# Patient Record
Sex: FEMALE | Race: WHITE | Employment: OTHER | URBAN - METROPOLITAN AREA
[De-identification: names, ages, dates, MRNs, and addresses within clinical notes are randomized per-mention and may not be internally consistent; named-entity substitution may affect disease eponyms.]

---

## 2019-02-04 VITALS
RESPIRATION RATE: 18 BRPM | OXYGEN SATURATION: 96 % | WEIGHT: 212 LBS | HEART RATE: 71 BPM | TEMPERATURE: 97 F | DIASTOLIC BLOOD PRESSURE: 60 MMHG | SYSTOLIC BLOOD PRESSURE: 142 MMHG

## 2019-02-04 RX ORDER — TAMSULOSIN HYDROCHLORIDE 0.4 MG/1
0.8 CAPSULE ORAL AT BEDTIME
COMMUNITY
End: 2019-06-12

## 2019-02-04 RX ORDER — ACETAMINOPHEN 500 MG
1000 TABLET ORAL EVERY MORNING
COMMUNITY
End: 2020-12-01

## 2019-02-04 RX ORDER — ASPIRIN 81 MG/1
81 TABLET, CHEWABLE ORAL DAILY
Status: ON HOLD | COMMUNITY
End: 2019-04-17

## 2019-02-04 RX ORDER — LIDOCAINE 50 MG/G
1 PATCH TOPICAL EVERY 24 HOURS
COMMUNITY
End: 2020-02-14

## 2019-02-05 ENCOUNTER — NURSING HOME VISIT (OUTPATIENT)
Dept: GERIATRICS | Facility: CLINIC | Age: 79
End: 2019-02-05
Payer: COMMERCIAL

## 2019-02-05 DIAGNOSIS — I48.19 PERSISTENT ATRIAL FIBRILLATION (H): ICD-10-CM

## 2019-02-05 DIAGNOSIS — G93.41 ACUTE METABOLIC ENCEPHALOPATHY DUE TO HYPOGLYCEMIA: Primary | ICD-10-CM

## 2019-02-05 DIAGNOSIS — E11.649 HYPOGLYCEMIA ASSOCIATED WITH TYPE 2 DIABETES MELLITUS (H): ICD-10-CM

## 2019-02-05 DIAGNOSIS — I10 ESSENTIAL HYPERTENSION: ICD-10-CM

## 2019-02-05 DIAGNOSIS — R05.9 COUGH: ICD-10-CM

## 2019-02-05 DIAGNOSIS — R13.12 OROPHARYNGEAL DYSPHAGIA: ICD-10-CM

## 2019-02-05 DIAGNOSIS — E16.2 ACUTE METABOLIC ENCEPHALOPATHY DUE TO HYPOGLYCEMIA: Primary | ICD-10-CM

## 2019-02-05 DIAGNOSIS — I50.22 CHRONIC SYSTOLIC HEART FAILURE (H): ICD-10-CM

## 2019-02-05 DIAGNOSIS — E03.9 ACQUIRED HYPOTHYROIDISM: ICD-10-CM

## 2019-02-05 PROCEDURE — 99310 SBSQ NF CARE HIGH MDM 45: CPT | Performed by: NURSE PRACTITIONER

## 2019-02-05 NOTE — LETTER
"    2/5/2019        RE: Chantal Cosby  [address]  [place] [address]              [facility] GERIATRIC SERVICES  PRIMARY CARE PROVIDER AND CLINIC:  No primary care provider on file. No primary physician on file.  Chief Complaint   Patient presents with     Hospital F/U     [place] Medical Record Number:  [id number]  Place of Service where encounter took place:  [facility] [facility] [[id number]]    HPI:    Chantal Cosby is a 78 year old  (1940),admitted to the above facility from  [facility] .  Hospital stay 1/8/19 through 2/1/19.  Admitted to this facility for  rehab, medical management and nursing care.  HPI information obtained from: Care Everywhere Epic chart review.  Current issues are:        1. Acute metabolic encephalopathy due to hypoglycemia    2. Hypoglycemia associated with type 2 diabetes mellitus (H)    3. Persistent atrial fibrillation (H)    4. Acquired hypothyroidism    5. Essential hypertension    6. Oropharyngeal dysphagia    7. Chronic systolic heart failure (H)      Came to see Kristine today as she was in the recliner in her room.  Chantal came to [place] from [facility] after intense therapies after her hospitalization for metabolic encephalopathy.    She was able to answer simple questions.  Her concern was this cough she has had for 2 days.  Is a risk for aspiration due to dysphagia.  Chantal asked if she could have Vicks Vapor Rub for her chest and \"whatever you can do\".  Noted to be up 4 lbs since admission.    Blood sugars taken twice a day.  No diabetic medication noted.    See summary below for details of hospitalization:      Copied from [facility] discharge note:  BRIEF HISTORY OF PRESENT ILLNESS: Kristine Cosby is a 78 y.o. female with past medical history of hypertension, diabetes mellitus type 2, chronic systolic heart failure, hyperlipidemia, hypothyroidism, CAD with STEMI s/p drug eluting stent 2016, prior right frontal infarct " "with no residual effects ~2008, and atrial fibrillation (not anticoagulated due to GI bleed on Pradaxa 2017) who presented to [facility] emergency department via EMS on 12/26/18 with altered mental status.     Of note, she was recently treated with urinary tract infection with Bactrim from 12/18/18-12/23/18. She had reported feeling lightheaded for the one week prior to hospitalization. She was with family on 12/25/18 in usual state of health. She cancelled further plans with family as she was not feeling well. That evening when daughter went by patient's home, she was noted to be in bed and was snoring so daughter did not disturb. She was next seen on 12/26/18 evening after family could not reach her on phone. When family arrived, she was still in her bed and not arousable and had \"dried foaming\" around mouth and what family described as possible seizure. EMS was called and she was found to have blood glucose of 31. She was given dextrose and transported to ED. She has a history of having one seizure after a stroke 10 years ago and is not on any antiepileptic medications at home.      At [facility] emergency department, CT brain showed no acute intracranial pathology. There was known right frontal encephalomalacia and diffuse volume loss also. Labs showed normal lactate and ammonia but she had slightly elevated troponin at 0.994, CK at 5231, and . She was started on diltiazem drip for atrial fibrillation with rapid ventricular response. She developed agonal breathing and was intubated for airway protection. She was admitted to ICU for further care needs. EEG did not show any syndromic epilepsy but did showed findings consistent with moderate to severe encephalopathy. No need for AED per neuro as seizure event most likely triggered by hypoglycemia. Echocardiogram revealed hypokinesis with EF 35-40% which was similar to prior 8/2017 echocardiogram findings. A nasogastric feeding tube was placed for " dysphagia.      MRI brain showed old right frontal infarct and moderately extensive changes of small vessel vascular disease and old lacunar type infarcts. She was extubated and transferred to floor.      CT Abdomen Pelvis was completed for anticipated PEG tube placement which revealed incidental 0.2 cm right middle lobe nodule and was recommended to follow up outpatient with [facility]. There was also noted age indeterminate T9 and T10 compression deformities but imaging had limited evaluation due to bilateral hip arthroplasties causing streak artifact. A large hiatal hernia was observed (per chart review, known since EGD 8/2017). After discussion with patient/family, it was decided not to pursue PEG due to very large hiatal hernia. Szymanski catheter removed 1/1/19 and she continued with urinary incontinence.      XR Video Swallow Study with Speech on 1/4/19 showed aspiration of thins, penetration without aspiration of nectar, and pooling in proximal esophagus. She was started on NDD1/nectar thickened liquids dysphagia diet with 1:1 supervision at meals.      On 1/7/19, she was hypotensive due to volume depletion in setting of multiple hypertensives. She received IV fluids and ACE/lasix was discontinued. Hypotension continued and metoprolol was held. Palliative care followed and confirmed DNR status after meeting with patient/family. Patient was coughing with nectar thickened liquids on 1/8/19. SLP evaluated and downgraded diet to NDD1/HONEY thickened liquids by teaspoon only until patient can consistently use compensatory strategies of swallow X 2 per bolus and volitional cough.      She was requiring moderate to total assist for ADLs and total assist for transfers/mobility. She was independent of ADLs/IADls and mobility prior to hospitalization. She was below functional baseline and admitted to [facility] acute inpatient rehabilitation on 1/8/19.     CODE STATUS/ADVANCE DIRECTIVES DISCUSSION:   DNR /  DNI  Patient's living condition: lives alone    ALLERGIES:Patient has no allergy information on record.  PAST MEDICAL HISTORY:  has a past medical history of Cellulitis of left lower extremity, GERD (gastroesophageal reflux disease), HTN (hypertension), Type 2 diabetes mellitus without complication (H), and Ulcer of left lower extremity with fat layer exposed (H).  PAST SURGICAL HISTORY:  has a past surgical history that includes Hysterectomy and ESOPHAGOGASTRODUODENOSCOPY.  FAMILY HISTORY: family history includes Lung Cancer in her father.  SOCIAL HISTORY:  reports that she has quit smoking. She has quit using smokeless tobacco. She reports that she does not drink alcohol.    Post Discharge Medication Reconciliation Status: discharge medications reconciled, continue medications without change.  Current Outpatient Medications   Medication Sig Dispense Refill     acetaminophen (TYLENOL) 500 MG tablet Take 500 mg by mouth 3 times daily       aspirin (ASA) 81 MG chewable tablet Take 81 mg by mouth daily       cholecalciferol 1000 units TABS Take 2,000 Units by mouth daily       guaiFENesin (ROBITUSSIN) 100 MG/5ML SYRP Take 10 mLs by mouth every 4 hours as needed for cough       lidocaine (LIDODERM) 5 % patch Place 1 patch onto the skin every 24 hours       Skin Protectants, Misc. (EUCERIN) cream Apply topically 2 times daily ; apply to arms, legs, hands, feet for dry skin       tamsulosin (FLOMAX) 0.4 MG capsule Take 0.8 mg by mouth At Bedtime         ROS:  10 point ROS of systems including Constitutional, Eyes, Respiratory, Cardiovascular, Gastroenterology, Genitourinary, Integumentary, Musculoskeletal, Psychiatric were all negative except for pertinent positives noted in my HPI.    Exam:  /60   Pulse 71   Temp 97  F (36.1  C)   Resp 18   Wt 96.2 kg (212 lb)   SpO2 96%   GENERAL APPEARANCE:  Alert, cooperative, little coughing through out the visit noted.  EYES:  PERRL, Conjunctiva and lids normal  RESP:   wheezing heard in lung fields.  congested cough but no production noted to see phlegm.  no oxygen used  CV:  Palpation and auscultation of heart done , regular rate and rhythm, no murmur, rub, or gallop, no edema  ABDOMEN:  normal bowel sounds, soft, nontender, no hepatosplenomegaly or other masses, no guarding or rebound  M/S:   Gait and station abnormal uses a walker for transfers, w/c for mobility, able to feed self  SKIN:  Inspection of skin and subcutaneous tissue baseline, Palpation of skin and subcutaneous tissue baseline  PSYCH:  oriented to person and time which is vague.  did not remember what town she lives in, memory impaired , affect and mood normal    Lab/Diagnostic data:  Hemoglobin AM (01/29/2019 5:30 AM)  Hemoglobin AM (01/29/2019 5:30 AM)   Component Value Ref Range Performed At   HEMOGLOBIN  11.7 (L) 12.0 - 16.0 g/dL [facility]     Basic metabolic panel AM (01/29/2019 5:30 AM)  Only the most recent of 10 results within the time period is included.    Basic metabolic panel AM (01/29/2019 5:30 AM)   Component Value Ref Range Performed At   SODIUM 138 135 - 145 mmol/L [facility]   POTASSIUM 4.6 3.5 - 5.0 mmol/L [facility]   CHLORIDE 108 98 - 110 mmol/L [facility]   CO2,TOTAL 24 21 - 31 mmol/L [facility]   ANION GAP 6 5 - 18  [facility]   GLUCOSE 123 (H) 65 - 100 mg/dL [facility]   CALCIUM 9.1 8.5 - 10.5 mg/dL [facility]   BUN 12 8 - 25 mg/dL [facility]   CREATININE 0.85 0.57 - 1.11 mg/dL [facility]   BUN/CREAT RATIO  14 10 - 20  [facility]   GFR if African American >60 >60 ml/min/1.73m2 [facility]   GFR if not  African American >60 >60 ml/min/1.73m2 [facility]     RENAL FUNCTION PANEL (01/24/2019 10:55 AM)  RENAL FUNCTION PANEL (01/24/2019 10:55 AM)   Component Value Ref Range Performed At   SODIUM 138 135 - 145 mmol/L [facility]   POTASSIUM 4.3 3.5 - 5.0 mmol/L [facility]   CHLORIDE 108 98 - 110 mmol/L [facility]   CO2,TOTAL 22 21 - 31 mmol/L [facility]   ANION GAP 8 5 - 18  [facility]   GLUCOSE 195 (H) 65 - 100 mg/dL [facility]   CALCIUM 9.0 8.5 - 10.5 mg/dL [facility]   BUN 13 8 - 25 mg/dL [facility]   CREATININE 1.03 0.57 - 1.11 mg/dL [facility]   BUN/CREAT RATIO  13 10 - 20  [facility]   GFR if African American >60 >60 ml/min/1.73m2 [facility]   GFR if not African American 52 (L) >60 ml/min/1.73m2 [facility]   PHOSPHORUS 3.3 2.3 - 4.7 mg/dL [facility]   ALBUMIN 3.3 3.2 - 4.6 g/dL [facility]     CBC W PLT NO DIFF (01/15/2019 4:48 AM)  Only the most recent of 3 results within the time period is included.    CBC W PLT NO DIFF (01/15/2019 4:48 AM)   Component Value Ref Range Performed At   WHITE BLOOD COUNT  7.8 4.5 - 11.0 thou/cu mm [facility]   RED BLOOD COUNT  3.66 (L) 4.00 - 5.20 mil/cu mm [facility]   HEMOGLOBIN  12.0 12.0 - 16.0 g/dL [facility]   HEMATOCRIT  35.4 33.0 - 51.0 % [facility]   MCV  97 80 - 100 fL [facility]   MCH  32.8 26.0 - 34.0 pg [facility]  LABORATORY-CENTRAL LABORATORY   MCHC  33.9 32.0 - 36.0 g/dL [facility] LABORATORY-CENTRAL LABORATORY   RDW  14.4 11.5 - 15.5 % [facility] LABORATORY-CENTRAL LABORATORY   PLATELET COUNT  217 140 - 440 thou/cu mm [facility] LABORATORY-CENTRAL LABORATORY   MPV  11.6 (H) 6.5 - 11.0 fL [facility] LABORATORY-CENTRAL LABORATORY                 ASSESSMENT/PLAN:  Acute metabolic encephalopathy due to hypoglycemia  Hypoglycemia associated with type 2 diabetes mellitus (H)  - at this time will continue her therapy for PT/OT and ST.  Came to Lovenox 40mg daily at HS.  This to be ongoing pending ambulation status.  No medication for the Diabetes.  Blood sugars will be done twice a day.  So far her sugars have ranged from 130-190's so far.  Last A1c was 6.1% on 12/27/18.  Glipizide discontinued.    Persistent atrial fibrillation (H)  Hx of GI bleed with Pradaxa 2017  Remains on ASA 81mg daily and Toprol XL 50mg daily.      Cough  Out of concern for this cough and knowing she aspirates, will obtain a CXR AP and lateral view.  Will order Albuterol nebs PRN and vicks vapor rub prn to chest.    Acquired hypothyroidism  Is on Synthroid 125mcg daily.    No changes at this time.    Essential hypertension  Vitals to be done daily.  Blood pressures so far 100-150's/60-70's.    Is on Toprol XL 50mg daily.      Oropharyngeal dysphagia  Unable to insert PEG tube due to large hiatal hernia.  Does aspirate on thins and penetration/aspiration of nectar from video swallow on 1/4/19.    Need soft foods with honey thickened liquids.  Instead of a magic cup will use a nutritional supplement that is to honey thick.  This facility uses [employer] that would be equivalent to magic cup  Will be working with speech therapy.    Chronic systolic heart failure (H)  Was on Lasix but discontinued due to low B/P's.  Vitals daily.  Will monitor for symptoms.       Orders:  1.  CXR AP & lateral view.  Dx: wheezing, cough  2.  Albuterol nebs 1 vial Q4H  PRN.  Dx: wheezing, SOB  3.  Vicks Vapor rub to chest BID PRN.  Dx: chest congestion  4.  Monday, CBC & BMP.  Dx: CHF, HTN    Total time spent with patient visit at the skilled nursing facility was 38 minutes including patient visit and review of past records. Greater than 50% of total time spent with counseling and coordinating care due to medication review and meet her concerns    Electronically signed by:  NATIVIDAD Sanon CNP, Cynthia Dianne Loehlein, APRN CNP

## 2019-02-06 PROBLEM — E16.2 ACUTE METABOLIC ENCEPHALOPATHY DUE TO HYPOGLYCEMIA: Status: ACTIVE | Noted: 2019-01-07

## 2019-02-06 PROBLEM — R53.1 GENERALIZED WEAKNESS: Status: ACTIVE | Noted: 2019-01-08

## 2019-02-06 PROBLEM — R13.10 DYSPHAGIA: Status: ACTIVE | Noted: 2019-01-08

## 2019-02-06 PROBLEM — Z86.73 HISTORY OF STROKE: Status: ACTIVE | Noted: 2019-01-08

## 2019-02-06 PROBLEM — F06.2 PSYCHOTIC DISORDER DUE TO MEDICAL CONDITION WITH DELUSIONS: Status: ACTIVE | Noted: 2019-02-06

## 2019-02-06 PROBLEM — Z51.5 PALLIATIVE CARE BY SPECIALIST: Status: ACTIVE | Noted: 2019-01-04

## 2019-02-06 PROBLEM — G93.41 ACUTE METABOLIC ENCEPHALOPATHY DUE TO HYPOGLYCEMIA: Status: ACTIVE | Noted: 2019-01-07

## 2019-02-06 PROBLEM — I50.22 CHRONIC SYSTOLIC HEART FAILURE (H): Status: ACTIVE | Noted: 2019-01-08

## 2019-02-06 PROBLEM — Z71.89 ACP (ADVANCE CARE PLANNING): Status: ACTIVE | Noted: 2019-01-04

## 2019-02-06 PROBLEM — E11.649 HYPOGLYCEMIA ASSOCIATED WITH TYPE 2 DIABETES MELLITUS (H): Status: ACTIVE | Noted: 2018-12-27

## 2019-02-06 PROBLEM — I25.10 CORONARY ATHEROSCLEROSIS: Status: ACTIVE | Noted: 2019-01-08

## 2019-02-06 RX ORDER — ALBUTEROL SULFATE 0.83 MG/ML
2.5 SOLUTION RESPIRATORY (INHALATION) EVERY 4 HOURS PRN
COMMUNITY
End: 2019-10-11

## 2019-02-06 SDOH — HEALTH STABILITY: MENTAL HEALTH: HOW OFTEN DO YOU HAVE A DRINK CONTAINING ALCOHOL?: NEVER

## 2019-02-06 NOTE — PROGRESS NOTES
"[place] [facility]  PRIMARY CARE PROVIDER AND CLINIC:  No primary care provider on file. No primary physician on file.  Chief Complaint   Patient presents with     Hospital F/U     [place] Medical Record Number:  [id number]  Place of Service where encounter took place:  [facility] [[id number]]    HPI:    Chantal Cosby is a 78 year old  (1940),admitted to the above facility from  [facility] .  Hospital stay 1/8/19 through 2/1/19.  Admitted to this facility for  rehab, medical management and nursing care.  HPI information obtained from: [facility] Everywhere [facility] chart review.  Current issues are:        1. Acute metabolic encephalopathy due to hypoglycemia    2. Hypoglycemia associated with type 2 diabetes mellitus (H)    3. Persistent atrial fibrillation (H)    4. Acquired hypothyroidism    5. Essential hypertension    6. Oropharyngeal dysphagia    7. Chronic systolic heart failure (H)      Came to see Kristine today as she was in the recliner in her room.  Chantal came to [place] from [facility] after intense therapies after her hospitalization for metabolic encephalopathy.    She was able to answer simple questions.  Her concern was this cough she has had for 2 days.  Is a risk for aspiration due to dysphagia.  Chantal asked if she could have Vicks Vapor Rub for her chest and \"whatever you can do\".  Noted to be up 4 lbs since admission.    Blood sugars taken twice a day.  No diabetic medication noted.    See summary below for details of hospitalization:      Copied from [facility] discharge note:  BRIEF HISTORY OF PRESENT ILLNESS: Kristine Cosby is a 78 y.o. 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     Of note, she was recently treated with urinary tract infection with Bactrim from 12/18/18-12/23/18. She had reported feeling lightheaded for the one week prior to hospitalization. She was with family on 12/25/18 in usual state of health. She cancelled further plans with family as she was not feeling well. That evening when daughter went by patient's home, she was noted to be in bed and was snoring so daughter did not disturb. She was next seen on 12/26/18 evening after family could not reach her on phone. When family arrived, she was still in her bed and not arousable and had \"dried foaming\" around mouth and what family described as possible seizure. EMS was called and she was found to have blood glucose of 31. She was given dextrose and transported to ED. She has a history of having one seizure after a stroke 10 years ago and is not on any antiepileptic medications at home.      At [facility] emergency department, CT brain showed no acute intracranial pathology. There was known right frontal encephalomalacia and diffuse volume loss also. Labs showed normal lactate and ammonia but she had slightly elevated troponin at 0.994, CK at 5231, and . She was started on diltiazem drip for atrial fibrillation with rapid ventricular response. She developed agonal breathing and was intubated for airway protection. She was admitted to ICU for further care needs. EEG did not show any syndromic epilepsy but did showed findings consistent with moderate to severe encephalopathy. No need for AED per neuro as seizure event most likely triggered by hypoglycemia. Echocardiogram revealed hypokinesis with EF 35-40% which was similar to prior 8/2017 echocardiogram findings. A nasogastric feeding tube was placed for dysphagia.      MRI brain showed old right frontal infarct and moderately extensive changes of " small vessel vascular disease and old lacunar type infarcts. She was extubated and transferred to floor.      CT Abdomen Pelvis was completed for anticipated PEG tube placement which revealed incidental 0.2 cm right middle lobe nodule and was recommended to follow up outpatient with [facility]. There was also noted age indeterminate T9 and T10 compression deformities but imaging had limited evaluation due to bilateral hip arthroplasties causing streak artifact. A large hiatal hernia was observed (per chart review, known since EGD 8/2017). After discussion with patient/family, it was decided not to pursue PEG due to very large hiatal hernia. Szymanski catheter removed 1/1/19 and she continued with urinary incontinence.      XR Video Swallow Study with Speech on 1/4/19 showed aspiration of thins, penetration without aspiration of nectar, and pooling in proximal esophagus. She was started on NDD1/nectar thickened liquids dysphagia diet with 1:1 supervision at meals.      On 1/7/19, she was hypotensive due to volume depletion in setting of multiple hypertensives. She received IV fluids and ACE/lasix was discontinued. Hypotension continued and metoprolol was held. Palliative care followed and confirmed DNR status after meeting with patient/family. Patient was coughing with nectar thickened liquids on 1/8/19. SLP evaluated and downgraded diet to NDD1/HONEY thickened liquids by teaspoon only until patient can consistently use compensatory strategies of swallow X 2 per bolus and volitional cough.      She was requiring moderate to total assist for ADLs and total assist for transfers/mobility. She was independent of ADLs/IADls and mobility prior to hospitalization. She was below functional baseline and admitted to [facility] acute inpatient rehabilitation on 1/8/19.     CODE STATUS/ADVANCE DIRECTIVES DISCUSSION:   DNR / DNI  Patient's living condition: lives alone    ALLERGIES:Patient has no allergy information on  record.  PAST MEDICAL HISTORY:  has a past medical history of Cellulitis of left lower extremity, GERD (gastroesophageal reflux disease), HTN (hypertension), Type 2 diabetes mellitus without complication (H), and Ulcer of left lower extremity with fat layer exposed (H).  PAST SURGICAL HISTORY:  has a past surgical history that includes Hysterectomy and ESOPHAGOGASTRODUODENOSCOPY.  FAMILY HISTORY: family history includes Lung Cancer in her father.  SOCIAL HISTORY:  reports that she has quit smoking. She has quit using smokeless tobacco. She reports that she does not drink alcohol.    Post Discharge Medication Reconciliation Status: discharge medications reconciled, continue medications without change.  Current Outpatient Medications   Medication Sig Dispense Refill     acetaminophen (TYLENOL) 500 MG tablet Take 500 mg by mouth 3 times daily       aspirin (ASA) 81 MG chewable tablet Take 81 mg by mouth daily       cholecalciferol 1000 units TABS Take 2,000 Units by mouth daily       guaiFENesin (ROBITUSSIN) 100 MG/5ML SYRP Take 10 mLs by mouth every 4 hours as needed for cough       lidocaine (LIDODERM) 5 % patch Place 1 patch onto the skin every 24 hours       Skin Protectants, Misc. (EUCERIN) cream Apply topically 2 times daily ; apply to arms, legs, hands, feet for dry skin       tamsulosin (FLOMAX) 0.4 MG capsule Take 0.8 mg by mouth At Bedtime         ROS:  10 point ROS of systems including Constitutional, Eyes, Respiratory, Cardiovascular, Gastroenterology, Genitourinary, Integumentary, Musculoskeletal, Psychiatric were all negative except for pertinent positives noted in my HPI.    Exam:  /60   Pulse 71   Temp 97  F (36.1  C)   Resp 18   Wt 96.2 kg (212 lb)   SpO2 96%   GENERAL APPEARANCE:  Alert, cooperative, little coughing through out the visit noted.  EYES:  PERRL, Conjunctiva and lids normal  RESP:  wheezing heard in lung fields.  congested cough but no production noted to see phlegm.  no oxygen  used  CV:  Palpation and auscultation of heart done , regular rate and rhythm, no murmur, rub, or gallop, no edema  ABDOMEN:  normal bowel sounds, soft, nontender, no hepatosplenomegaly or other masses, no guarding or rebound  M/S:   Gait and station abnormal uses a walker for transfers, w/c for mobility, able to feed self  SKIN:  Inspection of skin and subcutaneous tissue baseline, Palpation of skin and subcutaneous tissue baseline  PSYCH:  oriented to person and time which is vague.  did not remember what town she lives in, memory impaired , affect and mood normal    Lab/Diagnostic data:  Hemoglobin AM (01/29/2019 5:30 AM)  Hemoglobin AM (01/29/2019 5:30 AM)   Component Value Ref Range Performed At   HEMOGLOBIN  11.7 (L) 12.0 - 16.0 g/dL [facility]     Basic metabolic panel AM (01/29/2019 5:30 AM)  Only the most recent of 10 results within the time period is included.    Basic metabolic panel AM (01/29/2019 5:30 AM)   Component Value Ref Range Performed At   SODIUM 138 135 - 145 mmol/L [facility]   POTASSIUM 4.6 3.5 - 5.0 mmol/L [facility]   CHLORIDE 108 98 - 110 mmol/L [facility]   CO2,TOTAL 24 21 - 31 mmol/L [facility]   ANION GAP 6 5 - 18  [facility]   GLUCOSE 123 (H) 65 - 100 mg/dL [facility]   CALCIUM 9.1 8.5 - 10.5 mg/dL [facility]   BUN 12 8 - 25 mg/dL [facility]   CREATININE 0.85 0.57 - 1.11 mg/dL [facility]   BUN/CREAT RATIO  14 10 - 20  [facility]   GFR if African American >60 >60 ml/min/1.73m2 [facility]   GFR if not African American >60 >60 ml/min/1.73m2 [facility]     RENAL  FUNCTION PANEL (01/24/2019 10:55 AM)  RENAL FUNCTION PANEL (01/24/2019 10:55 AM)   Component Value Ref Range Performed At   SODIUM 138 135 - 145 mmol/L [facility]   POTASSIUM 4.3 3.5 - 5.0 mmol/L [facility]   CHLORIDE 108 98 - 110 mmol/L [facility]   CO2,TOTAL 22 21 - 31 mmol/L [facility]   ANION GAP 8 5 - 18  [facility]   GLUCOSE 195 (H) 65 - 100 mg/dL [facility]   CALCIUM 9.0 8.5 - 10.5 mg/dL [facility]   BUN 13 8 - 25 mg/dL [facility]   CREATININE 1.03 0.57 - 1.11 mg/dL [facility]   BUN/CREAT RATIO  13 10 - 20  [facility]   GFR if African American >60 >60 ml/min/1.73m2 [facility]   GFR if not African American 52 (L) >60 ml/min/1.73m2 [facility]   PHOSPHORUS 3.3 2.3 - 4.7 mg/dL [facility]   ALBUMIN 3.3 3.2 - 4.6 g/dL [facility]     CBC W PLT NO DIFF (01/15/2019 4:48 AM)  Only the most recent of 3 results within the time period is included.    CBC W PLT NO DIFF (01/15/2019 4:48 AM)   Component Value Ref Range Performed At   WHITE BLOOD COUNT  7.8 4.5 - 11.0 thou/cu mm [facility]   RED BLOOD COUNT  3.66 (L) 4.00 - 5.20 mil/cu mm [facility]   HEMOGLOBIN  12.0 12.0 - 16.0 g/dL [facility]   HEMATOCRIT  35.4 33.0 - 51.0 % [facility]   MCV  97 80 - 100 fL [facility]   MCH  32.8 26.0 - 34.0 pg [facility]   MCHC  33.9 32.0 - 36.0 g/dL [facility]  LABORATORY   RDW  14.4 11.5 - 15.5 % [facility]AT[facility]-C[facility]TR[facility] LABORATORY   PLATELET COUNT  217 140 - 440 thou/cu mm [facility] LAB[facility]ORY   MPV  11.6 (H) 6.5 - 11.0 fL [facility]AL LAB[facility]ATORY                 ASSESSMENT/PLAN:  Acute metabolic encephalopathy due to hypoglycemia  Hypoglycemia associated with type 2 diabetes mellitus (H)  - at this time will continue her therapy for PT/OT and ST.  Came to Lovenox 40mg daily at HS.  This to be ongoing pending ambulation status.  No medication for the Diabetes.  Blood sugars will be done twice a day.  So far her sugars have ranged from 130-190's so far.  Last A1c was 6.1% on 12/27/18.  Glipizide discontinued.    Persistent atrial fibrillation (H)  Hx of GI bleed with Pradaxa 2017  Remains on ASA 81mg daily and Toprol XL 50mg daily.      Cough  Out of concern for this cough and knowing she aspirates, will obtain a CXR AP and lateral view.  Will order Albuterol nebs PRN and vicks vapor rub prn to chest.    Acquired hypothyroidism  Is on Synthroid 125mcg daily.    No changes at this time.    Essential hypertension  Vitals to be done daily.  Blood pressures so far 100-150's/60-70's.    Is on Toprol XL 50mg daily.      Oropharyngeal dysphagia  Unable to insert PEG tube due to large hiatal hernia.  Does aspirate on thins and penetration/aspiration of nectar from video swallow on 1/4/19.    Need soft foods with honey thickened liquids.  Instead of a magic cup will use a nutritional supplement that is to honey thick.  This facility uses [employer] that would be equivalent to magic cup  Will be working with speech therapy.    Chronic systolic heart failure (H)  Was on Lasix but discontinued due to low B/P's.  Vitals daily.  Will monitor for symptoms.       Orders:  1.  CXR AP & lateral view.  Dx: wheezing, cough  2.  Albuterol nebs 1 vial Q4H PRN.  Dx: wheezing, SOB  3.  Vicks Vapor rub to chest BID PRN.  Dx: chest congestion  4.   Monday, CBC & BMP.  Dx: CHF, HTN    Total time spent with patient visit at the skilled nursing facility was 38 minutes including patient visit and review of past records. Greater than 50% of total time spent with counseling and coordinating care due to medication review and meet her concerns    Electronically signed by:  NATIVIDAD Sanon CNP

## 2019-02-11 ENCOUNTER — HOSPITAL LABORATORY (OUTPATIENT)
Dept: NURSING HOME | Facility: OTHER | Age: 79
End: 2019-02-11

## 2019-02-11 LAB
ANION GAP SERPL CALCULATED.3IONS-SCNC: 6 MMOL/L (ref 3–14)
BUN SERPL-MCNC: 14 MG/DL (ref 7–30)
CALCIUM SERPL-MCNC: 8.5 MG/DL (ref 8.5–10.1)
CHLORIDE SERPL-SCNC: 110 MMOL/L (ref 94–109)
CO2 SERPL-SCNC: 27 MMOL/L (ref 20–32)
CREAT SERPL-MCNC: 0.73 MG/DL (ref 0.52–1.04)
ERYTHROCYTE [DISTWIDTH] IN BLOOD BY AUTOMATED COUNT: 14.4 % (ref 10–15)
GFR SERPL CREATININE-BSD FRML MDRD: 79 ML/MIN/{1.73_M2}
GLUCOSE SERPL-MCNC: 120 MG/DL (ref 70–99)
HCT VFR BLD AUTO: 32.9 % (ref 35–47)
HGB BLD-MCNC: 10.9 G/DL (ref 11.7–15.7)
MCH RBC QN AUTO: 33.5 PG (ref 26.5–33)
MCHC RBC AUTO-ENTMCNC: 33.1 G/DL (ref 31.5–36.5)
MCV RBC AUTO: 101 FL (ref 78–100)
PLATELET # BLD AUTO: 186 10E9/L (ref 150–450)
POTASSIUM SERPL-SCNC: 3.7 MMOL/L (ref 3.4–5.3)
RBC # BLD AUTO: 3.25 10E12/L (ref 3.8–5.2)
SODIUM SERPL-SCNC: 143 MMOL/L (ref 133–144)
WBC # BLD AUTO: 4.8 10E9/L (ref 4–11)

## 2019-02-21 ENCOUNTER — NURSING HOME VISIT (OUTPATIENT)
Dept: GERIATRICS | Facility: CLINIC | Age: 79
End: 2019-02-21
Payer: COMMERCIAL

## 2019-02-21 VITALS
SYSTOLIC BLOOD PRESSURE: 130 MMHG | RESPIRATION RATE: 22 BRPM | OXYGEN SATURATION: 98 % | HEIGHT: 67 IN | HEART RATE: 101 BPM | WEIGHT: 218 LBS | DIASTOLIC BLOOD PRESSURE: 76 MMHG | BODY MASS INDEX: 34.21 KG/M2 | TEMPERATURE: 96.7 F

## 2019-02-21 DIAGNOSIS — I50.22 CHRONIC SYSTOLIC HEART FAILURE (H): ICD-10-CM

## 2019-02-21 DIAGNOSIS — I48.20 CHRONIC ATRIAL FIBRILLATION (H): Primary | ICD-10-CM

## 2019-02-21 DIAGNOSIS — M62.81 GENERALIZED MUSCLE WEAKNESS: ICD-10-CM

## 2019-02-21 DIAGNOSIS — R13.12 OROPHARYNGEAL DYSPHAGIA: ICD-10-CM

## 2019-02-21 DIAGNOSIS — I10 ESSENTIAL HYPERTENSION: ICD-10-CM

## 2019-02-21 DIAGNOSIS — E11.9 TYPE 2 DIABETES MELLITUS WITHOUT COMPLICATION, WITHOUT LONG-TERM CURRENT USE OF INSULIN (H): ICD-10-CM

## 2019-02-21 PROCEDURE — 99305 1ST NF CARE MODERATE MDM 35: CPT | Performed by: FAMILY MEDICINE

## 2019-02-21 RX ORDER — PANTOPRAZOLE SODIUM 40 MG/1
40 TABLET, DELAYED RELEASE ORAL DAILY
COMMUNITY
End: 2019-06-12

## 2019-02-21 RX ORDER — MENTHOL
LOZENGE MUCOUS MEMBRANE 2 TIMES DAILY PRN
COMMUNITY
End: 2019-04-02

## 2019-02-21 RX ORDER — METOPROLOL SUCCINATE 50 MG/1
50 TABLET, EXTENDED RELEASE ORAL DAILY
COMMUNITY

## 2019-02-21 RX ORDER — NYSTATIN 100000 [USP'U]/G
POWDER TOPICAL 2 TIMES DAILY PRN
COMMUNITY
End: 2020-12-01

## 2019-02-21 RX ORDER — LEVOTHYROXINE SODIUM 125 UG/1
125 TABLET ORAL DAILY
COMMUNITY
End: 2019-06-12

## 2019-02-21 RX ORDER — AMOXICILLIN 250 MG
2 CAPSULE ORAL DAILY
COMMUNITY

## 2019-02-21 RX ORDER — NITROGLYCERIN 0.4 MG/1
0.4 TABLET SUBLINGUAL EVERY 5 MIN PRN
COMMUNITY

## 2019-02-21 RX ORDER — ATORVASTATIN CALCIUM 20 MG/1
20 TABLET, FILM COATED ORAL AT BEDTIME
COMMUNITY

## 2019-02-21 ASSESSMENT — MIFFLIN-ST. JEOR: SCORE: 1506.47

## 2019-02-21 NOTE — PROGRESS NOTES
[place] [facility]  PRIMARY CARE PROVIDER AND CLINIC:  No primary care provider on file. No primary physician on file.  Chief Complaint   Patient presents with     Hospital F/U     Clinic Care Coordination - Initial     [place] Medical Record Number:  [id number]  Place of Service where encounter took place:  [facility] [[id number]]    HPI:    Chantal Cosby is a 78 year old  (1940),admitted to the above facility from  [facility] .  Hospital stay 1/8/19 through 2/1/19.  Admitted to this facility for  rehab, medical management and nursing care.  HPI information obtained from: facility chart records and Care Everywhere Epic chart review.     Pt with T2D admitted to the above hospital for AMS 2/2 hypoglycemia, intubated for agonal breathing, a-fib RVR, dysphagia had failed PEG placement attempt due to large hiatal hernia.      Current issues are:      - Pt seen and examined today. Reports started rehab and making a  Progress. GNP reports now can transfer with assistance  And able to propel wheel chair with a gaol to return home.   - GNP reports pt developed LLL pna, treated with Augmentin. Pt reports slight cough with clear phlegm, no fever or chills.   - GNP reports Pt on dysphagia diet and working with speech therapist. Pt reports tolerating well the diet.   - GNP reports Pt now is off insulin and oral meds given [facility] readings have been wnl for this patient.   =====================================  CODE STATUS/ADVANCE DIRECTIVES DISCUSSION:   DNR / DNI  Patient's living condition: lives alone    ALLERGIES:Patient has no allergy information on record.  PAST MEDICAL HISTORY:  has a past medical history of Cellulitis of left lower extremity, GERD (gastroesophageal reflux disease), HTN (hypertension), Type 2 diabetes mellitus without complication (H), and Ulcer of left lower extremity with fat layer exposed (H).  PAST SURGICAL HISTORY:  has a past surgical  history that includes Hysterectomy and ESOPHAGOGASTRODUODENOSCOPY.  FAMILY HISTORY: family history includes Lung Cancer in her father.  SOCIAL HISTORY:  reports that she has quit smoking. She has quit using smokeless tobacco. She reports that she does not drink alcohol.    Post Discharge Medication Reconciliation Status: discharge medications reconciled and changed, per note/orders (see AVS).  Current Outpatient Medications   Medication Sig Dispense Refill     acetaminophen (TYLENOL) 500 MG tablet Take 500 mg by mouth 3 times daily       albuterol (PROVENTIL) (2.5 MG/3ML) 0.083% neb solution Take 2.5 mg by nebulization every 4 hours as needed for shortness of breath / dyspnea or wheezing       aspirin (ASA) 81 MG chewable tablet Take 81 mg by mouth daily       atorvastatin (LIPITOR) 20 MG tablet Take 20 mg by mouth At Bedtime       camphor-eucalyptus-menthol (VICKS VAPORUB) 4.73-1.2-2.6 % OINT ointment Apply topically 2 times daily as needed for cough       cholecalciferol 1000 units TABS Take 2,000 Units by mouth daily       enoxaparin (LOVENOX) 40 MG/0.4ML syringe Inject 40 mg Subcutaneous At Bedtime       levothyroxine (SYNTHROID/LEVOTHROID) 125 MCG tablet Take 125 mcg by mouth daily       lidocaine (LIDODERM) 5 % patch Place 1 patch onto the skin every 24 hours       metoprolol succinate ER (TOPROL-XL) 50 MG 24 hr tablet Take 50 mg by mouth daily       nitroGLYcerin (NITROSTAT) 0.4 MG sublingual tablet Place 0.4 mg under the tongue every 5 minutes as needed for chest pain For chest pain place 1 tablet under the tongue every 5 minutes for 3 doses. If symptoms persist 5 minutes after 1st dose call 911.       nystatin (MYCOSTATIN) 449563 UNIT/GM external powder Apply topically 2 times daily as needed       pantoprazole (PROTONIX) 40 MG EC tablet Take 40 mg by mouth daily       senna-docusate (SENOKOT-S/PERICOLACE) 8.6-50 MG tablet Take 1-2 tablets by mouth 2 times daily       Skin Protectants, Misc. (EUCERIN) cream  "Apply topically 2 times daily ; apply to arms, legs, hands, feet for dry skin       tamsulosin (FLOMAX) 0.4 MG capsule Take 0.8 mg by mouth At Bedtime         ROS:  10 point ROS of systems including Constitutional, Eyes, Respiratory, Cardiovascular, Gastroenterology, Genitourinary, Integumentary, Musculoskeletal, Psychiatric were all negative except for pertinent positives noted in my HPI.    Exam:  /76   Pulse 101   Temp 96.7  F (35.9  C)   Resp 22   Ht 1.702 m (5' 7\")   Wt 98.9 kg (218 lb)   SpO2 98%   BMI 34.14 kg/m    GENERAL APPEARANCE:  in no distress, cooperative, obese  ENT:  Mouth and posterior oropharynx normal, moist mucous membranes, oral mucosa moist, no lesion noted.   EYES:  EOMI, Pupil rounded and equal.  RESP:  lungs clear to auscultation   CV:  S1S2 audible, irregular HR, no murmur appreciated. Traces pedal edema.   ABDOMEN:  soft, NT/ND, BS audible. no mass appreciated on palpation.   M/S:   no joint deformity noted on observation.   SKIN:  No rash.   NEURO:   No NFD appreciated on observation. Hand  5/5 b/l  PSYCH:  AAOx person, and year but not place, day or month. Fair insight, and affected memory. affect and mood normal        Lab/Diagnostic data:  CBC RESULTS:   Recent Labs   Lab Test 02/11/19  0718   WBC 4.8   RBC 3.25*   HGB 10.9*   HCT 32.9*   *   MCH 33.5*   MCHC 33.1   RDW 14.4          Last Basic Metabolic Panel:  Recent Labs   Lab Test 02/11/19  0718      POTASSIUM 3.7   CHLORIDE 110*   MIKE 8.5   CO2 27   BUN 14   CR 0.73   *       ASSESSMENT/PLAN:  Chronic atrial fibrillation (H)  Chronic systolic heart failure (H)  Essential hypertension  - compensated.   - on metoprolol and ASA    Type 2 diabetes mellitus without complication, without long-term current use of insulin (H)  -Controlled. Off meds now.     Oropharyngeal dysphagia  - on dysphagia diet, failed PEG placement 2/2 large hiatal hernia. At risk for aspiration.   - SLP     Generalized " muscle weakness  - started rehab program, making a progress, continue until desired goal is achieved.      Orders:  - See above, otherwise, continue the rest of the current POC.     Electronically signed by:  Parris Navarro MD

## 2019-02-21 NOTE — LETTER
2/21/2019        RE: Kristine Cosby  [address] [facility] [address]        [facility]  PRIMARY CARE PROVIDER AND CLINIC:  No primary care provider on file. No primary physician on file.  Chief Complaint   Patient presents with     Hospital F/U     Clinic Care Coordination - Initial     [facility] Record Number:  [id number]  Place of Service where encounter took place:  [facility] [[id number]]    HPI:    Chantal Cosby is a 78 year old  (1940),admitted to the above facility from  [facility] .  Hospital stay 1/8/19 through 2/1/19.  Admitted to this facility for  rehab, medical management and nursing care.  HPI information obtained from: facility chart records and Care Everywhere Epic chart review.     Pt with T2D admitted to the above hospital for AMS 2/2 hypoglycemia, intubated for agonal breathing, a-fib RVR, dysphagia had failed PEG placement attempt due to large hiatal hernia.      Current issues are:      - Pt seen and examined today. Reports started rehab and making a  Progress. GNP reports now can transfer with assistance  And able to propel wheel chair with a gaol to return home.   - GNP reports pt developed LLL pna, treated with Augmentin. Pt reports slight cough with clear phlegm, no fever or chills.   - GNP reports Pt on dysphagia diet and working with speech therapist. Pt reports tolerating well the diet.   - GNP reports Pt now is off insulin and oral meds given BGS readings have been wnl for this patient.   =====================================  CODE STATUS/ADVANCE DIRECTIVES DISCUSSION:   DNR / DNI  Patient's living condition: lives alone    ALLERGIES:Patient has no allergy information on record.  PAST MEDICAL HISTORY:  has a past medical history of Cellulitis of left lower extremity, GERD (gastroesophageal reflux disease), HTN (hypertension), Type 2 diabetes mellitus without complication (H), and Ulcer of left  lower extremity with fat layer exposed (H).  PAST SURGICAL HISTORY:  has a past surgical history that includes Hysterectomy and ESOPHAGOGASTRODUODENOSCOPY.  FAMILY HISTORY: family history includes Lung Cancer in her father.  SOCIAL HISTORY:  reports that she has quit smoking. She has quit using smokeless tobacco. She reports that she does not drink alcohol.    Post Discharge Medication Reconciliation Status: discharge medications reconciled and changed, per note/orders (see AVS).  Current Outpatient Medications   Medication Sig Dispense Refill     acetaminophen (TYLENOL) 500 MG tablet Take 500 mg by mouth 3 times daily       albuterol (PROVENTIL) (2.5 MG/3ML) 0.083% neb solution Take 2.5 mg by nebulization every 4 hours as needed for shortness of breath / dyspnea or wheezing       aspirin (ASA) 81 MG chewable tablet Take 81 mg by mouth daily       atorvastatin (LIPITOR) 20 MG tablet Take 20 mg by mouth At Bedtime       camphor-eucalyptus-menthol (VICKS VAPORUB) 4.73-1.2-2.6 % OINT ointment Apply topically 2 times daily as needed for cough       cholecalciferol 1000 units TABS Take 2,000 Units by mouth daily       enoxaparin (LOVENOX) 40 MG/0.4ML syringe Inject 40 mg Subcutaneous At Bedtime       levothyroxine (SYNTHROID/LEVOTHROID) 125 MCG tablet Take 125 mcg by mouth daily       lidocaine (LIDODERM) 5 % patch Place 1 patch onto the skin every 24 hours       metoprolol succinate ER (TOPROL-XL) 50 MG 24 hr tablet Take 50 mg by mouth daily       nitroGLYcerin (NITROSTAT) 0.4 MG sublingual tablet Place 0.4 mg under the tongue every 5 minutes as needed for chest pain For chest pain place 1 tablet under the tongue every 5 minutes for 3 doses. If symptoms persist 5 minutes after 1st dose call 911.       nystatin (MYCOSTATIN) 672466 UNIT/GM external powder Apply topically 2 times daily as needed       pantoprazole (PROTONIX) 40 MG EC tablet Take 40 mg by mouth daily       senna-docusate (SENOKOT-S/PERICOLACE) 8.6-50 MG  "tablet Take 1-2 tablets by mouth 2 times daily       Skin Protectants, Misc. (EUCERIN) cream Apply topically 2 times daily ; apply to arms, legs, hands, feet for dry skin       tamsulosin (FLOMAX) 0.4 MG capsule Take 0.8 mg by mouth At Bedtime         ROS:  10 point ROS of systems including Constitutional, Eyes, Respiratory, Cardiovascular, Gastroenterology, Genitourinary, Integumentary, Musculoskeletal, Psychiatric were all negative except for pertinent positives noted in my HPI.    Exam:  /76   Pulse 101   Temp 96.7  F (35.9  C)   Resp 22   Ht 1.702 m (5' 7\")   Wt 98.9 kg (218 lb)   SpO2 98%   BMI 34.14 kg/m     GENERAL APPEARANCE:  in no distress, cooperative, obese  ENT:  Mouth and posterior oropharynx normal, moist mucous membranes, oral mucosa moist, no lesion noted.   EYES:  EOMI, Pupil rounded and equal.  RESP:  lungs clear to auscultation   CV:  S1S2 audible, irregular HR, no murmur appreciated. Traces pedal edema.   ABDOMEN:  soft, NT/ND, BS audible. no mass appreciated on palpation.   M/S:   no joint deformity noted on observation.   SKIN:  No rash.   NEURO:   No NFD appreciated on observation. Hand  5/5 b/l  PSYCH:  AAOx person, and year but not place, day or month. Fair insight, and affected memory. affect and mood normal        Lab/Diagnostic data:  CBC RESULTS:   Recent Labs   Lab Test 02/11/19  0718   WBC 4.8   RBC 3.25*   HGB 10.9*   HCT 32.9*   *   MCH 33.5*   MCHC 33.1   RDW 14.4          Last Basic Metabolic Panel:  Recent Labs   Lab Test 02/11/19  0718      POTASSIUM 3.7   CHLORIDE 110*   MIKE 8.5   CO2 27   BUN 14   CR 0.73   *       ASSESSMENT/PLAN:  Chronic atrial fibrillation (H)  Chronic systolic heart failure (H)  Essential hypertension  - compensated.   - on metoprolol and ASA    Type 2 diabetes mellitus without complication, without long-term current use of insulin (H)  -Controlled. Off meds now.     Oropharyngeal dysphagia  - on dysphagia diet, " failed PEG placement 2/2 large hiatal hernia. At risk for aspiration.   - SLP     Generalized muscle weakness  - started rehab program, making a progress, continue until desired goal is achieved.      Orders:  - See above, otherwise, continue the rest of the current POC.     Electronically signed by:  Parris Navarro MD                    Sincerely,        Parris Navarro MD

## 2019-03-06 PROBLEM — M62.81 GENERALIZED MUSCLE WEAKNESS: Status: ACTIVE | Noted: 2019-03-06

## 2019-03-25 ENCOUNTER — HOSPITAL ENCOUNTER (OUTPATIENT)
Dept: GENERAL RADIOLOGY | Facility: CLINIC | Age: 79
Discharge: HOME | End: 2019-03-25
Attending: NURSE PRACTITIONER | Admitting: NURSE PRACTITIONER
Payer: COMMERCIAL

## 2019-03-25 ENCOUNTER — HOSPITAL ENCOUNTER (OUTPATIENT)
Dept: SPEECH THERAPY | Facility: CLINIC | Age: 79
End: 2019-03-25
Attending: NURSE PRACTITIONER
Payer: COMMERCIAL

## 2019-03-25 DIAGNOSIS — R13.10 DYSPHAGIA: ICD-10-CM

## 2019-03-25 PROCEDURE — 92610 EVALUATE SWALLOWING FUNCTION: CPT | Mod: GN | Performed by: SPEECH-LANGUAGE PATHOLOGIST

## 2019-03-25 PROCEDURE — 92611 MOTION FLUOROSCOPY/SWALLOW: CPT | Mod: GN | Performed by: SPEECH-LANGUAGE PATHOLOGIST

## 2019-03-25 PROCEDURE — 74230 X-RAY XM SWLNG FUNCJ C+: CPT | Mod: TC

## 2019-03-25 RX ORDER — BARIUM SULFATE 400 MG/ML
SUSPENSION ORAL ONCE
Status: COMPLETED | OUTPATIENT
Start: 2019-03-25 | End: 2019-03-25

## 2019-03-25 RX ADMIN — BARIUM SULFATE: 400 SUSPENSION ORAL at 10:10

## 2019-03-25 NOTE — PROGRESS NOTES
" 03/25/19 1030   General Information   Type Of Visit Initial   Start Of Care Date 03/25/19   Referring Physician Karyn Hogue   Orders Evaluate And Treat   Orders Comment VFSS   Medical Diagnosis Dysphagia   Onset Of Illness/injury Or Date Of Surgery 02/25/19   Precautions/limitations Swallowing Precautions   Hearing WFLs   Pertinent History of Current Problem/OT: Additional Occupational Profile Info Patient is 78 year old woman who is currently in rehab at [facility] and receiving ST for swallowing difficulties.  Current diet is nectar thick liquids and regular food items.  Patient reports that she takes her medications with water.  PMH includes dysphagia, HTN, GERD, DB 2, acute metabolic encephalopathy due to hypoglycemia, acute CHF, generalized muscle weakness, seizures, UTI, TIA and cellulitis.  Patient reports she is not in pain and has no pain associated to her swallowing difficulties.  She reports that she has not lost weight with her swallowing difficulties and strongly dislikes her nectar thick liquids and pauses in starting her swallow due to the strong dislike of the thicker liquid sensation. A video swallow study has been ordered today to assess the function of her swallowing mechanism and rule out aspiration.     Respiratory Status Room air   Prior Level Of Function Swallowing   Prior Level Of Function Comment nectar thick liquids, regular food items, pills whole with water   Patient Role/employment History Retired   Living Environment [facility]   General Observations Patient is alert and participated well with the evaluation today.  She followed directions without difficulties and participated well in the discussion folloqing the procedure, regarding results and recommendations.     Patient/family Goals \"I don't want these liquids thick.  They make me sick to my stomach.\"   Pain Assessment   Pain Reported No   Fall Risk Screen   Is patient a fall risk? Yes;Department fall risk interventions " implemented   Clinical Swallow Evaluation   Oral Musculature generally intact   Structural Abnormalities none present   Dentition upper and lower dentures  (dentures fit well per patient report and observation)   Mucosal Quality good   Mandibular Strength and Mobility intact   Oral Labial Strength and Mobility WFL   Lingual Strength and Mobility impaired coordination   Velar Elevation intact   Buccal Strength and Mobility intact   Laryngeal Function Cough;Throat clear;Swallow;Voicing initiated;Dry swallow palpated   Oral Musculature Comments Mild deficits noted in coordination of masticators.     Additional Documentation Yes   Additional evaluation(s) completed today Yes;Recommended   Rationale for completing additional evaluation to assess function of the swallowing mechanism, rule out aspiration and determine safest diet.   Clinical Swallow Eval: Nectar Thick Liquid Texture Trial   Mode of Presentation, Nectar self-fed;cup   Volume of Nectar Presented .5 ounce   Oral Phase, [place] Poor AP movement   Pharyngeal Phase, [place] impaired   Diagnostic Statement possible penetration/aspiration   VFSS Evaluation   VFSS Additional Documentation Yes   VFSS Eval: Radiology   Radiologist Dr. Eric Carrasco   Views Taken left lateral   Physical Location of Procedure [facility] [place] [address]   VFSS Eval: Thin Liquid Texture Trial   Mode of Presentation, Thin Liquid spoon   Order of Presentation 11, 12   Preparatory Phase WFL   Oral Phase, Thin Liquid WFL   Pharyngeal Phase, Thin Liquid Residue in valleculae;Residue in pyriform sinus   Rosenbek's Penetration Aspiration Scale: Thin Liquid Trial Results 1 - no aspiration, contrast does not enter airway   Diagnostic Statement no aspiration/penetration with very limited bolus size trials taken via spoon   VFSS Eval: Nectar Thick Liquid Texture Trial   Mode of Presentation, Nectar spoon;cup;self-fed;fed by clinician   Order of Presentation 1, 2, 3, 4, 9   Preparatory Phase Holding   Oral Phase,  Nectar Poor AP movement   Pharyngeal Phase, Nectar Residue in valleculae;Residue in pyriform sinus   Rosenbek's Penetration Aspiration Scale: Nectar-Thick Liquid Trial Results 6 - contrast passes glottis, no subglottic residue remains (aspiration)   Response to Aspiration, Nectar unproductive reflexive involuntary cough/throat clear   Successful Strategies Trialed During Procedure, Nectar chin tuck   Diagnostic Statement Aspiration and penetration occurred on more than half trials when trials taken via cup.  Chin tuck ineffective.  When trials taken via spoon, no aspiration or penetration.  Aspiration resputed in spontaneous coughing.     VFSS Eval: Honey Thick Texture Trial   Mode of Presentation, Honey spoon;fed by clinician   Order of Presentation 5   Preparatory Phase WFL   Oral Phase, Honey Poor AP movement   Pharyngeal Phase, Honey WFL   Rosenbek's Penetration Aspiration Scale: Honey Trial Results 1 - no aspiration, contrast does not enter airway   Diagnostic Statement no difficulties noted   VFSS Eval: Puree Solid Texture Trial   Mode of Presentation, Puree spoon;fed by clinician   Order of Presentation 6   Preparatory Phase WFL   Oral Phase, Puree WFL   Pharyngeal Phase, Puree WFL   Rosenbek's Penetration Aspiration Scale: Puree Food Trial Results 1 - no aspiration, contrast does not enter airway   Diagnostic Statement no concerns identified   VFSS Eval: Semisolid Texture Trial   Mode of Presentation, Semisolid spoon;fed by clinician   Order of Presentation 7   Preparatory Phase WFL   Oral Phase, Semisolid Poor AP movement   Pharyngeal Phase, Semisolid WFL   Rosenbek's Penetration Aspiration Scale: Semisolid Food Trial Results 1 - no aspiration, contrast does not enter airway   Diagnostic Statement no concerns identified; very mild difficulties with effective mastication and AP propulsion of bolus   VFSS Eval: Solid Food Texture Trial   Mode of Presentation, Solid spoon;fed by clinician   Order of  Presentation 8   Preparatory Phase WFL   Oral Phase, Solid Poor AP movement   Pharyngeal Phase, Solid WFL   Rosenbek's Penetration Aspiration Scale: Solid Food Trial Results 1 - no aspiration, contrast does not enter airway   Diagnostic Statement no concerns identified; very mild difficulties with effective mastication and AP propulsion of bolus   Swallow Compensations   Swallow Compensations Chin tuck;Reduce amounts   Results No difficulties noted   Educational Assessment   Barriers to Learning Cognitive   Preferred Learning Style Demonstration   Esophageal Phase of Swallow   Patient reports or presents with symptoms of esophageal dysphagia Yes   Esophageal sweep performed during today s vidofluoroscopic exam  Please refer to radiologist's report for details   General Therapy Interventions   Planned Therapy Interventions (Patient receiving dysphagia therapy at [facility])   Intervention Comments Patient will benefit from continued skilled intervention to maximize safety of oral intake and reduce risk of aspiration and pneumonia   Swallow Eval: Clinical Impressions   Skilled Criteria for Therapy Intervention Skilled criteria met.  Treatment indicated.  (Patient receiving dysphagia therapy at [facility] where residing)   Functional Assessment Scale (FAS) 3   Dysphagia Outcome Severity Scale (KESHA) Level 3 - KESHA   Treatment Diagnosis Moderate oropharyngeal dysphagia   Diet texture recommendations Dysphagia diet level 3;Nectar thick liquids  (Pills whole with applesauce)   Recommended Feeding/Eating Techniques no straws;small sips/bites  (liquids via SPOON ONLY)   Rehab Potential good, to achieve stated therapy goals   Risks and Benefits of Treatment have been explained. Yes   Patient, family and/or staff in agreement with Plan of Care Yes   Clinical Impression Comments Video swallow study completed per MD order.     Trials included thin, nectar thick, honey thick and pureed, semisolid and solid textures given via cup and spoon  with patient feeding herself and SLP feeding for selected trials.  Thin liquid trials given via spoon only because of aspiration risk.  Results are as follows:    Patient aspirated 2 trials of nectar thick liquids via cup and flash penetrated 1 trial via cup.  Patient did spontaneously cough with aspiration.   Did not aspirate or penetrate trials of nectar thick liquids when given via spoon.  Did not aspirated/penetrate any trials of thin liquids when given via spoon.   No difficulties swallowing honey thick trials.  Tolerated all food trials with minimal difficulties and good pharyngeal clearance.  Mild decrease in effective mastication and AP propulsion likely due to dentures, as difficulties slightly increased as textures became more firm.  Reviewed images with patient, identifying aspiration and penetration with nectar thick liquid trials.  Educated patient regarding safe swallow strategies including the need to take liquids VIA SPOON only otherwise significant increase in risk of aspiration and penetration.  Recommend continue with dysphagia therapy at her SNF for diet tolerance, diet advancement and education regarding s/s of aspiration and pneumonia.  Recommend diet of nectar thick liquids and mechanical soft foods with pills whole and in food carrier.   No straws with any liquids.  Recommend patient to be upright for all oral intake and remain upright for 30 minutes following intake as a reflux precaution.  SLP at [facility] may consider advancing liquids to thin consistency when patient is able to consistently take liquids via spoon and presents with no overt s/s of aspiration or penetration or pneumonia.  Patient agreed to follow up dysphagia sessions with SNF SLP.   Total Session Time   SLP Eval: oral/pharyngeal swallow function, clinical minutes (28825) 20   SLP Eval: VideoFluoroscopic Swallow function Minutes (89932) 25   Total Evaluation Time 45       Thank you for this referral.    Etta Schwarz (Mattie)  [email or web address]

## 2019-03-27 ENCOUNTER — HOSPITAL LABORATORY (OUTPATIENT)
Dept: NURSING HOME | Facility: OTHER | Age: 79
End: 2019-03-27

## 2019-04-02 ENCOUNTER — NURSING HOME VISIT (OUTPATIENT)
Dept: GERIATRICS | Facility: CLINIC | Age: 79
End: 2019-04-02
Payer: COMMERCIAL

## 2019-04-02 VITALS
OXYGEN SATURATION: 96 % | HEIGHT: 67 IN | HEART RATE: 96 BPM | DIASTOLIC BLOOD PRESSURE: 84 MMHG | RESPIRATION RATE: 19 BRPM | WEIGHT: 218.8 LBS | SYSTOLIC BLOOD PRESSURE: 153 MMHG | BODY MASS INDEX: 34.34 KG/M2 | TEMPERATURE: 96.3 F

## 2019-04-02 DIAGNOSIS — I50.22 CHRONIC SYSTOLIC HEART FAILURE (H): ICD-10-CM

## 2019-04-02 DIAGNOSIS — I48.20 CHRONIC ATRIAL FIBRILLATION (H): ICD-10-CM

## 2019-04-02 DIAGNOSIS — I10 ESSENTIAL HYPERTENSION: ICD-10-CM

## 2019-04-02 DIAGNOSIS — E11.9 TYPE 2 DIABETES MELLITUS WITHOUT COMPLICATION, WITHOUT LONG-TERM CURRENT USE OF INSULIN (H): ICD-10-CM

## 2019-04-02 DIAGNOSIS — D64.9 ANEMIA, UNSPECIFIED TYPE: ICD-10-CM

## 2019-04-02 DIAGNOSIS — R33.9 URINARY RETENTION: ICD-10-CM

## 2019-04-02 DIAGNOSIS — I25.2 HISTORY OF ST ELEVATION MYOCARDIAL INFARCTION (STEMI): ICD-10-CM

## 2019-04-02 DIAGNOSIS — Z86.73 HISTORY OF STROKE: ICD-10-CM

## 2019-04-02 DIAGNOSIS — R13.12 OROPHARYNGEAL DYSPHAGIA: ICD-10-CM

## 2019-04-02 DIAGNOSIS — I25.10 ATHEROSCLEROSIS OF NATIVE CORONARY ARTERY OF NATIVE HEART WITHOUT ANGINA PECTORIS: ICD-10-CM

## 2019-04-02 DIAGNOSIS — M62.81 GENERALIZED MUSCLE WEAKNESS: ICD-10-CM

## 2019-04-02 DIAGNOSIS — M15.0 PRIMARY OSTEOARTHRITIS INVOLVING MULTIPLE JOINTS: ICD-10-CM

## 2019-04-02 DIAGNOSIS — E78.5 HYPERLIPIDEMIA LDL GOAL <100: ICD-10-CM

## 2019-04-02 DIAGNOSIS — K21.00 GASTROESOPHAGEAL REFLUX DISEASE WITH ESOPHAGITIS: ICD-10-CM

## 2019-04-02 DIAGNOSIS — E16.2 HYPOGLYCEMIC ENCEPHALOPATHY: Primary | ICD-10-CM

## 2019-04-02 DIAGNOSIS — K59.01 SLOW TRANSIT CONSTIPATION: ICD-10-CM

## 2019-04-02 DIAGNOSIS — E03.9 ACQUIRED HYPOTHYROIDISM: ICD-10-CM

## 2019-04-02 PROCEDURE — 99310 SBSQ NF CARE HIGH MDM 45: CPT | Performed by: NURSE PRACTITIONER

## 2019-04-02 ASSESSMENT — MIFFLIN-ST. JEOR: SCORE: 1510.1

## 2019-04-02 NOTE — PROGRESS NOTES
[facility]  [place] Medical Record Number:  [id number]  Place of Service where encounter took place:  [facility] ([facility]) [[id number]]  Chief Complaint   Patient presents with     [facility]       HPI:    Kristine Cosby  is a 78 year old (1940), who is being seen today for an episodic care visit.  HPI information obtained from: facility chart records, facility staff, patient report, [facility] chart review and Care Everywhere [facility] chart review. Today's concern is:     Hypoglycemic encephalopathy  Type 2 diabetes mellitus without complication, without long-term current use of insulin (H)  Oropharyngeal dysphagia  Chronic atrial fibrillation (H)  Chronic systolic heart failure (H)  Essential hypertension  Hyperlipidemia LDL goal <100  Atherosclerosis of native coronary artery of native heart without angina pectoris  History of ST elevation myocardial infarction (STEMI)  Gastroesophageal reflux disease with esophagitis  History of stroke  Primary osteoarthritis involving multiple joints  Urinary retention  Acquired hypothyroidism  Slow transit constipation  Generalized muscle weakness  Anemia, unspecified type     Patient is a 78 year old woman with PMH of DM2, A fib with RVR, CVA with seizure, HTN, HLD, CAD, 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  She was found to have hypoglycemia (blood glucose 31) and was intubated and transferred to [facility] (12/27/18 - 1/8/19).  She was treated for metabolic encephalopathy 2/2 hypoglycemia, JOSTIN, dysphagia and was then transferred to acute care rehab at [facility] (1/8-2/1/19) before transferring to [facility] for LTC.      Today patient is met in her LTC room where she is pleasantly confused and denies all ROS questioning (seemingly without thinking about the  "questions).  She is in good spirits.      Nursing reports patient is confused and notes indicate that she has been recently depressed about wanting to go home.  Today patient reports, \"I'm going home in a little while.\"      Past Medical and Surgical History reviewed in Epic today.    MEDICATIONS:  Current Outpatient Medications   Medication Sig Dispense Refill     acetaminophen (TYLENOL) 500 MG tablet Take 1,000 mg by mouth 3 times daily        albuterol (PROVENTIL) (2.5 MG/3ML) 0.083% neb solution Take 2.5 mg by nebulization every 4 hours as needed for shortness of breath / dyspnea or wheezing       aspirin (ASA) 81 MG chewable tablet Take 81 mg by mouth daily       atorvastatin (LIPITOR) 20 MG tablet Take 20 mg by mouth At Bedtime       cholecalciferol 1000 units TABS Take 2,000 Units by mouth daily       levothyroxine (SYNTHROID/LEVOTHROID) 125 MCG tablet Take 125 mcg by mouth daily       lidocaine (LIDODERM) 5 % patch Place 1 patch onto the skin every 24 hours       metoprolol succinate ER (TOPROL-XL) 50 MG 24 hr tablet Take 50 mg by mouth daily       nitroGLYcerin (NITROSTAT) 0.4 MG sublingual tablet Place 0.4 mg under the tongue every 5 minutes as needed for chest pain For chest pain place 1 tablet under the tongue every 5 minutes for 3 doses. If symptoms persist 5 minutes after 1st dose call 911.       nystatin (MYCOSTATIN) 215127 UNIT/GM external powder Apply topically 2 times daily as needed       pantoprazole (PROTONIX) 40 MG EC tablet Take 40 mg by mouth daily       senna-docusate (SENOKOT-S/PERICOLACE) 8.6-50 MG tablet Take 2 tablets by mouth 2 times daily        Skin Protectants, Misc. (EUCERIN) cream Apply topically 2 times daily ; apply to arms, legs, hands, feet for dry skin       tamsulosin (FLOMAX) 0.4 MG capsule Take 0.8 mg by mouth At Bedtime         REVIEW OF SYSTEMS:  Limited secondary to cognitive impairment but today pt reports 10 point ROS of systems including Constitutional, Eyes, " "Respiratory, Cardiovascular, Gastroenterology, Genitourinary, Integumentary, Musculoskeletal, Psychiatric were all negative except for pertinent positives noted in my HPI.    Objective:  /84   Pulse 96   Temp 96.3  F (35.7  C)   Resp 19   Ht 1.702 m (5' 7\")   Wt 99.2 kg (218 lb 12.8 oz)   SpO2 96%   BMI 34.27 kg/m    Exam:  GENERAL APPEARANCE:  Alert, in no distress, pleasantly confused  RESP:  respiratory effort and palpation of chest normal, auscultation of lungs clear , no respiratory distress  CV:  Palpation and auscultation of heart done , rate and rhythm irregular, no murmur, moderate LE peripheral edema  ABDOMEN:  normal bowel sounds, soft, nontender, no hepatosplenomegaly or other masses  M/S:   Gait and station with W/C for mobility, also has walker in room, Digits and nails with arthritic changes, reduced muscle mass  SKIN:  Inspection and Palpation of skin and subcutaneous tissue seemingly intact  PSYCH:  insight and judgement, memory with impairment, affect and mood normal, follows commands readily       Labs:   Labs done in SNF are in [place] EPIC. Please refer to them using EPIC/Care Everywhere.    ASSESSMENT/PLAN:  Hypoglycemic encephalopathy  Presented to the [facility] after being found down at home for undetermined amount of time, blood glucose found to be 31 and patient transferred to [facility] (12/27/28-1/8/19).  Then transferred to [facility] (1/8-2/1/19) and now to SNF for LTC.    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    BIMS 8/15  Able to make her needs known.     PLAN:  - nursing for supportive cares     Type 2 diabetes mellitus without complication, without long-term current use of insulin (H)  Found down with hypoglycemia-induced encephalopathy  Glipizide and Novolog DC'd   12/27/18 A1C 6.1  On BID blood glucose monitoring:  AM:112-137   PM: 135-219    PLAN:  - Goal: HgbA1C between 8-9%. " "Per AGS there is potential harm in lowering the A1C to <6.5% in older adults with diabetes.   - will monitor accuchecks but if stable, likely can discontinue accuchecks as patient is not on med anymore and is taking in PO, resides in LTC SNF with nursing for monitoring.  Can add \"PRN accuchecks\"    Oropharyngeal dysphagia  Failed PEG tube placement while in-patient d/t large hiatal hernia.  SLP consulted and recommended regular diet with nectar thick liquids.   Nursing reporting patient does not comply with thickened liquid diet recommendation and waiver at SNF signed by family.      Patient currently on [place] thick nutritional supplements 4 oz TID, regular diet, nectar-thick liquids    PLAN:  - nectar thick liquids  - regular diet  - SLP following for exercising and diet recommendations     Chronic atrial fibrillation (H)  On Toprol XL 50 mg daily  HRs 80-90s, irregular today  On ASA 81 mg daily, no other OA (d/t to GIB and falls)     PLAN:  - continue Toprol XL for rate control  - anticoagulation with ASA 81 mg daily only     Chronic systolic heart failure (H)  Essential hypertension  Hyperlipidemia LDL goal <100  Atherosclerosis of native coronary artery of native heart without angina pectoris  History of ST elevation myocardial infarction (STEMI)  12/27/18 ECHO with EF 35-40%, hypokinesis of distal septum, anterior wall and apex    On atorvastatin 20 mg q HS, ASA 81 mg daily, Toprol XL 50 mg daily, nitroglycerin PRN    BPs 120-140s mostly/70-80s  HRs 80-90s  Patient denies CP, HA, lightheadedness (may be a poor historian)    PLAN:  - BP goals are <150/90 mm Hg.This is higher than ACC and AHA recommendations due to goals of care, risk for hypotension, risk of dizziness and falls and risk of tissue/cerebral hypoperfusion. Patient is stable with current plan of care and routine assessment.  - monitor for titration needs  - TEDS ordered, use recommended    Gastroesophageal reflux disease with esophagitis  History " of large hiatal hernia and erosive gastritis  On pantoprazole 40 mg daily   Patient denies symptoms of heartburn or upset stomach (likely is apoor historian d/t cognitive impairment and answering without considering questioning).     PLAN:  - continue pantoprazole despite known risks as patient high risk for recurrent GIB     History of stroke  With seizure post-CVA  On ASA 81 mg daily and atorvastatin 20 mg q HS for ppx    PLAN:  - nursing for supportive cares   - continue ASA and atorvastatin        Primary osteoarthritis involving multiple joints  History of bilat knee pain - patient denies pain today  Analgesia with Tylenol 1000 mg 3 times daily lidocaine patch,    PLAN:  - monitor for pain  - unknown if steroid injections have been given, can look into this if patient/family interested.     Urinary retention  On tamsulosin 0.8 mg q HS  Will monitor for concerns/issues.     Acquired hypothyroidism  On levothyroxine 125 mcg daily  11/29/18 TSH 0.09 but Reflex Free T4: 1.49  Denies fatigue, constipation, depression (may be poor historian).  Nursing notes indicate recent adjustment disorder with depression at not being able to move home.     PLAN:  - continue levothyroxine  - can recheck TSH with Reflex Free T4 in near future      Slow transit constipation  On Senna S 2 tabs BID  Patient denies constipation   EHR showing 1-2 BMs/day    PLAN:  - continue Senna S  - monitor for titration needs    Generalized muscle weakness  2/2 chronic illnesses with increased needs 2/2 recent acute illnesses requiring hospitalizations  Patient has been seeing Physical Therapy/OT; Physical Therapy's last day denotes today.     PLAN:  - nursing for supportive cares, likely will have Restorative Program after therapy completed.     Anemia  1/29/19 Hgb 11.7  2/11/19 Hgb 10.9  No overt bleeding noted, does have history of GIB    6/7/18: Iron 91, TIBC 305, Iron Sat 30, Ferritin 133.3    PLAN:  - TIFFANY vs ACD   - recheck Hgb for stability        Orders written by provider at facility  1. BMP, Hgb, TSH reflex to Free T4 on 4/10/19, DX: hypothyroidism, anemia, encephalopathy, CHF      Total time spent with patient visit at the skilled nursing facility was >35 min including patient visit, review of past records and coordinationof care with nursing. Greater than 50% of total time spent with counseling and coordinating care due to review of records, patient visit, coordination of care with nursing     Electronically signed by:  NATIVIDAD Chapa CNP

## 2019-04-02 NOTE — LETTER
4/2/2019        RE: Kristine Cosby  [address] [address]        [facility]  [place] Medical Record Number:  [id number]  Place of Service where encounter took place:  [facility] ([facility]) [[id number]]  Chief Complaint   Patient presents with     [facility]       HPI:    Kristine Cosby  is a 78 year old (1940), who is being seen today for an episodic care visit.  HPI information obtained from: facility chart records, facility staff, patient report, [facility] chart review and Care [facility] chart review. Today's concern is:     Hypoglycemic encephalopathy  Type 2 diabetes mellitus without complication, without long-term current use of insulin (H)  Oropharyngeal dysphagia  Chronic atrial fibrillation (H)  Chronic systolic heart failure (H)  Essential hypertension  Hyperlipidemia LDL goal <100  Atherosclerosis of native coronary artery of native heart without angina pectoris  History of ST elevation myocardial infarction (STEMI)  Gastroesophageal reflux disease with esophagitis  History of stroke  Primary osteoarthritis involving multiple joints  Urinary retention  Acquired hypothyroidism  Slow transit constipation  Generalized muscle weakness  Anemia, unspecified type     Patient is a 78 year old woman with PMH of DM2, A fib with RVR, CVA with seizure, HTN, HLD, CAD, 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  She was found to have hypoglycemia (blood glucose 31) and was intubated and transferred to [facility] (12/27/18 - 1/8/19).  She was treated for metabolic encephalopathy 2/2 hypoglycemia, JOSTIN, dysphagia and was then transferred to acute care rehab at [facility] (1/8-2/1/19) before transferring to [facility] for LTC.      Today patient is met in her LTC room where she is  "pleasantly confused and denies all ROS questioning (seemingly without thinking about the questions).  She is in good spirits.      Nursing reports patient is confused and notes indicate that she has been recently depressed about wanting to go home.  Today patient reports, \"I'm going home in a little while.\"      Past Medical and Surgical History reviewed in Epic today.    MEDICATIONS:  Current Outpatient Medications   Medication Sig Dispense Refill     acetaminophen (TYLENOL) 500 MG tablet Take 1,000 mg by mouth 3 times daily        albuterol (PROVENTIL) (2.5 MG/3ML) 0.083% neb solution Take 2.5 mg by nebulization every 4 hours as needed for shortness of breath / dyspnea or wheezing       aspirin (ASA) 81 MG chewable tablet Take 81 mg by mouth daily       atorvastatin (LIPITOR) 20 MG tablet Take 20 mg by mouth At Bedtime       cholecalciferol 1000 units TABS Take 2,000 Units by mouth daily       levothyroxine (SYNTHROID/LEVOTHROID) 125 MCG tablet Take 125 mcg by mouth daily       lidocaine (LIDODERM) 5 % patch Place 1 patch onto the skin every 24 hours       metoprolol succinate ER (TOPROL-XL) 50 MG 24 hr tablet Take 50 mg by mouth daily       nitroGLYcerin (NITROSTAT) 0.4 MG sublingual tablet Place 0.4 mg under the tongue every 5 minutes as needed for chest pain For chest pain place 1 tablet under the tongue every 5 minutes for 3 doses. If symptoms persist 5 minutes after 1st dose call 911.       nystatin (MYCOSTATIN) 432322 UNIT/GM external powder Apply topically 2 times daily as needed       pantoprazole (PROTONIX) 40 MG EC tablet Take 40 mg by mouth daily       senna-docusate (SENOKOT-S/PERICOLACE) 8.6-50 MG tablet Take 2 tablets by mouth 2 times daily        Skin Protectants, Misc. (EUCERIN) cream Apply topically 2 times daily ; apply to arms, legs, hands, feet for dry skin       tamsulosin (FLOMAX) 0.4 MG capsule Take 0.8 mg by mouth At Bedtime         REVIEW OF SYSTEMS:  Limited secondary to cognitive " "impairment but today pt reports 10 point ROS of systems including Constitutional, Eyes, Respiratory, Cardiovascular, Gastroenterology, Genitourinary, Integumentary, Musculoskeletal, Psychiatric were all negative except for pertinent positives noted in my HPI.    Objective:  /84   Pulse 96   Temp 96.3  F (35.7  C)   Resp 19   Ht 1.702 m (5' 7\")   Wt 99.2 kg (218 lb 12.8 oz)   SpO2 96%   BMI 34.27 kg/m     Exam:  GENERAL APPEARANCE:  Alert, in no distress, pleasantly confused  RESP:  respiratory effort and palpation of chest normal, auscultation of lungs clear , no respiratory distress  CV:  Palpation and auscultation of heart done , rate and rhythm irregular, no murmur, moderate LE peripheral edema  ABDOMEN:  normal bowel sounds, soft, nontender, no hepatosplenomegaly or other masses  M/S:   Gait and station with W/C for mobility, also has walker in room, Digits and nails with arthritic changes, reduced muscle mass  SKIN:  Inspection and Palpation of skin and subcutaneous tissue seemingly intact  PSYCH:  insight and judgement, memory with impairment, affect and mood normal, follows commands readily       Labs:   Labs done in SNF are in [place] EPIC. Please refer to them using EPIC/Care Everywhere.    ASSESSMENT/PLAN:  Hypoglycemic encephalopathy  Presented to the [facility] after being found down at home for undetermined amount of time, blood glucose found to be 31 and patient transferred to [facility] (12/27/28-1/8/19).  Then transferred to [facility] (1/8-2/1/19) and now to SNF for LTC.    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    BIMS 8/15  Able to make her needs known.     PLAN:  - nursing for supportive cares     Type 2 diabetes mellitus without complication, without long-term current use of insulin (H)  Found down with hypoglycemia-induced encephalopathy  Glipizide and Novolog DC'd   12/27/18 A1C 6.1  On BID " "blood glucose monitoring:  AM:112-137   PM: 135-219    PLAN:  - Goal: HgbA1C between 8-9%. Per AGS there is potential harm in lowering the A1C to <6.5% in older adults with diabetes.   - will monitor accuchecks but if stable, likely can discontinue accuchecks as patient is not on med anymore and is taking in PO, resides in LTC SNF with nursing for monitoring.  Can add \"PRN accuchecks\"    Oropharyngeal dysphagia  Failed PEG tube placement while in-patient d/t large hiatal hernia.  SLP consulted and recommended regular diet with nectar thick liquids.   Nursing reporting patient does not comply with thickened liquid diet recommendation and waiver at SNF signed by family.      Patient currently on [place] thick nutritional supplements 4 oz TID, regular diet, nectar-thick liquids    PLAN:  - nectar thick liquids  - regular diet  - SLP following for exercising and diet recommendations     Chronic atrial fibrillation (H)  On Toprol XL 50 mg daily  HRs 80-90s, irregular today  On ASA 81 mg daily, no other OA (d/t to GIB and falls)     PLAN:  - continue Toprol XL for rate control  - anticoagulation with ASA 81 mg daily only     Chronic systolic heart failure (H)  Essential hypertension  Hyperlipidemia LDL goal <100  Atherosclerosis of native coronary artery of native heart without angina pectoris  History of ST elevation myocardial infarction (STEMI)  12/27/18 ECHO with EF 35-40%, hypokinesis of distal septum, anterior wall and apex    On atorvastatin 20 mg q HS, ASA 81 mg daily, Toprol XL 50 mg daily, nitroglycerin PRN    BPs 120-140s mostly/70-80s  HRs 80-90s  Patient denies CP, HA, lightheadedness (may be a poor historian)    PLAN:  - BP goals are <150/90 mm Hg.This is higher than ACC and AHA recommendations due to goals of care, risk for hypotension, risk of dizziness and falls and risk of tissue/cerebral hypoperfusion. Patient is stable with current plan of care and routine assessment.  - monitor for titration needs  - " TEDS ordered, use recommended    Gastroesophageal reflux disease with esophagitis  History of large hiatal hernia and erosive gastritis  On pantoprazole 40 mg daily   Patient denies symptoms of heartburn or upset stomach (likely is apoor historian d/t cognitive impairment and answering without considering questioning).     PLAN:  - continue pantoprazole despite known risks as patient high risk for recurrent GIB     History of stroke  With seizure post-CVA  On ASA 81 mg daily and atorvastatin 20 mg q HS for ppx    PLAN:  - nursing for supportive cares   - continue ASA and atorvastatin        Primary osteoarthritis involving multiple joints  History of bilat knee pain - patient denies pain today  Analgesia with Tylenol 1000 mg 3 times daily lidocaine patch,    PLAN:  - monitor for pain  - unknown if steroid injections have been given, can look into this if patient/family interested.     Urinary retention  On tamsulosin 0.8 mg q HS  Will monitor for concerns/issues.     Acquired hypothyroidism  On levothyroxine 125 mcg daily  11/29/18 TSH 0.09 but Reflex Free T4: 1.49  Denies fatigue, constipation, depression (may be poor historian).  Nursing notes indicate recent adjustment disorder with depression at not being able to move home.     PLAN:  - continue levothyroxine  - can recheck TSH with Reflex Free T4 in near future      Slow transit constipation  On Senna S 2 tabs BID  Patient denies constipation   EHR showing 1-2 BMs/day    PLAN:  - continue Senna S  - monitor for titration needs    Generalized muscle weakness  2/2 chronic illnesses with increased needs 2/2 recent acute illnesses requiring hospitalizations  Patient has been seeing Physical Therapy/OT; Physical Therapy's last day denotes today.     PLAN:  - nursing for supportive cares, likely will have Restorative Program after therapy completed.     Anemia  1/29/19 Hgb 11.7  2/11/19 Hgb 10.9  No overt bleeding noted, does have history of GIB    6/7/18: Iron 91,  TIBC 305, Iron Sat 30, Ferritin 133.3    PLAN:  - TIFFANY vs ACD   - recheck Hgb for stability       Orders written by provider at facility  1. BMP, Hgb, TSH reflex to Free T4 on 4/10/19, DX: hypothyroidism, anemia, encephalopathy, CHF      Total time spent with patient visit at the [facility] nursing facility was >35 min including patient visit, review of past records and coordinationof care with nursing. Greater than 50% of total time spent with counseling and coordinating care due to review of records, patient visit, coordination of care with nursing     Electronically signed by:  NATIVIDAD Chapa CNP               Sincerely,        NATIVIDAD Chapa CNP

## 2019-04-10 ENCOUNTER — HOSPITAL LABORATORY (OUTPATIENT)
Dept: NURSING HOME | Facility: OTHER | Age: 79
End: 2019-04-10

## 2019-04-10 LAB
ANION GAP SERPL CALCULATED.3IONS-SCNC: 4 MMOL/L (ref 3–14)
BUN SERPL-MCNC: 14 MG/DL (ref 7–30)
CALCIUM SERPL-MCNC: 8.3 MG/DL (ref 8.5–10.1)
CHLORIDE SERPL-SCNC: 112 MMOL/L (ref 94–109)
CO2 SERPL-SCNC: 28 MMOL/L (ref 20–32)
CREAT SERPL-MCNC: 0.82 MG/DL (ref 0.52–1.04)
GFR SERPL CREATININE-BSD FRML MDRD: 69 ML/MIN/{1.73_M2}
GLUCOSE SERPL-MCNC: 116 MG/DL (ref 70–99)
HGB BLD-MCNC: 10.4 G/DL (ref 11.7–15.7)
POTASSIUM SERPL-SCNC: 4.1 MMOL/L (ref 3.4–5.3)
SODIUM SERPL-SCNC: 144 MMOL/L (ref 133–144)
T4 FREE SERPL-MCNC: 1.32 NG/DL (ref 0.76–1.46)
TSH SERPL DL<=0.005 MIU/L-ACNC: 0.86 MU/L (ref 0.4–4)

## 2019-04-15 ENCOUNTER — APPOINTMENT (OUTPATIENT)
Dept: MRI IMAGING | Facility: CLINIC | Age: 79
DRG: 092 | End: 2019-04-15
Attending: STUDENT IN AN ORGANIZED HEALTH CARE EDUCATION/TRAINING PROGRAM
Payer: COMMERCIAL

## 2019-04-15 ENCOUNTER — APPOINTMENT (OUTPATIENT)
Dept: CT IMAGING | Facility: CLINIC | Age: 79
End: 2019-04-15
Attending: EMERGENCY MEDICINE
Payer: COMMERCIAL

## 2019-04-15 ENCOUNTER — MEDICAL CORRESPONDENCE (OUTPATIENT)
Dept: HEALTH INFORMATION MANAGEMENT | Facility: CLINIC | Age: 79
End: 2019-04-15

## 2019-04-15 ENCOUNTER — HOSPITAL ENCOUNTER (EMERGENCY)
Facility: CLINIC | Age: 79
Discharge: ANOTHER HOSPITAL | End: 2019-04-15
Attending: EMERGENCY MEDICINE | Admitting: EMERGENCY MEDICINE
Payer: COMMERCIAL

## 2019-04-15 ENCOUNTER — HOSPITAL ENCOUNTER (INPATIENT)
Facility: CLINIC | Age: 79
LOS: 2 days | Discharge: NURSING, REHAB OR LONG-TERM CARE | DRG: 092 | End: 2019-04-17
Attending: PSYCHIATRY & NEUROLOGY | Admitting: PSYCHIATRY & NEUROLOGY
Payer: COMMERCIAL

## 2019-04-15 VITALS
RESPIRATION RATE: 22 BRPM | BODY MASS INDEX: 34.18 KG/M2 | SYSTOLIC BLOOD PRESSURE: 137 MMHG | WEIGHT: 218.26 LBS | DIASTOLIC BLOOD PRESSURE: 87 MMHG | OXYGEN SATURATION: 95 % | HEART RATE: 99 BPM | TEMPERATURE: 98.6 F

## 2019-04-15 DIAGNOSIS — Z86.73 HISTORY OF STROKE: Primary | ICD-10-CM

## 2019-04-15 DIAGNOSIS — I63.9 CEREBROVASCULAR ACCIDENT (CVA), UNSPECIFIED MECHANISM (H): ICD-10-CM

## 2019-04-15 DIAGNOSIS — N30.00 ACUTE CYSTITIS WITHOUT HEMATURIA: ICD-10-CM

## 2019-04-15 PROBLEM — R41.0 CONFUSION: Status: ACTIVE | Noted: 2019-04-15

## 2019-04-15 LAB
ANION GAP SERPL CALCULATED.3IONS-SCNC: 8 MMOL/L (ref 3–14)
ANION GAP SERPL CALCULATED.3IONS-SCNC: 8 MMOL/L (ref 3–14)
APTT PPP: 32 SEC (ref 22–37)
APTT PPP: 34 SEC (ref 22–37)
BASOPHILS # BLD AUTO: 0 10E9/L (ref 0–0.2)
BASOPHILS NFR BLD AUTO: 0.3 %
BUN SERPL-MCNC: 14 MG/DL (ref 7–30)
BUN SERPL-MCNC: 16 MG/DL (ref 7–30)
CALCIUM SERPL-MCNC: 8.3 MG/DL (ref 8.5–10.1)
CALCIUM SERPL-MCNC: 8.4 MG/DL (ref 8.5–10.1)
CHLORIDE SERPL-SCNC: 108 MMOL/L (ref 94–109)
CHLORIDE SERPL-SCNC: 110 MMOL/L (ref 94–109)
CHOLEST SERPL-MCNC: 100 MG/DL
CO2 SERPL-SCNC: 23 MMOL/L (ref 20–32)
CO2 SERPL-SCNC: 26 MMOL/L (ref 20–32)
CREAT SERPL-MCNC: 0.8 MG/DL (ref 0.52–1.04)
CREAT SERPL-MCNC: 0.89 MG/DL (ref 0.52–1.04)
DIFFERENTIAL METHOD BLD: ABNORMAL
EOSINOPHIL NFR BLD AUTO: 3.3 %
ERYTHROCYTE [DISTWIDTH] IN BLOOD BY AUTOMATED COUNT: 12.2 % (ref 10–15)
ERYTHROCYTE [DISTWIDTH] IN BLOOD BY AUTOMATED COUNT: 12.2 % (ref 10–15)
GFR SERPL CREATININE-BSD FRML MDRD: 62 ML/MIN/{1.73_M2}
GFR SERPL CREATININE-BSD FRML MDRD: 71 ML/MIN/{1.73_M2}
GLUCOSE BLDC GLUCOMTR-MCNC: 99 MG/DL (ref 70–99)
GLUCOSE SERPL-MCNC: 102 MG/DL (ref 70–99)
GLUCOSE SERPL-MCNC: 150 MG/DL (ref 70–99)
HBA1C MFR BLD: 6.7 % (ref 0–5.6)
HCT VFR BLD AUTO: 34.7 % (ref 35–47)
HCT VFR BLD AUTO: 34.9 % (ref 35–47)
HDLC SERPL-MCNC: 43 MG/DL
HGB BLD-MCNC: 11 G/DL (ref 11.7–15.7)
HGB BLD-MCNC: 11.2 G/DL (ref 11.7–15.7)
IMM GRANULOCYTES # BLD: 0 10E9/L (ref 0–0.4)
IMM GRANULOCYTES NFR BLD: 0.2 %
INR PPP: 1.08 (ref 0.86–1.14)
INR PPP: 1.21 (ref 0.86–1.14)
LDLC SERPL CALC-MCNC: 39 MG/DL
LYMPHOCYTES # BLD AUTO: 1.6 10E9/L (ref 0.8–5.3)
LYMPHOCYTES NFR BLD AUTO: 26.9 %
MCH RBC QN AUTO: 32.4 PG (ref 26.5–33)
MCH RBC QN AUTO: 32.5 PG (ref 26.5–33)
MCHC RBC AUTO-ENTMCNC: 31.5 G/DL (ref 31.5–36.5)
MCHC RBC AUTO-ENTMCNC: 32.3 G/DL (ref 31.5–36.5)
MCV RBC AUTO: 100 FL (ref 78–100)
MCV RBC AUTO: 103 FL (ref 78–100)
MONOCYTES # BLD AUTO: 0.6 10E9/L (ref 0–1.3)
MONOCYTES NFR BLD AUTO: 10.5 %
MRSA DNA SPEC QL NAA+PROBE: NEGATIVE
NEUTROPHILS # BLD AUTO: 3.6 10E9/L (ref 1.6–8.3)
NEUTROPHILS NFR BLD AUTO: 58.8 %
NONHDLC SERPL-MCNC: 57 MG/DL
NRBC # BLD AUTO: 0 10*3/UL
NRBC BLD AUTO-RTO: 0 /100
PLATELET # BLD AUTO: 168 10E9/L (ref 150–450)
PLATELET # BLD AUTO: 190 10E9/L (ref 150–450)
POTASSIUM SERPL-SCNC: 4 MMOL/L (ref 3.4–5.3)
POTASSIUM SERPL-SCNC: 4.3 MMOL/L (ref 3.4–5.3)
RBC # BLD AUTO: 3.38 10E12/L (ref 3.8–5.2)
RBC # BLD AUTO: 3.46 10E12/L (ref 3.8–5.2)
SODIUM SERPL-SCNC: 141 MMOL/L (ref 133–144)
SODIUM SERPL-SCNC: 142 MMOL/L (ref 133–144)
SPECIMEN SOURCE: NORMAL
TRIGL SERPL-MCNC: 90 MG/DL
TROPONIN I SERPL-MCNC: 0.02 UG/L (ref 0–0.04)
TROPONIN I SERPL-MCNC: 0.02 UG/L (ref 0–0.04)
WBC # BLD AUTO: 5.4 10E9/L (ref 4–11)
WBC # BLD AUTO: 6.1 10E9/L (ref 4–11)

## 2019-04-15 PROCEDURE — C9113 INJ PANTOPRAZOLE SODIUM, VIA: HCPCS | Performed by: STUDENT IN AN ORGANIZED HEALTH CARE EDUCATION/TRAINING PROGRAM

## 2019-04-15 PROCEDURE — 86870 RBC ANTIBODY IDENTIFICATION: CPT | Performed by: PSYCHIATRY & NEUROLOGY

## 2019-04-15 PROCEDURE — 96375 TX/PRO/DX INJ NEW DRUG ADDON: CPT | Performed by: EMERGENCY MEDICINE

## 2019-04-15 PROCEDURE — 36415 COLL VENOUS BLD VENIPUNCTURE: CPT | Performed by: PSYCHIATRY & NEUROLOGY

## 2019-04-15 PROCEDURE — 25000128 H RX IP 250 OP 636: Performed by: EMERGENCY MEDICINE

## 2019-04-15 PROCEDURE — 70450 CT HEAD/BRAIN W/O DYE: CPT

## 2019-04-15 PROCEDURE — 93005 ELECTROCARDIOGRAM TRACING: CPT | Performed by: EMERGENCY MEDICINE

## 2019-04-15 PROCEDURE — 86901 BLOOD TYPING SEROLOGIC RH(D): CPT | Performed by: PSYCHIATRY & NEUROLOGY

## 2019-04-15 PROCEDURE — 85610 PROTHROMBIN TIME: CPT | Performed by: PSYCHIATRY & NEUROLOGY

## 2019-04-15 PROCEDURE — 80048 BASIC METABOLIC PNL TOTAL CA: CPT | Performed by: EMERGENCY MEDICINE

## 2019-04-15 PROCEDURE — 85730 THROMBOPLASTIN TIME PARTIAL: CPT | Performed by: PSYCHIATRY & NEUROLOGY

## 2019-04-15 PROCEDURE — 25000125 ZZHC RX 250: Performed by: STUDENT IN AN ORGANIZED HEALTH CARE EDUCATION/TRAINING PROGRAM

## 2019-04-15 PROCEDURE — 70498 CT ANGIOGRAPHY NECK: CPT

## 2019-04-15 PROCEDURE — 93005 ELECTROCARDIOGRAM TRACING: CPT

## 2019-04-15 PROCEDURE — 85027 COMPLETE CBC AUTOMATED: CPT | Performed by: PSYCHIATRY & NEUROLOGY

## 2019-04-15 PROCEDURE — 80048 BASIC METABOLIC PNL TOTAL CA: CPT | Performed by: PSYCHIATRY & NEUROLOGY

## 2019-04-15 PROCEDURE — 20000004 ZZH R&B ICU UMMC

## 2019-04-15 PROCEDURE — 86850 RBC ANTIBODY SCREEN: CPT | Performed by: PSYCHIATRY & NEUROLOGY

## 2019-04-15 PROCEDURE — 85730 THROMBOPLASTIN TIME PARTIAL: CPT | Performed by: EMERGENCY MEDICINE

## 2019-04-15 PROCEDURE — 70553 MRI BRAIN STEM W/O & W/DYE: CPT

## 2019-04-15 PROCEDURE — 80061 LIPID PANEL: CPT | Performed by: PSYCHIATRY & NEUROLOGY

## 2019-04-15 PROCEDURE — 99291 CRITICAL CARE FIRST HOUR: CPT | Mod: 25 | Performed by: EMERGENCY MEDICINE

## 2019-04-15 PROCEDURE — 00000146 ZZHCL STATISTIC GLUCOSE BY METER IP

## 2019-04-15 PROCEDURE — 85610 PROTHROMBIN TIME: CPT | Performed by: EMERGENCY MEDICINE

## 2019-04-15 PROCEDURE — 93010 ELECTROCARDIOGRAM REPORT: CPT | Mod: Z6 | Performed by: EMERGENCY MEDICINE

## 2019-04-15 PROCEDURE — 86900 BLOOD TYPING SEROLOGIC ABO: CPT | Performed by: PSYCHIATRY & NEUROLOGY

## 2019-04-15 PROCEDURE — 25800030 ZZH RX IP 258 OP 636: Performed by: STUDENT IN AN ORGANIZED HEALTH CARE EDUCATION/TRAINING PROGRAM

## 2019-04-15 PROCEDURE — 25500064 ZZH RX 255 OP 636: Performed by: PSYCHIATRY & NEUROLOGY

## 2019-04-15 PROCEDURE — 84484 ASSAY OF TROPONIN QUANT: CPT | Performed by: EMERGENCY MEDICINE

## 2019-04-15 PROCEDURE — 87640 STAPH A DNA AMP PROBE: CPT | Performed by: PSYCHIATRY & NEUROLOGY

## 2019-04-15 PROCEDURE — 84484 ASSAY OF TROPONIN QUANT: CPT | Performed by: PSYCHIATRY & NEUROLOGY

## 2019-04-15 PROCEDURE — A9585 GADOBUTROL INJECTION: HCPCS | Performed by: PSYCHIATRY & NEUROLOGY

## 2019-04-15 PROCEDURE — 96361 HYDRATE IV INFUSION ADD-ON: CPT | Performed by: EMERGENCY MEDICINE

## 2019-04-15 PROCEDURE — 25000125 ZZHC RX 250: Performed by: EMERGENCY MEDICINE

## 2019-04-15 PROCEDURE — 25000128 H RX IP 250 OP 636: Performed by: STUDENT IN AN ORGANIZED HEALTH CARE EDUCATION/TRAINING PROGRAM

## 2019-04-15 PROCEDURE — 96365 THER/PROPH/DIAG IV INF INIT: CPT | Mod: 59 | Performed by: EMERGENCY MEDICINE

## 2019-04-15 PROCEDURE — 83036 HEMOGLOBIN GLYCOSYLATED A1C: CPT | Performed by: PSYCHIATRY & NEUROLOGY

## 2019-04-15 PROCEDURE — 85025 COMPLETE CBC W/AUTO DIFF WBC: CPT | Performed by: EMERGENCY MEDICINE

## 2019-04-15 PROCEDURE — 99292 CRITICAL CARE ADDL 30 MIN: CPT | Performed by: EMERGENCY MEDICINE

## 2019-04-15 PROCEDURE — 80061 LIPID PANEL: CPT | Performed by: STUDENT IN AN ORGANIZED HEALTH CARE EDUCATION/TRAINING PROGRAM

## 2019-04-15 PROCEDURE — 93010 ELECTROCARDIOGRAM REPORT: CPT | Performed by: INTERNAL MEDICINE

## 2019-04-15 PROCEDURE — 87641 MR-STAPH DNA AMP PROBE: CPT | Performed by: PSYCHIATRY & NEUROLOGY

## 2019-04-15 RX ORDER — IOPAMIDOL 755 MG/ML
500 INJECTION, SOLUTION INTRAVASCULAR ONCE
Status: COMPLETED | OUTPATIENT
Start: 2019-04-15 | End: 2019-04-15

## 2019-04-15 RX ORDER — MAGNESIUM SULFATE HEPTAHYDRATE 40 MG/ML
4 INJECTION, SOLUTION INTRAVENOUS EVERY 4 HOURS PRN
Status: DISCONTINUED | OUTPATIENT
Start: 2019-04-15 | End: 2019-04-17 | Stop reason: HOSPADM

## 2019-04-15 RX ORDER — PROCHLORPERAZINE 25 MG
12.5 SUPPOSITORY, RECTAL RECTAL EVERY 12 HOURS PRN
Status: DISCONTINUED | OUTPATIENT
Start: 2019-04-15 | End: 2019-04-17 | Stop reason: HOSPADM

## 2019-04-15 RX ORDER — ALBUTEROL SULFATE 0.83 MG/ML
2.5 SOLUTION RESPIRATORY (INHALATION) EVERY 4 HOURS PRN
Status: DISCONTINUED | OUTPATIENT
Start: 2019-04-15 | End: 2019-04-17 | Stop reason: HOSPADM

## 2019-04-15 RX ORDER — LIDOCAINE 4 G/G
1 PATCH TOPICAL
Status: DISCONTINUED | OUTPATIENT
Start: 2019-04-15 | End: 2019-04-17 | Stop reason: HOSPADM

## 2019-04-15 RX ORDER — NICOTINE POLACRILEX 4 MG
15-30 LOZENGE BUCCAL
Status: DISCONTINUED | OUTPATIENT
Start: 2019-04-15 | End: 2019-04-17 | Stop reason: HOSPADM

## 2019-04-15 RX ORDER — METOPROLOL SUCCINATE 50 MG/1
50 TABLET, EXTENDED RELEASE ORAL DAILY
Status: DISCONTINUED | OUTPATIENT
Start: 2019-04-16 | End: 2019-04-15

## 2019-04-15 RX ORDER — ATORVASTATIN CALCIUM 10 MG/1
20 TABLET, FILM COATED ORAL AT BEDTIME
Status: DISCONTINUED | OUTPATIENT
Start: 2019-04-15 | End: 2019-04-15

## 2019-04-15 RX ORDER — DEXTROSE MONOHYDRATE 25 G/50ML
25-50 INJECTION, SOLUTION INTRAVENOUS
Status: DISCONTINUED | OUTPATIENT
Start: 2019-04-15 | End: 2019-04-17 | Stop reason: HOSPADM

## 2019-04-15 RX ORDER — ACETAMINOPHEN 325 MG/1
650 TABLET ORAL EVERY 4 HOURS PRN
Status: DISCONTINUED | OUTPATIENT
Start: 2019-04-15 | End: 2019-04-15

## 2019-04-15 RX ORDER — POTASSIUM CHLORIDE 750 MG/1
20-40 TABLET, EXTENDED RELEASE ORAL
Status: DISCONTINUED | OUTPATIENT
Start: 2019-04-15 | End: 2019-04-17 | Stop reason: HOSPADM

## 2019-04-15 RX ORDER — POTASSIUM CL/LIDO/0.9 % NACL 10MEQ/0.1L
10 INTRAVENOUS SOLUTION, PIGGYBACK (ML) INTRAVENOUS
Status: DISCONTINUED | OUTPATIENT
Start: 2019-04-15 | End: 2019-04-17 | Stop reason: HOSPADM

## 2019-04-15 RX ORDER — ONDANSETRON 4 MG/1
4 TABLET, ORALLY DISINTEGRATING ORAL EVERY 6 HOURS PRN
Status: DISCONTINUED | OUTPATIENT
Start: 2019-04-15 | End: 2019-04-17 | Stop reason: HOSPADM

## 2019-04-15 RX ORDER — ACETAMINOPHEN 650 MG/1
650 SUPPOSITORY RECTAL EVERY 4 HOURS PRN
Status: DISCONTINUED | OUTPATIENT
Start: 2019-04-15 | End: 2019-04-16

## 2019-04-15 RX ORDER — LIDOCAINE 40 MG/G
CREAM TOPICAL
Status: DISCONTINUED | OUTPATIENT
Start: 2019-04-15 | End: 2019-04-17 | Stop reason: HOSPADM

## 2019-04-15 RX ORDER — POTASSIUM CHLORIDE 7.45 MG/ML
10 INJECTION INTRAVENOUS
Status: DISCONTINUED | OUTPATIENT
Start: 2019-04-15 | End: 2019-04-17 | Stop reason: HOSPADM

## 2019-04-15 RX ORDER — POTASSIUM CHLORIDE 29.8 MG/ML
20 INJECTION INTRAVENOUS
Status: DISCONTINUED | OUTPATIENT
Start: 2019-04-15 | End: 2019-04-17 | Stop reason: HOSPADM

## 2019-04-15 RX ORDER — SODIUM CHLORIDE 9 MG/ML
INJECTION, SOLUTION INTRAVENOUS CONTINUOUS
Status: DISCONTINUED | OUTPATIENT
Start: 2019-04-15 | End: 2019-04-17 | Stop reason: HOSPADM

## 2019-04-15 RX ORDER — PROCHLORPERAZINE MALEATE 5 MG
5 TABLET ORAL EVERY 6 HOURS PRN
Status: DISCONTINUED | OUTPATIENT
Start: 2019-04-15 | End: 2019-04-17 | Stop reason: HOSPADM

## 2019-04-15 RX ORDER — ONDANSETRON 2 MG/ML
4 INJECTION INTRAMUSCULAR; INTRAVENOUS EVERY 6 HOURS PRN
Status: DISCONTINUED | OUTPATIENT
Start: 2019-04-15 | End: 2019-04-17 | Stop reason: HOSPADM

## 2019-04-15 RX ORDER — NYSTATIN 100000 [USP'U]/G
POWDER TOPICAL 2 TIMES DAILY PRN
Status: DISCONTINUED | OUTPATIENT
Start: 2019-04-15 | End: 2019-04-17 | Stop reason: HOSPADM

## 2019-04-15 RX ORDER — METOPROLOL TARTRATE 1 MG/ML
5 INJECTION, SOLUTION INTRAVENOUS EVERY 6 HOURS
Status: DISCONTINUED | OUTPATIENT
Start: 2019-04-15 | End: 2019-04-16

## 2019-04-15 RX ORDER — LABETALOL 20 MG/4 ML (5 MG/ML) INTRAVENOUS SYRINGE
10 EVERY 10 MIN PRN
Status: DISCONTINUED | OUTPATIENT
Start: 2019-04-15 | End: 2019-04-17 | Stop reason: HOSPADM

## 2019-04-15 RX ORDER — POTASSIUM CHLORIDE 1.5 G/1.58G
20-40 POWDER, FOR SOLUTION ORAL
Status: DISCONTINUED | OUTPATIENT
Start: 2019-04-15 | End: 2019-04-17 | Stop reason: HOSPADM

## 2019-04-15 RX ORDER — GADOBUTROL 604.72 MG/ML
10 INJECTION INTRAVENOUS ONCE
Status: COMPLETED | OUTPATIENT
Start: 2019-04-15 | End: 2019-04-15

## 2019-04-15 RX ORDER — NALOXONE HYDROCHLORIDE 0.4 MG/ML
.1-.4 INJECTION, SOLUTION INTRAMUSCULAR; INTRAVENOUS; SUBCUTANEOUS
Status: DISCONTINUED | OUTPATIENT
Start: 2019-04-15 | End: 2019-04-17 | Stop reason: HOSPADM

## 2019-04-15 RX ADMIN — SODIUM CHLORIDE: 9 INJECTION, SOLUTION INTRAVENOUS at 20:21

## 2019-04-15 RX ADMIN — PANTOPRAZOLE SODIUM 40 MG: 40 INJECTION, POWDER, LYOPHILIZED, FOR SOLUTION INTRAVENOUS at 20:25

## 2019-04-15 RX ADMIN — ALTEPLASE 8.91 MG: KIT at 12:03

## 2019-04-15 RX ADMIN — SODIUM CHLORIDE 100 ML: 9 INJECTION, SOLUTION INTRAVENOUS at 11:23

## 2019-04-15 RX ADMIN — METOPROLOL TARTRATE 5 MG: 5 INJECTION, SOLUTION INTRAVENOUS at 20:26

## 2019-04-15 RX ADMIN — ALTEPLASE 80.19 MG: KIT at 12:09

## 2019-04-15 RX ADMIN — SODIUM CHLORIDE 100 ML: 9 INJECTION, SOLUTION INTRAVENOUS at 13:08

## 2019-04-15 RX ADMIN — SODIUM CHLORIDE 500 ML: 9 INJECTION, SOLUTION INTRAVENOUS at 11:50

## 2019-04-15 RX ADMIN — IOPAMIDOL 100 ML: 755 INJECTION, SOLUTION INTRAVENOUS at 11:23

## 2019-04-15 RX ADMIN — GADOBUTROL 10 ML: 604.72 INJECTION INTRAVENOUS at 21:32

## 2019-04-15 ASSESSMENT — ACTIVITIES OF DAILY LIVING (ADL)
ADLS_ACUITY_SCORE: 19
COGNITION: 0 - NO COGNITION ISSUES REPORTED
RETIRED_COMMUNICATION: 0-->UNDERSTANDS/COMMUNICATES WITHOUT DIFFICULTY
AMBULATION: 2-->ASSISTIVE PERSON
BATHING: 0-->INDEPENDENT
RETIRED_EATING: 0-->INDEPENDENT
DRESS: 0-->INDEPENDENT
TRANSFERRING: 2-->ASSISTIVE PERSON
TOILETING: 2-->ASSISTIVE PERSON
FALL_HISTORY_WITHIN_LAST_SIX_MONTHS: NO
SWALLOWING: 0-->SWALLOWS FOODS/LIQUIDS WITHOUT DIFFICULTY

## 2019-04-15 NOTE — ED NOTES
Bedside report from Maryanne MENDEZ RN and care assumed. Blood sugar en route to ED was 161 so was deferred on patient's arrival to ED.

## 2019-04-15 NOTE — H&P
[facility], [place]      Neurology Stroke Admission    Patient Name: Kristine Cosby  : 1940 MRN#: [id number]    STROKE DATA    Stroke Code:  Stroke code activated at outside facility.  Time patient seen:  04/15/2019 1730  Last known normal (pt's baseline):  04/15/2019 0900     TPA treatment:  Administered at 1203.  The treatment plan was discussed with and approved by Attending MD prior to administration.  Risks and benefits of tPA were discussed with patient's emergency contact / daughter and it was administered.  prior to administration.      Stroke Scales      National Institutes of Health Stroke Scale (at presentation)   NIHSS done at:  time patient seen      Score    Level of consciousness:  (0)   Alert, keenly responsive    LOC questions:  (1)   Answers one question correctly    LOC commands:  (0)   Performs both tasks correctly    Best gaze:  (0)   Normal    Visual:  (0)   No visual loss    Facial palsy:  (0)   Normal symmetrical movements    Motor arm (left):  (0)   No drift    Motor arm (right):  (0)   No drift    Motor leg (left):  (0)   No drift    Motor leg (right):  (0)   No drift    Limb ataxia:  (0)   Absent    Sensory:  (0)   Normal- no sensory loss    Best language:  (0)   Normal- no aphasia    Dysarthria:  (1)   Mild to moderate dysarthria (w/o dentures, per family patient is normal    Extinction and inattention:  (0)   No abnormality        NIHSS Total Score:  2          Dysphagia Screen  Time of screenin/15/2019 1730  Screening results: Dysarthria present - maintain NPO, consult SLP     ASSESSMENT & PLAN BY PROBLEM     Work-up for Ischemic Stroke (post TPA)        #Confusion, decreased responsiveness, difficulty following commands, episode of inability to lift bilateral upper extremities question of aphasia versus confusion/encephalopathy.  Has multiple stroke risk factors , including hypertension, hyperlipidemia, type 2 diabetes, previous stroke, CAD,  A. fib not on anticoagulation. also, cannot rule out toxic metabolic, medication effect, underlying infection for patient's previous confusion/altered mental status; patient's family report history of confusion and cognition issues over the past few months since hospitalization in December for hypoglycemia complicated by possible seizure.   - Risks and benefits of tPA discussed with family prior to administration  - Admit to Neuro ICU, under Neurocritical Care Team  - Close monitoring and neurochecks for any evidence of hemorrhagic transformation or allergic reaction  - Labetalol PRN to maintain BP < 180/105  - Euthermia, Euglycemia  - Head of bed elevated  - Hold aspirin and pharmacologic DVT prophylaxis for at least 24 hrs post-tPA  - Statin  - Repeat HCT 24 hrs post-tPA  - MRI w/ + w/o to evaluate for stroke versus other etiologies of confusion/decreased responsiveness  - TTE with Bubble Study  - Telemetry, EKG  - Bedside Glucose Monitoring  - A1c, Lipid Panel, Troponin x 3  - PT/OT/SLP  - PM&R  - Stroke Education  - Smoking Cessation  - Depression Screen  - Apnea Screen    During initial physical assessment, the plan of care was discussed and developed with patient and family.  Plan of care includes: further MRI + evaluation of event..    Patient was admitted via transfer from [facility] ED.    The patient will be admitted to the Neuro Critical Care/Stroke team..     Other Medical Problems:  #Atrial fibrillation not currently on anticoagulation (due to GI bleed 2017)  #HFpEF  #CAD  #Hypertension  #Hyperlipidemia  #GERD  #Type 2 diabetes  #hypothyroidism  -Holding PTA medications in setting of previous dysarthria, will restart after SLP evaluation  - metoprolol 5mg q6h    Fluids/Electrolytes/Nutrition  0.9% sodium chloride @ 75 mL/hr  Avoid hypotonic fluids.    Nutrition: NPO    Prophylaxis            For VTE Prevention:  - pneumatic compression device    For Acid Suppression:  - pantoprazole    Code  Status  Full Code    HPI  Kristine Cosby is a 78 year old female with hypoglycemia c/b seizure, dysphagia, remote ischemic stroke, Atrial fib not anticoagulation due to GI bleed in 2017 while on Pradaxa, type 2 diabetes, hypertension, hyperlipidemia, tobacco abuse who presents with concern for confusion versus aphasia and transient decreased responsiveness/difficulty following commands.  NIH of 6 at outside hospital, in the time window without bleed on CT, given TPA and transferred to [facility] for further evaluation.  Upon admission, Kristine could not recall the events that lead to her hospitalization today. She did remember that she was transported in an ambulance today.  Per discussion with family, patient has had significant cognitive and memory concerns in recent months after hospitalizations starting in December.    Her TCU was contacted this morning for collateral information. They describe her as pleasantly confused at baseline. She has conversations with staff members knows there names and has friends at the TCU. They reiterated that she has a past history of TIA and seizures. At 0825 she was awakened from sleep for a blood glucose check, was 130, she was tired and slept  in longer which was unusual for her. At 0930 she was noted to be talking like a baby which was unusual for her she then hugged her lenora bear and went back to sleep which was not considered unusual. At 1030 She asked for extra help to get out of bed and stated that she felt unusual both her arms were noted to be flaccid. She did not follow commands, which limited her neurologic evaluation, however her appearance was noted to be symmetrical. An ambulance was called at that time and arrived at 1050. She did ask at that time if she could drive the ambulance which the TCU staff thought was an unusual response for her.     Patient presented to [facility] ED, at that time patient was not able to follow directions, was disoriented,  talking like a baby but attempted to answer questions.  NIH of 6 (level of consciousness questions 2, severe aphasia, mild to moderate dysarthria, extinction and inattention of 1).  Per discussion with Dr. Anguiano at [facility], patient was given TPA and transferred to [facility] for further evaluation.  Here, patient is evaluated and multiple family members are present.  She again does not recall the events of the day but remembers getting an argument with 1 of the care attendants at the TCU related to her medications but otherwise cannot recall the events leading to arrival.  Family believes that she is in her normal state of health and she has had moderate amount of confusion, which is baseline in recent months.    Pertinent Past Medical/Surgical History  Past Medical History:   Diagnosis Date     Cellulitis of left lower extremity      GERD (gastroesophageal reflux disease)      HTN (hypertension)      Type 2 diabetes mellitus without complication (H)      Ulcer of left lower extremity with fat layer exposed (H)        Past Surgical History:   Procedure Laterality Date     ESOPHAGOGASTRODUODENOSCOPY       HYSTERECTOMY         Medications:   Medications Prior to Admission   Medication Sig Dispense Refill Last Dose     acetaminophen (TYLENOL) 500 MG tablet Take 1,000 mg by mouth 3 times daily    4/15/2019 at 0913     albuterol (PROVENTIL) (2.5 MG/3ML) 0.083% neb solution Take 2.5 mg by nebulization every 4 hours as needed for shortness of breath / dyspnea or wheezing   Unknown at Unknown time     aspirin (ASA) 81 MG chewable tablet Take 81 mg by mouth daily   4/15/2019 at 0913     atorvastatin (LIPITOR) 20 MG tablet Take 20 mg by mouth At Bedtime   4/14/2019 at 2004     cholecalciferol 1000 units TABS Take 2,000 Units by mouth daily   4/15/2019 at 0913     levothyroxine (SYNTHROID/LEVOTHROID) 125 MCG tablet Take 125 mcg by mouth daily   4/15/2019 at 0635     lidocaine (LIDODERM) 5 % patch Place 1 patch onto the skin every 24  hours   4/15/2019 at 0913     metoprolol succinate ER (TOPROL-XL) 50 MG 24 hr tablet Take 50 mg by mouth daily   4/15/2019 at 0913     nitroGLYcerin (NITROSTAT) 0.4 MG sublingual tablet Place 0.4 mg under the tongue every 5 minutes as needed for chest pain For chest pain place 1 tablet under the tongue every 5 minutes for 3 doses. If symptoms persist 5 minutes after 1st dose call 911.   Unknown at Unknown time     nystatin (MYCOSTATIN) 992209 UNIT/GM external powder Apply topically 2 times daily as needed   3/4/2019 at 0953     pantoprazole (PROTONIX) 40 MG EC tablet Take 40 mg by mouth daily   4/15/2019 at 0635     senna-docusate (SENOKOT-S/PERICOLACE) 8.6-50 MG tablet Take 2 tablets by mouth 2 times daily    4/15/2019 at 0913     Skin Protectants, Misc. (EUCERIN) cream Apply topically 2 times daily ; apply to arms, legs, hands, feet for dry skin   4/6/2019 at 0949     tamsulosin (FLOMAX) 0.4 MG capsule Take 0.8 mg by mouth At Bedtime   4/14/2019 at 2004   .    Allergies: No Known Allergies.    Family History:   Family History   Problem Relation Age of Onset     Lung Cancer Father    No known family history of stroke, granddaughter had a dvt.    Social History:   Social History     Tobacco Use     Smoking status: Former Smoker     Smokeless tobacco: Former User   Substance Use Topics     Alcohol use: No     Frequency: Never   Socially smokes.    Tobacco use: Current smoker, infrequent / few cigarettes per day but not consistent.     ROS:  The 10 point Review of Systems was performed and is negative other than noted in the HPI.    PHYSICAL EXAMINATION  Vital Signs:  B/P: 157/98,  T: 98.5,  P: Data Unavailable,  R: 22    General:  patient lying in bed without any acute distress    HEENT:  normocephalic/atraumatic, edentulous, no oral lesions, no epistaxis   Cardio: fairly  RRR  Pulmonary:  no respiratory distress, CTAB  Abdomen:  soft, non-tender, non-distended, bowel sounds present, obese  Extremities: moving all,  well perfused  Skin:  rash present, bruising present   Psych: Variable affect, laughing, pauses throughout conversation and closes her eyes and then will abruptly open her eyes and ask apprised that you have were talking to her.  At other times, she will laugh hysterically for no apparent reason or to situations that were not necessarily comical.    Neurologic  Mental Status: Alert, oriented to self, year, not month.  Knows her birthday, knows how old she is.  Knows the current president but not the previous one.. Unable to spell world forwards.  Remembers 3 of 3 words on immediate recall and 03 words on 5-minute delay.  Unable to perform Luria testing.  Follows one-step commands consistently, inconsistently following 2 step commands, and unable to follow complex commands.  Cranial Nerves:  visual fields intact, EOMI with normal smooth pursuit, facial sensation intact and symmetric, facial movements symmetric, hearing not formally tested but intact to conversation, shoulder shrug strong bilaterally, tongue protrusion midline, Mild dysarthria but is edentulous (and patient's family thinks she sounds about normal for not having her dentures in).  Motor:  no abnormal movements, normal muscle bulk, no pronator drift, able to move all limbs spontaneously, strength 5/5 throughout upper and lower extremities  Sensory:  intact/symmetric to light touch throughout upper and lower extremities without extinction.  Coordination:  FNF and HS intact without dysmetria, although impaired by patient's ability to follow commands.  Station/Gait:  Defer testing due to patient receiving TPA and is on strict bedrest    Labs  Labs and Imaging reviewed and used in developing the plan; pertinent results included.     Lab Results   Component Value Date     (H) 04/15/2019       The patient was discussed with the fellow, Dr. Real.  The staff is Dr. Begum.    Gigi Diehl MD  Pager: [phone]

## 2019-04-15 NOTE — ED NOTES
"Return from CT and placed on cardiac/BP/oximetry. She is alert and more talkative at this time. She now knows her birth date and age. She keeps shaking her head and when asked if she has pain or is dizzy she states, \"I'm just trying to figure out whats going on\".  "

## 2019-04-15 NOTE — PHARMACY
Pharmacy Stroke Code Response    Patient weight (in kg): 99.    The Stroke Team determined that the patient was a candidate for IV alteplase therapy and requested that alteplase be made at 11:52.    Dose of alteplase:  A total dose of 89.1 mg was calculated.  This is to be given as a 8.91 mg bolus over 1 minute followed by a 80.19 mg infusion over 1 hour.  Calculation double check performed by: Ralph Mckeon    The alteplase bolus was handed off to nursing at 12:00.      Ralph Mckeon [facility] on 4/15/2019 at 12:26 PM

## 2019-04-15 NOTE — ED TRIAGE NOTES
"CODE STROKE CALLED: Patient brought to ED via EMS with concerns for possible stroke. Staff noted \"slurred speech\" about 0915 today. Upon arrival she is using \"baby talk\". H/O UTI's. Maryanne Hanna RN  "

## 2019-04-15 NOTE — ED PROVIDER NOTES
"  History     Chief Complaint   Patient presents with     Slurred Speech     The history is provided by the EMS personnel.     Kristine Cosby is a 78 year old female who has a history of dysphagia, TIA and atrial fibrillation currently not on blood thinners presents to the emergency department via ems for slurred speech. Patient brought to the ED via EMS from her nursing home. EMS reports the staff at her nursing home noticed sudden onset of slurred speech and confusion around 0915.  Last seen normal at presumably 9 am but that cannot but confirmed by paramedics and patient cannot answer any questions.  She has also been talking \"like a baby\" which unusual for her. Her last known well was around 0900. Patient is also complaining of right sided neck pain, \"feels funny\" and photophobia unable to focus her vision. Her blood sugar around 0915 was in the 130's.  They noticed no limb ataxia or focal neurologic deficits with the exception of difficulty speaking.    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  Patient has identified Health Care Agent(s): No    FAMILY CONTACT  Primary Spokesperson: Amarilys Cosby Relationship: daughter Phone: [phone]   Secondary Spokesperson: Iraida Relationship: " daughter Phone: [phone]   Conservator:  Relationship:  Phone:    Guardian: Relationship:  Phone:      Patient has Advance Care Plan Documents (Health Care Directive, POLST): No, recommend POLST.    Patient has identified Specific Treatment Preferences: Yes     How have preferences been verified: patient currently without decisional capacity. Daughters make decisions together for patient.     Specific Treatment Preferences:   a.) Code Status:  DNR / DNI / Do Not Attempt Resuscitation - Allow a Natural Death  b.) Goals of Treatment:  Selective Treatment: Use medical treatment, antibiotics, IV fluids and cardiac monitor as indicated. No intubation, advanced airway interventions, or mechanical ventilation. May consider less invasive airway support (e.g. CPAP, BiPAP). Transfer to hospital if indicated. Generally avoid the intensive care unit. All patients will receive comfort-focused treatments.  TREATMENT PLAN: Provide basic medical treatments aimed at treating new or reversible illness.    Dudley Herrera MD 1/4/2019 4:43 PM       Palliative care by specialist 01/04/2019     Priority: Medium     Hypoglycemia associated with type 2 diabetes mellitus (H) 12/27/2018     Priority: Medium     Acute CHF (congestive heart failure) (H) 12/22/2016     Priority: Medium     Anemia 12/22/2016     Priority: Medium     Persistent atrial fibrillation (H) 02/24/2016     Priority: Medium     Overview:   2.  Chronic atrial fibrillation  A. On Toprol 200mg, daily.  B. Pradaxa discontinued due to GI bleed. On aspirin. Risk of bleeding currently outweighs risk of stroke. Will have close follow-up with GI.       Acquired hypothyroidism 02/01/2016     Priority: Medium     GERD (gastroesophageal reflux disease) 02/01/2016     Priority: Medium     HTN (hypertension) 02/01/2016     Priority: Medium     Hyperlipidemia LDL goal <100 02/01/2016     Priority: Medium     Type 2 diabetes mellitus without complication (H) 02/01/2016     Priority:  Medium        Past Medical History:    Past Medical History:   Diagnosis Date     Cellulitis of left lower extremity      GERD (gastroesophageal reflux disease)      HTN (hypertension)      Type 2 diabetes mellitus without complication (H)      Ulcer of left lower extremity with fat layer exposed (H)        Past Surgical History:    Past Surgical History:   Procedure Laterality Date     ESOPHAGOGASTRODUODENOSCOPY       HYSTERECTOMY         Family History:    Family History   Problem Relation Age of Onset     Lung Cancer Father        Social History:  Marital Status:   [5]  Social History     Tobacco Use     Smoking status: Former Smoker     Smokeless tobacco: Former User   Substance Use Topics     Alcohol use: No     Frequency: Never     Drug use: Not on file        Medications:      acetaminophen (TYLENOL) 500 MG tablet   albuterol (PROVENTIL) (2.5 MG/3ML) 0.083% neb solution   aspirin (ASA) 81 MG chewable tablet   atorvastatin (LIPITOR) 20 MG tablet   cholecalciferol 1000 units TABS   levothyroxine (SYNTHROID/LEVOTHROID) 125 MCG tablet   lidocaine (LIDODERM) 5 % patch   metoprolol succinate ER (TOPROL-XL) 50 MG 24 hr tablet   nitroGLYcerin (NITROSTAT) 0.4 MG sublingual tablet   nystatin (MYCOSTATIN) 393016 UNIT/GM external powder   pantoprazole (PROTONIX) 40 MG EC tablet   senna-docusate (SENOKOT-S/PERICOLACE) 8.6-50 MG tablet   Skin Protectants, Misc. (EUCERIN) cream   tamsulosin (FLOMAX) 0.4 MG capsule         Review of Systems   Unable to perform ROS: Mental status change       Physical Exam   BP: (!) 141/101  Pulse: 99  Heart Rate: 88  Temp: 99  F (37.2  C)  Resp: 16  Weight: 99 kg (218 lb 4.1 oz)  SpO2: 93 %      Physical Exam   Constitutional: She appears well-developed and well-nourished. No distress.   HENT:   Head: Normocephalic and atraumatic.   Eyes: Pupils are equal, round, and reactive to light. No scleral icterus.   Neck: Normal range of motion. Neck supple.   Cardiovascular: Exam reveals no  friction rub.   Irregularly irregular, tachycardic   Pulmonary/Chest: Effort normal and breath sounds normal. No stridor. No respiratory distress.   Musculoskeletal: Normal range of motion. She exhibits no edema or deformity.   Neurological: She is alert.   Disoriented.  No facial droop.  Unable to follow directions for full neurologic exam.  No pronator drift.  No limb ataxia appreciated.  Unable to follow directions for finger to nose.   Skin: Skin is warm and dry. Capillary refill takes less than 2 seconds. No rash noted. She is not diaphoretic. No erythema. No pallor.   Psychiatric:   Talking like a baby.  Attempts to answer questions.   Nursing note and vitals reviewed.      ED Course        Procedures               EKG Interpretation:      Interpreted by Wayne Mccracken  Time reviewed: 1148  Symptoms at time of EKG: None   Rhythm: bigeminy  Rate: Normal  Axis: Other (leftward)  Ectopy: bigeminy  Conduction: underlying atrial fibrillation  ST Segments/ T Waves: No ST-T wave changes  Q Waves: none  Comparison to prior: No old EKG available    Clinical Impression: bigeminy with underlying atrial fibrillation                Critical Care time:  was 40 minutes for this patient excluding procedures.    The patient has stroke symptoms:           ED Stroke specific documentation           NIHSS PDF          Protocol PDF     Patient last known well time: 0900  ED Provider first to bedside at: 1110  CT Results received at: 1131  Patient was treated with TPA.  The risks and benefits of TPA were reviewed using the risk information document.  These risks included bleeding complications such as intracranial bleeding and death. The patient or patient's surrogate's decisional capacity was assessed to be intact. TPA decision was made after this informed consent. I discussed this with the patient's emergency contact Norma Cosby who will call her other siblings and meet the patient at the [facility].     National Institutes of  Health Stroke Scale (Baseline)  Time Performed: 1110      Score    Level of consciousness: (0)   Alert, keenly responsive    LOC questions: (2)   Answers neither question correctly    LOC commands: (0)   Performs both tasks correctly    Best gaze: (0)   Normal    Visual: (0)   No visual loss    Facial palsy: (0)   Normal symmetrical movements    Motor arm (left): (0)   No drift    Motor arm (right): (0)   No drift    Motor leg (left): (0)   No drift    Motor leg (right): (0)   No drift    Limb ataxia: (0)   Absent    Sensory: (0)   Normal- no sensory loss    Best language: (2)   Severe aphasia    Dysarthria: (1)   Mild to moderate dysarthria    Extinction and inattention: (1)   Visual, tacile, auditory, spatial, person inattention        Total Score:  6        Stroke Mimics were considered (including migraine headache, seizure disorder, hypoglycemia (or hyperglycemia), head or spinal trauma, CNS infection, Toxin ingestion and shock state (e.g. sepsis) .      There were no delays in care                  Results for orders placed or performed during the hospital encounter of 04/15/19 (from the past 24 hour(s))   Head CT w/o contrast    Narrative    CT SCAN OF THE HEAD WITHOUT CONTRAST   4/15/2019 11:19 AM     HISTORY: Motor neuron disease. History of stroke and dysphagia.  Presents with sudden onset of slurred speech and confusion at 9:15 AM.  Right neck pain. Photophobia.    TECHNIQUE:  Axial images of the head and coronal reformations without  IV contrast material. Radiation dose for this scan was reduced using  automated exposure control, adjustment of the mA and/or kV according  to patient size, or iterative reconstruction technique.    COMPARISON: None.    FINDINGS:  There is generalized atrophy of the brain.  There is low  attenuation in the white matter of the cerebral hemispheres consistent  with sequelae of small vessel ischemic disease. There is  encephalomalacia in the lateral right frontal lobe consistent  with an  old infarct. There is no evidence of intracranial hemorrhage, mass,  acute infarct or anomaly.     The visualized portions of the sinuses and mastoids appear normal.  There is no evidence of trauma.       Impression    IMPRESSION:   1. No acute abnormality.  2. Atrophy of the brain.  White matter changes consistent with  sequelae of small vessel ischemic disease.  3. Encephalomalacia in the lateral right frontal lobe probably from an  old infarct.     DEA FELDER MD   CBC with platelets differential   Result Value Ref Range    WBC 6.1 4.0 - 11.0 10e9/L    RBC Count 3.46 (L) 3.8 - 5.2 10e12/L    Hemoglobin 11.2 (L) 11.7 - 15.7 g/dL    Hematocrit 34.7 (L) 35.0 - 47.0 %     78 - 100 fl    MCH 32.4 26.5 - 33.0 pg    MCHC 32.3 31.5 - 36.5 g/dL    RDW 12.2 10.0 - 15.0 %    Platelet Count 190 150 - 450 10e9/L    Diff Method Automated Method     % Neutrophils 58.8 %    % Lymphocytes 26.9 %    % Monocytes 10.5 %    % Eosinophils 3.3 %    % Basophils 0.3 %    % Immature Granulocytes 0.2 %    Nucleated RBCs 0 0 /100    Absolute Neutrophil 3.6 1.6 - 8.3 10e9/L    Absolute Lymphocytes 1.6 0.8 - 5.3 10e9/L    Absolute Monocytes 0.6 0.0 - 1.3 10e9/L    Absolute Basophils 0.0 0.0 - 0.2 10e9/L    Abs Immature Granulocytes 0.0 0 - 0.4 10e9/L    Absolute Nucleated RBC 0.0    Basic metabolic panel   Result Value Ref Range    Sodium 142 133 - 144 mmol/L    Potassium 4.3 3.4 - 5.3 mmol/L    Chloride 108 94 - 109 mmol/L    Carbon Dioxide 26 20 - 32 mmol/L    Anion Gap 8 3 - 14 mmol/L    Glucose 150 (H) 70 - 99 mg/dL    Urea Nitrogen 16 7 - 30 mg/dL    Creatinine 0.89 0.52 - 1.04 mg/dL    GFR Estimate 62 >60 mL/min/[1.73_m2]    GFR Estimate If Black 72 >60 mL/min/[1.73_m2]    Calcium 8.4 (L) 8.5 - 10.1 mg/dL   INR   Result Value Ref Range    INR 1.08 0.86 - 1.14   Partial thromboplastin time   Result Value Ref Range    PTT 32 22 - 37 sec   Troponin I   Result Value Ref Range    Troponin I ES 0.016 0.000 - 0.045 ug/L   CT  Head Neck Angio w/o & w Contrast    Narrative    CT ANGIOGRAM OF THE HEAD AND NECK WITHOUT AND WITH CONTRAST  April 15,  2019 11:38 AM     HISTORY: Sudden onset of slurred speech and confusion at 9:15 AM.    TECHNIQUE: Precontrast localizing scans were followed by CT  angiography with an injection of 100 mL Isovue 370  IV with scans  through the head and neck. Images were transferred to a separate 3-D  workstation where multiplanar reformations and 3-D images were  created. Estimates of carotid stenoses are made relative to the distal  internal carotid artery diameters except as noted. Radiation dose for  this scan was reduced using automated exposure control, adjustment of  the mA and/or kV according to patient size, or iterative  reconstruction technique.      COMPARISON: None.    CT HEAD FINDINGS: No contrast enhancing lesions. Cerebral blood flow  is grossly normal.    CT ANGIOGRAM HEAD FINDINGS: Multiple stenoses are seen in the  intracranial arterial circulation consistent with atherosclerosis, but  no arterial occlusion is seen. No aneurysm. Calcified atherosclerotic  plaque is present in the carotid siphons but with no significant  stenosis. Venous circulation is unremarkable.     CT ANGIOGRAM NECK FINDINGS:   Right carotid artery: Calcified atherosclerotic plaque in the carotid  bifurcation.  No significant stenosis.      Left carotid artery: Calcified atherosclerotic plaque in the carotid  bifurcation.  No significant stenosis.      Vertebral arteries: No significant stenosis.      Other findings: Thyroid gland is either absent or severely atrophic.  Bilateral intraocular lens implants. Old infarct in lateral right  frontal lobe.      Impression    IMPRESSION:   1. No evidence for arterial occlusion or severe stenosis.  2. Multilevel areas of mild atherosclerotic stenosis in the  intracranial circulation.  3. Mild calcified atherosclerotic plaque in the carotid bifurcations  and siphons with no significant  "stenosis.    I called the report to Dr. Zenon Mccracken in the emergency room at 11:48 AM  on 4/15/2019.         Medications   alteplase (ACTIVASE) infusion 8.91 mg (8.91 mg Intravenous New Bag 4/15/19 1203)     Followed by   alteplase (ACTIVASE) infusion 80.19 mg (80.19 mg Intravenous New Bag 4/15/19 1209)     Followed by   0.9% sodium chloride BOLUS (has no administration in time range)   No NSAIDs, Heparin, Aspirin, or Warfarin to be given immediately before or 24 hours after alteplase (ACTIVASE) infusion. (has no administration in time range)   0.9% sodium chloride BOLUS (500 mLs Intravenous New Bag 4/15/19 1150)   sodium chloride (PF) 0.9% PF flush 3 mL (3 mLs Intravenous Given by Other 4/15/19 1123)   Saline Bag 100mL  CT  flush use only (100 mLs Intravenous Given by Other 4/15/19 1123)   iopamidol (ISOVUE-370) solution 500 mL (100 mLs Intravenous Given by Other 4/15/19 1123)     0900 - per report, last seen well.  Normally A and O, speaking normally  0845 per call to NH  0915 - \"baby talk\" confusion and slurred speech  1110 patient seen at bedside. NIHSS shows a stroke scale of 6  1115 requested CT  1131 reviewed the results of the CT - no bleed  1132 - reviewed ecg which shows sinus tach, not afib  1133 - requested page to stroke neurology  1140 Dr. Begum reviewed the case, ct, cta, meds and timeline and recommended TPA  During this time, nursing staff confirmed that the patient was not on anticoagulants and last known well time with 0845 am.  TPA ordered. Patient accepted for transfer. No change in status on re-evaluation.      Assessments & Plan (with Medical Decision Making)  CVA  78-year-old female with atrial fibrillation not on anticoagulants presents with new onset altered mental status, confusion, slurred speech.  Last known well time at 8:45 AM per nursing home staff.  We called and confirmed this.  Apparently the patient is normally not this confused does not have baby talk and does not have the " significant of slurred speech.  NIH stroke scale 6.  I discussed this case with the stroke neurologist Dr. Begum who recommended TPA.  He felt that the patient's GI bleed was remote enough that it was not a contraindication to TPA.  I confirmed with her emergency contact Norma Cosby ([phone]) and specifically discussed the risks and benefits of TPA and she wanted us to proceed with it.   The diagnosis, treatment options, risks and transfer to the [facility] was discussed with Norma Harjeet who agrees with the plan.  The patient was not mentally competent to make decisions in the emergency department.  TPA bolus and drip started.  Transfer arrangements were made via ground ambulance ALS for transfer and admission to the neuro ICU at the [facility] under the care of Dr. Begum.     I have reviewed the nursing notes.    I have reviewed the findings, diagnosis, plan and need for follow up with the patient.         Medication List      There are no discharge medications for this visit.         Final diagnoses:   Cerebrovascular accident (CVA), unspecified mechanism (H)     This document serves as a record of services personally performed by Zenon Mccracken MD. It was created on their behalf by Agueda Cabrera, a trained medical scribe. The creation of this record is based on the provider's personal observations and the statements of the patient. This document has been checked and approved by the attending provider.    Note: Chart documentation done in part with Dragon Voice Recognition software. Although reviewed after completion, some word and grammatical errors may remain.    4/15/2019   [facility] EMERGENCY DEPARTMENT     Wayne Mccracken MD  04/15/19 0086

## 2019-04-15 NOTE — PROGRESS NOTES
D: Pt admitted to [facility] Neuro ICU via [facility] ambulance stretcher from [facility] ED at 1519. Pt alert, oriented with occasional lapses of short term memory. PERRL, DANIELS with equal strength. Normotensive, normothermic. Lung sounds clear.   I: Standard monitors, q 30 minute neuros and vitals until 2009H tonight. Admission documentation started. Oriented pt and family to expected testing and unit routine.   A: Pt states no pain. Asked appropriately for assistance to bedside commode but was incontinent before she could get there.   P: Anticipate MRI tonight, q 1 hr vitals and neuros after 2009H.

## 2019-04-15 NOTE — LETTER
Transition Communication Hand-off for Care Transitions to Next Level of Care Provider    Name: Kristine Cosby  : 1940  MRN #: [id number]  Primary Care Provider: Physician No Ref-Primary     Primary Clinic: No address on file     Reason for Hospitalization:  Toxic metabolic encephalopathy [G92]  Admit Date/Time: 4/15/2019  3:19 PM  Discharge Date: 19  Payor Source: Payor: UCARE / Plan: UCARE MEDICARE NON FPA / Product Type: HMO /              Reason for Communication Hand-off Referral:     Discharge Plan:    Social Work Services Discharge Note     Patient Name:  Kristine Cosby     Anticipated Discharge Date:  19     Discharge Disposition:   [facility] ([phone]).     Following MD:  Facility assignment     Pre-Admission Screening (PAS) online form has been completed.  The Level of Care (LOC) is:  Determined  Confirmation Code is:  Not required as pt is returning to long term care facility of origin.        Additional Services/Equipment Arranged: CITLALLI has confirmed readiness for discharge with Dr. Diehl.  CITLALLI phoned Admissions at [facility] (Yessi) and left messages for Admissions to call in regards to the discharge plan.  CITLALLI has arranged for [facility] (Max [phone]) to provide w/c transport at 11:45am.  CITLALLI attempted to call Yessi a 3rd time and was successful in reaching her.  Yessi states that return Admissions are coordinated through the Nurse Manager, Sigrid. SW left a message for Sigrid informing her of the above.     Patient / Family response to discharge plan:  Pt and daughter (Kaya) voice understanding of the discharge plan and agreement with the discharge plan.     Persons notified of above discharge plan:  Pt, daughter, 6A nursing and Dr. Diehl.     Staff Discharge Instructions:  Please fax discharge orders and signed hard scripts for any controlled substances (SW will complete this task).  Please print a packet and send with patient.      CTS  Handoff completed:  YES     Medicare Notice of Rights provided to the patient/family:  YES     DMITRY Lin  Social Work, 6A  Phone:  [phone]  Pager:  [phone]  4/16/2019

## 2019-04-16 ENCOUNTER — APPOINTMENT (OUTPATIENT)
Dept: CT IMAGING | Facility: CLINIC | Age: 79
DRG: 092 | End: 2019-04-16
Attending: STUDENT IN AN ORGANIZED HEALTH CARE EDUCATION/TRAINING PROGRAM
Payer: COMMERCIAL

## 2019-04-16 ENCOUNTER — APPOINTMENT (OUTPATIENT)
Dept: SPEECH THERAPY | Facility: CLINIC | Age: 79
DRG: 092 | End: 2019-04-16
Attending: PSYCHIATRY & NEUROLOGY
Payer: COMMERCIAL

## 2019-04-16 PROBLEM — Z71.89 ACP (ADVANCE CARE PLANNING): Status: RESOLVED | Noted: 2019-01-04 | Resolved: 2019-04-16

## 2019-04-16 LAB
ABO + RH BLD: ABNORMAL
ABO + RH BLD: ABNORMAL
ALBUMIN UR-MCNC: NEGATIVE MG/DL
APPEARANCE UR: ABNORMAL
BACTERIA #/AREA URNS HPF: ABNORMAL /HPF
BILIRUB UR QL STRIP: NEGATIVE
BLD GP AB INVEST PLASRBC-IMP: ABNORMAL
BLD GP AB SCN SERPL QL: ABNORMAL
BLOOD BANK CMNT PATIENT-IMP: ABNORMAL
BLOOD BANK CMNT PATIENT-IMP: ABNORMAL
COLOR UR AUTO: YELLOW
GLUCOSE BLDC GLUCOMTR-MCNC: 100 MG/DL (ref 70–99)
GLUCOSE BLDC GLUCOMTR-MCNC: 102 MG/DL (ref 70–99)
GLUCOSE BLDC GLUCOMTR-MCNC: 102 MG/DL (ref 70–99)
GLUCOSE BLDC GLUCOMTR-MCNC: 160 MG/DL (ref 70–99)
GLUCOSE BLDC GLUCOMTR-MCNC: 92 MG/DL (ref 70–99)
GLUCOSE BLDC GLUCOMTR-MCNC: 93 MG/DL (ref 70–99)
GLUCOSE UR STRIP-MCNC: NEGATIVE MG/DL
HGB UR QL STRIP: NEGATIVE
INTERPRETATION ECG - MUSE: NORMAL
KETONES UR STRIP-MCNC: 10 MG/DL
LEUKOCYTE ESTERASE UR QL STRIP: ABNORMAL
MUCOUS THREADS #/AREA URNS LPF: PRESENT /LPF
NITRATE UR QL: POSITIVE
PH UR STRIP: 6.5 PH (ref 5–7)
RBC #/AREA URNS AUTO: 2 /HPF (ref 0–2)
SOURCE: ABNORMAL
SP GR UR STRIP: 1.02 (ref 1–1.03)
SPECIMEN EXP DATE BLD: ABNORMAL
SQUAMOUS #/AREA URNS AUTO: 2 /HPF (ref 0–1)
UROBILINOGEN UR STRIP-MCNC: NORMAL MG/DL (ref 0–2)
WBC #/AREA URNS AUTO: 78 /HPF (ref 0–5)
WBC CLUMPS #/AREA URNS HPF: PRESENT /HPF

## 2019-04-16 PROCEDURE — 25000128 H RX IP 250 OP 636: Performed by: STUDENT IN AN ORGANIZED HEALTH CARE EDUCATION/TRAINING PROGRAM

## 2019-04-16 PROCEDURE — C9113 INJ PANTOPRAZOLE SODIUM, VIA: HCPCS | Performed by: STUDENT IN AN ORGANIZED HEALTH CARE EDUCATION/TRAINING PROGRAM

## 2019-04-16 PROCEDURE — 25000132 ZZH RX MED GY IP 250 OP 250 PS 637: Performed by: STUDENT IN AN ORGANIZED HEALTH CARE EDUCATION/TRAINING PROGRAM

## 2019-04-16 PROCEDURE — 92610 EVALUATE SWALLOWING FUNCTION: CPT | Mod: GN

## 2019-04-16 PROCEDURE — 25000128 H RX IP 250 OP 636: Performed by: PSYCHIATRY & NEUROLOGY

## 2019-04-16 PROCEDURE — 81001 URINALYSIS AUTO W/SCOPE: CPT | Performed by: PSYCHIATRY & NEUROLOGY

## 2019-04-16 PROCEDURE — 25000125 ZZHC RX 250: Performed by: STUDENT IN AN ORGANIZED HEALTH CARE EDUCATION/TRAINING PROGRAM

## 2019-04-16 PROCEDURE — 87186 SC STD MICRODIL/AGAR DIL: CPT | Performed by: PSYCHIATRY & NEUROLOGY

## 2019-04-16 PROCEDURE — 12000001 ZZH R&B MED SURG/OB UMMC

## 2019-04-16 PROCEDURE — 40000141 ZZH STATISTIC PERIPHERAL IV START W/O US GUIDANCE

## 2019-04-16 PROCEDURE — 70450 CT HEAD/BRAIN W/O DYE: CPT

## 2019-04-16 PROCEDURE — 87086 URINE CULTURE/COLONY COUNT: CPT | Performed by: PSYCHIATRY & NEUROLOGY

## 2019-04-16 PROCEDURE — 25000131 ZZH RX MED GY IP 250 OP 636 PS 637: Performed by: STUDENT IN AN ORGANIZED HEALTH CARE EDUCATION/TRAINING PROGRAM

## 2019-04-16 PROCEDURE — 40000894 ZZH STATISTIC OT IP EVAL DEFER: Performed by: OCCUPATIONAL THERAPIST

## 2019-04-16 PROCEDURE — 87088 URINE BACTERIA CULTURE: CPT | Performed by: PSYCHIATRY & NEUROLOGY

## 2019-04-16 PROCEDURE — 00000146 ZZHCL STATISTIC GLUCOSE BY METER IP

## 2019-04-16 RX ORDER — ASPIRIN 325 MG
325 TABLET ORAL DAILY
Status: DISCONTINUED | OUTPATIENT
Start: 2019-04-16 | End: 2019-04-17 | Stop reason: HOSPADM

## 2019-04-16 RX ORDER — CEFTRIAXONE 1 G/1
1 INJECTION, POWDER, FOR SOLUTION INTRAMUSCULAR; INTRAVENOUS EVERY 24 HOURS
Status: DISCONTINUED | OUTPATIENT
Start: 2019-04-16 | End: 2019-04-17

## 2019-04-16 RX ORDER — METOPROLOL SUCCINATE 50 MG/1
50 TABLET, EXTENDED RELEASE ORAL DAILY
Status: DISCONTINUED | OUTPATIENT
Start: 2019-04-16 | End: 2019-04-17 | Stop reason: HOSPADM

## 2019-04-16 RX ORDER — TAMSULOSIN HYDROCHLORIDE 0.4 MG/1
0.8 CAPSULE ORAL AT BEDTIME
Status: DISCONTINUED | OUTPATIENT
Start: 2019-04-16 | End: 2019-04-17 | Stop reason: HOSPADM

## 2019-04-16 RX ORDER — ACETAMINOPHEN 325 MG/1
975 TABLET ORAL EVERY 6 HOURS PRN
Status: DISCONTINUED | OUTPATIENT
Start: 2019-04-16 | End: 2019-04-17 | Stop reason: HOSPADM

## 2019-04-16 RX ORDER — HEPARIN SODIUM 5000 [USP'U]/.5ML
5000 INJECTION, SOLUTION INTRAVENOUS; SUBCUTANEOUS EVERY 12 HOURS
Status: DISCONTINUED | OUTPATIENT
Start: 2019-04-16 | End: 2019-04-17 | Stop reason: HOSPADM

## 2019-04-16 RX ORDER — PANTOPRAZOLE SODIUM 40 MG/1
40 TABLET, DELAYED RELEASE ORAL
Status: DISCONTINUED | OUTPATIENT
Start: 2019-04-17 | End: 2019-04-16

## 2019-04-16 RX ORDER — PANTOPRAZOLE SODIUM 40 MG/1
40 TABLET, DELAYED RELEASE ORAL DAILY
Status: DISCONTINUED | OUTPATIENT
Start: 2019-04-17 | End: 2019-04-17 | Stop reason: HOSPADM

## 2019-04-16 RX ADMIN — ASPIRIN 325 MG ORAL TABLET 325 MG: 325 PILL ORAL at 14:49

## 2019-04-16 RX ADMIN — PANTOPRAZOLE SODIUM 40 MG: 40 INJECTION, POWDER, LYOPHILIZED, FOR SOLUTION INTRAVENOUS at 07:46

## 2019-04-16 RX ADMIN — ACETAMINOPHEN 975 MG: 325 TABLET, FILM COATED ORAL at 11:14

## 2019-04-16 RX ADMIN — METOPROLOL TARTRATE 5 MG: 5 INJECTION, SOLUTION INTRAVENOUS at 01:59

## 2019-04-16 RX ADMIN — METOPROLOL SUCCINATE 50 MG: 50 TABLET, EXTENDED RELEASE ORAL at 16:25

## 2019-04-16 RX ADMIN — METOPROLOL TARTRATE 5 MG: 5 INJECTION, SOLUTION INTRAVENOUS at 07:46

## 2019-04-16 RX ADMIN — CEFTRIAXONE SODIUM 1 G: 1 INJECTION, POWDER, FOR SOLUTION INTRAMUSCULAR; INTRAVENOUS at 12:32

## 2019-04-16 RX ADMIN — TAMSULOSIN HYDROCHLORIDE 0.8 MG: 0.4 CAPSULE ORAL at 21:12

## 2019-04-16 RX ADMIN — INSULIN ASPART 1 UNITS: 100 INJECTION, SOLUTION INTRAVENOUS; SUBCUTANEOUS at 21:13

## 2019-04-16 RX ADMIN — LEVOTHYROXINE SODIUM 125 MCG: 25 TABLET ORAL at 16:24

## 2019-04-16 RX ADMIN — HEPARIN SODIUM 5000 UNITS: 5000 INJECTION, SOLUTION INTRAVENOUS; SUBCUTANEOUS at 14:47

## 2019-04-16 ASSESSMENT — ACTIVITIES OF DAILY LIVING (ADL)
ADLS_ACUITY_SCORE: 23
ADLS_ACUITY_SCORE: 19
ADLS_ACUITY_SCORE: 23
ADLS_ACUITY_SCORE: 19

## 2019-04-16 ASSESSMENT — PAIN DESCRIPTION - DESCRIPTORS: DESCRIPTORS: ACHING

## 2019-04-16 NOTE — PROGRESS NOTES
Nurse Care Coordination was consulted to assess needs of patient following stroke. Chart was reviewed and discussed with interdisciplinary team. Nurse care coordination needs are not identified at this time.  Per chart review and report, pt is from TCU and anticipated pt will discharge back to TCU once medically stable.  RNCC will cont to follow plan of care.       Amos Novak RN, PHN, BSN  4A and 4E/ ICU  Care Coordinator  Phone: [phone]  Pager: [phone]

## 2019-04-16 NOTE — PROGRESS NOTES
Social Work Services Progress Note    Hospital Day: 2  Date of Initial Social Work Evaluation:  Not completed  Collaborated with:  Dr Diehl    Data:  Pt was admitted to [facility] from [facility] in [place].  Pt is active on Medical Assistance number [phone].    Intervention:  CITLALLI spoke with Dr. Diehl.  Dr. Diehl indicates that a stroke code was activated on pt at an outside facility and pt received TPA.  Dr. Diehl indicates that they have since determined that pt did not have a stroke.  Dr. Diehl indicates that pt has a UTI.  Dr. Diehl indicates that pt will remain hospitalized through the night as she received TPA.  Dr. Diehl anticipates that pt will be medically ready for discharge tomorrow. CITLALLI phoned Admissions (Yessi) at [facility] in [place] ([phone]) to inform that discharge is anticipated tomorrow.  As pt is on Medical Assistance, pt has a 18 day bed hold at [facility].    Assessment:  Discharge is anticipated tomorrow.    Plan:    Anticipated Disposition: Await return call from Admissions at [facility] to coordinate return admit for tomorrow.    Barriers to d/c plan:  Pt received TPA    Follow Up:  CITLALLI will follow for discharge planning.    DMITRY Lin  Social Work, 6A  Phone:  [phone]  Pager:  [phone]  4/16/2019

## 2019-04-16 NOTE — PLAN OF CARE
Discharge Planner SLP   Patient plan for discharge: Unknown  Current status: Clinical swallow evaluation completed per MD order.  Pt presents with moderate oropharyngeal dysphagia, recommend cautious initiation of dysphagia diet 1 and honey thick liquids via spoon only with 1:1 supervision. Please hold PO if pt demonstrates overt s/sx of aspiration. Pt to be fully alert and upright for all PO, taking small bites/sips at slow rate.  Pt with history of aspiration on nectar consistency via cup on recent VFSS. Pt awake, with consistent confusion.  Pt with upper and lower dentures which she uses today, not present during exam.  Weak and inconsistent cued cough, mild dysarthria.  Suspect receptive and language deficits, unclear if this is pt's baseline presentation.  Consistent coughing and wet vocal quality with nectar via spoon, coughing x1 and consistent wet vocal quality with honey via cup.  No overt s/sx of aspiration with honey thick liquid via spoon or puree consistency.  Mild difficulty with bolus prep and transit of puree consistency, but WFL with small bites.  SLP to follow for PO tolerance, diet advancement, education.  Speech-language evaluation to be completed as appropriate  Barriers to return to prior living situation: Dysphagia, cognition, language  Recommendations for discharge: Defer to PT/OT  Rationale for recommendations: Below baseline function; pt will benefit from ongoing ST targeting swallowing.  Pt will likely require SLP for swallowing and language at next level of care.       Entered by: Kati Agarwal 04/16/2019 11:36 AM

## 2019-04-16 NOTE — PROGRESS NOTES
"Pt now transferred to .     Pt has been alert and oriented to self and place but has been disoriented to time. She is somewhat oriented to situation as she knows she's here for \"a leak in her brain\". She is able to follow commands and is an easy one person assist with transfers. Slight pronator drift in the right upper extremity. Pt has been on RA with clear lungs. Hr has been in a controlled Afib 80-90s. BP has been stable. Afebrile. 2/2 pulses. No BM this shift. UO adequate, occasionally incontinent but has no issues with that this shift thus. Uses commode to void. BS have been great and needed no insulin. Pt complained of a headache this morning. Stat head CT was done and was unremarkable. 975mg of Tylenol given.   "

## 2019-04-16 NOTE — PROGRESS NOTES
Stroke Progress Note  04/16/2019    ASSESSMENT:  Kristine Cosby is a 78 year old female with hypoglycemia c/b seizure, dysphagia, remote ischemic stroke, Atrial fib not anticoagulation due to GI bleed in 2017 while on Pradaxa, type 2 diabetes, hypertension, hyperlipidemia, tobacco abuse who presents with concern for confusion versus aphasia and transient decreased responsiveness/difficulty following commands. NIH of 6 at outside hospital, in the time window without bleed on CT, given TPA and transferred to [facility] for further evaluation.    INTERVAL CHANGES:  - Found to have UTI, likely precipitant to confusion/encephalopathy, Started Ceftriaxone  - Added acetaminophen for headache, stat CTH negative for bleed in setting of recent TPA     PLAN by system    Neuro:   # Metabolic Encephalopathy secondary to UTI   Patient presented with confusion vs. aphasia. Found to have no bleed or significant occlusions on CT/CTA. IV tPA was given due to NIH 6 and concern for possible stroke given risk factors @ OSH. No endovascular procedure since no LVO. MRI w/o any e/o acute infarction.  - U/A on 4/16 indicated urinary tract infection, which would be most consistent with patient's waxing waning mental status + constellation of presenting symptoms      - treating w/ cetriaxone    #Hx of R frontal stroke: no e/o acute infarction on current MRI  Work-up  - TTE with bubble: Pending read   - Telemetry discontinued today   - A1c, LDL: A1c 6.7, LDL 39  - vessel imaging: no LVO    Management and Modification of Risk Factors: Does have history of stroke given history of stroke, will optimize secondary stroke prevention even though presenting symptoms likely related to toxic metabolic encephalopathy  -Antithrombotic/anticoagulant agent: Patient has known history of A. fib, complicated in the past by GI bleed of unclear significance given that patient has a [id number] of  9, will discuss with patient and family regarding reinitiating  anticoagulation   -H/o Hypertension: yes   BP goal long term: <140/80  -H/o Hyperlipidemia: yes   -LDL goal: <70 given DM, continue Atorva 20mg  -DM: yes, Hgb A1C 6.7   -Hgb A1C goal: < 7.0  -Atrial fibrillation: yes, [id number]: 9  -Smoking: yes   -Smoking cessation: ordered  -DARIUS: pending  -PHQ9: pending    # Headache: no e/o ICH  - Acetaminophen 975 mg Q6H PRN     Card:  BP goal long term: 120/80  #Atrial Fib- had not been anticoagulated since 2017 (after a GI bleed)   #CAD  #Hypertension  #Hyperlipidemia   - pta atorva 20mg at bedtime  -Aspirin 325 for the moment, long-term would recommend anticoagulation  -Metoprolol 50 mg XL, PTA        Pulm:  No acute issues    Renal:  No acute issues    Endo:  Hemoglobin A1C   Date Value Ref Range Status   04/15/2019 6.7 (H) 0 - 5.6 % Final     Comment:     Normal <5.7% Prediabetes 5.7-6.4%  Diabetes 6.5% or higher - adopted from ADA   consensus guidelines.       #Type 2 diabetes  #Hypothyroidism   -Sliding scale insulin    Hem:  # Macrocytic anemia  -We will check B12 and reflex to MMA    ID:   #UTI  - Ceftriaxone 1 g per day for 7 days, started 4/16    GI:  # GERD  - Pantoprazole 40 mg every morning, starting 4/17    #Nutrition- History of dysphagia  -Clinical swallow evaluation     Last BM: Today    Diet: DD3 w/ thick  Fluids: discontinue, able to PO  Ppx:    -DVT: hep   -GI: pantoprazole pta    Rehab Recs:   -  Pending OT evaluation     Code Status: Geriatric note in encounters has DNR/DNI    Dispo: Likely tomorrow discharge back to TCU pending further work up and stabilization.        Gigi Diehl MD on 4/16/2019 at 7:17 AM  ASCOM [id number]    ===================================================================  INTERVAL:   Confused ON, refusing cares, intermittently arousable with repeated stimulation, confused, inconsistently following commands.  Later describes headache.    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     Type 2 diabetes mellitus without complication (H)     Generalized muscle weakness     Confusion       Current Medications:    insulin aspart  1-4 Units Subcutaneous Q4H     lidocaine   Transdermal Q8H     lidocaine   Transdermal Q24H     lidocaine   Transdermal Q24h     metoprolol  5 mg Intravenous Q6H     pantoprazole (PROTONIX) IV  40 mg Intravenous Daily     sodium chloride (PF)  3 mL Intracatheter Q8H       PRN Medications:  acetaminophen, albuterol, glucose **OR** dextrose **OR** glucagon, labetalol, Lidocaine, lidocaine 4%, lidocaine 4%, lidocaine (buffered or not buffered), lidocaine (buffered or not buffered), magnesium sulfate, - MEDICATION INSTRUCTIONS -, - MEDICATION INSTRUCTIONS -, naloxone, nystatin, ondansetron **OR** ondansetron, potassium chloride, potassium chloride with lidocaine, potassium chloride, potassium chloride, potassium chloride, potassium phosphate (KPHOS) in D5W IV, potassium phosphate (KPHOS) in D5W IV, potassium phosphate (KPHOS) in D5W IV, potassium phosphate (KPHOS) in D5W IV, prochlorperazine **OR** prochlorperazine **OR** prochlorperazine, sodium chloride (PF), sodium phosphate    Infusions:    - MEDICATION INSTRUCTIONS -       - MEDICATION INSTRUCTIONS -       sodium chloride 75 mL/hr at 04/16/19 0400       Objective findings:  /73   Pulse 97   Temp 98.2  F (36.8  C) (Oral)   Resp 16   SpO2 94%     Intake/Output:    Intake/Output Summary (Last 24  hours) at 4/16/2019 0717  Last data filed at 4/16/2019 0700  Gross per 24 hour   Intake 885 ml   Output 150 ml   Net 735 ml       Physical Examination:   Vitals:  B/P: 122/73, T: 98.2, P: 97, R: 16  General:  NAD, confused  HEENT:  NC/AT, no icterus, op pink and moist, no ear or nose drainage. Edentulous.  Chest:  Breathing unlabored  Abdomen:  ND  Extremities:  No LE swelling.    Skin:  No rash or lesion.      Neuro Exam:  Mental Status: requires repeated stimulation to remain awake/alert enough for exam. oriented to month, age. speech is slowed, intermittently coherent but with delay, intact naming and repeating simple phrases / items. Able to follow 1 step commands, unable to follow 2 step / complex. R-L differentiation issues.     Cranial Nerves:  CN II: visual fields intact, blinks to threat  CN III, IV, VI: EOMI intact  CN V: facial sensation nl  CN VII: face symmetric and strong b/l  CN VIII: hearing normal  CN XI SCM normal, shoulder shrug nl  CN XII: tongue midline, mild dysarthria but without dentures    Strength: limited due to cooperation. No abnormal movements. Moving all extremities antigravity.     Sensory: intact to light touch throughout w/o extinction.     Coordination: unable to fully assess due to difficulty to following commands but no overt dysmetria with finger to nose. Deferred heel to shin given confusion.    Gait:  Not assessed, on bedrest with recent TPA administration     [employer] Stroke Scale  Performed in the Early AM (patient's exam improved later in the day)   Score    Level of consciousness: (1)   Not alert; arousable w/ minor stim to obey/answer/respond    LOC questions: (0)   Answers both questions correctly    LOC commands: (1)   Performs one task correctly    Best gaze: (0)   Normal    Visual: (0)   No visual loss    Facial palsy: (0)   Normal symmetrical movements    Motor arm (left): (0)   No drift    Motor arm (right): (1)   Drift    Motor leg (left): (1)    Drift    Motor leg (right): (1)   Drift    Limb ataxia: (0)   Absent    Sensory: (0)   Normal- no sensory loss    Best language: (1)   Mild to moderate aphasia    Dysarthria: (1)   Mild to moderate dysarthria    Extinction and inattention: (0)   No abnormality        Total Score:  7         Labs/Studies:  Recent Labs   Lab Test 04/15/19  1858 04/15/19  1131 04/10/19  0825 02/11/19  0718    142 144 143   POTASSIUM 4.0 4.3 4.1 3.7   CHLORIDE 110* 108 112* 110*   CO2 23 26 28 27   ANIONGAP 8 8 4 6   * 150* 116* 120*   BUN 14 16 14 14   CR 0.80 0.89 0.82 0.73   MIKE 8.3* 8.4* 8.3* 8.5   WBC 5.4 6.1  --  4.8   RBC 3.38* 3.46*  --  3.25*   HGB 11.0* 11.2* 10.4* 10.9*    190  --  186       Recent Labs   Lab Test 04/15/19  1858 04/15/19  1131   INR 1.21* 1.08   PTT 34 32       No lab results found in last 7 days.    ==========================================================    I Raman Chase, MS3, scribed the above note for resident Anthony Diehl.    This patient was seen & discussed with my attending, Dr. Begum, who agrees with my assessment and plan.     Anthony Diehl   Neurology  [phone]

## 2019-04-16 NOTE — PLAN OF CARE
OT: Per chart pt on bedrest for 24hrs after TPA administration until noon today and then plans to transfer to floor, OT will check back as appropriate and available.

## 2019-04-16 NOTE — PROGRESS NOTES
Social Work Services Discharge Note      Patient Name:  Kristine Cosby     Anticipated Discharge Date:  4/17/19    Discharge Disposition:   [facility] ([phone]).    Following MD:  Facility assignment     Pre-Admission Screening (PAS) online form has been completed.  The Level of Care (LOC) is:  Determined  Confirmation Code is:  Not required as pt is returning to long term care facility of origin.       Additional Services/Equipment Arranged: CITLALLI has confirmed readiness for discharge with Dr. Diehl.  SW phoned Admissions at [facility] (Yessi) and left messages for Admissions to call in regards to the discharge plan.  [facility]I has arranged for [facility] ([facility] [phone]) to provide w/c transport at 11:45am.  SW attempted to call Yessi a 3rd time and was successful in reaching her.  [facility]essi states that return Admissions are coordinated through the Nurse Manager, Sigrid. SW left a message for Sigrid informing her of the above.     Patient / Family response to discharge plan:  Pt and daughter (Kaya) voice understanding of the discharge plan and agreement with the discharge plan.     Persons notified of above discharge plan:  Pt, daughter, 6A nursing and Dr. Diehl.    Staff Discharge Instructions:  Please fax discharge orders and signed hard scripts for any controlled substances (SW will complete this task).  Please print a packet and send with patient.     CTS Handoff completed:  YES    Medicare Notice of Rights provided to the patient/family:  YES    DMITRY Lin  Social Work, 6A  Phone:  [phone]  Pager:  [phone]  4/16/2019

## 2019-04-16 NOTE — PLAN OF CARE
Pt admitted with stroke s/p tpa administration.    Neuros: Alert with intermittent episodes of disorientation to time and situation. PERRLA. Able to follow commands. Moving all extremities spontaneously.    CVS: Rate controlled Afib @ baseline. Afebrile. BP stable.    RS: Sating mid to upper 90s on RA. LS clear.    G.I: NPO. Speech eval ordered. BS normoactive. No stool. Blood sugar WDL.    G.U: Incontinent of bladder.     Pain: Denies headache.    Activity: Up to commode with assist of 1.    MIVF: NS @ 75 ml/hr.    Plan: see flow sheet for detailed assessments and interventions, continue to support POC.

## 2019-04-16 NOTE — PROGRESS NOTES
Attempted to start piv as patient's nurse requested and patient refused to have piv inserted. Patient's nurse notified.

## 2019-04-16 NOTE — PROGRESS NOTES
04/16/19 1047   General Information   Onset Date 04/15/19   Start of Care Date 04/16/19   Referring Physician Gigi Diehl MD   Patient/Family Goals Statement Pt unable to state   Swallowing Evaluation Bedside swallow evaluation   Behaviorial Observations Confused;Lethargic;Distractible;Other (Comment)  (Oriented to name, place.  Inconsistent following )   Mode of current nutrition NPO   Respiratory Status Room air   Comments Pt is a 78 year old female with hypoglycemia c/b seizure, dysphagia, remote ischemic stroke, Atrial fib not anticoagulation due to GI bleed in 2017 while on Pradaxa, type 2 diabetes, hypertension, hyperlipidemia, tobacco abuse who presents with concern for confusion versus aphasia and transient decreased responsiveness/difficulty following commands.  Admitted to Neuro ICU, under Neurocritical Care Team for close monitoring and neurochecks for any evidence of hemorrhagic transformation or allergic reaction.  History of GERD.  Recent VFSS on 3/25/2019 indicated pt  aspirated 2 trials of nectar thick liquids via cup and flash penetrated 1 trial via cup, and that pt did spontaneously cough with aspiration.   Pt did not aspirate or penetrate trials of nectar thick liquids when given via spoon.  Did not aspirate/penetrate any trials of thin liquids when given via spoon, thin given only via spoon due aspiration risk.   No aspiration or penetration swallowing honey thick trials.  Pt was recommended a dysphagia diet 3 and nectar thick liquids via spoon only.  Clinical swallow evaluation completed per MD order.   Clinical Swallow Evaluation   Oral Musculature anomalies present;unable to assess due to poor participation/comprehension   Structural Abnormalities none present   Dentition upper and lower dentures;uses dentures to eat  (dentures not present during exam)   Mucosal Quality adequate   Laryngeal Function Cough;Voicing initiated  (weak cough)   Oral Musculature Comments Full oral [facility] exam  not completed due to pt's ability to participate.  Pt with upper and lower dentures which she uses today, not present during exam.  Weak and inconsistent cough.   Additional Documentation Yes   Swallow Eval   Feeding Assistance dependent   Clinical Swallow Eval: Nectar Thick Liquid Texture Trial   Mode of Presentation, Nectar spoon   Volume of Nectar Presented 2 teaspoon (1/2 tsp amounts)   Oral Phase, Nectar Premature pharyngeal entry   Pharyngeal Phase, Nectar impaired;coughing/choking;reduction in laryngeal movement;wet vocal quality after swallow   Diagnostic Statement High aspiration risk   Clinical Swallow Eval: Honey Thick Liquid Texture Trial   Mode of Presentation, Honey cup;spoon;fed by clinician   Volume of Honey Presented 3 oz    Oral Phase, Honey Premature pharyngeal entry  (via cup)   Pharyngeal Phase, Honey wet vocal quality after swallow;coughing/choking  (x1 cough and wet vocal quality x2 via cup)   Diagnostic Statement Overt s/sx of aspiration via cup.  No overt s/sx of aspiration with honey via spoon    Clinical Swallow Eval: Puree Solid Texture Trial   Mode of Presentation, Puree spoon;fed by clinician   Volume of Puree Presented 4 teaspoons   Oral Phase, Puree WFL   Pharyngeal Phase, Puree intact   Diagnostic Statement No overt s/sx of aspiration with puree consistency.  Mild difficulty with oral prep, but WFL for small controlled bites   Swallow Compensations   Swallow Compensations Pacing;Reduce amounts   Results Suspect aspiration   Esophageal Phase of Swallow   Patient reports or presents with symptoms of esophageal dysphagia Yes   Esophageal comments Hx of GERD   General Therapy Interventions   Planned Therapy Interventions Dysphagia Treatment   Dysphagia treatment Modified diet education;Instruction of safe swallow strategies;Compensatory strategies for swallowing   Swallow Eval: Clinical Impressions   Skilled Criteria for Therapy Intervention Skilled criteria met.  Treatment indicated.    Functional Assessment Scale (FAS) 3   Treatment Diagnosis Moderate oropharyngeal dysphagia   Diet texture recommendations Honey thick liquids;Dysphagia diet level 1  (via spoon only)   Recommended Feeding/Eating Techniques alternate between small bites and sips of food/liquid;check mouth frequently for oral residue/pocketing;maintain upright posture during/after eating for 30 mins;small sips/bites;no straws   Demonstrates Need for Referral to Another Service occupational therapy;physical therapy;dietitian   Therapy Frequency daily   Predicted Duration of Therapy Intervention (days/wks) 2 weeks   Anticipated Discharge Disposition other (see comments)  (defer to MD/PT/OT)   Risks and Benefits of Treatment have been explained. Yes   Patient, family and/or staff in agreement with Plan of Care Yes   Clinical Impression Comments Clinical swallow evaluation completed per MD order.  Pt presents with moderate oropharyngeal dysphagia, recommend cautious initiation of dysphagia diet 1 and honey thick liquids via spoon only with 1:1 supervision.  Pt to be fully alert and upright for all PO, taking small bites/sips at slow rate.  Pt with history of aspiration on nectar consistency via cup on recent VFSS. Pt awake, with consistent confusion.  Oriented to name, place.  Full oral [facility] exam not completed due to pt's ability to participate.  Pt with upper and lower dentures which she uses today, not present during exam.  Weak and inconsistent cued cough, mild dysarthria.  Suspect receptive and language deficits, unclear if this is pt's baseline presentation.  Consistent coughing and wet vocal quality with nectar via spoon, coughing x1 and consistent wet vocal quality with honey via cup.  No overt s/sx of aspiration with honey thick liquid via spoon or puree consistency.  Mild difficulty with bolus prep and transit of puree consistency, but WFL with small bites.  Education provided regarding safe swallow precautions, diet  recommendations, question pt's understanding.  SLP to follow for PO tolerance, diet advancement, education.  Speech-language evaluation should be completed as appropriate.   Total Evaluation Time   Total Evaluation Time (Minutes) 16

## 2019-04-16 NOTE — PROGRESS NOTES
04/16/19 1047   General Information   Onset Date 04/15/19   Start of Care Date 04/16/19   Referring Physician Gigi Diehl MD   Patient/Family Goals Statement Pt unable to state   Swallowing Evaluation Bedside swallow evaluation   Behaviorial Observations Confused;Lethargic;Distractible;Other (Comment)  (Oriented to name, place.  Inconsistent following )   Mode of current nutrition NPO   Respiratory Status Room air   Comments Pt is a 78 year old female with hypoglycemia c/b seizure, dysphagia, remote ischemic stroke, Atrial fib not anticoagulation due to GI bleed in 2017 while on Pradaxa, type 2 diabetes, hypertension, hyperlipidemia, tobacco abuse who presents with concern for confusion versus aphasia and transient decreased responsiveness/difficulty following commands.  Admitted to Neuro ICU, under Neurocritical Care Team for close monitoring and neurochecks for any evidence of hemorrhagic transformation or allergic reaction.  History of GERD.  Recent VFSS on 3/25/2019 indicated pt  aspirated 2 trials of nectar thick liquids via cup and flash penetrated 1 trial via cup, and that pt did spontaneously cough with aspiration.   Pt did not aspirate or penetrate trials of nectar thick liquids when given via spoon.  Did not aspirate/penetrate any trials of thin liquids when given via spoon, thin given only via spoon due aspiration risk.   No aspiration or penetration swallowing honey thick trials.  Pt was recommended a dysphagia diet 3 and nectar thick liquids via spoon only.  Clinical swallow evaluation completed per MD order.   Clinical Swallow Evaluation   Oral Musculature anomalies present;unable to assess due to poor participation/comprehension   Structural Abnormalities none present   Dentition upper and lower dentures;uses dentures to eat  (dentures not present during exam)   Mucosal Quality adequate   Laryngeal Function Cough;Voicing initiated  (weak cough)   Oral Musculature Comments Full oral [facility] exam  not completed due to pt's ability to participate.  Pt with upper and lower dentures which she uses today, not present during exam.  Weak and inconsistent cough.   Additional Documentation Yes   Swallow Eval   Feeding Assistance dependent   Clinical Swallow Eval: Nectar Thick Liquid Texture Trial   Mode of Presentation, Nectar spoon   Volume of Nectar Presented 2 teaspoon (1/2 tsp amounts)   Oral Phase, Nectar Premature pharyngeal entry   Pharyngeal Phase, Nectar impaired;coughing/choking;reduction in laryngeal movement;wet vocal quality after swallow   Diagnostic Statement High aspiration risk   Clinical Swallow Eval: Honey Thick Liquid Texture Trial   Mode of Presentation, Honey cup;spoon;fed by clinician   Volume of Honey Presented 3 oz    Oral Phase, Honey Premature pharyngeal entry  (via cup)   Pharyngeal Phase, Honey wet vocal quality after swallow;coughing/choking  (x1 cough and wet vocal quality x2 via cup)   Diagnostic Statement Overt s/sx of aspiration via cup.  No overt s/sx of aspiration with honey via spoon    Clinical Swallow Eval: Puree Solid Texture Trial   Mode of Presentation, Puree spoon;fed by clinician   Volume of Puree Presented 4 teaspoons   Oral Phase, Puree WFL   Pharyngeal Phase, Puree intact   Diagnostic Statement No overt s/sx of aspiration with puree consistency.  Mild difficulty with oral prep, but WFL for small controlled bites   Swallow Compensations   Swallow Compensations Pacing;Reduce amounts   Results Suspect aspiration   Esophageal Phase of Swallow   Patient reports or presents with symptoms of esophageal dysphagia Yes   Esophageal comments Hx of GERD   General Therapy Interventions   Planned Therapy Interventions Dysphagia Treatment   Dysphagia treatment Modified diet education;Instruction of safe swallow strategies;Compensatory strategies for swallowing   Swallow Eval: Clinical Impressions   Skilled Criteria for Therapy Intervention Skilled criteria met.  Treatment indicated.    Functional Assessment Scale (FAS) 3   Treatment Diagnosis Moderate oropharyngeal dysphagia   Diet texture recommendations Honey thick liquids;Dysphagia diet level 1  (via spoon only)   Recommended Feeding/Eating Techniques alternate between small bites and sips of food/liquid;check mouth frequently for oral residue/pocketing;maintain upright posture during/after eating for 30 mins;small sips/bites;no straws   Demonstrates Need for Referral to Another Service occupational therapy;physical therapy;dietitian   Therapy Frequency daily   Predicted Duration of Therapy Intervention (days/wks) 2 weeks   Anticipated Discharge Disposition other (see comments)  (defer to MD/PT/OT)   Risks and Benefits of Treatment have been explained. Yes   Patient, family and/or staff in agreement with Plan of Care Yes   Clinical Impression Comments Clinical swallow evaluation completed per MD order.  Pt presents with moderate oropharyngeal dysphagia, recommend cautious initiation of dysphagia diet 1 and honey thick liquids via spoon only with 1:1 supervision.  Pt to be fully alert and upright for all PO, taking small bites/sips at slow rate.  Pt with history of aspiration on nectar consistency via cup on recent VFSS. Pt awake, with consistent confusion.  Oriented to name, place.  Full oral [facility] exam not completed due to pt's ability to participate.  Pt with upper and lower dentures which she uses today, not present during exam.  Weak and inconsistent cough.  Consistent coughing and wet vocal quality with nectar via spoon, coughing x1 and consistent wet vocal quality with honey via cup.  No overt s/sx of aspiration with honey thick liquid via spoon or puree consistency.  Mild difficulty with bolus prep and transit of puree consistency, but WFL with small bites.  Education provided regarding safe swallow precautions, diet recommendations, question pt's understanding.  SLP to follow for PO tolerance, diet advancement, education.   Total  Evaluation Time   Total Evaluation Time (Minutes) 16

## 2019-04-16 NOTE — PLAN OF CARE
"OT: Through thorough chart review OT found that pt lives in [facility] facility. OT called [facility] [phone] and found that pt lives in LTC at baseline. OT talked with pt's RN, RN stating that at baseline pt is Ax1 for all ADLS, pt is in w/c at baseline, walks w/ staff only w/ walker and Ax1. RN stating that pt's \"baby voice\" and \"shaking\" are not baseline. Pt up w/ A x1 w/ nursing staff to BSC current during hospital stay per chart review. OT will complete orders. Thank for the referral.   "

## 2019-04-16 NOTE — CONSULTS
Social Work Services Progress Note    Hospital Day: 2  Date of Initial Social Work Evaluation:  Not completed.  Collaborated with:  Bedside RN    Data:  SW consulted to complete a stroke consult     Intervention:  Writer attempted to meet with Pt and or Pt's family to follow up on SW attempts for SWA. Pt was in the process of being moved to . Bedside RN was present in the room and says that Pt is near her baseline and will be returning to the nursing home where she was previously. Writer does not know the name of the nursing home.     Assessment:  Pt appears confused at the time of meeting    Plan:    Anticipated Disposition:  Returning to previous nursing home    Barriers to d/c plan:  Medical clearance.     Follow Up:  SW may want to follow up and attempt to meet with family. No family was present at the time of meeting.    Sruthi Zavala  Social Work Intern   Phone: [phone]   Pager:[phone]

## 2019-04-16 NOTE — PROGRESS NOTES
CLINICAL NUTRITION SERVICES - ASSESSMENT NOTE     Nutrition Prescription    RECOMMENDATIONS FOR MDs/PROVIDERS TO ORDER:  - ADAT per SLP recommendations for safe PO    Malnutrition Status:    - unable to complete all parameters of NFPA    Recommendations already ordered by Registered Dietitian (RD):  - Ordered Magic Cup with meals TID     Future/Additional Recommendations:  - PO/supp tolerance     - IF EN initiated, recommend: Nutren 1.5 @ 50 mL/hr to provide 1800 kcals (25 kcal/kg/day), 82 g PRO (1.2 g/kg/day), 912 mL H2O, 211 g CHO and no fiber daily.  - Once FT (NGT vs ppFT) placed and verfied, Initiate @ 10 ml/hr and advance by 10 ml q8hr as tolerated  - Do not start or advance until lytes (Mg++,K+) WNL and phos>1.9   - Recommend 30-60 ml q4hr fluid flushes for tube patency. Additional fluids and/or adjustments per MD.    - Order multivitamin/mineral (15 ml/day via FT) to help ensure micronutrient needs being met with suspected hypermetabolic demands and potential interruptions to TF infusions.       REASON FOR ASSESSMENT  Kristine Cosby is a/an 78 year old female assessed by the dietitian for Nutrition Risk Monitoring; NPO in ICU     Medical history: Pt with hypoglycemia c/b seizure, dysphagia, remote ischemic stroke, Atrial fib not anticoagulation due to GI bleed in 2017, type 2 diabetes, hypertension, hyperlipidemia, tobacco abuse,Ulcer of left lower extremity with fat layer exposed (H) who presents with concern for confusion versus aphasia and transient decreased responsiveness/difficulty following commands.    NUTRITION HISTORY  Assess as able. Pt busy with cares/therapies and then preparing for transport to other unit.     CURRENT NUTRITION ORDERS  Diet: NPO--> just advanced   Intake/Tolerance: SLP consult ordered for swallow assessment.   4/16 SLP assessment recommendations: Honey thick liquids;Dysphagia diet level 1    LABS  Labs reviewed  A1C: 6.7 (H)  Glucose trends: 102, 92, 93  HDL: 43  "(L)    MEDICATIONS  Medications reviewed  Insulin   NaCl @ 75 ml/hr     ANTHROPOMETRICS  Height: 0 cm (Data Unavailable)   Ht Readings from Last 2 Encounters:   04/02/19 1.702 m (5' 7\")   02/21/19 1.702 m (5' 7\")   67\"   Most Recent Weight: 99 kg     IBW: 61 kg  BMI: Obesity Grade I BMI 30-34.9  Weight History:   Wt Readings from Last 9 Encounters:   04/15/19 99 kg (218 lb 4.1 oz)   04/02/19 99.2 kg (218 lb 12.8 oz)   02/21/19 98.9 kg (218 lb)   02/04/19 96.2 kg (212 lb)     Dosing Weight: 71 kg (adjusted weight based current weight of 99 kg and IBW of 61 kg)    ASSESSED NUTRITION NEEDS  Estimated Energy Needs: 8060-4258 kcals/day (25 - 30 kcals/kg)  Justification: Maintenance  Estimated Protein Needs:  grams protein/day (1.2 - 1.5 grams of pro/kg)  Justification: Increased needs  Estimated Fluid Needs: 4011-7893 mL/day (1 mL/kcal)   Justification: Maintenance and Per provider pending fluid status    PHYSICAL FINDINGS  See malnutrition section below.     PMH of wound per chart. Did not note WOC consult placed thus far this admit.      MALNUTRITION  % Intake: Unable to assess  % Weight Loss: None noted over past few months; requires further assessment   Subcutaneous Fat Loss: None observed  Muscle Loss: None observed  Fluid Accumulation/Edema: None noted  Malnutrition Diagnosis: Unable to determine due to unable to assess all parameters of NFPA; pt with cares/therapies and then transported to other unit.     NUTRITION DIAGNOSIS  Inadequate protein-energy intake related to diet status as evidenced by NPO diet status and meeting 0% kcal/PRO needs. *diet just advanced to modified diet with thickened liquids, no meals yet*      INTERVENTIONS  Implementation  Nutrition Education: Unable to complete due to unable to visit pt    Enteral Nutrition - recommendations made should EN become nutrition POC.      Goals  Diet adv v nutrition support within 2-3 days.  *Diet just advanced  Pt will tolerate % nutritionally " adequate meals.      Monitoring/Evaluation  Progress toward goals will be monitored and evaluated per protocol.     Mejia Harden RD, NIRMAL, [facility]  Neuro ICU  Pager: [phone]

## 2019-04-16 NOTE — PLAN OF CARE
"PT: Per OT, pt lives in [facility] facility. OT called [facility] [phone] and found that pt lives in LTC at baseline. OT talked with pt's RN at LTC, with RN stating that at baseline pt is Ax1 for all ADLS, pt is in w/c at baseline, walks w/ staff only w/ walker and Ax1. RN stating that pt's \"baby voice\" and \"shaking\" are not baseline. Pt up w/ A x1 w/ nursing staff to C current during hospital stay per chart review. Per nursing notes here, pt walking with Ax1 and wc follow.  PT will complete orders. Per staff, pt likely to return to LTC tonight.  Thank for the referral.     "

## 2019-04-16 NOTE — CONSULTS
"Smoking Cessation Consult   2019    Patient: Kristine Cosby      :  1940                    MRN:[id number]      Admission: stroke, Confusion    Reviewed patient chart. Documented in H&P that patient is former smoker AND that she does smoke a few cigarettes per day few cigarettes per day but not consistent.     Asked patient about tobacco use and she stated that she \"quit over 30 years ago\".    Will sign off consult.    Jeanine Banuelos, RRT, CTTS  Chronic Pulmonary Disease Specialist  Office: [phone]  Pager: [phone]    "

## 2019-04-16 NOTE — PLAN OF CARE
Pt arrived from 4a at appx 1410. Pt on 6a for stroke workup. Neuros and VS unchanged. Neuros include: d/o to place, situation, and time. Knows hospital with choices, unable to state which one. DD1 diet w/ honey thick liquids. Up w/ 1, GB. Follow w/WC for longer distances. Commode ordered for use in room. Anticipate discharge tomorrow after completing IV abx for UTI. Will update oncoming nurse.

## 2019-04-17 ENCOUNTER — APPOINTMENT (OUTPATIENT)
Dept: SPEECH THERAPY | Facility: CLINIC | Age: 79
DRG: 092 | End: 2019-04-17
Attending: PSYCHIATRY & NEUROLOGY
Payer: COMMERCIAL

## 2019-04-17 VITALS
TEMPERATURE: 98.2 F | SYSTOLIC BLOOD PRESSURE: 162 MMHG | RESPIRATION RATE: 16 BRPM | OXYGEN SATURATION: 97 % | DIASTOLIC BLOOD PRESSURE: 84 MMHG | HEART RATE: 102 BPM

## 2019-04-17 LAB
GLUCOSE BLDC GLUCOMTR-MCNC: 104 MG/DL (ref 70–99)
GLUCOSE BLDC GLUCOMTR-MCNC: 109 MG/DL (ref 70–99)
GLUCOSE BLDC GLUCOMTR-MCNC: 163 MG/DL (ref 70–99)
GLUCOSE BLDC GLUCOMTR-MCNC: 98 MG/DL (ref 70–99)
T4 SERPL-MCNC: 12.7 UG/DL (ref 4.5–13.9)
TSH SERPL DL<=0.005 MIU/L-ACNC: 1.62 MU/L (ref 0.4–4)

## 2019-04-17 PROCEDURE — 25000128 H RX IP 250 OP 636: Performed by: STUDENT IN AN ORGANIZED HEALTH CARE EDUCATION/TRAINING PROGRAM

## 2019-04-17 PROCEDURE — 25000132 ZZH RX MED GY IP 250 OP 250 PS 637: Performed by: STUDENT IN AN ORGANIZED HEALTH CARE EDUCATION/TRAINING PROGRAM

## 2019-04-17 PROCEDURE — 00000146 ZZHCL STATISTIC GLUCOSE BY METER IP

## 2019-04-17 PROCEDURE — 92526 ORAL FUNCTION THERAPY: CPT | Mod: GN

## 2019-04-17 RX ORDER — ASPIRIN 325 MG
325 TABLET ORAL DAILY
DISCHARGE
Start: 2019-04-18 | End: 2019-04-17

## 2019-04-17 RX ORDER — CEPHALEXIN 500 MG/1
500 CAPSULE ORAL EVERY 12 HOURS
Qty: 12 CAPSULE | Refills: 0 | Status: ON HOLD | DISCHARGE
Start: 2019-04-17 | End: 2019-04-24

## 2019-04-17 RX ORDER — CEFTRIAXONE 1 G/1
1 INJECTION, POWDER, FOR SOLUTION INTRAMUSCULAR; INTRAVENOUS EVERY 24 HOURS
Status: DISCONTINUED | OUTPATIENT
Start: 2019-04-17 | End: 2019-04-17

## 2019-04-17 RX ADMIN — PANTOPRAZOLE SODIUM 40 MG: 40 TABLET, DELAYED RELEASE ORAL at 09:39

## 2019-04-17 RX ADMIN — METOPROLOL SUCCINATE 50 MG: 50 TABLET, EXTENDED RELEASE ORAL at 09:39

## 2019-04-17 RX ADMIN — LEVOTHYROXINE SODIUM 125 MCG: 25 TABLET ORAL at 09:37

## 2019-04-17 RX ADMIN — ASPIRIN 325 MG ORAL TABLET 325 MG: 325 PILL ORAL at 09:39

## 2019-04-17 RX ADMIN — HEPARIN SODIUM 5000 UNITS: 5000 INJECTION, SOLUTION INTRAVENOUS; SUBCUTANEOUS at 02:24

## 2019-04-17 RX ADMIN — CEPHALEXIN 500 MG: 250 CAPSULE ORAL at 10:51

## 2019-04-17 ASSESSMENT — ACTIVITIES OF DAILY LIVING (ADL)
ADLS_ACUITY_SCORE: 23

## 2019-04-17 NOTE — PROGRESS NOTES
Phone call received from Norma Cosby (patient's daughter and guardian).  Per Norma Cosby, she stated that it is ok for the patient to be on a blood thinner.  Message has been passed on to the medical team.

## 2019-04-17 NOTE — PLAN OF CARE
/68   Pulse 84   Temp 99.1  F (37.3  C) (Oral)   Resp 16   SpO2 94%     Patient's VSS, on RA. Denies pain. Disoriented to time and place. Reported she was here for a brain injury and bladder infection. Other neuros intact. Bed alarm on. Incontinent of urine x 1. DD1 diet w/ honey thick liquids. PIV SL.  and 98. Plan to discharge back to [facility] today, transport via [facility] at 1145.    Problem: Adult Inpatient Plan of Care  Goal: Plan of Care Review  4/17/2019 0519 by Any Whaley, RN  Outcome: No Change

## 2019-04-17 NOTE — DISCHARGE SUMMARY
[facility]  NEUROLOGY DISCHARGE SUMMARY    Patient Name:  Kristine Cosby  MRN:  [id number]      :  1940      Date of Admission:  4/15/2019  Date of Discharge:  2019  Admitting Physician:  Arnulfo Begum MD  Discharge Physician:  Arnulfo Begum MD  Primary Care Provider:   No Ref-Primary, Physician  Discharge Disposition:   Discharged to nursing home    Admission Diagnoses:  Confusion vs. Aphasia, concern for stroke    Discharge Diagnoses:    Toxic metabolic encephalopathy  Urinary tract infection    Brief History of Illness:   Kristine Cosby is a 78 year old female with hypoglycemia c/b seizure, dysphagia, remote ischemic stroke, Atrial fib not anticoagulation due to GI bleed in 2017 while on Pradaxa, type 2 diabetes, hypertension, hyperlipidemia, tobacco abuse who presents with concern for confusion versus aphasia and transient decreased responsiveness/difficulty following commands.  NIH of 6 at outside hospital, in the time window without bleed on CT, given TPA and transferred to [facility] for further evaluation.  Upon admission, Kristine could not recall the events that lead to her hospitalization today. She did remember that she was transported in an ambulance today.  Per discussion with family, patient has had significant cognitive and memory concerns in recent months after hospitalizations starting in December.     Her TCU was contacted this morning for collateral information. They describe her as pleasantly confused at baseline. She has conversations with staff members knows there names and has friends at the TCU. They reiterated that she has a past history of TIA and seizures. At 0825 she was awakened from sleep for a blood glucose check, was 130, she was tired and slept  in longer which was unusual for her. At 0930 she was noted to be talking like a baby which was unusual for her she then hugged her lenora bear and went back to sleep which was  not considered unusual. At 1030 She asked for extra help to get out of bed and stated that she felt unusual both her arms were noted to be flaccid. She did not follow commands, which limited her neurologic evaluation, however her appearance was noted to be symmetrical. An ambulance was called at that time and arrived at 1050. She did ask at that time if she could drive the ambulance which the TCU staff thought was an unusual response for her.      Patient presented to [facility] ED, at that time patient was not able to follow directions, was disoriented, talking like a baby but attempted to answer questions.  NIH of 6 (level of consciousness questions 2, severe aphasia, mild to moderate dysarthria, extinction and inattention of 1).  Per discussion with Dr. Anguiano at [facility], patient was given TPA and transferred to [facility] for further evaluation.  Here, patient is evaluated and multiple family members are present.  She again does not recall the events of the day but remembers getting an argument with 1 of the care attendants at the TCU related to her medications but otherwise cannot recall the events leading to arrival.  Family believes that she is in her normal state of health and she has had moderate amount of confusion, which is baseline in recent months.    Hospital Course:  Patient presented from [facility] due to concern for altered mental status, aphasia versus confusion; due to concern for stroke patient had a head CT CTA that showed no acute intracranial pathology and was given TPA and transferred to [facility] for further evaluation.  Upon transfer patient was evaluated, was pleasantly confused with altered mental status exam but otherwise nonfocal neurologically ; family present say that she is at her baseline and seems to be acting appropriately for which she has been recently.  MRI performed showed no acute infarctions but UA was indicative of urinary tract infection and urinary culture grew lactose  fermenting gram-negative rods greater than 100,000. Patient was started on ceftriaxone, received 2 IV doses and then was to continue Keflex for a total course of 7 days.  Patient did have continued difficulty with mentation early in the morning which improved during the day hospitalization.     Although patient did not have a stroke on MRI, patient does have an old R frontal stroke and a known history of atrial fibrillation and is not currently on anticoagulation due to history of GI bleeds as well as GI abnormality (hiatal hernia, erosive changes gastritis).  Given the patient has a chads 2 vasc of 9, with a 12% risk of stroke per year, we did have a discussion with patient and family regarding this being a high indication and strong recommendation to starting anticoagulation but we understand that this is a difficult decision given that she has had GI bleeds requiring transfusion in the past.  Patient was not able to relate the benefits and the risks of appropriate management/possibilities, and family wanted time to discuss amongst themselves regarding what they were going to do -e.g. what her previously preferred wishes were.  We had a long discussion regarding the risks and benefits of anticoagulation ( secondary prevention of stroke in the setting of A. Fib, high risk of stroke within one year versus catastrophic/significant GI bleed; this was also complicated by the fact that she has stents from a STEMI in the past, although these are fairly remote).  Per family decision and according to Norma Cosby, patient's guardian, patient would want to be on a blood thinner to prevent future strokes and potential significant complications.  Therefore, patient was to start anticoagulation with Eliquis 5 mg twice daily and hold aspirin; this being said, we did recommend that the family further discuss this amongst themselves and with the patient as well as with their primary care provider.  We do recommend continued  surveillance of patient for concerns of GI bleed or acute blood loss and rechecking hemoglobin in 1 week.    Patient's encephalopathy was waxing waning, worse in the morning but improved every afternoon a.m. when she was more awake.  Stable/improved over the course of the hospitalization.  Family thinks patient is at her baseline.  Was evaluated by occupational therapy, seems to be at her baseline function.  Safe to discharge to previous nursing home facility.  On the differential beyond toxic metabolic and stroke, if patient would have periods of fluctuant mental status and beyond patient's normal cognitive status despite consider further workup for altered mental status.  Of note, seizure was considered early on given her known intracranial lesion, but give clinical picture and improvement to baseline, seems less likely at this time but this could be considered in the future.    Pertinent Investigations:  UA - positive nitrate, large LE, WBC 78, few bacteria  UC - >100,000 lactose fermenting gram negative rods    Consultations:    OT  SLP    LDL 43  Hgb A1C 6.7    Recommendations and Follow-up:  - continue Keflex for 7 total days of treatment (last dose on 4/23)  - Start eliquis 5mg BID and stop ASA 325mg Qday / stop pta ASA 81mg ; if she has GIB or complications, would rediscuss goals of care and risk versus benefit of anticoagulation versus antiplatelet; if deemed inappropriate to continue anticoagulation at that time would consider plavix 75mg qday lifelong. Would also include in discussion the history of cardiac stents (although this was in 2016 and patient was only on low dose ASA 81).  - continue PTA medications   - Follow up with PCP or medical provider in 1wk to follow up CBC and re-discuss/reconfirm anticoagulation  -Continue discussing with family + primary care regarding benefits and risks of having restarted anticoagulation - in the setting of [id number] of 9 (12% risk per year) and previous GIB  (+  HASBLED 9% risk bleed per year).  - Discuss with primary care regarding further cognitive testing to further assess mentation.      Discharge physical examination:   /84   Pulse 102   Temp 98.2  F (36.8  C)   Resp 16   SpO2 97%     General:  patient lying in bed without any acute distress    HEENT:  normocephalic/atraumatic, edentulous, no oral lesions, no epistaxis   Pulmonary:  no respiratory distress  Abdomen:  soft, non-tender, non-distended, bowel sounds present, obese  Extremities: moving all, well perfused  Skin:  rash present, bruising present, chronic.  Psych: Variable affect, laughing intermittently but following conversation.     Neurologic  Mental Status: Early AM rounds - Drowsy but awakens to voice, oriented to self, year, month but often questions herself / her answers. Knows shes in the hospital.  Knows her birthday + age, knows how old she is.  Knows the current president but thinks the previous president is brian. Unable to spell world forwards.  Remember 3 of 3 words on immediate recall and 0/3 words on 5-minute delay. Follows one-step commands consistently, inconsistently following 2 step commands, and unable to follow complex commands.  Cranial Nerves:  visual fields intact, EOMI with normal smooth pursuit, facial sensation intact and symmetric, facial movements symmetric, hearing not formally tested but intact to conversation, shoulder shrug strong bilaterally, tongue protrusion midline, Mild dysarthria but is edentulous (and patient's family thinks she sounds about normal for not having her dentures in).  Motor:  no abnormal movements, normal muscle bulk, no pronator drift, able to move all limbs spontaneously, strength full throughout upper and mild weakness b/l in lower extremities but all antigravity.  Sensory:  intact/symmetric to light touch throughout upper and lower extremities without extinction.  Coordination:  FNF intact without dysmetria, although impaired by patient's  ability to follow commands.  Station/Gait:  Defer testing due to patient being assist + wheelchair dependent at baseline.      Discharge Medications:  Current Discharge Medication List      START taking these medications    Details   apixaban ANTICOAGULANT (ELIQUIS) 5 MG tablet Take 1 tablet (5 mg) by mouth 2 times daily    Associated Diagnoses: History of stroke      cephALEXin (KEFLEX) 500 MG capsule Take 1 capsule (500 mg) by mouth every 12 hours for 6 days  Qty: 12 capsule, Refills: 0    Associated Diagnoses: Acute cystitis without hematuria         CONTINUE these medications which have NOT CHANGED    Details   acetaminophen (TYLENOL) 500 MG tablet Take 1,000 mg by mouth 3 times daily       albuterol (PROVENTIL) (2.5 MG/3ML) 0.083% neb solution Take 2.5 mg by nebulization every 4 hours as needed for shortness of breath / dyspnea or wheezing      atorvastatin (LIPITOR) 20 MG tablet Take 20 mg by mouth At Bedtime      cholecalciferol 1000 units TABS Take 2,000 Units by mouth daily      levothyroxine (SYNTHROID/LEVOTHROID) 125 MCG tablet Take 125 mcg by mouth daily      lidocaine (LIDODERM) 5 % patch Place 1 patch onto the skin every 24 hours      metoprolol succinate ER (TOPROL-XL) 50 MG 24 hr tablet Take 50 mg by mouth daily      nitroGLYcerin (NITROSTAT) 0.4 MG sublingual tablet Place 0.4 mg under the tongue every 5 minutes as needed for chest pain For chest pain place 1 tablet under the tongue every 5 minutes for 3 doses. If symptoms persist 5 minutes after 1st dose call 911.      nystatin (MYCOSTATIN) 653125 UNIT/GM external powder Apply topically 2 times daily as needed      pantoprazole (PROTONIX) 40 MG EC tablet Take 40 mg by mouth daily      senna-docusate (SENOKOT-S/PERICOLACE) 8.6-50 MG tablet Take 2 tablets by mouth 2 times daily       Skin Protectants, Misc. (EUCERIN) cream Apply topically 2 times daily ; apply to arms, legs, hands, feet for dry skin      tamsulosin (FLOMAX) 0.4 MG capsule Take 0.8 mg  by mouth At Bedtime         STOP taking these medications       aspirin (ASA) 81 MG chewable tablet Comments:   Reason for Stopping:               Discharge follow up and instructions:     CBC with platelets    Last Lab Result: Hemoglobin (g/dL)       Date                     Value                 04/15/2019               11.0 (L)         ----------     General info for SNF    Length of Stay Estimate: Long Term Care  Condition at Discharge: Stable  Level of care:skilled   Rehabilitation Potential: Fair  Admission H&P remains valid and up-to-date: Yes  Recent Chemotherapy: N/A  Use Nursing Home Standing Orders: Yes     Mantoux instructions    Give two-step Mantoux (PPD) Per Facility Policy Yes     Reason for your hospital stay    Altered mental status, confusion. No stroke on MRI but found to have a urinary tract infection.     Bladder scan    X 2 for post void residual     Activity - Up ad unique     Follow Up and recommended labs and tests    Follow up with primary care provider in 1 week to discuss hospitalization, change in medication, improvement/resolution of UTI, discussion with family and patient regarding starting anticoagulation. Follow up with CBC in one week.     Additional Discharge Instructions    - continue Keflex for 7 total days of treatment (last dose on 4/23)  - After discussion of risks vs. Benefits, we started eliquis 5mg BID and stop ASA 325mg Qday and stop pta ASA 81mg ; if she has GIB or complications, would rediscuss goals of care and risk versus benefit of anticoagulation versus antiplatelet; if deemed inappropriate to continue anticoagulation at that time would consider plavix 75mg qday lifelong. Would also include in discussion the history of cardiac stents (although this was in 2016 and patient was only on low dose ASA 81).  --> please check CBC in one week and be hypervigilant about checking patient's stools for changes / black-blood or evidence of GIB.   --> fall precautions  - continue  PTA medications   -Continue discussing with family + primary care regarding benefits and risks of having restarted anticoagulation - in the setting of [id number] of 9 (12% risk per year) and previous GIB  (+ HASBLED 9% risk bleed per year).  - Discuss with primary care regarding further cognitive testing to further assess mentation.  Follow up CBC for stability.     DNR/DNI     Physical Therapy Adult Consult    Evaluate and treat as clinically indicated.    Reason:  Deconditioning, gait instability     Occupational Therapy Adult Consult    Evaluate and treat as clinically indicated.    Reason:  Deconditioning, gait instability, cognitive assessment/evaluation     Speech Language Path Adult Consult    Evaluate and treat as clinically indicated.    Reason:  Dysphagia, reassess swallowing ability.     Fall precautions     Advance Diet as Tolerated    Follow this diet upon discharge: Orders Placed This Encounter      Snacks/Supplements Adult: Magic Cup; With Meals      Room Service      Dysphagia Diet Level 1 Pureed Honey Thickened Liquids (pre-thickened or use instant food thickener)       Patient seen and discussed with Dr. Begum.     Gigi Diehl MD on 4/17/2019 at 8:10 AM

## 2019-04-17 NOTE — PROGRESS NOTES
Status:Ischemic stroke work-up, TPA administered  Vitals: VSS, on RA  Neuros: Alert and oriented to self and April. Disoriented to place and situation.    IV: No PIV access.  PIV not removed by writer.  Diet: DD1 with honey thick liquids  Bowel status: BS+, BM x1 this am.    : Voiding spont, using commode.  Up with A2 and GB to commode.  Skin: Bruises throughout.  Scar located lower L leg.  Pain: Denies  Activity: Up with A2, GB+, to bedside commode.  Plan: Discharged to [facility] at 1145am today.

## 2019-04-17 NOTE — PROGRESS NOTES
SW faxed pt's discharge orders and summary to [facility].  CITLALLI spoke with the Nurse Manager (Maggie) at [facility] and confirmed arrangements for pt's 4/17/19 return.    DMITRY Lin  Social Work, 6A  Phone:  [phone]  Pager:  [phone]  4/17/2019

## 2019-04-17 NOTE — PLAN OF CARE
Status: Pt admitted for Ischemic Stroke work-up s/p TPA administered   Vitals: VSS on RA   Neuros: Alert to self, knew April 2019, disoriented to situation and location, denies N/T, intact, BA on at all times   IV: PIV SL   Resp/trach: Clear   Diet: DD1 w/honey thick liquids   Bowel status: BS+, no BM this shift   : Voiding spontaneously, pt incontinent of urine x1   Skin: Bruises throughout, scar on lower L leg   Pain: Pt denied pain   Activity:  Up with assist of 1/GB to bedside commode   Social: Daughter at bedside for portion of shift   Plan: Continue with POC, discharging tomorrow to [facility] @11:45am via [facility]

## 2019-04-17 NOTE — PLAN OF CARE
"Discharge Planner SLP   Patient plan for discharge: return to LTC today  Current status: Pt seen at bedside for dysphagia management. Assessed with nectar-thick liquids via spoon; no overt s/sx of aspiration (consistent with results from VFSS 3/25/19). Training provided re: pharyngeal strengthening. When asked about dentition, pt reports that her daughters are \"hiding\" her dentures; this remains a barrier to diet advancement. Recommend continuation of puree diet/honey-thick liquids given recent hx of aspiration on videoswallow study. Pt to be fully alert and upright for all PO, taking small bites/sips at slow rate. Pt cleared for nectar-thick liquids via teaspoon, however, question if pt is able to follow this recommendation independently.   Barriers to return to prior living situation: Modified diet  Recommendations for discharge: TCU  Rationale for recommendations: pt will benefit from ongoing ST targeting dysphagia       Entered by: Yuridia Berkowitz 04/17/2019 10:16 AM     Speech Language Therapy Discharge Summary    Reason for therapy discharge:    Discharged to long term care facility.    Progress towards therapy goal(s). See goals on Care Plan in [facility] electronic health record for goal details.  Goals partially met.  Barriers to achieving goals:   discharge from facility.    Therapy recommendation(s):    Continued therapy is recommended.  Rationale/Recommendations:  Modified diet, hx of aspiration on VFSS.         "

## 2019-04-17 NOTE — CONSULTS
Patient still has some confusion and  discharging back to her LT care facility. PLC stoke education not appropriate at this time.

## 2019-04-19 ENCOUNTER — NURSING HOME VISIT (OUTPATIENT)
Dept: GERIATRICS | Facility: CLINIC | Age: 79
End: 2019-04-19
Payer: COMMERCIAL

## 2019-04-19 ENCOUNTER — AMBULATORY - HEALTHEAST (OUTPATIENT)
Dept: OTHER | Facility: CLINIC | Age: 79
End: 2019-04-19

## 2019-04-19 ENCOUNTER — DOCUMENTATION ONLY (OUTPATIENT)
Dept: OTHER | Facility: CLINIC | Age: 79
End: 2019-04-19

## 2019-04-19 VITALS
WEIGHT: 217 LBS | DIASTOLIC BLOOD PRESSURE: 74 MMHG | HEART RATE: 86 BPM | HEIGHT: 67 IN | SYSTOLIC BLOOD PRESSURE: 125 MMHG | TEMPERATURE: 96.5 F | OXYGEN SATURATION: 94 % | BODY MASS INDEX: 34.06 KG/M2 | RESPIRATION RATE: 18 BRPM

## 2019-04-19 DIAGNOSIS — I25.2 HISTORY OF ST ELEVATION MYOCARDIAL INFARCTION (STEMI): ICD-10-CM

## 2019-04-19 DIAGNOSIS — N30.00 ACUTE CYSTITIS WITHOUT HEMATURIA: Primary | ICD-10-CM

## 2019-04-19 DIAGNOSIS — K92.2 GASTROINTESTINAL HEMORRHAGE, UNSPECIFIED GASTROINTESTINAL HEMORRHAGE TYPE: ICD-10-CM

## 2019-04-19 DIAGNOSIS — I48.20 CHRONIC ATRIAL FIBRILLATION (H): ICD-10-CM

## 2019-04-19 DIAGNOSIS — E11.9 TYPE 2 DIABETES MELLITUS WITHOUT COMPLICATION, WITHOUT LONG-TERM CURRENT USE OF INSULIN (H): ICD-10-CM

## 2019-04-19 DIAGNOSIS — E78.5 HYPERLIPIDEMIA LDL GOAL <100: ICD-10-CM

## 2019-04-19 DIAGNOSIS — I50.22 CHRONIC SYSTOLIC HEART FAILURE (H): ICD-10-CM

## 2019-04-19 DIAGNOSIS — E16.2 HYPOGLYCEMIC ENCEPHALOPATHY: ICD-10-CM

## 2019-04-19 DIAGNOSIS — I25.10 ATHEROSCLEROSIS OF NATIVE CORONARY ARTERY OF NATIVE HEART WITHOUT ANGINA PECTORIS: ICD-10-CM

## 2019-04-19 DIAGNOSIS — I10 ESSENTIAL HYPERTENSION: ICD-10-CM

## 2019-04-19 DIAGNOSIS — D64.9 ANEMIA, UNSPECIFIED TYPE: ICD-10-CM

## 2019-04-19 DIAGNOSIS — Z86.73 HISTORY OF STROKE: ICD-10-CM

## 2019-04-19 LAB
BACTERIA SPEC CULT: ABNORMAL
BACTERIA SPEC CULT: ABNORMAL
Lab: ABNORMAL
SPECIMEN SOURCE: ABNORMAL

## 2019-04-19 PROCEDURE — 99310 SBSQ NF CARE HIGH MDM 45: CPT | Performed by: NURSE PRACTITIONER

## 2019-04-19 RX ORDER — BISACODYL 10 MG
10 SUPPOSITORY, RECTAL RECTAL DAILY PRN
Status: ON HOLD | COMMUNITY
Start: 2019-04-18 | End: 2019-04-24

## 2019-04-19 ASSESSMENT — MIFFLIN-ST. JEOR: SCORE: 1501.94

## 2019-04-19 NOTE — PROGRESS NOTES
[facility]  PRIMARY CARE PROVIDER AND CLINIC:  Jannie Cruz, NATIVIDAD CNP, [address] [place] [address] / [place] [address]  Chief Complaint   Patient presents with     Hospital F/U     [place] Medical Record Number:  [id number]  Place of Service where encounter took place:  [facility] [[id number]]    Kristine Cosby  is a 78 year old  (1940), re-admitted to the above facility from  [facility]. Hospital stay 4/15/19 - 4/17/19. .  Admitted to this facility for  medical management and nursing care.    HPI:    HPI information obtained from: facility chart records, facility staff, patient report and [facility] chart review.   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  She was found to have hypoglycemia (blood glucose 31) and was intubated and transferred to [facility] (12/27/18 - 1/8/19).  She was treated for metabolic encephalopathy 2/2 hypoglycemia, JOSTIN, dysphagia and was then transferred to acute care rehab at [facility] (1/8-2/1/19) before transferring to [facility] for LTC.      On 4/15/19 patient had altered mentation from her baseline confusion as she was more lethargic, talking like a baby, not following commands.  She was transported to the ED and had NIH score of 6. Head CT showed no acute intracranial pathology. Neurology at [facility] was consulted, she was given tPA and was transported to [facility] for care.    At [facility] patient had MRI which showed old frontal stroke, no acute stroke.  Neuro exam was nonfocal and family noted her to be at baseline mentation.  She was  monitored and found ot have UTI which was treated with IV antibiotics  --> po antibiotics. She was started on (new) apixaban 5 mg BID and PTA ASA was stopped.  Family has great concern as patient has history of GIB while on Pradaxa in the past.  CBC f/u recommended.      Updates on Status Since Skilled nursing Admission:   Today patient is met in her SNF LTC room where she is at baseline mentation. She is without complaint and cooperative in her neuro exam and questioning today.      CODE STATUS/ADVANCE DIRECTIVES DISCUSSION:   CPR/Full code   Patient's living condition: lives in a skilled nursing facility  ALLERGIES: Blood transfusion related (informational only)  PAST MEDICAL HISTORY:  has a past medical history of Cellulitis of left lower extremity, GERD (gastroesophageal reflux disease), HTN (hypertension), Type 2 diabetes mellitus without complication (H), and Ulcer of left lower extremity with fat layer exposed (H).  PAST SURGICAL HISTORY:   has a past surgical history that includes Hysterectomy and ESOPHAGOGASTRODUODENOSCOPY.  FAMILY HISTORY: family history includes Lung Cancer in her father.  SOCIAL HISTORY:   reports that she quit smoking 3 days ago. She has quit using smokeless tobacco. She reports that she does not drink alcohol.    Post Discharge Medication Reconciliation Status: discharge medications reconciled, continue medications without change    Current Outpatient Medications   Medication Sig Dispense Refill     acetaminophen (TYLENOL) 500 MG tablet Take 1,000 mg by mouth 3 times daily        albuterol (PROVENTIL) (2.5 MG/3ML) 0.083% neb solution Take 2.5 mg by nebulization every 4 hours as needed for shortness of breath / dyspnea or wheezing       apixaban ANTICOAGULANT (ELIQUIS) 5 MG tablet Take 1 tablet (5 mg) by mouth 2 times daily       atorvastatin (LIPITOR) 20 MG tablet Take 20 mg by mouth At Bedtime       bisacodyl (DULCOLAX) 10 MG suppository Place 10 mg rectally daily as needed for  "constipation       cephALEXin (KEFLEX) 500 MG capsule Take 1 capsule (500 mg) by mouth every 12 hours for 6 days 12 capsule 0     cholecalciferol 1000 units TABS Take 2,000 Units by mouth daily       levothyroxine (SYNTHROID/LEVOTHROID) 125 MCG tablet Take 125 mcg by mouth daily       lidocaine (LIDODERM) 5 % patch Place 1 patch onto the skin every 24 hours       metoprolol succinate ER (TOPROL-XL) 50 MG 24 hr tablet Take 50 mg by mouth daily       nitroGLYcerin (NITROSTAT) 0.4 MG sublingual tablet Place 0.4 mg under the tongue every 5 minutes as needed for chest pain For chest pain place 1 tablet under the tongue every 5 minutes for 3 doses. If symptoms persist 5 minutes after 1st dose call 911.       nystatin (MYCOSTATIN) 216168 UNIT/GM external powder Apply topically 2 times daily as needed       pantoprazole (PROTONIX) 40 MG EC tablet Take 40 mg by mouth daily       senna-docusate (SENOKOT-S/PERICOLACE) 8.6-50 MG tablet Take 2 tablets by mouth 2 times daily        Skin Protectants, Misc. (EUCERIN) cream Apply topically 2 times daily ; apply to arms, legs, hands, feet for dry skin       tamsulosin (FLOMAX) 0.4 MG capsule Take 0.8 mg by mouth At Bedtime         ROS:  Limited secondary to cognitive impairment but today pt reports 10 point ROS of systems including Constitutional, Eyes, Respiratory, Cardiovascular, Gastroenterology, Genitourinary, Integumentary, Musculoskeletal, Psychiatric were all negative except for pertinent positives noted in my HPI.    Vitals:  /74   Pulse 86   Temp 96.5  F (35.8  C)   Resp 18   Ht 1.702 m (5' 7\")   Wt 98.4 kg (217 lb)   SpO2 94%   BMI 33.99 kg/m    Exam:  GENERAL APPEARANCE:  Alert, in no distress, pleasantly confused  RESP:  respiratory effort and palpation of chest normal, auscultation of lungs clear, no respiratory distress  CV:  Palpation and auscultation of heart done , rate and rhythm irregular, no murmur, no LE peripheral edema  ABDOMEN:  Obese, normal " bowel sounds, soft, nontender, LANA fully for hepatosplenomegaly or other masses d/t clothed, seated assessment and body habitus   M/S:   Gait and station with W/C for mobility, Digits and nails with arthritic changes, reduced muscle mass  SKIN:  Inspection and Palpation of skin and subcutaneous tissue intact  PSYCH:  insight and judgement, memory with impairment, affect and mood normal, follows commands readily         Lab/Diagnostic data:  Recent labs in [facility] reviewed by me today.     ASSESSMENT/PLAN:  Acute cystitis without hematuria  Presented with stroke-like symptoms  Head CT and MRI neg  UA RESULTS:  Recent Labs   Lab Test 04/16/19  0739   COLOR Yellow   APPEARANCE Slightly Cloudy   URINEGLC Negative   URINEBILI Negative   URINEKETONE 10*   SG 1.023   UBLD Negative   URINEPH 6.5   PROTEIN Negative   NITRITE Positive*   LEUKEST Large*   RBCU 2   WBCU 78*     Treated with IV antibiotics -->po antibiotics.     Patient denies symptoms today of UTI (may be poor historian)    PLAN:  - (new) Keflex 500 mg BID x 6 days (stop 4/23/19)  - monitor for any changes in mentation  - monitor for fever, elevated WBC on labs  - continue PTA tamsulosin 0.8 mg daily     Hx of Hypoglycemic encephalopathy  History of stroke  Presented to the [facility] after being found down at home for undetermined amount of time, blood glucose found to be 31 and patient transferred to [facility] (12/27/28-1/8/19).  Then transferred to [facility] (1/8-2/1/19) and now to SNF for LTC.    Recent change  In mental status with patient talking like a baby, not following commands, increased confusion. Transferred to [facility] ED --> [facility] with concern for repeat CVA as known a fib not on OA. tPA given. Head CT and MRI without acute pathological changes. UTI found and treated with antibiotics.     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  Neuro exam at baseline without  focal deficits.    BIMS 8/15  Able to make her needs known.     PLAN:  - nursing for supportive cares  - new apixaban 5 mg BID for anticoagulation  - PTA ASA DC'd  - continue statin for ppx    Type 2 diabetes mellitus without complication, without long-term current use of insulin (H)  Found down with hypoglycemia-induced encephalopathy  Glipizide and Novolog DC'd   12/27/18 A1C 6.1  Remains with accuchecks BID    PLAN:  - Goal: HgbA1C between 8-9%. Per AGS there is potential harm in lowering the A1C to <6.5% in older adults with diabetes.     Chronic atrial fibrillation (H)  Previously on ASA only for OA as History of GIB in 2017 while on Pradaxa.  (new) apixaban 5 mg BID  Metoprolol ER 50 mg daily for rate control   HRs , irregular today     Family concerned about new OA.  Left message for them today to discuss, awaiting response.     PLAN:  - continue (new) apixaban, monitor for s/s of bleeding  - CBC in 1 week for monitoring  - continue metoprolol and monitor HR for rate control       Chronic systolic heart failure (H)  Essential hypertension  Hyperlipidemia LDL goal <100  Atherosclerosis of native coronary artery of native heart without angina pectoris  History of ST elevation myocardial infarction (STEMI)  Remains on atorvastatin 20 mg daily, metoprolol ER 50 mg daily, nitroglycerin prn,     BPs 120/70s  HRs , irregular today  Patient denies CP, HA, lightheadedness (may be poor historian)    PLAN:  - BP goals are <150/90 mm Hg.This is higher than ACC and AHA recommendations due to goals of care, risk for hypotension, risk of dizziness and falls, risk of tissue/cerebral hypoperfusion and frailty. Patient is stable with current plan of care and routine assessment.  - monitor for titration needs    Anemia, unspecified type  Hx of GIB (hiatal hernia, erosive gastritis)  Hemoglobin   Date Value Ref Range Status   04/15/2019 11.0 (L) 11.7 - 15.7 g/dL Final   04/15/2019 11.2 (L) 11.7 - 15.7 g/dL Final      PTA ASA DC'd  (new) apixaban 5 mg BID  Family concerned as history of GIB while on Pradaxa in the past. History of hiatal hernia and erosive gastritis.   History of CVA from known a fib.    PLAN:  - repeat CBC in 1 week for monitoring         Orders written by provider at facility and transcribed by : Sherlyn Faulkner MA  1.  CBC on 4/26/19.  Dx: new OA w/hx GIB  2.  SLUMS testing (per family request).  Dx: cognitive impairment,       Total time spent with patient visit at the skilled nursing facility was >35 min including patient visit, review of past records and coordination of care with nursing. Greater than 50% of total time spent with counseling and coordinating care due to review of records, patient visit with discussion on UTI sxs, treatment and neuro exam and hx, coordiantion with nursing, phone call to family - message left to discuss OA and code status as SNF has full code but hospital documentation shows DNR/DNI (as does Epic).   Electronically signed by:  NATIVIDAD Chapa CNP

## 2019-04-19 NOTE — LETTER
4/19/2019        RE: Kristine Cosby  [address] [place] [address]        [facility]  PRIMARY CARE PROVIDER AND CLINIC:  NATIVIDAD Chapa [facility], [address] [place] [address] / [place] [address]  Chief Complaint   Patient presents with     Hospital F/U     [place] Medical Record Number:  [id number]  Place of Service where encounter took place:  [facility] [[id number]]    Kristine Cosby  is a 78 year old  (1940), re-admitted to the above facility from  [facility]. Hospital stay 4/15/19 - 4/17/19. .  Admitted to this facility for  medical management and nursing care.    HPI:    HPI information obtained from: facility chart records, facility staff, patient report and [facility] chart review.   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  She was found to have hypoglycemia (blood glucose 31) and was intubated and transferred to [facility] (12/27/18 - 1/8/19).  She was treated for metabolic encephalopathy 2/2 hypoglycemia, JOSTIN, dysphagia and was then transferred to acute care rehab at [facility] (1/8-2/1/19) before transferring to [facility] for LTC.      On 4/15/19 patient had altered mentation from her baseline confusion as she was more lethargic, talking like a baby, not following commands.  She was transported to the ED and had NIH score of 6. Head CT showed no acute intracranial pathology. Neurology at [facility] was consulted, she was given tPA and was transported to [facility] for care.    At [facility] patient had MRI which showed old frontal stroke, no  acute stroke.  Neuro exam was nonfocal and family noted her to be at baseline mentation.  She was monitored and found ot have UTI which was treated with IV antibiotics  --> po antibiotics. She was started on (new) apixaban 5 mg BID and PTA ASA was stopped.  Family has great concern as patient has history of GIB while on Pradaxa in the past.  CBC f/u recommended.      Updates on Status Since Skilled nursing Admission:   Today patient is met in her SNF LTC room where she is at baseline mentation. She is without complaint and cooperative in her neuro exam and questioning today.      CODE STATUS/ADVANCE DIRECTIVES DISCUSSION:   CPR/Full code   Patient's living condition: lives in a skilled nursing facility  ALLERGIES: Blood transfusion related (informational only)  PAST MEDICAL HISTORY:  has a past medical history of Cellulitis of left lower extremity, GERD (gastroesophageal reflux disease), HTN (hypertension), Type 2 diabetes mellitus without complication (H), and Ulcer of left lower extremity with fat layer exposed (H).  PAST SURGICAL HISTORY:   has a past surgical history that includes Hysterectomy and ESOPHAGOGASTRODUODENOSCOPY.  FAMILY HISTORY: family history includes Lung Cancer in her father.  SOCIAL HISTORY:   reports that she quit smoking 3 days ago. She has quit using smokeless tobacco. She reports that she does not drink alcohol.    Post Discharge Medication Reconciliation Status: discharge medications reconciled, continue medications without change    Current Outpatient Medications   Medication Sig Dispense Refill     acetaminophen (TYLENOL) 500 MG tablet Take 1,000 mg by mouth 3 times daily        albuterol (PROVENTIL) (2.5 MG/3ML) 0.083% neb solution Take 2.5 mg by nebulization every 4 hours as needed for shortness of breath / dyspnea or wheezing       apixaban ANTICOAGULANT (ELIQUIS) 5 MG tablet Take 1 tablet (5 mg) by mouth 2 times daily       atorvastatin (LIPITOR) 20 MG tablet Take 20 mg by mouth At  "Bedtime       bisacodyl (DULCOLAX) 10 MG suppository Place 10 mg rectally daily as needed for constipation       cephALEXin (KEFLEX) 500 MG capsule Take 1 capsule (500 mg) by mouth every 12 hours for 6 days 12 capsule 0     cholecalciferol 1000 units TABS Take 2,000 Units by mouth daily       levothyroxine (SYNTHROID/LEVOTHROID) 125 MCG tablet Take 125 mcg by mouth daily       lidocaine (LIDODERM) 5 % patch Place 1 patch onto the skin every 24 hours       metoprolol succinate ER (TOPROL-XL) 50 MG 24 hr tablet Take 50 mg by mouth daily       nitroGLYcerin (NITROSTAT) 0.4 MG sublingual tablet Place 0.4 mg under the tongue every 5 minutes as needed for chest pain For chest pain place 1 tablet under the tongue every 5 minutes for 3 doses. If symptoms persist 5 minutes after 1st dose call 911.       nystatin (MYCOSTATIN) 824710 UNIT/GM external powder Apply topically 2 times daily as needed       pantoprazole (PROTONIX) 40 MG EC tablet Take 40 mg by mouth daily       senna-docusate (SENOKOT-S/PERICOLACE) 8.6-50 MG tablet Take 2 tablets by mouth 2 times daily        Skin Protectants, Misc. (EUCERIN) cream Apply topically 2 times daily ; apply to arms, legs, hands, feet for dry skin       tamsulosin (FLOMAX) 0.4 MG capsule Take 0.8 mg by mouth At Bedtime         ROS:  Limited secondary to cognitive impairment but today pt reports 10 point ROS of systems including Constitutional, Eyes, Respiratory, Cardiovascular, Gastroenterology, Genitourinary, Integumentary, Musculoskeletal, Psychiatric were all negative except for pertinent positives noted in my HPI.    Vitals:  /74   Pulse 86   Temp 96.5  F (35.8  C)   Resp 18   Ht 1.702 m (5' 7\")   Wt 98.4 kg (217 lb)   SpO2 94%   BMI 33.99 kg/m     Exam:  GENERAL APPEARANCE:  Alert, in no distress, pleasantly confused  RESP:  respiratory effort and palpation of chest normal, auscultation of lungs clear, no respiratory distress  CV:  Palpation and auscultation of heart " done , rate and rhythm irregular, no murmur, no LE peripheral edema  ABDOMEN:  Obese, normal bowel sounds, soft, nontender, LANA fully for hepatosplenomegaly or other masses d/t clothed, seated assessment and body habitus   M/S:   Gait and station with W/C for mobility, Digits and nails with arthritic changes, reduced muscle mass  SKIN:  Inspection and Palpation of skin and subcutaneous tissue intact  PSYCH:  insight and judgement, memory with impairment, affect and mood normal, follows commands readily         Lab/Diagnostic data:  Recent labs in [facility] reviewed by me today.     ASSESSMENT/PLAN:  Acute cystitis without hematuria  Presented with stroke-like symptoms  Head CT and MRI neg  UA RESULTS:  Recent Labs   Lab Test 04/16/19  0739   COLOR Yellow   APPEARANCE Slightly Cloudy   URINEGLC Negative   URINEBILI Negative   URINEKETONE 10*   SG 1.023   UBLD Negative   URINEPH 6.5   PROTEIN Negative   NITRITE Positive*   LEUKEST Large*   RBCU 2   WBCU 78*     Treated with IV antibiotics -->po antibiotics.     Patient denies symptoms today of UTI (may be poor historian)    PLAN:  - (new) Keflex 500 mg BID x 6 days (stop 4/23/19)  - monitor for any changes in mentation  - monitor for fever, elevated WBC on labs  - continue PTA tamsulosin 0.8 mg daily     Hx of Hypoglycemic encephalopathy  History of stroke  Presented to the [facility] after being found down at home for undetermined amount of time, blood glucose found to be 31 and patient transferred to [facility] (12/27/28-1/8/19).  Then transferred to [facility] (1/8-2/1/19) and now to SNF for LTC.    Recent change  In mental status with patient talking like a baby, not following commands, increased confusion. Transferred to [facility] ED --> [facility] with concern for repeat CVA as known a fib not on OA. tPA given. Head CT and MRI without acute pathological changes. UTI found and treated with antibiotics.     Patient has persistent deficits  that are likely related to hypoglycemic-induced brain injury.  Neuro exam at baseline without focal deficits.    BIMS 8/15  Able to make her needs known.     PLAN:  - nursing for supportive cares  - new apixaban 5 mg BID for anticoagulation  - PTA ASA DC'd  - continue statin for ppx    Type 2 diabetes mellitus without complication, without long-term current use of insulin (H)  Found down with hypoglycemia-induced encephalopathy  Glipizide and Novolog DC'd   12/27/18 A1C 6.1  Remains with accuchecks BID    PLAN:  - Goal: HgbA1C between 8-9%. Per AGS there is potential harm in lowering the A1C to <6.5% in older adults with diabetes.     Chronic atrial fibrillation (H)  Previously on ASA only for OA as History of GIB in 2017 while on Pradaxa.  (new) apixaban 5 mg BID  Metoprolol ER 50 mg daily for rate control   HRs , irregular today     Family concerned about new OA.  Left message for them today to discuss, awaiting response.     PLAN:  - continue (new) apixaban, monitor for s/s of bleeding  - CBC in 1 week for monitoring  - continue metoprolol and monitor HR for rate control       Chronic systolic heart failure (H)  Essential hypertension  Hyperlipidemia LDL goal <100  Atherosclerosis of native coronary artery of native heart without angina pectoris  History of ST elevation myocardial infarction (STEMI)  Remains on atorvastatin 20 mg daily, metoprolol ER 50 mg daily, nitroglycerin prn,     BPs 120/70s  HRs , irregular today  Patient denies CP, HA, lightheadedness (may be poor historian)    PLAN:  - BP goals are <150/90 mm Hg.This is higher than ACC and AHA recommendations due to goals of care, risk for hypotension, risk of dizziness and falls, risk of tissue/cerebral hypoperfusion and frailty. Patient is stable with current plan of care and routine assessment.  - monitor for titration needs    Anemia, unspecified type  Hx of GIB (hiatal hernia, erosive gastritis)  Hemoglobin   Date Value Ref Range Status    04/15/2019 11.0 (L) 11.7 - 15.7 g/dL Final   04/15/2019 11.2 (L) 11.7 - 15.7 g/dL Final     PTA ASA DC'd  (new) apixaban 5 mg BID  Family concerned as history of GIB while on Pradaxa in the past. History of hiatal hernia and erosive gastritis.   History of CVA from known a fib.    PLAN:  - repeat CBC in 1 week for monitoring         Orders written by provider at facility and transcribed by : Sherlyn Faulkner MA  1.  CBC on 4/26/19.  Dx: new OA w/hx GIB  2.  SLUMS testing (per family request).  Dx: cognitive impairment,       Total time spent with patient visit at the skilled nursing facility was >35 min including patient visit, review of past records and coordination of care with nursing. Greater than 50% of total time spent with counseling and coordinating care due to review of records, patient visit with discussion on UTI sxs, treatment and neuro exam and hx, coordiantion with nursing, phone call to family - message left to discuss OA and code status as SNF has full code but hospital documentation shows DNR/DNI (as does Epic).   Electronically signed by:  NATIVIDAD Chapa CNP                         Sincerely,        NATIVIDAD Chapa CNP

## 2019-04-23 ENCOUNTER — APPOINTMENT (OUTPATIENT)
Dept: CT IMAGING | Facility: CLINIC | Age: 79
End: 2019-04-23
Attending: FAMILY MEDICINE
Payer: COMMERCIAL

## 2019-04-23 ENCOUNTER — APPOINTMENT (OUTPATIENT)
Dept: GENERAL RADIOLOGY | Facility: CLINIC | Age: 79
End: 2019-04-23
Attending: FAMILY MEDICINE
Payer: COMMERCIAL

## 2019-04-23 ENCOUNTER — APPOINTMENT (OUTPATIENT)
Dept: CARDIOLOGY | Facility: CLINIC | Age: 79
End: 2019-04-23
Attending: FAMILY MEDICINE
Payer: COMMERCIAL

## 2019-04-23 ENCOUNTER — HOSPITAL ENCOUNTER (OUTPATIENT)
Facility: CLINIC | Age: 79
Setting detail: OBSERVATION
Discharge: NURSING, REHAB OR LONG-TERM CARE | End: 2019-04-24
Attending: FAMILY MEDICINE | Admitting: FAMILY MEDICINE
Payer: COMMERCIAL

## 2019-04-23 DIAGNOSIS — R55 SYNCOPE, UNSPECIFIED SYNCOPE TYPE: ICD-10-CM

## 2019-04-23 DIAGNOSIS — K59.00 CONSTIPATION, UNSPECIFIED CONSTIPATION TYPE: Primary | ICD-10-CM

## 2019-04-23 LAB
ALBUMIN SERPL-MCNC: 3.3 G/DL (ref 3.4–5)
ALBUMIN UR-MCNC: NEGATIVE MG/DL
ALP SERPL-CCNC: 80 U/L (ref 40–150)
ALT SERPL W P-5'-P-CCNC: 12 U/L (ref 0–50)
ANION GAP SERPL CALCULATED.3IONS-SCNC: 8 MMOL/L (ref 3–14)
APPEARANCE UR: CLEAR
APTT PPP: 39 SEC (ref 22–37)
AST SERPL W P-5'-P-CCNC: 11 U/L (ref 0–45)
BASE EXCESS BLDV CALC-SCNC: 3.2 MMOL/L
BASOPHILS # BLD AUTO: 0 10E9/L (ref 0–0.2)
BASOPHILS NFR BLD AUTO: 0.4 %
BILIRUB SERPL-MCNC: 0.6 MG/DL (ref 0.2–1.3)
BILIRUB UR QL STRIP: NEGATIVE
BUN SERPL-MCNC: 11 MG/DL (ref 7–30)
CALCIUM SERPL-MCNC: 8.4 MG/DL (ref 8.5–10.1)
CHLORIDE SERPL-SCNC: 107 MMOL/L (ref 94–109)
CO2 SERPL-SCNC: 25 MMOL/L (ref 20–32)
COLOR UR AUTO: YELLOW
CREAT SERPL-MCNC: 0.82 MG/DL (ref 0.52–1.04)
DIFFERENTIAL METHOD BLD: ABNORMAL
EOSINOPHIL NFR BLD AUTO: 2.3 %
ERYTHROCYTE [DISTWIDTH] IN BLOOD BY AUTOMATED COUNT: 12.4 % (ref 10–15)
GFR SERPL CREATININE-BSD FRML MDRD: 69 ML/MIN/{1.73_M2}
GLUCOSE SERPL-MCNC: 128 MG/DL (ref 70–99)
GLUCOSE UR STRIP-MCNC: NEGATIVE MG/DL
HCO3 BLDV-SCNC: 27 MMOL/L (ref 21–28)
HCT VFR BLD AUTO: 36.2 % (ref 35–47)
HGB BLD-MCNC: 11.8 G/DL (ref 11.7–15.7)
HGB UR QL STRIP: NEGATIVE
IMM GRANULOCYTES # BLD: 0 10E9/L (ref 0–0.4)
IMM GRANULOCYTES NFR BLD: 0.3 %
INR PPP: 1.2 (ref 0.86–1.14)
KETONES UR STRIP-MCNC: NEGATIVE MG/DL
LACTATE BLD-SCNC: 0.9 MMOL/L (ref 0.7–2)
LEUKOCYTE ESTERASE UR QL STRIP: NEGATIVE
LYMPHOCYTES # BLD AUTO: 1.5 10E9/L (ref 0.8–5.3)
LYMPHOCYTES NFR BLD AUTO: 16.6 %
MCH RBC QN AUTO: 32.2 PG (ref 26.5–33)
MCHC RBC AUTO-ENTMCNC: 32.6 G/DL (ref 31.5–36.5)
MCV RBC AUTO: 99 FL (ref 78–100)
MONOCYTES # BLD AUTO: 0.8 10E9/L (ref 0–1.3)
MONOCYTES NFR BLD AUTO: 8.5 %
NEUTROPHILS # BLD AUTO: 6.5 10E9/L (ref 1.6–8.3)
NEUTROPHILS NFR BLD AUTO: 71.9 %
NITRATE UR QL: NEGATIVE
NRBC # BLD AUTO: 0 10*3/UL
NRBC BLD AUTO-RTO: 0 /100
NT-PROBNP SERPL-MCNC: 2985 PG/ML (ref 0–1800)
O2/TOTAL GAS SETTING VFR VENT: 36 %
PCO2 BLDV: 39 MM HG (ref 40–50)
PH BLDV: 7.46 PH (ref 7.32–7.43)
PH UR STRIP: 6 PH (ref 5–7)
PLATELET # BLD AUTO: 196 10E9/L (ref 150–450)
PO2 BLDV: 38 MM HG (ref 25–47)
POTASSIUM SERPL-SCNC: 4.2 MMOL/L (ref 3.4–5.3)
PROT SERPL-MCNC: 7 G/DL (ref 6.8–8.8)
RBC # BLD AUTO: 3.67 10E12/L (ref 3.8–5.2)
SODIUM SERPL-SCNC: 140 MMOL/L (ref 133–144)
SOURCE: NORMAL
SP GR UR STRIP: 1.01 (ref 1–1.03)
TROPONIN I SERPL-MCNC: 0.02 UG/L (ref 0–0.04)
TROPONIN I SERPL-MCNC: 0.02 UG/L (ref 0–0.04)
TROPONIN I SERPL-MCNC: <0.015 UG/L (ref 0–0.04)
UROBILINOGEN UR STRIP-MCNC: 0 MG/DL (ref 0–2)
WBC # BLD AUTO: 9 10E9/L (ref 4–11)

## 2019-04-23 PROCEDURE — 93005 ELECTROCARDIOGRAM TRACING: CPT | Performed by: FAMILY MEDICINE

## 2019-04-23 PROCEDURE — 93010 ELECTROCARDIOGRAM REPORT: CPT | Mod: Z6 | Performed by: FAMILY MEDICINE

## 2019-04-23 PROCEDURE — 85025 COMPLETE CBC W/AUTO DIFF WBC: CPT | Performed by: FAMILY MEDICINE

## 2019-04-23 PROCEDURE — 99285 EMERGENCY DEPT VISIT HI MDM: CPT | Mod: 25 | Performed by: FAMILY MEDICINE

## 2019-04-23 PROCEDURE — 40000264 ECHOCARDIOGRAM COMPLETE

## 2019-04-23 PROCEDURE — 85610 PROTHROMBIN TIME: CPT | Performed by: FAMILY MEDICINE

## 2019-04-23 PROCEDURE — 99220 ZZC INITIAL OBSERVATION CARE,LEVL III: CPT | Performed by: NURSE PRACTITIONER

## 2019-04-23 PROCEDURE — 25000132 ZZH RX MED GY IP 250 OP 250 PS 637: Performed by: NURSE PRACTITIONER

## 2019-04-23 PROCEDURE — 84484 ASSAY OF TROPONIN QUANT: CPT | Performed by: FAMILY MEDICINE

## 2019-04-23 PROCEDURE — 96361 HYDRATE IV INFUSION ADD-ON: CPT | Performed by: FAMILY MEDICINE

## 2019-04-23 PROCEDURE — 25000132 ZZH RX MED GY IP 250 OP 250 PS 637: Performed by: FAMILY MEDICINE

## 2019-04-23 PROCEDURE — 25800030 ZZH RX IP 258 OP 636: Performed by: NURSE PRACTITIONER

## 2019-04-23 PROCEDURE — 36416 COLLJ CAPILLARY BLOOD SPEC: CPT | Performed by: NURSE PRACTITIONER

## 2019-04-23 PROCEDURE — 25000128 H RX IP 250 OP 636: Performed by: FAMILY MEDICINE

## 2019-04-23 PROCEDURE — 83605 ASSAY OF LACTIC ACID: CPT | Performed by: FAMILY MEDICINE

## 2019-04-23 PROCEDURE — G0378 HOSPITAL OBSERVATION PER HR: HCPCS

## 2019-04-23 PROCEDURE — 83880 ASSAY OF NATRIURETIC PEPTIDE: CPT | Performed by: FAMILY MEDICINE

## 2019-04-23 PROCEDURE — 36415 COLL VENOUS BLD VENIPUNCTURE: CPT | Performed by: NURSE PRACTITIONER

## 2019-04-23 PROCEDURE — 82803 BLOOD GASES ANY COMBINATION: CPT | Performed by: FAMILY MEDICINE

## 2019-04-23 PROCEDURE — 84484 ASSAY OF TROPONIN QUANT: CPT | Performed by: NURSE PRACTITIONER

## 2019-04-23 PROCEDURE — 96361 HYDRATE IV INFUSION ADD-ON: CPT

## 2019-04-23 PROCEDURE — 71045 X-RAY EXAM CHEST 1 VIEW: CPT | Mod: TC

## 2019-04-23 PROCEDURE — 25000125 ZZHC RX 250: Performed by: FAMILY MEDICINE

## 2019-04-23 PROCEDURE — 80053 COMPREHEN METABOLIC PANEL: CPT | Performed by: FAMILY MEDICINE

## 2019-04-23 PROCEDURE — 25800030 ZZH RX IP 258 OP 636: Performed by: FAMILY MEDICINE

## 2019-04-23 PROCEDURE — 70450 CT HEAD/BRAIN W/O DYE: CPT

## 2019-04-23 PROCEDURE — 96374 THER/PROPH/DIAG INJ IV PUSH: CPT | Performed by: FAMILY MEDICINE

## 2019-04-23 PROCEDURE — 25500064 ZZH RX 255 OP 636: Performed by: INTERNAL MEDICINE

## 2019-04-23 PROCEDURE — 99291 CRITICAL CARE FIRST HOUR: CPT | Mod: 25 | Performed by: FAMILY MEDICINE

## 2019-04-23 PROCEDURE — 85730 THROMBOPLASTIN TIME PARTIAL: CPT | Performed by: FAMILY MEDICINE

## 2019-04-23 PROCEDURE — 81003 URINALYSIS AUTO W/O SCOPE: CPT | Performed by: FAMILY MEDICINE

## 2019-04-23 PROCEDURE — 93306 TTE W/DOPPLER COMPLETE: CPT | Mod: 26 | Performed by: INTERNAL MEDICINE

## 2019-04-23 PROCEDURE — 87040 BLOOD CULTURE FOR BACTERIA: CPT | Performed by: FAMILY MEDICINE

## 2019-04-23 RX ORDER — METOCLOPRAMIDE 5 MG/1
5 TABLET ORAL EVERY 6 HOURS PRN
Status: DISCONTINUED | OUTPATIENT
Start: 2019-04-23 | End: 2019-04-24 | Stop reason: HOSPADM

## 2019-04-23 RX ORDER — SODIUM CHLORIDE 9 MG/ML
INJECTION, SOLUTION INTRAVENOUS CONTINUOUS
Status: DISCONTINUED | OUTPATIENT
Start: 2019-04-23 | End: 2019-04-24 | Stop reason: HOSPADM

## 2019-04-23 RX ORDER — ACETAMINOPHEN 325 MG/1
650 TABLET ORAL EVERY 4 HOURS PRN
Status: DISCONTINUED | OUTPATIENT
Start: 2019-04-23 | End: 2019-04-24 | Stop reason: HOSPADM

## 2019-04-23 RX ORDER — ONDANSETRON 2 MG/ML
4 INJECTION INTRAMUSCULAR; INTRAVENOUS EVERY 6 HOURS PRN
Status: DISCONTINUED | OUTPATIENT
Start: 2019-04-23 | End: 2019-04-24 | Stop reason: HOSPADM

## 2019-04-23 RX ORDER — DILTIAZEM HYDROCHLORIDE 5 MG/ML
0.25 INJECTION INTRAVENOUS ONCE
Status: COMPLETED | OUTPATIENT
Start: 2019-04-23 | End: 2019-04-23

## 2019-04-23 RX ORDER — METOPROLOL TARTRATE 50 MG
50 TABLET ORAL ONCE
Status: COMPLETED | OUTPATIENT
Start: 2019-04-23 | End: 2019-04-23

## 2019-04-23 RX ORDER — PROCHLORPERAZINE MALEATE 5 MG
5 TABLET ORAL EVERY 6 HOURS PRN
Status: DISCONTINUED | OUTPATIENT
Start: 2019-04-23 | End: 2019-04-24 | Stop reason: HOSPADM

## 2019-04-23 RX ORDER — AMOXICILLIN 250 MG
1 CAPSULE ORAL 2 TIMES DAILY PRN
Status: DISCONTINUED | OUTPATIENT
Start: 2019-04-23 | End: 2019-04-24 | Stop reason: HOSPADM

## 2019-04-23 RX ORDER — METOCLOPRAMIDE HYDROCHLORIDE 5 MG/ML
5 INJECTION INTRAMUSCULAR; INTRAVENOUS EVERY 6 HOURS PRN
Status: DISCONTINUED | OUTPATIENT
Start: 2019-04-23 | End: 2019-04-24 | Stop reason: HOSPADM

## 2019-04-23 RX ORDER — TAMSULOSIN HYDROCHLORIDE 0.4 MG/1
0.8 CAPSULE ORAL AT BEDTIME
Status: DISCONTINUED | OUTPATIENT
Start: 2019-04-23 | End: 2019-04-24 | Stop reason: HOSPADM

## 2019-04-23 RX ORDER — ALBUTEROL SULFATE 0.83 MG/ML
2.5 SOLUTION RESPIRATORY (INHALATION) EVERY 4 HOURS PRN
Status: DISCONTINUED | OUTPATIENT
Start: 2019-04-23 | End: 2019-04-24 | Stop reason: HOSPADM

## 2019-04-23 RX ORDER — AMOXICILLIN 250 MG
2 CAPSULE ORAL 2 TIMES DAILY PRN
Status: DISCONTINUED | OUTPATIENT
Start: 2019-04-23 | End: 2019-04-24 | Stop reason: HOSPADM

## 2019-04-23 RX ORDER — ACETAMINOPHEN 500 MG
500 TABLET ORAL ONCE
Status: COMPLETED | OUTPATIENT
Start: 2019-04-23 | End: 2019-04-23

## 2019-04-23 RX ORDER — ACETAMINOPHEN 650 MG/1
650 SUPPOSITORY RECTAL EVERY 4 HOURS PRN
Status: DISCONTINUED | OUTPATIENT
Start: 2019-04-23 | End: 2019-04-24 | Stop reason: HOSPADM

## 2019-04-23 RX ORDER — METOPROLOL SUCCINATE 50 MG/1
50 TABLET, EXTENDED RELEASE ORAL DAILY
Status: DISCONTINUED | OUTPATIENT
Start: 2019-04-23 | End: 2019-04-24 | Stop reason: HOSPADM

## 2019-04-23 RX ORDER — ONDANSETRON 4 MG/1
4 TABLET, ORALLY DISINTEGRATING ORAL EVERY 6 HOURS PRN
Status: DISCONTINUED | OUTPATIENT
Start: 2019-04-23 | End: 2019-04-24 | Stop reason: HOSPADM

## 2019-04-23 RX ORDER — PROCHLORPERAZINE 25 MG
12.5 SUPPOSITORY, RECTAL RECTAL EVERY 12 HOURS PRN
Status: DISCONTINUED | OUTPATIENT
Start: 2019-04-23 | End: 2019-04-24 | Stop reason: HOSPADM

## 2019-04-23 RX ORDER — AMOXICILLIN 250 MG
2 CAPSULE ORAL 2 TIMES DAILY
Status: DISCONTINUED | OUTPATIENT
Start: 2019-04-23 | End: 2019-04-24 | Stop reason: HOSPADM

## 2019-04-23 RX ORDER — SODIUM CHLORIDE 9 MG/ML
1000 INJECTION, SOLUTION INTRAVENOUS CONTINUOUS
Status: DISCONTINUED | OUTPATIENT
Start: 2019-04-23 | End: 2019-04-23

## 2019-04-23 RX ORDER — NALOXONE HYDROCHLORIDE 0.4 MG/ML
.1-.4 INJECTION, SOLUTION INTRAMUSCULAR; INTRAVENOUS; SUBCUTANEOUS
Status: DISCONTINUED | OUTPATIENT
Start: 2019-04-23 | End: 2019-04-24 | Stop reason: HOSPADM

## 2019-04-23 RX ADMIN — SODIUM CHLORIDE: 9 INJECTION, SOLUTION INTRAVENOUS at 20:38

## 2019-04-23 RX ADMIN — ACETAMINOPHEN 500 MG: 500 TABLET ORAL at 13:29

## 2019-04-23 RX ADMIN — SENNOSIDES AND DOCUSATE SODIUM 2 TABLET: 8.6; 5 TABLET ORAL at 20:32

## 2019-04-23 RX ADMIN — DILTIAZEM HYDROCHLORIDE 24.95 MG: 5 INJECTION INTRAVENOUS at 12:30

## 2019-04-23 RX ADMIN — APIXABAN 5 MG: 5 TABLET, FILM COATED ORAL at 20:32

## 2019-04-23 RX ADMIN — SODIUM CHLORIDE 1000 ML: 9 INJECTION, SOLUTION INTRAVENOUS at 12:07

## 2019-04-23 RX ADMIN — TAMSULOSIN HYDROCHLORIDE 0.8 MG: 0.4 CAPSULE ORAL at 20:32

## 2019-04-23 RX ADMIN — METOPROLOL SUCCINATE 50 MG: 50 TABLET, EXTENDED RELEASE ORAL at 18:00

## 2019-04-23 RX ADMIN — HUMAN ALBUMIN MICROSPHERES AND PERFLUTREN 3 ML: 10; .22 INJECTION, SOLUTION INTRAVENOUS at 14:24

## 2019-04-23 RX ADMIN — METOPROLOL TARTRATE 50 MG: 50 TABLET, FILM COATED ORAL at 13:29

## 2019-04-23 RX ADMIN — SODIUM CHLORIDE 500 ML: 9 INJECTION, SOLUTION INTRAVENOUS at 10:57

## 2019-04-23 RX ADMIN — ACETAMINOPHEN 650 MG: 325 TABLET ORAL at 19:56

## 2019-04-23 ASSESSMENT — PAIN DESCRIPTION - DESCRIPTORS: DESCRIPTORS: HEADACHE

## 2019-04-23 ASSESSMENT — MIFFLIN-ST. JEOR: SCORE: 1544.38

## 2019-04-23 NOTE — LETTER
Transition Communication Hand-off for Care Transitions to Next Level of Care Provider    Name: Kristine Cosby  : 1940  MRN #: [id number]  Primary Care Provider: Jannie Cruz  Primary Care MD Name: Jannie Cruz CNP   Primary Clinic: [address] [place] [id number]  Primary Care Clinic Name: [facility]   Reason for Hospitalization:  Syncope, unspecified syncope type [R55]  Admit Date/Time: 2019 10:26 AM  Discharge Date: 19   Payor Source: Payor: UCARE / Plan: UCARE MEDICARE NON FPA / Product Type: HMO /     Readmission Assessment Measure (RED) Risk Score/category: Elevated          Reason for Communication Hand-off Referral: Fragility  Other Elevated risk for readmission    Discharge Plan:  Discharge Plan:      Most Recent Value   Disposition Comments  Patient returning to LTC at [facility]            Concern for non-adherence with plan of care:   Y/N no   Discharge Needs Assessment:  Needs      Most Recent Value   Anticipated Changes Related to Illness  none   # of Referrals Placed by [facility]  External Care Coordination   Other Resources  Transportation Services   Transportation Agency  [facility]    Skilled Nursing [facility] [phone], Fax: [phone]          Already enrolled in Tele-monitoring program and name of program:  no   Follow-up specialty is recommended: Yes    Follow-up plan:    Future Appointments   Date Time Provider Department Center   2019  1:00 PM [facility] [place] [facility]       Any outstanding tests or procedures:        Radiology & Cardiology Orders     Future Labs/Procedures Complete By Expires    Zio Patch Holter Adult Pediatric Greater than 48 hrs  2019 (Approximate) 2019              Key Recommendations:  Follow by CNP at [facility]     DMITRY Resendez     AVS/Discharge Summary is the source of truth; this is a helpful guide for improved communication of patient story

## 2019-04-23 NOTE — ED NOTES
Home medication list reviewed off of medication list from [facility].  Pt is confused and daughters are not present.  Obs video will need to be reviewed with them.  Shown to patient however not sure learning was obtained.

## 2019-04-23 NOTE — ED NOTES
ED Nursing criteria listed below was addressed during verbal handoff:     Abnormal vitals: Yes  Abnormal results: Yes  Med Reconciliation completed: Yes  Meds given in ED: Yes  Any Overdue Meds: No  Core Measures: N/A  Isolation: N/A  Special needs: N/A  Skin assessment: Yes    Observation Patient  Education provided: Yes   To room 252

## 2019-04-23 NOTE — ED PROVIDER NOTES
History     Chief Complaint   Patient presents with     Altered Mental Status     HPI  Kristine Cosby is a 78 year old female who presents from a local nursing home because of multiple syncopal episodes, increasing confusion and worsening left-sided weakness.  Patient was last seen normal around 830 this morning.  Nursing staff noted that she would become unresponsive for a few seconds and then come around with a sternal rub.  She seemed a little bit more confused than her baseline and they noticed some increasing left-sided weakness.  Patient was recently diagnosed with a stroke about a week ago and was sent to the nursing home for rehab.  Patient is also recently diagnosed with a urinary tract infection and is currently on antibiotics for this.  Patient is also complaining of some increasing shortness of breath here today, she feels like she needs more air.  She denies any chest pain, denies any nausea or vomiting.  Her history is limited though because of severe confusion.    I talked to the nursing home and according to them today:  At 6 AM they were concerned that the patient was somewhat lethargic and she was having high-pitched baby talk and could not focus.  Then at 9:00, they went to do vitals and she was having an episode where she became unresponsive and it seemed like she was breathing fast.  She had 2 more of these episodes before they called EMS.  The night nurse was wondering if this could be some form of a seizure possibly.  She has not had any of her morning medications today.        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  She was found  to have hypoglycemia (blood glucose 31) and was intubated and transferred to [facility] (12/27/18 - 1/8/19).  She was treated for metabolic encephalopathy 2/2 hypoglycemia, JOSTIN, dysphagia and was then transferred to acute care rehab at [facility] (1/8-2/1/19) before transferring to [facility] for LTC.       On 4/15/19 patient had altered mentation from her baseline confusion as she was more lethargic, talking like a baby, not following commands.  She was transported to the ED and had NIH score of 6. Head CT showed no acute intracranial pathology. Neurology at [facility] was consulted, she was given tPA and was transported to [facility] for care.     At [facility] patient had MRI which showed old frontal stroke, no acute stroke.  Neuro exam was nonfocal and family noted her to be at baseline mentation.  She was monitored and found ot have UTI which was treated with IV antibiotics  --> po antibiotics. She was started on (new) apixaban 5 mg BID and PTA ASA was stopped.  Family has great concern as patient has history of GIB while on Pradaxa in the past.  CBC f/u recommended      Allergies:  Allergies   Allergen Reactions     Blood Transfusion Related (Informational Only) Other (See Comments)     Patient has a history of a clinically significant antibody against RBC antigens.  A delay in compatible RBCs may occur.       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 02/06/2019     Priority: Medium     Chronic systolic heart failure (H) 01/08/2019     Priority: Medium     Coronary atherosclerosis 01/08/2019     Priority: Medium     Dysphagia 01/08/2019     Priority: Medium     Generalized weakness 01/08/2019     Priority: Medium     History of stroke 01/08/2019     Priority: Medium     Acute metabolic encephalopathy due to  "hypoglycemia 2019     Priority: Medium     Palliative care by specialist 2019     Priority: Medium     Hypoglycemia associated with type 2 diabetes mellitus (H) 2018     Priority: Medium     Acute CHF (congestive heart failure) (H) 2016     Priority: Medium     Anemia 2016     Priority: Medium     Persistent atrial fibrillation (H) 2016     Priority: Medium     Overview:   2.  Chronic atrial fibrillation  A. On Toprol 200mg, daily.  B. Pradaxa discontinued due to GI bleed. On aspirin. Risk of bleeding currently outweighs risk of stroke. Will have close follow-up with GI.       Acquired hypothyroidism 2016     Priority: Medium     GERD (gastroesophageal reflux disease) 2016     Priority: Medium     HTN (hypertension) 2016     Priority: Medium     Hyperlipidemia LDL goal <100 2016     Priority: Medium     Type 2 diabetes mellitus without complication (H) 2016     Priority: Medium        Past Medical History:    Past Medical History:   Diagnosis Date     Cellulitis of left lower extremity      GERD (gastroesophageal reflux disease)      HTN (hypertension)      Type 2 diabetes mellitus without complication (H)      Ulcer of left lower extremity with fat layer exposed (H)        Past Surgical History:    Past Surgical History:   Procedure Laterality Date     ESOPHAGOGASTRODUODENOSCOPY       HYSTERECTOMY         Family History:    Family History   Problem Relation Age of Onset     Lung Cancer Father        Social History:  Marital Status:   [5]  Social History     Tobacco Use     Smoking status: Former Smoker     Last attempt to quit: 2019     Years since quittin.0     Smokeless tobacco: Former User     Tobacco comment: Pt states she quit \"over 30 years ago\"   Substance Use Topics     Alcohol use: No     Frequency: Never     Drug use: Not on file        Medications:      acetaminophen (TYLENOL) 500 MG tablet   albuterol (PROVENTIL) (2.5 " MG/3ML) 0.083% neb solution   apixaban ANTICOAGULANT (ELIQUIS) 5 MG tablet   atorvastatin (LIPITOR) 20 MG tablet   cephALEXin (KEFLEX) 500 MG capsule   cholecalciferol 1000 units TABS   levothyroxine (SYNTHROID/LEVOTHROID) 125 MCG tablet   lidocaine (LIDODERM) 5 % patch   metoprolol succinate ER (TOPROL-XL) 50 MG 24 hr tablet   nitroGLYcerin (NITROSTAT) 0.4 MG sublingual tablet   nystatin (MYCOSTATIN) 290023 UNIT/GM external powder   pantoprazole (PROTONIX) 40 MG EC tablet   senna-docusate (SENOKOT-S/PERICOLACE) 8.6-50 MG tablet   Skin Protectants, Misc. (EUCERIN) cream   tamsulosin (FLOMAX) 0.4 MG capsule         Review of Systems   Unable to perform ROS: Mental status change       Physical Exam   BP: 130/86  Heart Rate: 125  Temp: 99  F (37.2  C)  Resp: 22  Weight: 99.8 kg (220 lb)  SpO2: 96 %      Physical Exam   Constitutional: She appears well-developed and well-nourished. No distress.   Cardiovascular: Normal rate and normal heart sounds. An irregularly irregular rhythm present. Exam reveals no friction rub.   No murmur heard.  Pulmonary/Chest: Effort normal and breath sounds normal. No stridor. No respiratory distress. She has no wheezes.   Abdominal: Soft. Bowel sounds are normal. She exhibits no distension. There is no tenderness. There is no guarding.   Neurological: She is alert. She is disoriented. No cranial nerve deficit or sensory deficit. She exhibits abnormal muscle tone. GCS eye subscore is 4. GCS verbal subscore is 5. GCS motor subscore is 6.   Patient is unable to hold either arm or legs up against gravity.  But is moving both arms and legs equally.  Patient has a normal gaze.   Skin: Skin is warm and dry. No rash noted. She is not diaphoretic.   Nursing note and vitals reviewed.      ED Course        Procedures               EKG Interpretation:      Interpreted by Gilberto Murdock  Time reviewed: now  Symptoms at time of EKG: None   Rhythm: atrial fibrillation - rapid  Rate: Tachycardia and  120-130  Axis: Normal  Ectopy: none  Conduction: normal  ST Segments/ T Waves: No acute ischemic changes and Non-specific ST-T wave changes  Q Waves: nonspecific  Comparison to prior: No old EKG available    Clinical Impression: atrial fibrillation (chronic)          Critical Care time:  was 30 minutes for this patient excluding procedures.      Results for orders placed or performed during the hospital encounter of 04/23/19 (from the past 24 hour(s))   Lactic acid whole blood   Result Value Ref Range    Lactic Acid 0.9 0.7 - 2.0 mmol/L   Blood gas venous   Result Value Ref Range    Ph Venous 7.46 (H) 7.32 - 7.43 pH    PCO2 Venous 39 (L) 40 - 50 mm Hg    PO2 Venous 38 25 - 47 mm Hg    Bicarbonate Venous 27 21 - 28 mmol/L    Base Excess Venous 3.2 mmol/L    FIO2 36    CBC with platelets differential   Result Value Ref Range    WBC 9.0 4.0 - 11.0 10e9/L    RBC Count 3.67 (L) 3.8 - 5.2 10e12/L    Hemoglobin 11.8 11.7 - 15.7 g/dL    Hematocrit 36.2 35.0 - 47.0 %    MCV 99 78 - 100 fl    MCH 32.2 26.5 - 33.0 pg    MCHC 32.6 31.5 - 36.5 g/dL    RDW 12.4 10.0 - 15.0 %    Platelet Count 196 150 - 450 10e9/L    Diff Method Automated Method     % Neutrophils 71.9 %    % Lymphocytes 16.6 %    % Monocytes 8.5 %    % Eosinophils 2.3 %    % Basophils 0.4 %    % Immature Granulocytes 0.3 %    Nucleated RBCs 0 0 /100    Absolute Neutrophil 6.5 1.6 - 8.3 10e9/L    Absolute Lymphocytes 1.5 0.8 - 5.3 10e9/L    Absolute Monocytes 0.8 0.0 - 1.3 10e9/L    Absolute Basophils 0.0 0.0 - 0.2 10e9/L    Abs Immature Granulocytes 0.0 0 - 0.4 10e9/L    Absolute Nucleated RBC 0.0    INR   Result Value Ref Range    INR 1.20 (H) 0.86 - 1.14   Partial thromboplastin time   Result Value Ref Range    PTT 39 (H) 22 - 37 sec   Comprehensive metabolic panel   Result Value Ref Range    Sodium 140 133 - 144 mmol/L    Potassium 4.2 3.4 - 5.3 mmol/L    Chloride 107 94 - 109 mmol/L    Carbon Dioxide 25 20 - 32 mmol/L    Anion Gap 8 3 - 14 mmol/L    Glucose  128 (H) 70 - 99 mg/dL    Urea Nitrogen 11 7 - 30 mg/dL    Creatinine 0.82 0.52 - 1.04 mg/dL    GFR Estimate 69 >60 mL/min/[1.73_m2]    GFR Estimate If Black 80 >60 mL/min/[1.73_m2]    Calcium 8.4 (L) 8.5 - 10.1 mg/dL    Bilirubin Total 0.6 0.2 - 1.3 mg/dL    Albumin 3.3 (L) 3.4 - 5.0 g/dL    Protein Total 7.0 6.8 - 8.8 g/dL    Alkaline Phosphatase 80 40 - 150 U/L    ALT 12 0 - 50 U/L    AST 11 0 - 45 U/L   Troponin I   Result Value Ref Range    Troponin I ES 0.017 0.000 - 0.045 ug/L   Nt probnp inpatient (BNP)   Result Value Ref Range    N-Terminal Pro BNP Inpatient 2,985 (H) 0 - 1,800 pg/mL   CT Head w/o Contrast    Narrative    CT SCAN OF THE HEAD WITHOUT CONTRAST   4/23/2019 10:51 AM     HISTORY: worsening altered mental status, recent stroke, bilateral leg  weaknss, worsening right arm weakness    TECHNIQUE:  Axial images of the head and coronal reformations without  IV contrast material. Radiation dose for this scan was reduced using  automated exposure control, adjustment of the mA and/or kV according  to patient size, or iterative reconstruction technique.    COMPARISON: None.    FINDINGS:     Intracranial contents: There is diffuse parenchymal volume loss.   White matter changes are present in the cerebral hemispheres that are  consistent with small vessel ischemic disease in this age patient.  Again noted is a chronic infarct in the right frontal lobe. There is  no evidence of intracranial hemorrhage, mass, acute infarct or  anomaly.    Visualized orbits/sinuses/mastoids:  The visualized portions of the  sinuses and mastoids appear normal.    Osseous structures/soft tissues:  There is no evidence of trauma.      Impression    IMPRESSION:  1. No acute pathology. No bleed, mass, or acute infarcts are seen.  2. There is diffuse parenchymal volume loss.  White matter changes are  present in the cerebral hemispheres that are consistent with small  vessel ischemic disease in this age patient..  3. Chronic infarct  in the right frontal lobe.      KEREN PEREZ MD   XR Chest Port 1 View    Narrative    XR CHEST PORT 1 VW 4/23/2019 10:57 AM    HISTORY: Weakness, syncope.    COMPARISON: None.    FINDINGS: No airspace consolidation, effusion or pneumothorax.      Impression    IMPRESSION: No acute abnormality.    KATRINA WILSON MD   UA reflex to Microscopic and Culture   Result Value Ref Range    Color Urine Yellow     Appearance Urine Clear     Glucose Urine Negative NEG^Negative mg/dL    Bilirubin Urine Negative NEG^Negative    Ketones Urine Negative NEG^Negative mg/dL    Specific Gravity Urine 1.013 1.003 - 1.035    Blood Urine Negative NEG^Negative    pH Urine 6.0 5.0 - 7.0 pH    Protein Albumin Urine Negative NEG^Negative mg/dL    Urobilinogen mg/dL 0.0 0.0 - 2.0 mg/dL    Nitrite Urine Negative NEG^Negative    Leukocyte Esterase Urine Negative NEG^Negative    Source Catheterized Urine        Medications   0.9% sodium chloride BOLUS (0 mLs Intravenous Stopped 4/23/19 1205)     Followed by   sodium chloride 0.9% infusion (1,000 mLs Intravenous New Bag 4/23/19 1207)   diltiazem (CARDIZEM) injection 24.95 mg (has no administration in time range)     Labs are reviewed and are unremarkable.  Patient's BNP was slightly elevated but there is no signs of failure on the x-ray.  On the monitor the patient continues to be in atrial fibrillation which was noted on the EKG.  She does have frequent PVCs though.  As noted from EMS the syncopal episode she was having seem to correspond when she would have multiple runs of these PVCs.  At this point I do not have a good explanation on the syncopal episodes.  Her confusion and mental status appears to be at her baseline according to her family.  When she was in the hospital last time, she had a full neurological workup and no signs of strokes.  They thought some of her symptoms could be from a encephalopathy.  Because I do not have a good explanation for the syncopal episodes, I would like to bring  the patient in for observation, continue cardiac monitoring and see if any of the symptoms do recur.  I did not contact neurology as this does not appear to be a stroke and she does not have any new focal neurological symptoms.  I discussed the case with the hospitalist and she would like me to talk to cardiology about these syncopal episodes and frequent PVCs.  I spoke with cards down at [facility] and they recommended watching the patient in the hospital here for 24 hours, recommend getting an echocardiogram and trend the troponins.  He postulated that with her age and since we do not have a good explanation, there could be a concern that she is going into intermittent heart block.  I discussed the case with the hospitalist and they will accept the patient.    Assessments & Plan (with Medical Decision Making)  Syncope     I have reviewed the nursing notes.    I have reviewed the findings, diagnosis, plan and need for follow up with the patient.        4/23/2019   [facility] EMERGENCY DEPARTMENT     Gilberto Murdock MD  04/23/19 [id number]

## 2019-04-23 NOTE — PROGRESS NOTES
S-(situation): Patient registered to Observation. Patient arrived to room 252 via cart from ER    B-(background): OBS/weakness and confusion    A-(assessment): 143/81BP and 82 HR. T98.8, RA patient is alert to self and place not to situation or time with shoftr term memory loss. Patient has a scar on lower left extremity.      R-(recommendations): Orders and observation goals reviewed with patient and daughters.     Nursing Observation criteria listed below was met:    Skin issues/needs documented:Yes  Isolation needs addressed, if appropriate: Yes  Fall Prevention: Education given and documented: Yes  Education Assessment documented:Yes  Education Documented (Pre-existing chronic infection such as, MRSA/VRE need education on admission): Yes  OBS video/handout Reviewed & DocumentedYes  Medication Reconciliation Complete: Yes  New medication patient education completed and documented (Possible Side Effects of Common Medications handout): Yes  Home medications if not able to send immediately home with family stored here: NA  Reminder note placed in discharge instructions: NA  Patient has discharge needs (If yes, please explain): No patient from [facility]

## 2019-04-23 NOTE — H&P
[facility]    History and Physical - Hospitalist Service       Date of Admission:  4/23/2019    Assessment & Plan   Kristine Cosby is a 78 year old female admitted on 4/23/2019. She presented from her nursing home with multiple syncopal episodes, confusion, and left sided weakness.     Principal Problem:      Syncope, unspecified syncope type  Assessment: Patient presented to the hospital from her nursing home after staff noted patient had several episodes where would become unresponsive for a few seconds and then come around with a sternal rub. Patient also noted to have increased confusion and left sided weakness. EMS was called who noted patient to have a syncopal episode which seemed to correspond to multiple runs of PVC's. Patient noted to be in atrial fibrillation with frequent PVC's while in the ED with no further syncopal episodes noted. No left sided weakness noted on exam and family reports patient back to baseline cognitive status. Cardiology was contacted by ED staff who recommended watching patient in hospital for 24 hours, obtaining echocardiogram, and trending troponin levels due to concern that she may be going into intermittent heart block.   Plan: Patient admitted to observation status. Troponin level ordered upon arrival to floor then every 4 hours x3. Monitor patient on telemetry. Echocardiogram completed in emergency department. Will closely monitor patient's neurologic status and vital signs.     Active Problems:    Confusion  Assessment: Patient with a history of CVA with chronic confusion. Per nursing home staff, on the day of admission patient was lethargic and having high-pitched baby talk and could not focus.  During exam, patient alert and oriented consistently only to self. Family not available during exam today but per report, family noted patient is back to baseline mental status.   Plan: Continue to monitor neurologic status closely     Chronic  systolic heart failure (H)  Assessment: Patient with known systolic heart failure. Previous echocardiogram done in December of 2018 showed an EF of 35-40%. Repeat echocardiogram completed in ED-results pending. Patient noted to nursing home staff today that she felt more short of breath than normal. BNP slightly increased upon arrival to ED but no signs of failure on chest xray. Patient is maintaining oxygen saturations above 90% on 4 LPM oxygen.   Plan: Continue metoprolol XL. Maintenance IV fluids ordered at 100 mL/hr and will monitor fluid status closely. Consider adding diuretic if noted to retain fluid. Wean oxygen as able to maintain oxygen saturations above 90%. Await results of echocardiogram completed in ED.     History of stroke  Assessment: Diagnosed during hospitalization at [facility] on 4/15/19. At that time, patient with altered patient had altered mentation from her baseline confusion as she was more lethargic, talking like a baby, not following commands.  She was transported to the ED and had NIH score of 6. Head CT showed no acute intracranial pathology. Neurology at [facility] was consulted, she was given tPA and was transported to [facility] for care. Patient had MRI at [facility] which showed old frontal stroke, no acute stroke.  Patient was monitored and found to have UTI which was treated with IV antibiotics. She was discharged back to her nursing home on oral Keflex which she completed 4/23. She was also started on apixaban 5 mg twice daily.   Plan: Monitor neurologic status as above. Home dose of apixaban was ordered while patient in hospital.     Persistent atrial fibrillation (H)  Assessment: Chronic and managed with Toprol XL 25 mg once daily for rate control. Noted to be in atrial fibrillation with frequent PVC's while in ED.   Plan: Continue to monitor on telemetry. Continue home dose of Toprol XL while in hospital.         Diet: Regular  DVT Prophylaxis: Eliquis   Szymanski Catheter: not present  Code Status:  Full    Disposition Plan   Expected discharge: Tomorrow, recommended to prior living arrangement once mental status at baseline and relevant test and procedures completed.  Entered: Walter Paulino NP 04/23/2019, 4:16 PM     The patient's care was discussed with the Attending Physician, Dr. Ochoa and Patient.    Walter Paulino NP  [facility]    ______________________________________________________________________    Chief Complaint   Syncopal episodes    History is obtained from the patient    History of Present Illness   Kristine Cosby is a 78 year old female who presents from a local nursing home because of multiple syncopal episodes, increasing confusion and worsening left-sided weakness.  Patient was last seen normal around 830 this morning.  Nursing staff noted that she would become unresponsive for a few seconds and then come around with a sternal rub.  She seemed a little bit more confused than her baseline and they noticed some increasing left-sided weakness.  Patient was recently diagnosed with a stroke about a week ago and was sent to the nursing home for rehab.  Patient is also recently diagnosed with a urinary tract infection and is currently on antibiotics for this.  Patient is also complaining of some increasing shortness of breath here today, she feels like she needs more air. Labs are reviewed and are unremarkable.  Patient's BNP was slightly elevated but there is no signs of failure on the x-ray.  On the monitor the patient continues to be in atrial fibrillation which was noted on the EKG.  She does have frequent PVCs though.  As noted from EMS the syncopal episode she was having seem to correspond when she would have multiple runs of these PVCs.  Her confusion and mental status appears to be at her baseline according to her family. Emergency room doctor spoke with cardiology who recommended watching the patient in the hospital here for 24 hours, recommend getting  "an echocardiogram and trend the troponins. Patient admitted to observation status and will be monitored on telemetry.     Review of Systems    The 10 point Review of Systems is negative other than noted in the HPI or here.     Past Medical History    I have reviewed this patient's medical history and updated it with pertinent information if needed.   Past Medical History:   Diagnosis Date     Cellulitis of left lower extremity      GERD (gastroesophageal reflux disease)      HTN (hypertension)      Type 2 diabetes mellitus without complication (H)      Ulcer of left lower extremity with fat layer exposed (H)        Past Surgical History   I have reviewed this patient's surgical history and updated it with pertinent information if needed.  Past Surgical History:   Procedure Laterality Date     ESOPHAGOGASTRODUODENOSCOPY       HYSTERECTOMY         Social History   I have reviewed this patient's social history and updated it with pertinent information if needed.  Social History     Tobacco Use     Smoking status: Former Smoker     Last attempt to quit: 2019     Years since quittin.0     Smokeless tobacco: Former User     Tobacco comment: Pt states she quit \"over 30 years ago\"   Substance Use Topics     Alcohol use: No     Frequency: Never     Drug use: Not on file       Family History   I have reviewed this patient's family history and updated it with pertinent information if needed.   Family History   Problem Relation Age of Onset     Lung Cancer Father        Prior to Admission Medications   Prior to Admission Medications   Prescriptions Last Dose Informant Patient Reported? Taking?   Skin Protectants, Misc. (EUCERIN) cream Unknown at Unknown time  Yes No   Sig: Apply topically 2 times daily ; apply to arms, legs, hands, feet for dry skin   acetaminophen (TYLENOL) 500 MG tablet 2019 at 2000  Yes Yes   Sig: Take 1,000 mg by mouth 3 times daily    albuterol (PROVENTIL) (2.5 MG/3ML) 0.083% neb solution " Unknown at Unknown time  Yes No   Sig: Take 2.5 mg by nebulization every 4 hours as needed for shortness of breath / dyspnea or wheezing   apixaban ANTICOAGULANT (ELIQUIS) 5 MG tablet 4/22/2019 at 1800  No Yes   Sig: Take 1 tablet (5 mg) by mouth 2 times daily   atorvastatin (LIPITOR) 20 MG tablet 4/22/2019 at 2100  Yes Yes   Sig: Take 20 mg by mouth At Bedtime   cephALEXin (KEFLEX) 500 MG capsule 4/23/2019 at 0830  No Yes   Sig: Take 1 capsule (500 mg) by mouth every 12 hours for 6 days   cholecalciferol 1000 units TABS 4/22/2019 at 0800  Yes Yes   Sig: Take 2,000 Units by mouth daily   levothyroxine (SYNTHROID/LEVOTHROID) 125 MCG tablet 4/23/2019 at 0600  Yes Yes   Sig: Take 125 mcg by mouth daily   lidocaine (LIDODERM) 5 % patch 4/23/2019 at 0800  Yes Yes   Sig: Place 1 patch onto the skin every 24 hours   metoprolol succinate ER (TOPROL-XL) 50 MG 24 hr tablet 4/22/2019 at 0800  Yes Yes   Sig: Take 50 mg by mouth daily   nitroGLYcerin (NITROSTAT) 0.4 MG sublingual tablet   Yes No   Sig: Place 0.4 mg under the tongue every 5 minutes as needed for chest pain For chest pain place 1 tablet under the tongue every 5 minutes for 3 doses. If symptoms persist 5 minutes after 1st dose call 911.   nystatin (MYCOSTATIN) 151637 UNIT/GM external powder Unknown at Unknown time  Yes No   Sig: Apply topically 2 times daily as needed   pantoprazole (PROTONIX) 40 MG EC tablet 4/23/2019 at 0600  Yes Yes   Sig: Take 40 mg by mouth daily   senna-docusate (SENOKOT-S/PERICOLACE) 8.6-50 MG tablet 4/22/2019 at 1800  Yes Yes   Sig: Take 2 tablets by mouth 2 times daily    tamsulosin (FLOMAX) 0.4 MG capsule 4/22/2019 at Unknown time  Yes Yes   Sig: Take 0.8 mg by mouth At Bedtime      Facility-Administered Medications: None     Allergies   Allergies   Allergen Reactions     Blood Transfusion Related (Informational Only) Other (See Comments)     Patient has a history of a clinically significant antibody against RBC antigens.  A delay in  compatible RBCs may occur.       Physical Exam   Vital Signs: Temp: 99  F (37.2  C) Temp src: Oral BP: 114/85 Pulse: 90 Heart Rate: 98 Resp: 24 SpO2: 97 % O2 Device: Nasal cannula Oxygen Delivery: 4 LPM  Weight: 220 lbs 0 oz    Constitutional: awake, alert, cooperative, no apparent distress, and appears stated age  Respiratory: No increased work of breathing, good air exchange, clear to auscultation bilaterally, no crackles or wheezing  Cardiovascular: irregularly irregular  GI: normal bowel sounds, non-distended and non-tender  Skin: no redness, warmth, or swelling, no rashes and no lesions  Musculoskeletal: no lower extremity pitting edema present  full range of motion noted  motor strength is 5 out of 5 all extremities bilaterally  Neurologic: Awake, alert, oriented to name, place and time.  Cranial nerves II-XII are grossly intact.  Motor is 5 out of 5 bilaterally; Sensory is intact.      Data   Data reviewed today: I reviewed all medications, new labs and imaging results over the last 24 hours.   Recent Labs   Lab 04/23/19  1038   WBC 9.0   HGB 11.8   MCV 99      INR 1.20*      POTASSIUM 4.2   CHLORIDE 107   CO2 25   BUN 11   CR 0.82   ANIONGAP 8   MIKE 8.4*   *   ALBUMIN 3.3*   PROTTOTAL 7.0   BILITOTAL 0.6   ALKPHOS 80   ALT 12   AST 11   TROPI 0.017     Recent Results (from the past 24 hour(s))   CT Head w/o Contrast    Narrative    CT SCAN OF THE HEAD WITHOUT CONTRAST   4/23/2019 10:51 AM     HISTORY: worsening altered mental status, recent stroke, bilateral leg  weaknss, worsening right arm weakness    TECHNIQUE:  Axial images of the head and coronal reformations without  IV contrast material. Radiation dose for this scan was reduced using  automated exposure control, adjustment of the mA and/or kV according  to patient size, or iterative reconstruction technique.    COMPARISON: None.    FINDINGS:     Intracranial contents: There is diffuse parenchymal volume loss.   White matter changes  are present in the cerebral hemispheres that are  consistent with small vessel ischemic disease in this age patient.  Again noted is a chronic infarct in the right frontal lobe. There is  no evidence of intracranial hemorrhage, mass, acute infarct or  anomaly.    Visualized orbits/sinuses/mastoids:  The visualized portions of the  sinuses and mastoids appear normal.    Osseous structures/soft tissues:  There is no evidence of trauma.      Impression    IMPRESSION:  1. No acute pathology. No bleed, mass, or acute infarcts are seen.  2. There is diffuse parenchymal volume loss.  White matter changes are  present in the cerebral hemispheres that are consistent with small  vessel ischemic disease in this age patient..  3. Chronic infarct in the right frontal lobe.      KEREN PEREZ MD   XR Chest Port 1 View    Narrative    XR CHEST PORT 1 VW 4/23/2019 10:57 AM    HISTORY: Weakness, syncope.    COMPARISON: None.    FINDINGS: No airspace consolidation, effusion or pneumothorax.      Impression    IMPRESSION: No acute abnormality.    KATRINA WILSON MD

## 2019-04-24 ENCOUNTER — HOSPITAL ENCOUNTER (OUTPATIENT)
Dept: CARDIOLOGY | Facility: CLINIC | Age: 79
Discharge: HOME | End: 2019-04-24
Attending: NURSE PRACTITIONER | Admitting: NURSE PRACTITIONER
Payer: COMMERCIAL

## 2019-04-24 ENCOUNTER — HOSPITAL LABORATORY (OUTPATIENT)
Dept: NURSING HOME | Facility: OTHER | Age: 79
End: 2019-04-24

## 2019-04-24 VITALS
WEIGHT: 219.36 LBS | DIASTOLIC BLOOD PRESSURE: 75 MMHG | HEIGHT: 69 IN | SYSTOLIC BLOOD PRESSURE: 140 MMHG | HEART RATE: 83 BPM | RESPIRATION RATE: 20 BRPM | TEMPERATURE: 98.9 F | BODY MASS INDEX: 32.49 KG/M2 | OXYGEN SATURATION: 93 %

## 2019-04-24 DIAGNOSIS — R55 SYNCOPE, UNSPECIFIED SYNCOPE TYPE: ICD-10-CM

## 2019-04-24 LAB
ANION GAP SERPL CALCULATED.3IONS-SCNC: 7 MMOL/L (ref 3–14)
BUN SERPL-MCNC: 10 MG/DL (ref 7–30)
CALCIUM SERPL-MCNC: 8 MG/DL (ref 8.5–10.1)
CHLORIDE SERPL-SCNC: 112 MMOL/L (ref 94–109)
CO2 SERPL-SCNC: 23 MMOL/L (ref 20–32)
CREAT SERPL-MCNC: 0.78 MG/DL (ref 0.52–1.04)
ERYTHROCYTE [DISTWIDTH] IN BLOOD BY AUTOMATED COUNT: 12.4 % (ref 10–15)
GFR SERPL CREATININE-BSD FRML MDRD: 73 ML/MIN/{1.73_M2}
GLUCOSE SERPL-MCNC: 100 MG/DL (ref 70–99)
HCT VFR BLD AUTO: 32 % (ref 35–47)
HGB BLD-MCNC: 10.4 G/DL (ref 11.7–15.7)
MCH RBC QN AUTO: 32.2 PG (ref 26.5–33)
MCHC RBC AUTO-ENTMCNC: 32.5 G/DL (ref 31.5–36.5)
MCV RBC AUTO: 99 FL (ref 78–100)
PLATELET # BLD AUTO: 173 10E9/L (ref 150–450)
POTASSIUM SERPL-SCNC: 4 MMOL/L (ref 3.4–5.3)
RBC # BLD AUTO: 3.23 10E12/L (ref 3.8–5.2)
SODIUM SERPL-SCNC: 142 MMOL/L (ref 133–144)
TROPONIN I SERPL-MCNC: 0.02 UG/L (ref 0–0.04)
WBC # BLD AUTO: 5.5 10E9/L (ref 4–11)

## 2019-04-24 PROCEDURE — 36415 COLL VENOUS BLD VENIPUNCTURE: CPT | Performed by: FAMILY MEDICINE

## 2019-04-24 PROCEDURE — 0296T ZIO PATCH HOLTER ADULT PEDIATRIC GREATER THAN 48 HRS: CPT

## 2019-04-24 PROCEDURE — 84484 ASSAY OF TROPONIN QUANT: CPT | Performed by: FAMILY MEDICINE

## 2019-04-24 PROCEDURE — G0378 HOSPITAL OBSERVATION PER HR: HCPCS

## 2019-04-24 PROCEDURE — 96361 HYDRATE IV INFUSION ADD-ON: CPT

## 2019-04-24 PROCEDURE — 25000132 ZZH RX MED GY IP 250 OP 250 PS 637: Performed by: NURSE PRACTITIONER

## 2019-04-24 PROCEDURE — 99217 ZZC OBSERVATION CARE DISCHARGE: CPT | Performed by: NURSE PRACTITIONER

## 2019-04-24 PROCEDURE — 36415 COLL VENOUS BLD VENIPUNCTURE: CPT | Performed by: NURSE PRACTITIONER

## 2019-04-24 PROCEDURE — 85027 COMPLETE CBC AUTOMATED: CPT | Performed by: NURSE PRACTITIONER

## 2019-04-24 PROCEDURE — 80048 BASIC METABOLIC PNL TOTAL CA: CPT | Performed by: NURSE PRACTITIONER

## 2019-04-24 PROCEDURE — 99207 ZZC CDG-CODE CATEGORY CHANGED: CPT | Performed by: NURSE PRACTITIONER

## 2019-04-24 RX ORDER — BISACODYL 10 MG
10 SUPPOSITORY, RECTAL RECTAL DAILY PRN
DISCHARGE
Start: 2019-04-24 | End: 2020-02-25

## 2019-04-24 RX ADMIN — APIXABAN 5 MG: 5 TABLET, FILM COATED ORAL at 08:59

## 2019-04-24 RX ADMIN — METOPROLOL SUCCINATE 50 MG: 50 TABLET, EXTENDED RELEASE ORAL at 08:58

## 2019-04-24 RX ADMIN — SENNOSIDES AND DOCUSATE SODIUM 2 TABLET: 8.6; 5 TABLET ORAL at 08:59

## 2019-04-24 RX ADMIN — LEVOTHYROXINE SODIUM 125 MCG: 100 TABLET ORAL at 08:58

## 2019-04-24 NOTE — DISCHARGE INSTRUCTIONS
Hospital follow up:  Dr. Brantley  Thursday May 9th at 2pm (please check in 10min early)  [facility]

## 2019-04-24 NOTE — PLAN OF CARE
S End of shift report    B. Weakness/Syncope    A. HR and BP controlled with negative trops. Afebrile and RA. Patient is alert to self and knows she is at a hospital unsure about which one and disoriented to time and situation. Patient refused supper tonight; writer has encouraged fluids throughout this shift. Incontinent of urine x3 this shift. Fluids @ 100 ml/hr.     R. Will continue to monitor per POC.

## 2019-04-24 NOTE — DISCHARGE SUMMARY
[facility]  Hospitalist Discharge Summary       Date of Admission:  4/23/2019  Date of Discharge:  No discharge date for patient encounter.  Discharging Provider: Walter Paulino NP      Discharge Diagnoses   Principal Problem:    Syncope, unspecified syncope type  Active Problems:    Chronic systolic heart failure (H)    History of stroke    Persistent atrial fibrillation (H)    Confusion    Syncope      Follow-ups Needed After Discharge   Follow-up Appointments     Follow Up and recommended labs and tests      Follow up with Dr. Anusha Brantley, [facility]   ([phone])  in 10 days for hospital follow up and to discuss Zio patch   results.  No follow up labs or test are needed.             Unresulted Labs Ordered in the Past 30 Days of this Admission     Date and Time Order Name Status Description    4/23/2019 1029 Blood culture Preliminary     4/23/2019 1029 Blood culture Preliminary         Discharge Disposition   Discharged to nursing home  Condition at discharge: Stable    Hospital Course      Kristine Cosby is a 78 year old female who presented from a local nursing home because of multiple syncopal episodes, increasing confusion and worsening left-sided weakness.  Patient was last seen normal around 830 the morning of admission.  Nursing staff noted that she would become unresponsive for a few seconds and then come around with a sternal rub.  She seemed a little bit more confused than her baseline and they noticed some increasing left-sided weakness.  Patient was recently diagnosed with a stroke about a week ago and was sent to the nursing home for rehab.  Patient also recently diagnosed with a urinary tract infection and completed a 7 day course of Keflex on the day of admission.  Patient also was complaining of some increasing shortness of breath while in the ED and felt like she needed more air. Labs were reviewed and were unremarkable. Patient's BNP  was slightly elevated but there is no signs of failure on the x-ray.  On the monitor the patient continued to be in atrial fibrillation which was noted on the EKG along with frequent PVCs. As noted from EMS, the syncopal episode she was having seemed to correspond to when she would have multiple runs of these PVCs. In the emergency department, patient's confusion and mental status appears to be at her baseline according to her family. No focal weakness present upon exam. Emergency room doctor spoke with cardiology who recommended watching the patient in the hospital here for 24 hours, recommend getting an echocardiogram and trend the troponins. Patient admitted to observation status and was be monitored on telemetry. Patient with no further episodes of syncope while in the hospital and no further runs of PVCs. Echocardiogram completed with no significant changes from prior echocardiogram done in 12/18. Patient denies shortness of breath on the day of discharge and patient is maintaining oxygen saturations above 90% on room air. Patient hemodynamically stable with no signs of infection as patient remains afebrile with no leukocytosis. Troponin levels were obtained overnight and were normal. Discussed with cardiology again this morning who recommended monitoring patient on Zio patch for 7 days.  Patient medically stable for discharge today.     Principal Problem:      Syncope, unspecified syncope type  Assessment: Patient presented to the hospital from her nursing home after staff noted patient had several episodes where would become unresponsive for a few seconds and then come around with a sternal rub. Patient also noted to have increased confusion and left sided weakness. EMS was called who noted patient to have a syncopal episode which seemed to correspond to multiple runs of PVC's. Patient noted to be in atrial fibrillation with frequent PVC's while in the ED with no further syncopal episodes noted.  "    4/24-Patient with no further episodes of syncope while in hospital and no further runs of PVC's noted. Echocardiogram completed with no significant changes from prior echocardiogram done in 12/18. Patient back to baseline mental status per family and no focal weakness on exam. Discussed patient with Dr. Brady of cardiology who recommended monitoring patient on Zio patch for 7 days as it may be possible patient is having a symptomatic ventricular tachycardia or heart block.    Plan: Patient medically stable for discharge back to [facility] today. Ordered Zio patch for after discharge. Updated patient's daughter, Kaya, regarding patient's hospital course, current medical status, and plan. Kaya expressed understanding and requested patient follow up with Dr. Anusha Brantley who knows patient well. Requested follow up with Dr. Brantley in approximately 10 days.     Active Problems:    Confusion  Assessment: Patient with a history of CVA with chronic confusion. Per nursing home staff, on the day of admission patient was lethargic and having high-pitched baby talk and could not focus.     4/24-Patient alert to self and understands she is in the hospital. She is unsure why she is in the hospital and would only state \"I guess I'm sick.\" Patient not oriented to time. Per family, this is patient's baseline mental status.   Plan: No further acute intervention indicated. Patient to return to long term care today.     Chronic systolic heart failure (H)  Assessment: Patient with known systolic heart failure. Previous echocardiogram done in December of 2018 showed an EF of 35-40%. Repeat echocardiogram completed in ED-results pending. Patient noted to nursing home staff today that she felt more short of breath than normal. BNP slightly increased upon arrival to ED but no signs of failure on chest xray.     4/24-Patient denies shortness of breath today and is maintaining oxygen saturations above 90% on room " air.   Plan: Home dose of metoprolol was ordered while in the hospital and ordered to continue after discharge.     History of stroke  Assessment: Diagnosed during hospitalization at [facility] on 4/15/19. At that time, patient with altered patient had altered mentation from her baseline confusion as she was more lethargic, talking like a baby, not following commands.  She was transported to the ED and had NIH score of 6. Head CT showed no acute intracranial pathology. Neurology at [facility] was consulted, she was given tPA and was transported to [facility] for care. Patient had MRI at [facility] which showed old frontal stroke, no acute stroke.  Patient was monitored and found to have UTI which was treated with IV antibiotics. She was discharged back to her nursing home on oral Keflex which she completed 4/23. She was also started on apixaban 5 mg twice daily.   4/24-Patient back to baseline mental status per family. No focal neurological deficits noted on exam  Plan: No further acute intervention indicated. Home dose of apixaban was ordered while patient in hospital and ordered to continue after discharge.      Persistent atrial fibrillation (H)  Assessment: Chronic and managed with Toprol XL 25 mg once daily for rate control. Noted to be in atrial fibrillation with frequent PVC's while in ED.   Plan: Home dose of Toprol XL ordered while in hospital and continued after discharge    Consultations This Hospital Stay   SOCIAL WORK IP CONSULT    Code Status   Full Code    Time Spent on this Encounter   IWalter, personally saw the patient today and spent greater than 30 minutes discharging this patient.       Walter Paulino NP  [facility]  ______________________________________________________________________    Physical Exam   Vital Signs: Temp: 98.2  F (36.8  C) Temp src: Oral BP: 155/89 Pulse: 92 Heart Rate: 98 Resp: 24 SpO2: 93 % O2 Device: None (Room air)    Weight: 219 lbs 5.72 oz  Constitutional:  awake, alert, cooperative, no apparent distress, and appears stated age  Respiratory: No increased work of breathing, good air exchange, clear to auscultation bilaterally, no crackles or wheezing  Cardiovascular: irregularly irregular  GI: normal bowel sounds, non-distended and non-tender  Skin: no redness, warmth, or swelling, no rashes and no lesions  Musculoskeletal: no lower extremity pitting edema present; full range of motion noted; motor strength is 5 out of 5 all extremities bilaterally  Neurologic: Patient alert and oriented to self and place; not oriented to time or situation       Primary Care Physician   Jannie Cruz    Discharge Orders      General info for SNF    Length of Stay Estimate: Long Term Care  Condition at Discharge: Stable  Level of care:board and care  Rehabilitation Potential: Good  Admission H&P remains valid and up-to-date: Yes  Recent Chemotherapy: N/A  Use Nursing Home Standing Orders: Yes     Mantoux instructions    Give two-step Mantoux (PPD) Per Facility Policy Yes     Reason for your hospital stay    You were in the hospital for syncopal episodes, confusion, and left sided weakness and improved     Activity - Up with nursing assistance     Follow Up and recommended labs and tests    Follow up with Dr. Zainab Brantley, [facility] ([phone])  in 10 days for hospital follow up and to discuss Zio patch results.  No follow up labs or test are needed.     Zio Patch Holter Adult Pediatric Greater than 48 hrs     Advance Diet as Tolerated    Follow this diet upon discharge: Orders Placed This Encounter      Regular Diet Adult       Significant Results and Procedures   Most Recent 3 CBC's:  Recent Labs   Lab Test 04/24/19  0319 04/23/19  1038 04/15/19  1858   WBC 5.5 9.0 5.4   HGB 10.4* 11.8 11.0*   MCV 99 99 103*    196 168     Most Recent 3 BMP's:  Recent Labs   Lab Test 04/24/19  0319 04/23/19  1038 04/15/19  1858    140 141   POTASSIUM 4.0 4.2  4.0   CHLORIDE 112* 107 110*   CO2 23 25 23   BUN 10 11 14   CR 0.78 0.82 0.80   ANIONGAP 7 8 8   MIKE 8.0* 8.4* 8.3*   * 128* 102*   ,   Results for orders placed or performed during the hospital encounter of 04/23/19   XR Chest Port 1 View    Narrative    XR CHEST PORT 1 VW 4/23/2019 10:57 AM    HISTORY: Weakness, syncope.    COMPARISON: None.    FINDINGS: No airspace consolidation, effusion or pneumothorax.      Impression    IMPRESSION: No acute abnormality.    KATRINA WILSON MD   CT Head w/o Contrast    Narrative    CT SCAN OF THE HEAD WITHOUT CONTRAST   4/23/2019 10:51 AM     HISTORY: worsening altered mental status, recent stroke, bilateral leg  weaknss, worsening right arm weakness    TECHNIQUE:  Axial images of the head and coronal reformations without  IV contrast material. Radiation dose for this scan was reduced using  automated exposure control, adjustment of the mA and/or kV according  to patient size, or iterative reconstruction technique.    COMPARISON: None.    FINDINGS:     Intracranial contents: There is diffuse parenchymal volume loss.   White matter changes are present in the cerebral hemispheres that are  consistent with small vessel ischemic disease in this age patient.  Again noted is a chronic infarct in the right frontal lobe. There is  no evidence of intracranial hemorrhage, mass, acute infarct or  anomaly.    Visualized orbits/sinuses/mastoids:  The visualized portions of the  sinuses and mastoids appear normal.    Osseous structures/soft tissues:  There is no evidence of trauma.      Impression    IMPRESSION:  1. No acute pathology. No bleed, mass, or acute infarcts are seen.  2. There is diffuse parenchymal volume loss.  White matter changes are  present in the cerebral hemispheres that are consistent with small  vessel ischemic disease in this age patient..  3. Chronic infarct in the right frontal lobe.      KEREN PEREZ MD       Discharge Medications   Current Discharge Medication  List      CONTINUE these medications which have CHANGED    Details   bisacodyl (DULCOLAX) 10 MG suppository Place 1 suppository (10 mg) rectally daily as needed for constipation    Associated Diagnoses: Constipation, unspecified constipation type         CONTINUE these medications which have NOT CHANGED    Details   acetaminophen (TYLENOL) 500 MG tablet Take 1,000 mg by mouth 3 times daily       apixaban ANTICOAGULANT (ELIQUIS) 5 MG tablet Take 1 tablet (5 mg) by mouth 2 times daily    Associated Diagnoses: History of stroke      atorvastatin (LIPITOR) 20 MG tablet Take 20 mg by mouth At Bedtime      cholecalciferol 1000 units TABS Take 2,000 Units by mouth daily      levothyroxine (SYNTHROID/LEVOTHROID) 125 MCG tablet Take 125 mcg by mouth daily      lidocaine (LIDODERM) 5 % patch Place 1 patch onto the skin every 24 hours      metoprolol succinate ER (TOPROL-XL) 50 MG 24 hr tablet Take 50 mg by mouth daily      pantoprazole (PROTONIX) 40 MG EC tablet Take 40 mg by mouth daily      senna-docusate (SENOKOT-S/PERICOLACE) 8.6-50 MG tablet Take 2 tablets by mouth 2 times daily       tamsulosin (FLOMAX) 0.4 MG capsule Take 0.8 mg by mouth At Bedtime      albuterol (PROVENTIL) (2.5 MG/3ML) 0.083% neb solution Take 2.5 mg by nebulization every 4 hours as needed for shortness of breath / dyspnea or wheezing      nitroGLYcerin (NITROSTAT) 0.4 MG sublingual tablet Place 0.4 mg under the tongue every 5 minutes as needed for chest pain For chest pain place 1 tablet under the tongue every 5 minutes for 3 doses. If symptoms persist 5 minutes after 1st dose call 911.      nystatin (MYCOSTATIN) 439092 UNIT/GM external powder Apply topically 2 times daily as needed      Skin Protectants, Misc. (EUCERIN) cream Apply topically 2 times daily ; apply to arms, legs, hands, feet for dry skin         STOP taking these medications       cephALEXin (KEFLEX) 500 MG capsule Comments:   Reason for Stopping:             Allergies   Allergies    Allergen Reactions     Blood Transfusion Related (Informational Only) Other (See Comments)     Patient has a history of a clinically significant antibody against RBC antigens.  A delay in compatible RBCs may occur.

## 2019-04-24 NOTE — PROGRESS NOTES
Name: Kristine Cosby    MRN#: [id number]    Reason for Hospitalization: Syncope, unspecified syncope type [R55]    Discharge Date: 4/24/2019    Patient / Family response to discharge plan: Daughter, Kaya, in agreement with discharge plan back to [facility] today.  Daughter agrees to [facility] van transport and will be billed $35.00 to her home address, per her request.      Other Providers (Care Coordinator, County Services, PCA services etc): No    CTS Hand Off Completed: Yes: to her CNP at [facility]     RED Score: Elevated     Future Appointments:   Future Appointments   Date Time Provider Department Center   4/24/2019  1:00 PM [facility] [place] [facility]       Discharge Disposition: long term care facility    Discharge Planner   Discharge Plans in progress: Completed.  Patient returning to long term care at [facility] on 4/24/19.  [facility] transport to  at 1330 in specialty clinic area.    Barriers to discharge plan: no   Follow up plan: per CNP/family       Entered by: NEFTALI DAS 04/24/2019 11:28 AM           DMITRY Resendez

## 2019-04-24 NOTE — PROGRESS NOTES
S-(situation): Patient discharged to [facility] via wheelchair with transportation van.    B-(background): Syncope episide    A-(assessment): VSS, denies pain, Alert to self. No acute deficits. No unresponsive episodes. Tele Afib. Up with assist of 1-2 with walker. Patient went down to clinic to have Z patch applied and then will discharge from clinic.   Last bowel movement: 4/24/19     R-(recommendations):Report called to Maggie. Listed belongings gathered and sent with patient.     Discharge Nursing Criteria:     Care Plan and Patient education resolved: Yes    Vaccines  Influenza status verified at discharge:  Yes      [facility] medications returned to patient: NA  Medication Bin checked and emptied on discharge Yes  All paperwork sent with patient/Copy of AVS given to patient or family Yes.

## 2019-04-24 NOTE — PROGRESS NOTES
Kristine Cosby  Gender: female  : 1940  [address] [place] [id number] [phone] (home)     Medical Record: [id number]  Pharmacy: Data Unavailable  Primary Care Provider: Jannie Cruz    Parent's names are: Data Unavailable (mother) and Data Unavailable (father).      [facility]  2019     Discharge Phone Call:  Discharge to  [facility]

## 2019-04-24 NOTE — PROGRESS NOTES
"S-(situation): end of shift note    B-(background): syncope    A-(assessment): pt is oriented to self only, answers \"I don't know\" to place, date, and situation. Is cooperative and follows commands, has purposeful movement to all extremities, see neuro assessment. Incontinent of bowel and bladder. Tele is Afib with CVR, occasional PVCs noted. Pt denies pain. Troponin drawn overnight = 0.022.    R-(recommendations): will continue with telemetry, monitor neuro status.  "

## 2019-04-24 NOTE — CONSULTS
CARE TRANSITION SOCIAL WORK INITIAL ASSESSMENT:      Met with: Patient.    DATA  Principal Problem:    Syncope, unspecified syncope type  Active Problems:    Chronic systolic heart failure (H)    History of stroke    Persistent atrial fibrillation (H)    Confusion    Syncope       Primary Care Clinic Name: [facility]   Primary Care MD Name: Jannie Cruz CNP   Contact information and PCP information verified: Yes      ASSESSMENT  Cognitive Status: awake and alert, chronic confusion- back to baseline        Resources List: Skilled Nursing Facility  Other Resources: Transportation Services           Description of Support System: Supportive, Involved   Who is your support system?: Children   Support Assessment: Adequate family and caregiver support, Adequate social supports   Insurance Concerns: No Insurance issues identified           This writer visited with patient's daughter, Kaya Cosby, over the phone and introduced self and role. Discussed discharge planning and that patient is medically stable to return to long term care at [facility] today.  Kaya stated that she did talk with the nurse practitioner about this earlier today and she is in agreement. Patient not able to be part of discharge planning due to her confusion.      Writer discussed transportation options back to [place].  Daughter thought patient would need an ambulance transport, but discussed that ambulance is not needed and would be very costly for the patient, as it is non-emergency transport.  Discussed option of agency van transport at cost of $35.00.  Daughter in agreement with this, as no family members are available to transport patient today.  Daughter aware of transport time of 1330 today.      Writer has contacted [facility] and they are available to transport patient at 1330 from cardiology in the Specialty building attached to [facility]. Patient is getting a Zio patch placed at 1:00 there and has to be  discharged from the hospital prior to this.  [facility]  will bring patient's wheelchair from [facility] to patient's room here at [facility] prior to 1:00 today.      Writer has contacted Maggie, nurse at [facility] that cares for this patient.  She is aware of the discharge plan and transport time of 1330.  She is aware that patient is having a Zio patch placed and has spoken with the DON at [facility] and they are ok with this.  Plan is for staff in cardiology to call Maggie and explain patch and patch removal.        PLAN    Patient discharging back to long term care at [facility] today.  [facility] transport at 1330.          DMITRY Resendez

## 2019-04-24 NOTE — PROGRESS NOTES
SPIRITUAL HEALTH SERVICES  SPIRITUAL ASSESSMENT Progress Note  [facility]      During Rounding,  introduced himself to Kristine Cosby and informed her of his availability.    Olivier Gallegos M.Div., [facility]  Staff   Office tel: [phone]

## 2019-04-25 NOTE — PROGRESS NOTES
[facility]  PRIMARY CARE PROVIDER AND CLINIC:  Jannie Cruz, NATIVIDAD CNP, [address] [place] [address] / [place] [address]  Chief Complaint   Patient presents with     Hospital F/U     [facility] Medical Record Number:  [id number]  Place of Service where encounter took place:  [facility] [place] ([facility]) [[id number]]    Kristine Cosby  is a 78 year old  (1940), re-admitted to the above facility from  [facility]. Hospital stay 4/23/19 - 4/24/19. .  Admitted to this facility for  medical management and nursing care.    HPI:    HPI information obtained from: facility chart records, facility staff, patient report and [facility] chart review.   Brief Summary of Hospital Course:   Patient is a 78 year old woman who has had a complicated course recently. Briefly, her course is:  - found down at home and later determined to have hypoglycemia encephalopathy, hospitalized 12/27/18-1/8/19 with complications of  JOSTIN, dysphagia  1/8-2/1/19 - Acute rehab at [facility]   2/1- 4/2/19: [facility] TCU  4/2/19: Moved to LTC   4/15-4/17/19: hospitalized for AMS thought 2/2 to CVA, was given tPA, transferred to [facility] where MRI showed no acute CVA, + UTI treated with antibiotics. Started on apixaban for a fib  4/23/19: patient was sent back to ED after further unresponsive episodes where sternal rub required. In ED patient at baseline and no focal deficits. No CVA found, no metabolic nor infectious etiology found. ECHo unchanged from prior. EMS noting possible syncopal episodes correlating with runs of PVCs. Zio patch ordered and patient transferred back to SNF.     4/25/19: patient had another unresponsive episode; family elected not to hospitalize and mentioned to nursing history of seizure.       Updates on Status Since Skilled nursing Admission: Nursing reporting that today just prior to this visit patient had another unresponsive episode.  Patient was met in her SNF  room where she is slightly lethargic but arouses easily to voice and present.  She is seemingly at her baseline mentation without any focal deficits noted upon exam.  Labs drawn today again are unremarkable.  Zio patch remaining on the left chest.  Patient is without complaint, denying shortness of breath, chest pain, headache, lightheadedness, heartburn, upset stomach, constipation. (Noted patient may be a poor historian due to cognitive impairment)    CODE STATUS/ADVANCE DIRECTIVES DISCUSSION:   CPR/Full code   Patient's living condition: lives in a skilled nursing facility  ALLERGIES: Blood transfusion related (informational only)  PAST MEDICAL HISTORY:  has a past medical history of Cellulitis of left lower extremity, GERD (gastroesophageal reflux disease), HTN (hypertension), Type 2 diabetes mellitus without complication (H), and Ulcer of left lower extremity with fat layer exposed (H).  PAST SURGICAL HISTORY:   has a past surgical history that includes Hysterectomy and ESOPHAGOGASTRODUODENOSCOPY.  FAMILY HISTORY: family history includes Lung Cancer in her father.  SOCIAL HISTORY:   reports that she quit smoking 10 days ago. She has quit using smokeless tobacco. She reports that she does not drink alcohol.    Post Discharge Medication Reconciliation Status: discharge medications reconciled, continue medications without change    Current Outpatient Medications   Medication Sig Dispense Refill     acetaminophen (TYLENOL) 500 MG tablet Take 1,000 mg by mouth 3 times daily        albuterol (PROVENTIL) (2.5 MG/3ML) 0.083% neb solution Take 2.5 mg by nebulization every 4 hours as needed for shortness of breath / dyspnea or wheezing       apixaban ANTICOAGULANT (ELIQUIS) 5 MG tablet Take 1 tablet (5 mg) by mouth 2 times daily       atorvastatin (LIPITOR) 20 MG tablet Take 20 mg by mouth At Bedtime       bisacodyl (DULCOLAX) 10 MG suppository Place 1 suppository (10 mg) rectally daily as needed for constipation        "cholecalciferol 1000 units TABS Take 2,000 Units by mouth daily       levETIRAcetam (KEPPRA) 500 MG tablet Take 500 mg by mouth See Admin Instructions       levothyroxine (SYNTHROID/LEVOTHROID) 125 MCG tablet Take 125 mcg by mouth daily       lidocaine (LIDODERM) 5 % patch Place 1 patch onto the skin every 24 hours       metoprolol succinate ER (TOPROL-XL) 50 MG 24 hr tablet Take 50 mg by mouth daily       nitroGLYcerin (NITROSTAT) 0.4 MG sublingual tablet Place 0.4 mg under the tongue every 5 minutes as needed for chest pain For chest pain place 1 tablet under the tongue every 5 minutes for 3 doses. If symptoms persist 5 minutes after 1st dose call 911.       nystatin (MYCOSTATIN) 113990 UNIT/GM external powder Apply topically 2 times daily as needed       pantoprazole (PROTONIX) 40 MG EC tablet Take 40 mg by mouth daily       senna-docusate (SENOKOT-S/PERICOLACE) 8.6-50 MG tablet Take 2 tablets by mouth 2 times daily        Skin Protectants, Misc. (EUCERIN) cream Apply topically 2 times daily ; apply to arms, legs, hands, feet for dry skin       tamsulosin (FLOMAX) 0.4 MG capsule Take 0.8 mg by mouth At Bedtime         ROS:  Limited secondary to cognitive impairment but today pt reports 10 point ROS of systems including Constitutional, Eyes, Respiratory, Cardiovascular, Gastroenterology, Genitourinary, Integumentary, Musculoskeletal, Psychiatric were all negative except for pertinent positives noted in my HPI.    Vitals:  /83   Pulse 100   Temp 97.9  F (36.6  C)   Resp 22   Ht 1.702 m (5' 7\")   Wt 96.5 kg (212 lb 12.8 oz)   SpO2 92%   BMI 33.33 kg/m    Exam:  GENERAL APPEARANCE:  Mildly more lethargic but arouses easily to voice and presence, in no distress  RESP:  respiratory effort and palpation of chest normal, auscultation of lungs clear , no respiratory distress  CV:  Palpation and auscultation of heart done , rate and rhythm irregular, no murmur, no LE peripheral edema  ABDOMEN:  Mild obesity, " normal bowel sounds, soft, nontender, no hepatosplenomegaly or other masses  M/S:   Gait and station with W/C for mobility, Digits and nails at baseline, reduced muscle mass  SKIN:  Inspection and Palpation of skin and subcutaneous tissue pale, intact, no rashes appreciated  PSYCH:  insight and judgement, memory with impairment, affect and mood normal, follows commands readily         Lab/Diagnostic data:  Labs done in SNF are in [facility] EPIC. Please refer to them using [employer] Everywhere.   Last Comprehensive Metabolic Panel:  Sodium   Date Value Ref Range Status   04/26/2019 144 133 - 144 mmol/L Final     Potassium   Date Value Ref Range Status   04/26/2019 4.2 3.4 - 5.3 mmol/L Final     Chloride   Date Value Ref Range Status   04/26/2019 112 (H) 94 - 109 mmol/L Final     Carbon Dioxide   Date Value Ref Range Status   04/26/2019 26 20 - 32 mmol/L Final     Anion Gap   Date Value Ref Range Status   04/26/2019 6 3 - 14 mmol/L Final     Glucose   Date Value Ref Range Status   04/26/2019 112 (H) 70 - 99 mg/dL Final     Urea Nitrogen   Date Value Ref Range Status   04/26/2019 10 7 - 30 mg/dL Final     Creatinine   Date Value Ref Range Status   04/26/2019 0.88 0.52 - 1.04 mg/dL Final     GFR Estimate   Date Value Ref Range Status   04/26/2019 63 >60 mL/min/[1.73_m2] Final     Comment:     Non  GFR Calc  Starting 12/18/2018, serum creatinine based estimated GFR (eGFR) will be   calculated using the Chronic Kidney Disease Epidemiology Collaboration   (CKD-EPI) equation.       Calcium   Date Value Ref Range Status   04/26/2019 8.2 (L) 8.5 - 10.1 mg/dL Final       CBC RESULTS:   Recent Labs   Lab Test 04/26/19  0804   WBC 5.1   RBC 3.31*   HGB 10.5*   HCT 33.2*      MCH 31.7   MCHC 31.6   RDW 12.4          UA RESULTS:  Recent Labs   Lab Test 04/23/19  1143 04/16/19  0739   COLOR Yellow Yellow   APPEARANCE Clear Slightly Cloudy   URINEGLC Negative Negative   URINEBILI Negative Negative    URINEKETONE Negative 10*   SG 1.013 1.023   UBLD Negative Negative   URINEPH 6.0 6.5   PROTEIN Negative Negative   NITRITE Negative Positive*   LEUKEST Negative Large*   RBCU  --  2   WBCU  --  78*         ASSESSMENT/PLAN:  Recurrent episodes of unresponsiveness  Syncope, unspecified syncope type  History of seizures  History of stroke  Recurrent episodes of unresponsiveness/syncope.  Treated for UTI with last hospitalization, was given tPA for possible CVA (although imaging showing no new infarcts)  Recent Obs hospitalization with EMS noting unresponsiveness possibly correlated with runs of PVCs. Zio patch in place at this time.   ECHO remains unchanged. Labs remain unremarkable  Patient's family also report History of seizures as well.    Patient usually sleepy after unresponsive episode, then regains baseline mentation shortly thereafter.     Exam today with patient at baseline mentation with just slightly more tiredness/lethargy (easily arousable to voice and presence)  No focal deficits on neuro exam. , AVSS at time of event     Nursing noted they did press event button during episode this AM.  This will be helpful in correlating any cardiac event history with symptoms.     PLAN:  - zio patch in place for 10 days to monitor for PVCs/cardiac events causing unresponsiveness, F/u scheduled with Cardiology.  - Neurology consult also placed (unfortunately appt not until June)  - will start Keppra for possible seizure activity occurring.  - Keppra level one week after starting daily dosing, to monitor for titration needs  - monitor for unresponsive episodes      Chronic atrial fibrillation (H)  Remains on (new-lilliana) Apixaban for anticoagulation (history of GIB on Pradaxa, will need close monitoring for bleeding)  Remains on Toprol XL for rate control   HRs 80-90s, irregular today    PLAN:  - continue apixaban as ordered, monitor for bleeding  - cotninue Toprol Xl, monitor for titration needs    PVC's  (premature ventricular contractions)  Noted per History, may correlate with unresponsive/syncopal episodes  Zio patch in place x 10 days  F/u with Cardiology following     Chronic systolic heart failure (H)  Essential hypertension  Hyperlipidemia LDL goal <100  Atherosclerosis of native coronary artery of native heart without angina pectoris  History of ST elevation myocardial infarction (STEMI)  Noted elevated BNP on admit however ECHO remain unchanged (EF 35-40%) and xray without s/s of fluid overload.   Remains on Toprol XL     BPs 120-140s/60-80s  HRs 80-90s mostly, irregular today  LS clear  Patient denies SOB  No LE edema    PLAN:  - monitor VS and weights for exacerbation  - continue Toprol XL as ordered  - Zio patch for monitoring of rhythm     Cognitive impairment  Hx of Hypoglycemic encephalopathy  Poor historian d/t cognitive impairment  BIMS 8/15  Unresponsive episodes recently (possible PVCs contributing vs seizures vs unknown?)  History of CVA    PLAN:  - nursing for monitoring and supportive cares  - monitor mentation as correlating to above concerns       Orders written by provider at facility and transcribed by : Sherlyn Faulkner MA  1.  Keppra 1000 mg po BID x 2 days, then decrease to 500 mg po BID. Dx: history of seizures, unresponsive episodes   2.  Keppra level 7 days after starting 500 mg BID dosing. Dx: history of seizures, unresponsive episodes     Total time spent with patient visit at the skilled nursing facility was >35 min including patient visit, review of past records and coordiantion of care with MD and nursing. Greater than 50% of total time spent with counseling and coordinating care due to review of records, patient visit, coordiantion of care.  Electronically signed by:  NATIVIDAD Chapa CNP

## 2019-04-26 ENCOUNTER — NURSING HOME VISIT (OUTPATIENT)
Dept: GERIATRICS | Facility: CLINIC | Age: 79
End: 2019-04-26
Payer: COMMERCIAL

## 2019-04-26 ENCOUNTER — HOSPITAL LABORATORY (OUTPATIENT)
Dept: NURSING HOME | Facility: OTHER | Age: 79
End: 2019-04-26

## 2019-04-26 VITALS
HEIGHT: 67 IN | WEIGHT: 212.8 LBS | SYSTOLIC BLOOD PRESSURE: 147 MMHG | TEMPERATURE: 97.9 F | HEART RATE: 100 BPM | OXYGEN SATURATION: 92 % | BODY MASS INDEX: 33.4 KG/M2 | DIASTOLIC BLOOD PRESSURE: 83 MMHG | RESPIRATION RATE: 22 BRPM

## 2019-04-26 DIAGNOSIS — I25.2 HISTORY OF ST ELEVATION MYOCARDIAL INFARCTION (STEMI): ICD-10-CM

## 2019-04-26 DIAGNOSIS — I50.22 CHRONIC SYSTOLIC HEART FAILURE (H): ICD-10-CM

## 2019-04-26 DIAGNOSIS — I25.10 ATHEROSCLEROSIS OF NATIVE CORONARY ARTERY OF NATIVE HEART WITHOUT ANGINA PECTORIS: ICD-10-CM

## 2019-04-26 DIAGNOSIS — R40.4 RECURRENT EPISODES OF UNRESPONSIVENESS: Primary | ICD-10-CM

## 2019-04-26 DIAGNOSIS — I49.3 PVC'S (PREMATURE VENTRICULAR CONTRACTIONS): ICD-10-CM

## 2019-04-26 DIAGNOSIS — I10 ESSENTIAL HYPERTENSION: ICD-10-CM

## 2019-04-26 DIAGNOSIS — E78.5 HYPERLIPIDEMIA LDL GOAL <100: ICD-10-CM

## 2019-04-26 DIAGNOSIS — I48.20 CHRONIC ATRIAL FIBRILLATION (H): ICD-10-CM

## 2019-04-26 DIAGNOSIS — R41.89 COGNITIVE IMPAIRMENT: ICD-10-CM

## 2019-04-26 DIAGNOSIS — Z87.898 HISTORY OF SEIZURES: ICD-10-CM

## 2019-04-26 DIAGNOSIS — R55 SYNCOPE, UNSPECIFIED SYNCOPE TYPE: ICD-10-CM

## 2019-04-26 DIAGNOSIS — Z86.73 HISTORY OF STROKE: ICD-10-CM

## 2019-04-26 DIAGNOSIS — E16.2 HYPOGLYCEMIC ENCEPHALOPATHY: ICD-10-CM

## 2019-04-26 LAB
ANION GAP SERPL CALCULATED.3IONS-SCNC: 6 MMOL/L (ref 3–14)
BUN SERPL-MCNC: 10 MG/DL (ref 7–30)
CALCIUM SERPL-MCNC: 8.2 MG/DL (ref 8.5–10.1)
CHLORIDE SERPL-SCNC: 112 MMOL/L (ref 94–109)
CO2 SERPL-SCNC: 26 MMOL/L (ref 20–32)
CREAT SERPL-MCNC: 0.88 MG/DL (ref 0.52–1.04)
ERYTHROCYTE [DISTWIDTH] IN BLOOD BY AUTOMATED COUNT: 12.4 % (ref 10–15)
GFR SERPL CREATININE-BSD FRML MDRD: 63 ML/MIN/{1.73_M2}
GLUCOSE SERPL-MCNC: 112 MG/DL (ref 70–99)
HCT VFR BLD AUTO: 33.2 % (ref 35–47)
HGB BLD-MCNC: 10.5 G/DL (ref 11.7–15.7)
MCH RBC QN AUTO: 31.7 PG (ref 26.5–33)
MCHC RBC AUTO-ENTMCNC: 31.6 G/DL (ref 31.5–36.5)
MCV RBC AUTO: 100 FL (ref 78–100)
PLATELET # BLD AUTO: 186 10E9/L (ref 150–450)
POTASSIUM SERPL-SCNC: 4.2 MMOL/L (ref 3.4–5.3)
RBC # BLD AUTO: 3.31 10E12/L (ref 3.8–5.2)
SODIUM SERPL-SCNC: 144 MMOL/L (ref 133–144)
WBC # BLD AUTO: 5.1 10E9/L (ref 4–11)

## 2019-04-26 PROCEDURE — 99310 SBSQ NF CARE HIGH MDM 45: CPT | Performed by: NURSE PRACTITIONER

## 2019-04-26 RX ORDER — LEVETIRACETAM 500 MG/1
750 TABLET ORAL 2 TIMES DAILY
COMMUNITY
End: 2019-05-21

## 2019-04-26 ASSESSMENT — MIFFLIN-ST. JEOR: SCORE: 1482.88

## 2019-04-26 NOTE — LETTER
4/26/2019        RE: Kristine Cosby  [address] [place] [address]        [facility]  PRIMARY CARE PROVIDER AND CLINIC:  NATIVIDAD Chapa [facility], [address] [place] [address] / [place] [address]  Chief Complaint   Patient presents with     Hospital F/U     [facility] Medical Record Number:  [id number]  Place of Service where encounter took place:  [facility] [place] ([facility]) [[id number]]    Kristine Cosby  is a 78 year old  (1940), re-admitted to the above facility from  [facility]. Hospital stay 4/23/19 - 4/24/19. .  Admitted to this facility for  medical management and nursing care.    HPI:    HPI information obtained from: facility chart records, facility staff, patient report and [facility] chart review.   Brief Summary of Hospital Course:   Patient is a 78 year old woman who has had a complicated course recently. Briefly, her course is:  - found down at home and later determined to have hypoglycemia encephalopathy, hospitalized 12/27/18-1/8/19 with complications of  JOSTIN, dysphagia  1/8-2/1/19 - Acute rehab at [facility]   2/1- 4/2/19: [facility] TCU  4/2/19: Moved to LTC   4/15-4/17/19: hospitalized for AMS thought 2/2 to CVA, was given tPA, transferred to [facility] where MRI showed no acute CVA, + UTI treated with antibiotics. Started on apixaban for a fib  4/23/19: patient was sent back to ED after further unresponsive episodes where sternal rub required. In ED patient at baseline and no focal deficits. No CVA found, no metabolic nor infectious etiology found. ECHo unchanged from prior. EMS noting possible syncopal episodes correlating with runs of PVCs. Zio patch ordered and patient transferred back to SNF.     4/25/19: patient had another unresponsive episode; family elected not to hospitalize and mentioned to nursing history of seizure.       Updates on Status Since Skilled nursing Admission: Nursing reporting that today  just prior to this visit patient had another unresponsive episode.  Patient was met in her SNF room where she is slightly lethargic but arouses easily to voice and present.  She is seemingly at her baseline mentation without any focal deficits noted upon exam.  Labs drawn today again are unremarkable.  Zio patch remaining on the left chest.  Patient is without complaint, denying shortness of breath, chest pain, headache, lightheadedness, heartburn, upset stomach, constipation. (Noted patient may be a poor historian due to cognitive impairment)    CODE STATUS/ADVANCE DIRECTIVES DISCUSSION:   CPR/Full code   Patient's living condition: lives in a skilled nursing facility  ALLERGIES: Blood transfusion related (informational only)  PAST MEDICAL HISTORY:  has a past medical history of Cellulitis of left lower extremity, GERD (gastroesophageal reflux disease), HTN (hypertension), Type 2 diabetes mellitus without complication (H), and Ulcer of left lower extremity with fat layer exposed (H).  PAST SURGICAL HISTORY:   has a past surgical history that includes Hysterectomy and ESOPHAGOGASTRODUODENOSCOPY.  FAMILY HISTORY: family history includes Lung Cancer in her father.  SOCIAL HISTORY:   reports that she quit smoking 10 days ago. She has quit using smokeless tobacco. She reports that she does not drink alcohol.    Post Discharge Medication Reconciliation Status: discharge medications reconciled, continue medications without change    Current Outpatient Medications   Medication Sig Dispense Refill     acetaminophen (TYLENOL) 500 MG tablet Take 1,000 mg by mouth 3 times daily        albuterol (PROVENTIL) (2.5 MG/3ML) 0.083% neb solution Take 2.5 mg by nebulization every 4 hours as needed for shortness of breath / dyspnea or wheezing       apixaban ANTICOAGULANT (ELIQUIS) 5 MG tablet Take 1 tablet (5 mg) by mouth 2 times daily       atorvastatin (LIPITOR) 20 MG tablet Take 20 mg by mouth At Bedtime       bisacodyl (DULCOLAX)  "10 MG suppository Place 1 suppository (10 mg) rectally daily as needed for constipation       cholecalciferol 1000 units TABS Take 2,000 Units by mouth daily       levETIRAcetam (KEPPRA) 500 MG tablet Take 500 mg by mouth See Admin Instructions       levothyroxine (SYNTHROID/LEVOTHROID) 125 MCG tablet Take 125 mcg by mouth daily       lidocaine (LIDODERM) 5 % patch Place 1 patch onto the skin every 24 hours       metoprolol succinate ER (TOPROL-XL) 50 MG 24 hr tablet Take 50 mg by mouth daily       nitroGLYcerin (NITROSTAT) 0.4 MG sublingual tablet Place 0.4 mg under the tongue every 5 minutes as needed for chest pain For chest pain place 1 tablet under the tongue every 5 minutes for 3 doses. If symptoms persist 5 minutes after 1st dose call 911.       nystatin (MYCOSTATIN) 386296 UNIT/GM external powder Apply topically 2 times daily as needed       pantoprazole (PROTONIX) 40 MG EC tablet Take 40 mg by mouth daily       senna-docusate (SENOKOT-S/PERICOLACE) 8.6-50 MG tablet Take 2 tablets by mouth 2 times daily        Skin Protectants, Misc. (EUCERIN) cream Apply topically 2 times daily ; apply to arms, legs, hands, feet for dry skin       tamsulosin (FLOMAX) 0.4 MG capsule Take 0.8 mg by mouth At Bedtime         ROS:  Limited secondary to cognitive impairment but today pt reports 10 point ROS of systems including Constitutional, Eyes, Respiratory, Cardiovascular, Gastroenterology, Genitourinary, Integumentary, Musculoskeletal, Psychiatric were all negative except for pertinent positives noted in my HPI.    Vitals:  /83   Pulse 100   Temp 97.9  F (36.6  C)   Resp 22   Ht 1.702 m (5' 7\")   Wt 96.5 kg (212 lb 12.8 oz)   SpO2 92%   BMI 33.33 kg/m     Exam:  GENERAL APPEARANCE:  Mildly more lethargic but arouses easily to voice and presence, in no distress  RESP:  respiratory effort and palpation of chest normal, auscultation of lungs clear , no respiratory distress  CV:  Palpation and auscultation of heart " done , rate and rhythm irregular, no murmur, no LE peripheral edema  ABDOMEN:  Mild obesity, normal bowel sounds, soft, nontender, no hepatosplenomegaly or other masses  M/S:   Gait and station with W/C for mobility, Digits and nails at baseline, reduced muscle mass  SKIN:  Inspection and Palpation of skin and subcutaneous tissue pale, intact, no rashes appreciated  PSYCH:  insight and judgement, memory with impairment, affect and mood normal, follows commands readily         Lab/Diagnostic data:  Labs done in SNF are in [facility] EPIC. Please refer to them using [employer]/Care Everywhere.   Last Comprehensive Metabolic Panel:  Sodium   Date Value Ref Range Status   04/26/2019 144 133 - 144 mmol/L Final     Potassium   Date Value Ref Range Status   04/26/2019 4.2 3.4 - 5.3 mmol/L Final     Chloride   Date Value Ref Range Status   04/26/2019 112 (H) 94 - 109 mmol/L Final     Carbon Dioxide   Date Value Ref Range Status   04/26/2019 26 20 - 32 mmol/L Final     Anion Gap   Date Value Ref Range Status   04/26/2019 6 3 - 14 mmol/L Final     Glucose   Date Value Ref Range Status   04/26/2019 112 (H) 70 - 99 mg/dL Final     Urea Nitrogen   Date Value Ref Range Status   04/26/2019 10 7 - 30 mg/dL Final     Creatinine   Date Value Ref Range Status   04/26/2019 0.88 0.52 - 1.04 mg/dL Final     GFR Estimate   Date Value Ref Range Status   04/26/2019 63 >60 mL/min/[1.73_m2] Final     Comment:     Non  GFR Calc  Starting 12/18/2018, serum creatinine based estimated GFR (eGFR) will be   calculated using the Chronic Kidney Disease Epidemiology Collaboration   (CKD-EPI) equation.       Calcium   Date Value Ref Range Status   04/26/2019 8.2 (L) 8.5 - 10.1 mg/dL Final       CBC RESULTS:   Recent Labs   Lab Test 04/26/19  0804   WBC 5.1   RBC 3.31*   HGB 10.5*   HCT 33.2*      MCH 31.7   MCHC 31.6   RDW 12.4          UA RESULTS:  Recent Labs   Lab Test 04/23/19  1143 04/16/19  0739   COLOR Yellow Yellow    APPEARANCE Clear Slightly Cloudy   URINEGLC Negative Negative   URINEBILI Negative Negative   URINEKETONE Negative 10*   SG 1.013 1.023   UBLD Negative Negative   URINEPH 6.0 6.5   PROTEIN Negative Negative   NITRITE Negative Positive*   LEUKEST Negative Large*   RBCU  --  2   WBCU  --  78*         ASSESSMENT/PLAN:  Recurrent episodes of unresponsiveness  Syncope, unspecified syncope type  History of seizures  History of stroke  Recurrent episodes of unresponsiveness/syncope.  Treated for UTI with last hospitalization, was given tPA for possible CVA (although imaging showing no new infarcts)  Recent Obs hospitalization with EMS noting unresponsiveness possibly correlated with runs of PVCs. Zio patch in place at this time.   ECHO remains unchanged. Labs remain unremarkable  Patient's family also report History of seizures as well.    Patient usually sleepy after unresponsive episode, then regains baseline mentation shortly thereafter.     Exam today with patient at baseline mentation with just slightly more tiredness/lethargy (easily arousable to voice and presence)  No focal deficits on neuro exam. , AVSS at time of event     Nursing noted they did press event button during episode this AM.  This will be helpful in correlating any cardiac event history with symptoms.     PLAN:  - zio patch in place for 10 days to monitor for PVCs/cardiac events causing unresponsiveness, F/u scheduled with Cardiology.  - Neurology consult also placed (unfortunately appt not until June)  - will start Keppra for possible seizure activity occurring.  - Keppra level one week after starting daily dosing, to monitor for titration needs  - monitor for unresponsive episodes      Chronic atrial fibrillation (H)  Remains on (new-lilliana) Apixaban for anticoagulation (history of GIB on Pradaxa, will need close monitoring for bleeding)  Remains on Toprol XL for rate control   HRs 80-90s, irregular today    PLAN:  - continue apixaban as  ordered, monitor for bleeding  - cotninue Toprol Xl, monitor for titration needs    PVC's (premature ventricular contractions)  Noted per History, may correlate with unresponsive/syncopal episodes  Zio patch in place x 10 days  F/u with Cardiology following     Chronic systolic heart failure (H)  Essential hypertension  Hyperlipidemia LDL goal <100  Atherosclerosis of native coronary artery of native heart without angina pectoris  History of ST elevation myocardial infarction (STEMI)  Noted elevated BNP on admit however ECHO remain unchanged (EF 35-40%) and xray without s/s of fluid overload.   Remains on Toprol XL     BPs 120-140s/60-80s  HRs 80-90s mostly, irregular today  LS clear  Patient denies SOB  No LE edema    PLAN:  - monitor VS and weights for exacerbation  - continue Toprol XL as ordered  - Zio patch for monitoring of rhythm     Cognitive impairment  Hx of Hypoglycemic encephalopathy  Poor historian d/t cognitive impairment  BIMS 8/15  Unresponsive episodes recently (possible PVCs contributing vs seizures vs unknown?)  History of CVA    PLAN:  - nursing for monitoring and supportive cares  - monitor mentation as correlating to above concerns       Orders written by provider at facility and transcribed by : Sherlyn Faulkner MA  1.  Keppra 1000 mg po BID x 2 days, then decrease to 500 mg po BID. Dx: history of seizures, unresponsive episodes   2.  Keppra level 7 days after starting 500 mg BID dosing. Dx: history of seizures, unresponsive episodes     Total time spent with patient visit at the skilled nursing facility was >35 min including patient visit, review of past records and coordiantion of care with MD and nursing. Greater than 50% of total time spent with counseling and coordinating care due to review of records, patient visit, coordiantion of care.  Electronically signed by:  NATIVIDAD Chapa CNP                         Sincerely,        NATIVIDAD Chapa CNP

## 2019-04-29 LAB
BACTERIA SPEC CULT: NO GROWTH
BACTERIA SPEC CULT: NO GROWTH
Lab: NORMAL
Lab: NORMAL
SPECIMEN SOURCE: NORMAL
SPECIMEN SOURCE: NORMAL

## 2019-05-06 ENCOUNTER — HOSPITAL LABORATORY (OUTPATIENT)
Dept: NURSING HOME | Facility: OTHER | Age: 79
End: 2019-05-06

## 2019-05-07 LAB — LEVETIRACETAM SERPL-MCNC: 15 UG/ML (ref 12–46)

## 2019-05-08 VITALS
HEIGHT: 67 IN | RESPIRATION RATE: 18 BRPM | DIASTOLIC BLOOD PRESSURE: 82 MMHG | TEMPERATURE: 96.8 F | WEIGHT: 213 LBS | HEART RATE: 90 BPM | SYSTOLIC BLOOD PRESSURE: 140 MMHG | BODY MASS INDEX: 33.43 KG/M2 | OXYGEN SATURATION: 92 %

## 2019-05-08 ASSESSMENT — MIFFLIN-ST. JEOR: SCORE: 1483.79

## 2019-05-08 NOTE — PROGRESS NOTES
"[facility]  PRIMARY CARE PROVIDER AND CLINIC:  Jannie Cruz, NATIVIDAD CNP, [address] [place] [address] / [place] [address]  Chief Complaint   Patient presents with     Hospital F/U     [facility] Record Number:  [id number]  Place of Service where encounter took place:  [facility] [place] ([facility]) [[id number]]    Kristine Cosby  is a 78 year old  (1940), re-admitted to the above facility from  [facility]. Hospital stay 4/23/19 - 4/24/19. .  Admitted to this facility for  medical management and nursing care.    HPI:    HPI information obtained from: facility staff, patient report, [facility] chart review and DNP.   Brief Summary of Hospital Course:   - Pt has multiple hospitalization over the course of few months, for encephalopathy, JOSTIN, dysphagia, rehab, possible CVA, UTI, afib. Most recently admitted to the hospital for an episode of non responsiveness, work up negative for neurological or infectious causes. Noted to have PVC, Zio patch applied, Echo no changed.   Updates on Status Since Skilled nursing Admission:   - GNP reports that Resident continued to have \"Baby talk-like voice\"  And unresponsiveness especially in am, has neurology appointment in June. GNP started keppra given remote hx of sz.   - RN reports Resident's voice changes to baby-like, has ongoing episode now, and cannot walk her up, /106 and earlier was 170/100,  and irregular. Resident is full code, afebrile, repeated .   - Resident woke up when the Writer called her by name, reports feeling cold and tired, but denies chest pain or palpitation.     CODE STATUS/ADVANCE DIRECTIVES DISCUSSION:   CPR/Full code   Patient's living condition: lives in a skilled nursing facility  ALLERGIES: Blood transfusion related (informational only)  PAST MEDICAL HISTORY:  has a past medical history of Cellulitis of left lower extremity, GERD (gastroesophageal reflux disease), HTN " (hypertension), Type 2 diabetes mellitus without complication (H), and Ulcer of left lower extremity with fat layer exposed (H).  PAST SURGICAL HISTORY:   has a past surgical history that includes Hysterectomy and ESOPHAGOGASTRODUODENOSCOPY.  FAMILY HISTORY: family history includes Lung Cancer in her father.  SOCIAL HISTORY:   reports that she quit smoking about 4 weeks ago. She has quit using smokeless tobacco. She reports that she does not drink alcohol.    Post Discharge Medication Reconciliation Status: discharge medications reconciled and changed, per note/orders (see AVS)  Current Outpatient Medications   Medication Sig Dispense Refill     acetaminophen (TYLENOL) 500 MG tablet Take 1,000 mg by mouth 3 times daily        albuterol (PROVENTIL) (2.5 MG/3ML) 0.083% neb solution Take 2.5 mg by nebulization every 4 hours as needed for shortness of breath / dyspnea or wheezing       apixaban ANTICOAGULANT (ELIQUIS) 5 MG tablet Take 1 tablet (5 mg) by mouth 2 times daily       atorvastatin (LIPITOR) 20 MG tablet Take 20 mg by mouth At Bedtime       bisacodyl (DULCOLAX) 10 MG suppository Place 1 suppository (10 mg) rectally daily as needed for constipation       cholecalciferol 1000 units TABS Take 2,000 Units by mouth daily       levETIRAcetam (KEPPRA) 500 MG tablet Take 750 mg by mouth 2 times daily        levothyroxine (SYNTHROID/LEVOTHROID) 125 MCG tablet Take 125 mcg by mouth daily       lidocaine (LIDODERM) 5 % patch Place 1 patch onto the skin every 24 hours       metoprolol succinate ER (TOPROL-XL) 50 MG 24 hr tablet Take 50 mg by mouth daily       nitroGLYcerin (NITROSTAT) 0.4 MG sublingual tablet Place 0.4 mg under the tongue every 5 minutes as needed for chest pain For chest pain place 1 tablet under the tongue every 5 minutes for 3 doses. If symptoms persist 5 minutes after 1st dose call 911.       nystatin (MYCOSTATIN) 837126 UNIT/GM external powder Apply topically 2 times daily as needed        "pantoprazole (PROTONIX) 40 MG EC tablet Take 40 mg by mouth daily       senna-docusate (SENOKOT-S/PERICOLACE) 8.6-50 MG tablet Take 2 tablets by mouth 2 times daily        Skin Protectants, Misc. (EUCERIN) cream Apply topically 2 times daily ; apply to arms, legs, hands, feet for dry skin       tamsulosin (FLOMAX) 0.4 MG capsule Take 0.8 mg by mouth At Bedtime       levofloxacin (LEVAQUIN) 750 MG tablet Take 1 tablet (750 mg) by mouth daily 10 tablet 0     OLANZapine (ZYPREXA) 2.5 MG tablet Take 1 tablet (2.5 mg) by mouth 2 times daily 60 tablet 0     ROS:  Limited secondary to cognitive impairment but today pt reports- see HPI    Vitals:  /82   Pulse 90   Temp 96.8  F (36  C)   Resp 18   Ht 1.702 m (5' 7\")   Wt 96.6 kg (213 lb)   SpO2 92%   BMI 33.36 kg/m    Exam:   GENERAL APPEARANCE:  Lying down in bed, required rubbing her sternum to wake up but kept closing eyes shortly after, required frequent prompting. ENT:  Mouth and posterior oropharynx normal, moist mucous membranes, oral mucosa moist, no lesion noted.   EYES:  EOMI, Pupil rounded and equal.  RESP:  lungs clear to auscultation   CV:  S1S2 audible, rapid but regular HR, no murmur appreciated. No edema  ABDOMEN:  soft, NT/ND, BS audible. no mass appreciated on palpation.   M/S:   no joint deformity noted on observation.   SKIN:  Copper discoloration legs  NEURO:   visual acuity intact, buccinator weak (possible because edentulous), no facial droop, ms strength: 5/5 right hand  but 4/5 on left. Flexion BUE 5/5 on right but 4/5 on left. Babinski down going. DTR: 1+ ankle b/l, 2+ b/l triceps.   PSYCH:  Drowsy, answered a few questions, smiling while awake, pleasant.     Lab/Diagnostic data:  Recent labs in [facility] reviewed by me today.     ASSESSMENT/PLAN:  Recurrent episodes of unresponsiveness  Syncope, unspecified syncope type  History of seizures  History of stroke  - noted behavior only in am, started on keppra 500 mg bid recently, level " low, will give extra dose of 500 mg now, and increase  Dose to 750 mg bid, will arrange for an EEG as soon as possible, and a neurlogy follow up. Nurse manager will work on it.    Chronic atrial fibrillation (H)  - rapid HR this am with elevated BP, improved.   - asymptomatic.   - had PVC at the hospital on the monitor, follow on Zio patch result.     Chronic systolic heart failure (H)  Essential hypertension  Hyperlipidemia LDL goal <100  Atherosclerosis of native coronary artery of native heart without angina pectoris  History of ST elevation myocardial infarction (STEMI)  - at baseline.     Debility:  - Significant  Deficits requiring NH placement. Requiring extensive assistance from nursing.     Total time spent with patient visit at the skilled nursing facility was 45 min including patient visit, review of past records and discussing with nursing staff, reviewing NP's note. Greater than 50% of total time spent with counseling and coordinating care due to above acute ongoing medical conditions,     Orders written by provider at facility and transcribed by : Sherlyn Faulkner MA  1.  Keppra 500 mg po stat.  2.  Increase Keppra from 500 mg po BID to 750 mg po BID.  3.  EEG.      Electronically signed by:  Parris Navarro MD

## 2019-05-09 ENCOUNTER — NURSING HOME VISIT (OUTPATIENT)
Dept: GERIATRICS | Facility: CLINIC | Age: 79
End: 2019-05-09
Payer: COMMERCIAL

## 2019-05-09 DIAGNOSIS — I10 ESSENTIAL HYPERTENSION: ICD-10-CM

## 2019-05-09 DIAGNOSIS — I49.3 PVC'S (PREMATURE VENTRICULAR CONTRACTIONS): ICD-10-CM

## 2019-05-09 DIAGNOSIS — Z87.898 HISTORY OF SEIZURES: ICD-10-CM

## 2019-05-09 DIAGNOSIS — I50.22 CHRONIC SYSTOLIC HEART FAILURE (H): ICD-10-CM

## 2019-05-09 DIAGNOSIS — R55 SYNCOPE, UNSPECIFIED SYNCOPE TYPE: ICD-10-CM

## 2019-05-09 DIAGNOSIS — R40.4 RECURRENT EPISODES OF UNRESPONSIVENESS: Primary | ICD-10-CM

## 2019-05-09 DIAGNOSIS — Z86.73 HISTORY OF STROKE: ICD-10-CM

## 2019-05-09 DIAGNOSIS — E78.5 HYPERLIPIDEMIA LDL GOAL <100: ICD-10-CM

## 2019-05-09 DIAGNOSIS — I48.20 CHRONIC ATRIAL FIBRILLATION (H): ICD-10-CM

## 2019-05-09 DIAGNOSIS — I25.10 ATHEROSCLEROSIS OF NATIVE CORONARY ARTERY OF NATIVE HEART WITHOUT ANGINA PECTORIS: ICD-10-CM

## 2019-05-09 PROCEDURE — 99306 1ST NF CARE HIGH MDM 50: CPT | Performed by: FAMILY MEDICINE

## 2019-05-09 NOTE — LETTER
"    5/9/2019        RE: Kristine Cosby  [address] [place] [address]        [facility] [place] [facility]  PRIMARY CARE PROVIDER AND CLINIC:  NATIVIDAD [facility], [address] [place] [address] / [place] [address]  Chief Complaint   Patient presents with     Hospital F/U     [facility] Record Number:  [id number]  Place of Service where encounter took place:  [facility] ([facility] [[id number]]    Kristine Cosby  is a 78 year old  (1940), re-admitted to the above facility from  [facility]. Hospital stay 4/23/19 - 4/24/19. .  Admitted to this facility for  medical management and nursing care.    HPI:    HPI information obtained from: facility staff, patient report, [facility] chart review and DNP.   Brief Summary of Hospital Course:   - Pt has multiple hospitalization over the course of few months, for encephalopathy, JOSTIN, dysphagia, rehab, possible CVA, UTI, afib. Most recently admitted to the hospital for an episode of non responsiveness, work up negative for neurological or infectious causes. Noted to have PVC, Zio patch applied, Echo no changed.   Updates on Status Since Skilled nursing Admission:   - GNP reports that Resident continued to have \"Baby talk-like voice\"  And unresponsiveness especially in am, has neurology appointment in June. GNP started keppra given remote hx of sz.   - RN reports Resident's voice changes to baby-like, has ongoing episode now, and cannot walk her up, /106 and earlier was 170/100,  and irregular. Resident is full code, afebrile, repeated .   - Resident woke up when the Writer called her by name, reports feeling cold and tired, but denies chest pain or palpitation.     CODE STATUS/ADVANCE DIRECTIVES DISCUSSION:   CPR/Full code   Patient's living condition: lives in a skilled nursing facility  ALLERGIES: Blood transfusion related (informational only)  PAST MEDICAL HISTORY:  has a past " medical history of Cellulitis of left lower extremity, GERD (gastroesophageal reflux disease), HTN (hypertension), Type 2 diabetes mellitus without complication (H), and Ulcer of left lower extremity with fat layer exposed (H).  PAST SURGICAL HISTORY:   has a past surgical history that includes Hysterectomy and ESOPHAGOGASTRODUODENOSCOPY.  FAMILY HISTORY: family history includes Lung Cancer in her father.  SOCIAL HISTORY:   reports that she quit smoking about 4 weeks ago. She has quit using smokeless tobacco. She reports that she does not drink alcohol.    Post Discharge Medication Reconciliation Status: discharge medications reconciled and changed, per note/orders (see AVS)  Current Outpatient Medications   Medication Sig Dispense Refill     acetaminophen (TYLENOL) 500 MG tablet Take 1,000 mg by mouth 3 times daily        albuterol (PROVENTIL) (2.5 MG/3ML) 0.083% neb solution Take 2.5 mg by nebulization every 4 hours as needed for shortness of breath / dyspnea or wheezing       apixaban ANTICOAGULANT (ELIQUIS) 5 MG tablet Take 1 tablet (5 mg) by mouth 2 times daily       atorvastatin (LIPITOR) 20 MG tablet Take 20 mg by mouth At Bedtime       bisacodyl (DULCOLAX) 10 MG suppository Place 1 suppository (10 mg) rectally daily as needed for constipation       cholecalciferol 1000 units TABS Take 2,000 Units by mouth daily       levETIRAcetam (KEPPRA) 500 MG tablet Take 750 mg by mouth 2 times daily        levothyroxine (SYNTHROID/LEVOTHROID) 125 MCG tablet Take 125 mcg by mouth daily       lidocaine (LIDODERM) 5 % patch Place 1 patch onto the skin every 24 hours       metoprolol succinate ER (TOPROL-XL) 50 MG 24 hr tablet Take 50 mg by mouth daily       nitroGLYcerin (NITROSTAT) 0.4 MG sublingual tablet Place 0.4 mg under the tongue every 5 minutes as needed for chest pain For chest pain place 1 tablet under the tongue every 5 minutes for 3 doses. If symptoms persist 5 minutes after 1st dose call 911.       nystatin  "(MYCOSTATIN) 432867 UNIT/GM external powder Apply topically 2 times daily as needed       pantoprazole (PROTONIX) 40 MG EC tablet Take 40 mg by mouth daily       senna-docusate (SENOKOT-S/PERICOLACE) 8.6-50 MG tablet Take 2 tablets by mouth 2 times daily        Skin Protectants, Misc. (EUCERIN) cream Apply topically 2 times daily ; apply to arms, legs, hands, feet for dry skin       tamsulosin (FLOMAX) 0.4 MG capsule Take 0.8 mg by mouth At Bedtime       levofloxacin (LEVAQUIN) 750 MG tablet Take 1 tablet (750 mg) by mouth daily 10 tablet 0     OLANZapine (ZYPREXA) 2.5 MG tablet Take 1 tablet (2.5 mg) by mouth 2 times daily 60 tablet 0     ROS:  Limited secondary to cognitive impairment but today pt reports- see HPI    Vitals:  /82   Pulse 90   Temp 96.8  F (36  C)   Resp 18   Ht 1.702 m (5' 7\")   Wt 96.6 kg (213 lb)   SpO2 92%   BMI 33.36 kg/m     Exam:   GENERAL APPEARANCE:  Lying down in bed, required rubbing her sternum to wake up but kept closing eyes shortly after, required frequent prompting. ENT:  Mouth and posterior oropharynx normal, moist mucous membranes, oral mucosa moist, no lesion noted.   EYES:  EOMI, Pupil rounded and equal.  RESP:  lungs clear to auscultation   CV:  S1S2 audible, rapid but regular HR, no murmur appreciated. No edema  ABDOMEN:  soft, NT/ND, BS audible. no mass appreciated on palpation.   M/S:   no joint deformity noted on observation.   SKIN:  Copper discoloration legs  NEURO:   visual acuity intact, buccinator weak (possible because edentulous), no facial droop, ms strength: 5/5 right hand  but 4/5 on left. Flexion BUE 5/5 on right but 4/5 on left. Babinski down going. DTR: 1+ ankle b/l, 2+ b/l triceps.   PSYCH:  Drowsy, answered a few questions, smiling while awake, pleasant.     Lab/Diagnostic data:  Recent labs in [facility] reviewed by me today.     ASSESSMENT/PLAN:  Recurrent episodes of unresponsiveness  Syncope, unspecified syncope type  History of " seizures  History of stroke  - noted behavior only in am, started on keppra 500 mg bid recently, level low, will give extra dose of 500 mg now, and increase  Dose to 750 mg bid, will arrange for an EEG as soon as possible, and a neurlogy follow up. Nurse manager will work on it.    Chronic atrial fibrillation (H)  - rapid HR this am with elevated BP, improved.   - asymptomatic.   - had PVC at the hospital on the monitor, follow on Zio patch result.     Chronic systolic heart failure (H)  Essential hypertension  Hyperlipidemia LDL goal <100  Atherosclerosis of native coronary artery of native heart without angina pectoris  History of ST elevation myocardial infarction (STEMI)  - at baseline.     Debility:  - Significant  Deficits requiring NH placement. Requiring extensive assistance from nursing.     Total time spent with patient visit at the skilled nursing facility was 45 min including patient visit, review of past records and discussing with nursing staff, reviewing NP's note. Greater than 50% of total time spent with counseling and coordinating care due to above acute ongoing medical conditions,     Orders written by provider at facility and transcribed by : Sherlyn Faulkner MA  1.  Keppra 500 mg po stat.  2.  Increase Keppra from 500 mg po BID to 750 mg po BID.  3.  EEG.      Electronically signed by:  Parris Navarro MD                         Sincerely,        Parris Navarro MD

## 2019-05-13 ENCOUNTER — HOSPITAL ENCOUNTER (INPATIENT)
Facility: CLINIC | Age: 79
LOS: 4 days | Discharge: NURSING, REHAB OR LONG-TERM CARE | DRG: 177 | End: 2019-05-18
Attending: FAMILY MEDICINE | Admitting: FAMILY MEDICINE
Payer: COMMERCIAL

## 2019-05-13 ENCOUNTER — APPOINTMENT (OUTPATIENT)
Dept: GENERAL RADIOLOGY | Facility: CLINIC | Age: 79
DRG: 177 | End: 2019-05-13
Attending: FAMILY MEDICINE
Payer: COMMERCIAL

## 2019-05-13 DIAGNOSIS — R06.03 ACUTE RESPIRATORY DISTRESS: ICD-10-CM

## 2019-05-13 DIAGNOSIS — J18.9 PNEUMONIA OF RIGHT LOWER LOBE DUE TO INFECTIOUS ORGANISM: ICD-10-CM

## 2019-05-13 DIAGNOSIS — F06.2 PSYCHOTIC DISORDER DUE TO MEDICAL CONDITION WITH DELUSIONS: ICD-10-CM

## 2019-05-13 DIAGNOSIS — J16.8 PNEUMONIA DUE TO OTHER SPECIFIED INFECTIOUS ORGANISMS: ICD-10-CM

## 2019-05-13 DIAGNOSIS — J18.9 PNEUMONIA OF LEFT LOWER LOBE DUE TO INFECTIOUS ORGANISM: ICD-10-CM

## 2019-05-13 DIAGNOSIS — F03.90 DEMENTIA WITHOUT BEHAVIORAL DISTURBANCE, UNSPECIFIED DEMENTIA TYPE: ICD-10-CM

## 2019-05-13 DIAGNOSIS — N30.00 ACUTE CYSTITIS WITHOUT HEMATURIA: ICD-10-CM

## 2019-05-13 DIAGNOSIS — N39.0 URINARY TRACT INFECTION WITHOUT HEMATURIA, SITE UNSPECIFIED: Primary | ICD-10-CM

## 2019-05-13 LAB
ALBUMIN SERPL-MCNC: 3.1 G/DL (ref 3.4–5)
ALBUMIN UR-MCNC: NEGATIVE MG/DL
ALP SERPL-CCNC: 80 U/L (ref 40–150)
ALT SERPL W P-5'-P-CCNC: 11 U/L (ref 0–50)
ANION GAP SERPL CALCULATED.3IONS-SCNC: 10 MMOL/L (ref 3–14)
APPEARANCE UR: ABNORMAL
AST SERPL W P-5'-P-CCNC: 13 U/L (ref 0–45)
BACTERIA #/AREA URNS HPF: ABNORMAL /HPF
BASE EXCESS BLDV CALC-SCNC: 2.1 MMOL/L
BASOPHILS # BLD AUTO: 0 10E9/L (ref 0–0.2)
BASOPHILS NFR BLD AUTO: 0.5 %
BILIRUB SERPL-MCNC: 0.5 MG/DL (ref 0.2–1.3)
BILIRUB UR QL STRIP: NEGATIVE
BUN SERPL-MCNC: 14 MG/DL (ref 7–30)
CALCIUM SERPL-MCNC: 8.6 MG/DL (ref 8.5–10.1)
CHLORIDE SERPL-SCNC: 106 MMOL/L (ref 94–109)
CO2 SERPL-SCNC: 25 MMOL/L (ref 20–32)
COLOR UR AUTO: ABNORMAL
CREAT SERPL-MCNC: 1.06 MG/DL (ref 0.52–1.04)
CRP SERPL-MCNC: 26.6 MG/L (ref 0–8)
DIFFERENTIAL METHOD BLD: ABNORMAL
EOSINOPHIL NFR BLD AUTO: 5.3 %
ERYTHROCYTE [DISTWIDTH] IN BLOOD BY AUTOMATED COUNT: 12.8 % (ref 10–15)
FLUAV+FLUBV AG SPEC QL: NEGATIVE
FLUAV+FLUBV AG SPEC QL: NEGATIVE
GFR SERPL CREATININE-BSD FRML MDRD: 50 ML/MIN/{1.73_M2}
GLUCOSE SERPL-MCNC: 124 MG/DL (ref 70–99)
GLUCOSE UR STRIP-MCNC: NEGATIVE MG/DL
HCO3 BLDV-SCNC: 27 MMOL/L (ref 21–28)
HCT VFR BLD AUTO: 37.9 % (ref 35–47)
HGB BLD-MCNC: 12 G/DL (ref 11.7–15.7)
HGB UR QL STRIP: ABNORMAL
HYALINE CASTS #/AREA URNS LPF: 5 /LPF (ref 0–2)
IMM GRANULOCYTES # BLD: 0 10E9/L (ref 0–0.4)
IMM GRANULOCYTES NFR BLD: 0.2 %
INR PPP: 1.69 (ref 0.86–1.14)
KETONES UR STRIP-MCNC: 5 MG/DL
LACTATE BLD-SCNC: 1.2 MMOL/L (ref 0.7–2)
LEUKOCYTE ESTERASE UR QL STRIP: ABNORMAL
LIPASE SERPL-CCNC: 52 U/L (ref 73–393)
LYMPHOCYTES # BLD AUTO: 2 10E9/L (ref 0.8–5.3)
LYMPHOCYTES NFR BLD AUTO: 44.9 %
MCH RBC QN AUTO: 31.3 PG (ref 26.5–33)
MCHC RBC AUTO-ENTMCNC: 31.7 G/DL (ref 31.5–36.5)
MCV RBC AUTO: 99 FL (ref 78–100)
MONOCYTES # BLD AUTO: 0.7 10E9/L (ref 0–1.3)
MONOCYTES NFR BLD AUTO: 16.5 %
MUCOUS THREADS #/AREA URNS LPF: PRESENT /LPF
NEUTROPHILS # BLD AUTO: 1.4 10E9/L (ref 1.6–8.3)
NEUTROPHILS NFR BLD AUTO: 32.6 %
NITRATE UR QL: NEGATIVE
NRBC # BLD AUTO: 0 10*3/UL
NRBC BLD AUTO-RTO: 0 /100
NT-PROBNP SERPL-MCNC: 2259 PG/ML (ref 0–1800)
O2/TOTAL GAS SETTING VFR VENT: 28 %
PCO2 BLDV: 44 MM HG (ref 40–50)
PH BLDV: 7.4 PH (ref 7.32–7.43)
PH UR STRIP: 5 PH (ref 5–7)
PLATELET # BLD AUTO: 156 10E9/L (ref 150–450)
PO2 BLDV: 37 MM HG (ref 25–47)
POTASSIUM SERPL-SCNC: 3.8 MMOL/L (ref 3.4–5.3)
PROT SERPL-MCNC: 7 G/DL (ref 6.8–8.8)
RBC # BLD AUTO: 3.84 10E12/L (ref 3.8–5.2)
RBC #/AREA URNS AUTO: 32 /HPF (ref 0–2)
SODIUM SERPL-SCNC: 141 MMOL/L (ref 133–144)
SOURCE: ABNORMAL
SP GR UR STRIP: 1.03 (ref 1–1.03)
SPECIMEN SOURCE: NORMAL
SQUAMOUS #/AREA URNS AUTO: 1 /HPF (ref 0–1)
UROBILINOGEN UR STRIP-MCNC: 0 MG/DL (ref 0–2)
WBC # BLD AUTO: 4.4 10E9/L (ref 4–11)
WBC #/AREA URNS AUTO: 462 /HPF (ref 0–5)
WBC CLUMPS #/AREA URNS HPF: PRESENT /HPF

## 2019-05-13 PROCEDURE — 25000128 H RX IP 250 OP 636: Performed by: FAMILY MEDICINE

## 2019-05-13 PROCEDURE — 87186 SC STD MICRODIL/AGAR DIL: CPT | Performed by: FAMILY MEDICINE

## 2019-05-13 PROCEDURE — 99285 EMERGENCY DEPT VISIT HI MDM: CPT | Mod: 25 | Performed by: FAMILY MEDICINE

## 2019-05-13 PROCEDURE — 93005 ELECTROCARDIOGRAM TRACING: CPT

## 2019-05-13 PROCEDURE — 82803 BLOOD GASES ANY COMBINATION: CPT | Performed by: FAMILY MEDICINE

## 2019-05-13 PROCEDURE — 83690 ASSAY OF LIPASE: CPT | Performed by: FAMILY MEDICINE

## 2019-05-13 PROCEDURE — 85025 COMPLETE CBC W/AUTO DIFF WBC: CPT | Performed by: FAMILY MEDICINE

## 2019-05-13 PROCEDURE — 99285 EMERGENCY DEPT VISIT HI MDM: CPT | Mod: 25

## 2019-05-13 PROCEDURE — 96365 THER/PROPH/DIAG IV INF INIT: CPT

## 2019-05-13 PROCEDURE — 96375 TX/PRO/DX INJ NEW DRUG ADDON: CPT

## 2019-05-13 PROCEDURE — 25800030 ZZH RX IP 258 OP 636: Performed by: FAMILY MEDICINE

## 2019-05-13 PROCEDURE — 86140 C-REACTIVE PROTEIN: CPT | Performed by: FAMILY MEDICINE

## 2019-05-13 PROCEDURE — 83605 ASSAY OF LACTIC ACID: CPT | Performed by: FAMILY MEDICINE

## 2019-05-13 PROCEDURE — 83880 ASSAY OF NATRIURETIC PEPTIDE: CPT | Performed by: FAMILY MEDICINE

## 2019-05-13 PROCEDURE — 93010 ELECTROCARDIOGRAM REPORT: CPT | Mod: Z6 | Performed by: FAMILY MEDICINE

## 2019-05-13 PROCEDURE — 81001 URINALYSIS AUTO W/SCOPE: CPT | Performed by: FAMILY MEDICINE

## 2019-05-13 PROCEDURE — 85610 PROTHROMBIN TIME: CPT | Performed by: FAMILY MEDICINE

## 2019-05-13 PROCEDURE — 87804 INFLUENZA ASSAY W/OPTIC: CPT | Performed by: FAMILY MEDICINE

## 2019-05-13 PROCEDURE — 87086 URINE CULTURE/COLONY COUNT: CPT | Performed by: FAMILY MEDICINE

## 2019-05-13 PROCEDURE — 84145 PROCALCITONIN (PCT): CPT | Performed by: FAMILY MEDICINE

## 2019-05-13 PROCEDURE — 87088 URINE BACTERIA CULTURE: CPT | Performed by: FAMILY MEDICINE

## 2019-05-13 PROCEDURE — 80053 COMPREHEN METABOLIC PANEL: CPT | Performed by: FAMILY MEDICINE

## 2019-05-13 PROCEDURE — 87040 BLOOD CULTURE FOR BACTERIA: CPT | Performed by: FAMILY MEDICINE

## 2019-05-13 PROCEDURE — 71045 X-RAY EXAM CHEST 1 VIEW: CPT | Mod: TC

## 2019-05-13 RX ORDER — IPRATROPIUM BROMIDE AND ALBUTEROL SULFATE 2.5; .5 MG/3ML; MG/3ML
3 SOLUTION RESPIRATORY (INHALATION) EVERY 30 MIN PRN
Status: DISCONTINUED | OUTPATIENT
Start: 2019-05-13 | End: 2019-05-14

## 2019-05-13 RX ORDER — SODIUM CHLORIDE 9 MG/ML
INJECTION, SOLUTION INTRAVENOUS CONTINUOUS
Status: DISCONTINUED | OUTPATIENT
Start: 2019-05-13 | End: 2019-05-14

## 2019-05-13 RX ORDER — ONDANSETRON 2 MG/ML
4 INJECTION INTRAMUSCULAR; INTRAVENOUS EVERY 30 MIN PRN
Status: DISCONTINUED | OUTPATIENT
Start: 2019-05-13 | End: 2019-05-14

## 2019-05-13 RX ADMIN — TAZOBACTAM SODIUM AND PIPERACILLIN SODIUM 4.5 G: 500; 4 INJECTION, SOLUTION INTRAVENOUS at 22:21

## 2019-05-13 RX ADMIN — SODIUM CHLORIDE: 9 INJECTION, SOLUTION INTRAVENOUS at 22:21

## 2019-05-13 RX ADMIN — AZITHROMYCIN MONOHYDRATE 500 MG: 500 INJECTION, POWDER, LYOPHILIZED, FOR SOLUTION INTRAVENOUS at 22:56

## 2019-05-14 PROBLEM — G93.40 ENCEPHALOPATHY: Status: ACTIVE | Noted: 2019-05-14

## 2019-05-14 PROBLEM — J18.9 PNEUMONIA DUE TO INFECTIOUS ORGANISM: Status: ACTIVE | Noted: 2019-05-14

## 2019-05-14 PROBLEM — N39.0 URINARY TRACT INFECTION WITHOUT HEMATURIA, SITE UNSPECIFIED: Status: ACTIVE | Noted: 2019-05-14

## 2019-05-14 PROBLEM — J96.01 ACUTE RESPIRATORY FAILURE WITH HYPOXIA (H): Status: ACTIVE | Noted: 2019-05-14

## 2019-05-14 LAB
ANION GAP SERPL CALCULATED.3IONS-SCNC: 8 MMOL/L (ref 3–14)
BUN SERPL-MCNC: 11 MG/DL (ref 7–30)
CALCIUM SERPL-MCNC: 8.3 MG/DL (ref 8.5–10.1)
CHLORIDE SERPL-SCNC: 112 MMOL/L (ref 94–109)
CO2 SERPL-SCNC: 25 MMOL/L (ref 20–32)
CREAT SERPL-MCNC: 0.92 MG/DL (ref 0.52–1.04)
GFR SERPL CREATININE-BSD FRML MDRD: 60 ML/MIN/{1.73_M2}
GLUCOSE SERPL-MCNC: 109 MG/DL (ref 70–99)
GRAM STN SPEC: NORMAL
LACTATE BLD-SCNC: 0.9 MMOL/L (ref 0.7–2)
Lab: NORMAL
POTASSIUM SERPL-SCNC: 4.1 MMOL/L (ref 3.4–5.3)
PROCALCITONIN SERPL-MCNC: <0.05 NG/ML
SODIUM SERPL-SCNC: 145 MMOL/L (ref 133–144)
SPECIMEN SOURCE: NORMAL
WBC # BLD AUTO: 3.4 10E9/L (ref 4–11)

## 2019-05-14 PROCEDURE — 94640 AIRWAY INHALATION TREATMENT: CPT

## 2019-05-14 PROCEDURE — 83605 ASSAY OF LACTIC ACID: CPT | Performed by: FAMILY MEDICINE

## 2019-05-14 PROCEDURE — 25800030 ZZH RX IP 258 OP 636: Performed by: FAMILY MEDICINE

## 2019-05-14 PROCEDURE — 25000128 H RX IP 250 OP 636: Performed by: NURSE PRACTITIONER

## 2019-05-14 PROCEDURE — 87070 CULTURE OTHR SPECIMN AEROBIC: CPT | Performed by: FAMILY MEDICINE

## 2019-05-14 PROCEDURE — 25000128 H RX IP 250 OP 636: Performed by: FAMILY MEDICINE

## 2019-05-14 PROCEDURE — 12000000 ZZH R&B MED SURG/OB

## 2019-05-14 PROCEDURE — 25000125 ZZHC RX 250: Performed by: NURSE PRACTITIONER

## 2019-05-14 PROCEDURE — 85048 AUTOMATED LEUKOCYTE COUNT: CPT | Performed by: FAMILY MEDICINE

## 2019-05-14 PROCEDURE — 94640 AIRWAY INHALATION TREATMENT: CPT | Mod: 76

## 2019-05-14 PROCEDURE — 99223 1ST HOSP IP/OBS HIGH 75: CPT | Mod: AI | Performed by: FAMILY MEDICINE

## 2019-05-14 PROCEDURE — 87081 CULTURE SCREEN ONLY: CPT | Performed by: FAMILY MEDICINE

## 2019-05-14 PROCEDURE — 25800030 ZZH RX IP 258 OP 636: Performed by: NURSE PRACTITIONER

## 2019-05-14 PROCEDURE — 25000125 ZZHC RX 250: Performed by: FAMILY MEDICINE

## 2019-05-14 PROCEDURE — 25000132 ZZH RX MED GY IP 250 OP 250 PS 637: Performed by: FAMILY MEDICINE

## 2019-05-14 PROCEDURE — 80048 BASIC METABOLIC PNL TOTAL CA: CPT | Performed by: FAMILY MEDICINE

## 2019-05-14 PROCEDURE — 25800029 ZZH RX IP 258 OP 250: Performed by: NURSE PRACTITIONER

## 2019-05-14 PROCEDURE — 36415 COLL VENOUS BLD VENIPUNCTURE: CPT | Performed by: FAMILY MEDICINE

## 2019-05-14 PROCEDURE — 87205 SMEAR GRAM STAIN: CPT | Performed by: FAMILY MEDICINE

## 2019-05-14 PROCEDURE — 99207 ZZC CDG-MDM COMPONENT: MEETS MODERATE - UP CODED: CPT | Performed by: FAMILY MEDICINE

## 2019-05-14 RX ORDER — ONDANSETRON 4 MG/1
4 TABLET, ORALLY DISINTEGRATING ORAL EVERY 6 HOURS PRN
Status: DISCONTINUED | OUTPATIENT
Start: 2019-05-14 | End: 2019-05-18 | Stop reason: HOSPADM

## 2019-05-14 RX ORDER — PANTOPRAZOLE SODIUM 40 MG/1
40 TABLET, DELAYED RELEASE ORAL
Status: DISCONTINUED | OUTPATIENT
Start: 2019-05-14 | End: 2019-05-18 | Stop reason: HOSPADM

## 2019-05-14 RX ORDER — ALBUTEROL SULFATE 0.83 MG/ML
2.5 SOLUTION RESPIRATORY (INHALATION) EVERY 4 HOURS PRN
Status: DISCONTINUED | OUTPATIENT
Start: 2019-05-14 | End: 2019-05-14

## 2019-05-14 RX ORDER — IPRATROPIUM BROMIDE AND ALBUTEROL SULFATE 2.5; .5 MG/3ML; MG/3ML
3 SOLUTION RESPIRATORY (INHALATION) 4 TIMES DAILY
Status: DISCONTINUED | OUTPATIENT
Start: 2019-05-14 | End: 2019-05-18 | Stop reason: HOSPADM

## 2019-05-14 RX ORDER — NITROGLYCERIN 0.4 MG/1
0.4 TABLET SUBLINGUAL EVERY 5 MIN PRN
Status: DISCONTINUED | OUTPATIENT
Start: 2019-05-14 | End: 2019-05-18 | Stop reason: HOSPADM

## 2019-05-14 RX ORDER — METOPROLOL SUCCINATE 50 MG/1
50 TABLET, EXTENDED RELEASE ORAL DAILY
Status: DISCONTINUED | OUTPATIENT
Start: 2019-05-14 | End: 2019-05-14

## 2019-05-14 RX ORDER — ALBUTEROL SULFATE 0.83 MG/ML
3 SOLUTION RESPIRATORY (INHALATION)
Status: DISCONTINUED | OUTPATIENT
Start: 2019-05-14 | End: 2019-05-18 | Stop reason: HOSPADM

## 2019-05-14 RX ORDER — AMOXICILLIN 250 MG
2 CAPSULE ORAL 2 TIMES DAILY
Status: DISCONTINUED | OUTPATIENT
Start: 2019-05-14 | End: 2019-05-18 | Stop reason: HOSPADM

## 2019-05-14 RX ORDER — METOPROLOL TARTRATE 1 MG/ML
2.5 INJECTION, SOLUTION INTRAVENOUS EVERY 6 HOURS
Status: DISCONTINUED | OUTPATIENT
Start: 2019-05-14 | End: 2019-05-15

## 2019-05-14 RX ORDER — ACETAMINOPHEN 325 MG/1
650 TABLET ORAL EVERY 4 HOURS PRN
Status: DISCONTINUED | OUTPATIENT
Start: 2019-05-14 | End: 2019-05-18 | Stop reason: HOSPADM

## 2019-05-14 RX ORDER — PROCHLORPERAZINE MALEATE 5 MG
5 TABLET ORAL EVERY 6 HOURS PRN
Status: DISCONTINUED | OUTPATIENT
Start: 2019-05-14 | End: 2019-05-18 | Stop reason: HOSPADM

## 2019-05-14 RX ORDER — ONDANSETRON 2 MG/ML
4 INJECTION INTRAMUSCULAR; INTRAVENOUS EVERY 6 HOURS PRN
Status: DISCONTINUED | OUTPATIENT
Start: 2019-05-14 | End: 2019-05-18 | Stop reason: HOSPADM

## 2019-05-14 RX ORDER — PROCHLORPERAZINE 25 MG
12.5 SUPPOSITORY, RECTAL RECTAL EVERY 12 HOURS PRN
Status: DISCONTINUED | OUTPATIENT
Start: 2019-05-14 | End: 2019-05-18 | Stop reason: HOSPADM

## 2019-05-14 RX ORDER — TAMSULOSIN HYDROCHLORIDE 0.4 MG/1
0.8 CAPSULE ORAL AT BEDTIME
Status: DISCONTINUED | OUTPATIENT
Start: 2019-05-14 | End: 2019-05-18 | Stop reason: HOSPADM

## 2019-05-14 RX ORDER — SODIUM CHLORIDE 450 MG/100ML
INJECTION, SOLUTION INTRAVENOUS CONTINUOUS
Status: DISCONTINUED | OUTPATIENT
Start: 2019-05-14 | End: 2019-05-16

## 2019-05-14 RX ORDER — BISACODYL 10 MG
10 SUPPOSITORY, RECTAL RECTAL DAILY PRN
Status: DISCONTINUED | OUTPATIENT
Start: 2019-05-14 | End: 2019-05-18 | Stop reason: HOSPADM

## 2019-05-14 RX ORDER — LIDOCAINE 40 MG/G
CREAM TOPICAL
Status: DISCONTINUED | OUTPATIENT
Start: 2019-05-14 | End: 2019-05-18 | Stop reason: HOSPADM

## 2019-05-14 RX ORDER — NALOXONE HYDROCHLORIDE 0.4 MG/ML
.1-.4 INJECTION, SOLUTION INTRAMUSCULAR; INTRAVENOUS; SUBCUTANEOUS
Status: DISCONTINUED | OUTPATIENT
Start: 2019-05-14 | End: 2019-05-18 | Stop reason: HOSPADM

## 2019-05-14 RX ADMIN — IPRATROPIUM BROMIDE AND ALBUTEROL SULFATE 3 ML: .5; 3 SOLUTION RESPIRATORY (INHALATION) at 08:21

## 2019-05-14 RX ADMIN — TAMSULOSIN HYDROCHLORIDE 0.8 MG: 0.4 CAPSULE ORAL at 20:11

## 2019-05-14 RX ADMIN — SODIUM CHLORIDE: 4.5 INJECTION, SOLUTION INTRAVENOUS at 14:14

## 2019-05-14 RX ADMIN — TAZOBACTAM SODIUM AND PIPERACILLIN SODIUM 4.5 G: 500; 4 INJECTION, SOLUTION INTRAVENOUS at 22:56

## 2019-05-14 RX ADMIN — APIXABAN 5 MG: 5 TABLET, FILM COATED ORAL at 20:10

## 2019-05-14 RX ADMIN — IPRATROPIUM BROMIDE AND ALBUTEROL SULFATE 3 ML: .5; 3 SOLUTION RESPIRATORY (INHALATION) at 12:42

## 2019-05-14 RX ADMIN — SODIUM CHLORIDE: 9 INJECTION, SOLUTION INTRAVENOUS at 07:02

## 2019-05-14 RX ADMIN — IPRATROPIUM BROMIDE AND ALBUTEROL SULFATE 3 ML: .5; 3 SOLUTION RESPIRATORY (INHALATION) at 15:23

## 2019-05-14 RX ADMIN — LEVETIRACETAM 750 MG: 100 INJECTION, SOLUTION INTRAVENOUS at 18:40

## 2019-05-14 RX ADMIN — SENNOSIDES AND DOCUSATE SODIUM 2 TABLET: 8.6; 5 TABLET ORAL at 20:12

## 2019-05-14 RX ADMIN — TAZOBACTAM SODIUM AND PIPERACILLIN SODIUM 4.5 G: 500; 4 INJECTION, SOLUTION INTRAVENOUS at 17:54

## 2019-05-14 RX ADMIN — IPRATROPIUM BROMIDE AND ALBUTEROL SULFATE 3 ML: .5; 3 SOLUTION RESPIRATORY (INHALATION) at 20:14

## 2019-05-14 RX ADMIN — TAZOBACTAM SODIUM AND PIPERACILLIN SODIUM 4.5 G: 500; 4 INJECTION, SOLUTION INTRAVENOUS at 09:59

## 2019-05-14 RX ADMIN — METOPROLOL TARTRATE 2.5 MG: 5 INJECTION, SOLUTION INTRAVENOUS at 21:38

## 2019-05-14 RX ADMIN — METOPROLOL TARTRATE 2.5 MG: 5 INJECTION, SOLUTION INTRAVENOUS at 16:55

## 2019-05-14 RX ADMIN — TAZOBACTAM SODIUM AND PIPERACILLIN SODIUM 4.5 G: 500; 4 INJECTION, SOLUTION INTRAVENOUS at 04:04

## 2019-05-14 RX ADMIN — AZITHROMYCIN MONOHYDRATE 250 MG: 500 INJECTION, POWDER, LYOPHILIZED, FOR SOLUTION INTRAVENOUS at 21:42

## 2019-05-14 RX ADMIN — ACETAMINOPHEN 650 MG: 325 TABLET ORAL at 20:12

## 2019-05-14 ASSESSMENT — ACTIVITIES OF DAILY LIVING (ADL)
AMBULATION: 1-->ASSISTIVE EQUIPMENT
ADLS_ACUITY_SCORE: 29
DRESS: 2-->ASSISTIVE PERSON
BATHING: 2-->ASSISTIVE PERSON
FALL_HISTORY_WITHIN_LAST_SIX_MONTHS: NO
COGNITION: 1 - ATTENTION OR MEMORY DEFICITS
ADLS_ACUITY_SCORE: 29
TOILETING: 2-->ASSISTIVE PERSON
WHICH_OF_THE_ABOVE_FUNCTIONAL_RISKS_HAD_A_RECENT_ONSET_OR_CHANGE?: COGNITION
TRANSFERRING: 3-->ASSISTIVE EQUIPMENT AND PERSON
RETIRED_COMMUNICATION: 0-->UNDERSTANDS/COMMUNICATES WITHOUT DIFFICULTY
ADLS_ACUITY_SCORE: 29
RETIRED_EATING: 0-->INDEPENDENT
SWALLOWING: 2-->DIFFICULTY SWALLOWING LIQUIDS
ADLS_ACUITY_SCORE: 29
ADLS_ACUITY_SCORE: 29

## 2019-05-14 ASSESSMENT — ENCOUNTER SYMPTOMS
COUGH: 1
SHORTNESS OF BREATH: 1
FATIGUE: 1
MYALGIAS: 1
PALPITATIONS: 0

## 2019-05-14 ASSESSMENT — MIFFLIN-ST. JEOR: SCORE: 1495.38

## 2019-05-14 NOTE — ASSESSMENT & PLAN NOTE
Not complaining of any symptoms with urinalysis abnormal and altered mental status  Broad-spectrum antibiotics started with cultures of urine and blood pending.

## 2019-05-14 NOTE — ASSESSMENT & PLAN NOTE
Chronic issue, on rate control with metoprolol, taking Eliquis  Continue current meds, will monitor with telemetry

## 2019-05-14 NOTE — ASSESSMENT & PLAN NOTE
Presenting with altered mental status, not waking up normally.  Recent recurrent history of spells of unresponsiveness associated at times with talking baby talk  History of encephalopathy secondary to low blood sugars in December 2018  Other admissions for similar symptoms related to infections including urinary tract infections.  Recently started on Keppra for consideration that it could be seizures  Seen today for EEG has neuro appointment on June 4  Rehydrate tonight, antibiotics started with cultures pending.

## 2019-05-14 NOTE — H&P
[facility]    History and Physical - Hospitalist Service       Date of Admission:  5/13/2019    Assessment & Plan   Kristine Cosby is a 79 year old female admitted on 5/13/2019. She presents with altered mental status with acute shortness of breath.  She lives at [facility] and after dinner became acutely short of air with increased shortness of breath and hypoxemia requiring oxygen.  She was transferred by ambulance to the hospital.  On arrival she was afebrile with a tachycardia and tachypnea.  She was requiring supplemental oxygen.  Blood gases were normal.  Laboratory work showing normal CBC and electrolytes with a normal lactic acid of 1.2.  BNP slightly elevated at 2259 with chest x-ray showing a left basilar infiltrate versus atelectasis.  Patient is chronic A. fib taking Eliquis for stroke prophylaxis.  Urinalysis was abnormal with moderate LE T and 462 white cells.  Patient has a recent history since December of altered mental status with episodes of unresponsiveness followed by spells where she talks baby talk.  Initial event was hypoglycemic encephalopathy in December but is been hospitalized for possible stroke in the consideration of whether she has seizures.  She was seen today by neurology with an EEG and has follow-up neuro appointment in June.  This point is not clear if her altered status is secondary to pneumonia, consider aspiration pneumonitis versus secondary to UTI.  We will admit to inpatient status with cultures of blood and urine pending.  Broad-spectrum antibiotics including Zosyn and azithromycin have been started.  Will order procalcitonin.  She has history of dysphasia and is supposed be on a dysphagia diet with thickened liquids but evidently does not follow this diet completely and this was also consider aspiration pneumonitis.  Patient will be admitted inpatient status.  She is full code.  Will monitor oxygen saturation and give  supplemental oxygen.  Nebulization treatments have been ordered along with antibiotics.  Expect she will be in the hospital for at least 2 days    Encephalopathy  Presenting with altered mental status, not waking up normally.  Recent recurrent history of spells of unresponsiveness associated at times with talking baby talk  History of encephalopathy secondary to low blood sugars in December 2018  Other admissions for similar symptoms related to infections including urinary tract infections.  Recently started on Keppra for consideration that it could be seizures  Seen today for EEG has neuro appointment on June 4  Rehydrate tonight, antibiotics started with cultures pending.    Urinary tract infection without hematuria, site unspecified  Not complaining of any symptoms with urinalysis abnormal and altered mental status  Broad-spectrum antibiotics started with cultures of urine and blood pending.    Pneumonia due to infectious organism-possible  Left basilar infiltrate versus atelectasis noted  Broad-spectrum antibiotics with Zosyn started.  Consider that it could be possible aspiration and that she typically treats on her dysphasia diet with thin liquids and that her acute shortness of breath occurred this evening after dinner    Chronic systolic heart failure (H)  Related to chronic A. fib.  Last echocardiogram showing an EF of 40%  Continue on rate control is on Eliquis for stroke prevention    Dysphagia  Noted since her hypoglycemic event in December 2018  Has been seen since by speech-language therapy with recommendation for dysphasia diet level 3 with nectar thickened liquids  Patient does not follow her diet with family aware, oftentimes drinking thin liquids  Dysphagia diet with nectar thickened liquids ordered    Persistent atrial fibrillation (H)  Chronic issue, on rate control with metoprolol, taking Eliquis  Continue current meds, will monitor with telemetry    Acquired hypothyroidism  On Synthroid  replacement  No change    Hyperlipidemia LDL goal <100  Taking daily Lipitor  Restart at discharge         Diet: Dysphasia level 3 with thickened liquids, no straws  DVT Prophylaxis: Taking Eliquis  Szymanski Catheter: not present  Code Status: Full code    Disposition Plan   Expected discharge: 2 - 3 days, recommended to prior living arrangement once antibiotic plan established, mental status at baseline and Resolution of hypoxemia.  Entered: Lake Thurston MD 05/14/2019, 12:29 AM     The patient's care was discussed with the Patient and Patient's Family.    Lake Thurston MD  [facility]    ______________________________________________________________________    Chief Complaint   79-year-old female brought from the nursing home with increased shortness of breath and change in mental status    History is obtained from the patient, electronic health record, emergency department physician and patient's daughter    History of Present Illness   Kristine Cosby is a 79 year old female who presents by ambulance with change in mental status associated with shortness of breath.  She is a resident of the [facility] and after dinner this evening became acutely short of air with increased lethargy and confusion.  Oxygen saturations were 85% which was improved with oxygen and a neb.  She was transferred to the hospital by ambulance.  Patient is confused and is unable to give any additional history.  Patient's chart was reviewed with history of a hypoglycemic event in December 2018 associated with encephalopathy.  She has been seen and admitted several times since then with concerns of mental status changes related to possible cardiac event with episodes of tachycardia versus possible seizures versus possible stroke.  In April she was transferred to the [facility] for possible stroke and was administered TPA with the MRI subsequently being normal.  There is  "episodes where she will talk gibberish and baby talk so not sure if this is delusions secondary to underlying dementia although the possibility of seizure symptoms have been discussed.  She was seen this morning by neurology and had an EEG, results pending.  She is taking Keppra daily.  There is been no documented fever, increased cough.  Patient is unable to give any additional history.  Patient does take Eliquis for history of chronic A. fib.    Review of Systems    Review of systems not obtained due to patient factors - confusion    Past Medical History    I have reviewed this patient's medical history and updated it with pertinent information if needed.   Past Medical History:   Diagnosis Date     Cellulitis of left lower extremity      GERD (gastroesophageal reflux disease)      HTN (hypertension)      Type 2 diabetes mellitus without complication (H)      Ulcer of left lower extremity with fat layer exposed (H)        Past Surgical History   I have reviewed this patient's surgical history and updated it with pertinent information if needed.  Past Surgical History:   Procedure Laterality Date     ESOPHAGOGASTRODUODENOSCOPY       HYSTERECTOMY         Social History   I have reviewed this patient's social history and updated it with pertinent information if needed.  Social History     Tobacco Use     Smoking status: Former Smoker     Last attempt to quit: 2019     Years since quittin.0     Smokeless tobacco: Former User     Tobacco comment: Pt states she quit \"over 30 years ago\"   Substance Use Topics     Alcohol use: No     Frequency: Never     Drug use: Not on file       Family History   I have reviewed this patient's family history and updated it with pertinent information if needed.   Family History   Problem Relation Age of Onset     Lung Cancer Father        Prior to Admission Medications   Prior to Admission Medications   Prescriptions Last Dose Informant Patient Reported? Taking?   Skin " Protectants, Misc. (EUCERIN) cream Past Month at Unknown time  Yes Yes   Sig: Apply topically 2 times daily ; apply to arms, legs, hands, feet for dry skin   acetaminophen (TYLENOL) 500 MG tablet 5/13/2019 at 1925  Yes Yes   Sig: Take 1,000 mg by mouth 3 times daily    albuterol (PROVENTIL) (2.5 MG/3ML) 0.083% neb solution 5/12/2019 at 1027  Yes Yes   Sig: Take 2.5 mg by nebulization every 4 hours as needed for shortness of breath / dyspnea or wheezing   apixaban ANTICOAGULANT (ELIQUIS) 5 MG tablet 5/13/2019 at 1526  No Yes   Sig: Take 1 tablet (5 mg) by mouth 2 times daily   atorvastatin (LIPITOR) 20 MG tablet 5/13/2019 at 1925  Yes Yes   Sig: Take 20 mg by mouth At Bedtime   bisacodyl (DULCOLAX) 10 MG suppository   No Yes   Sig: Place 1 suppository (10 mg) rectally daily as needed for constipation   cholecalciferol 1000 units TABS 5/13/2019 at 0718  Yes Yes   Sig: Take 2,000 Units by mouth daily   levETIRAcetam (KEPPRA) 500 MG tablet 5/13/2019 at 1925  Yes Yes   Sig: Take 750 mg by mouth 2 times daily    levothyroxine (SYNTHROID/LEVOTHROID) 125 MCG tablet 5/13/2019 at 0642  Yes Yes   Sig: Take 125 mcg by mouth daily   lidocaine (LIDODERM) 5 % patch 5/13/2019 at 1925  Yes Yes   Sig: Place 1 patch onto the skin every 24 hours   metoprolol succinate ER (TOPROL-XL) 50 MG 24 hr tablet 5/13/2019 at 0718  Yes Yes   Sig: Take 50 mg by mouth daily   nitroGLYcerin (NITROSTAT) 0.4 MG sublingual tablet   Yes Yes   Sig: Place 0.4 mg under the tongue every 5 minutes as needed for chest pain For chest pain place 1 tablet under the tongue every 5 minutes for 3 doses. If symptoms persist 5 minutes after 1st dose call 911.   nystatin (MYCOSTATIN) 593485 UNIT/GM external powder   Yes Yes   Sig: Apply topically 2 times daily as needed   pantoprazole (PROTONIX) 40 MG EC tablet 5/13/2019 at 0642  Yes Yes   Sig: Take 40 mg by mouth daily   senna-docusate (SENOKOT-S/PERICOLACE) 8.6-50 MG tablet 5/13/2019 at 1526  Yes Yes   Sig: Take 2  tablets by mouth 2 times daily    tamsulosin (FLOMAX) 0.4 MG capsule 5/13/2019 at 1925  Yes Yes   Sig: Take 0.8 mg by mouth At Bedtime      Facility-Administered Medications: None     Allergies   Allergies   Allergen Reactions     Blood Transfusion Related (Informational Only) Other (See Comments)     Patient has a history of a clinically significant antibody against RBC antigens.  A delay in compatible RBCs may occur.       Physical Exam   Vital Signs: Temp: 98.6  F (37  C) Temp src: Oral BP: 128/81 Pulse: 108 Heart Rate: 109 Resp: 27 SpO2: 97 % O2 Device: Nasal cannula Oxygen Delivery: 2 LPM  Weight: 212 lbs 0 oz    Constitutional: Appears to be sleeping as she sits upright but will answer to voice, will not open eyes.  Knows that her daughter is with her but does not know where she is.  She appears to be stated age and appears to be in no respiratory distress  Eyes: pupils equal, round and reactive to light  ENT: Normocephalic, without obvious abnormality, atraumatic, sinuses nontender on palpation, external ears without lesions, oral pharynx with moist mucous membranes, tonsils without erythema or exudates, gums normal and good dentition.  Hematologic / Lymphatic: no cervical lymphadenopathy and no supraclavicular lymphadenopathy  Respiratory: Bilateral expiratory wheezing  Cardiovascular: irregularly irregular rhythm, no peripheral edema  GI: normal bowel sounds, soft, non-distended and non-tender  Skin: no bruising or bleeding, normal skin color, texture, turgor and no redness, warmth, or swelling  Musculoskeletal: There is no redness, warmth, or swelling of the joints.  Full range of motion noted.  Motor strength is 5 out of 5 all extremities bilaterally.  Tone is normal.  Neurologic: Awakens to voice, oriented to person and to her daughter with her but not to time or place.  No obvious cranial nerves loss.  Not really cooperating with exam so unable to do full neurologic exam.    Data   Data reviewed today:  I reviewed all medications, new labs and imaging results over the last 24 hours. I personally reviewed the EKG tracing showing Atrial fibrillation with a rate in the 100s, no acute changes and the chest x-ray image(s) showing Left basilar atelectasis, normal-sized heart.    Results for orders placed or performed during the hospital encounter of 05/13/19   XR Chest Port 1 View    Narrative    XR PORTABLE CHEST ONE VIEW  5/13/2019 10:30 PM     HISTORY: Cough, shortness of breath, fever.    COMPARISON: 4/23/2019.    FINDINGS: Suboptimal inspiration with hypoventilatory changes.  Asymmetric elevation of the right hemidiaphragm.      Impression    IMPRESSION: Left basilar atelectasis versus infiltrate.    NEW LAGUNA MD   CBC with platelets differential   Result Value Ref Range    WBC 4.4 4.0 - 11.0 10e9/L    RBC Count 3.84 3.8 - 5.2 10e12/L    Hemoglobin 12.0 11.7 - 15.7 g/dL    Hematocrit 37.9 35.0 - 47.0 %    MCV 99 78 - 100 fl    MCH 31.3 26.5 - 33.0 pg    MCHC 31.7 31.5 - 36.5 g/dL    RDW 12.8 10.0 - 15.0 %    Platelet Count 156 150 - 450 10e9/L    Diff Method Automated Method     % Neutrophils 32.6 %    % Lymphocytes 44.9 %    % Monocytes 16.5 %    % Eosinophils 5.3 %    % Basophils 0.5 %    % Immature Granulocytes 0.2 %    Nucleated RBCs 0 0 /100    Absolute Neutrophil 1.4 (L) 1.6 - 8.3 10e9/L    Absolute Lymphocytes 2.0 0.8 - 5.3 10e9/L    Absolute Monocytes 0.7 0.0 - 1.3 10e9/L    Absolute Basophils 0.0 0.0 - 0.2 10e9/L    Abs Immature Granulocytes 0.0 0 - 0.4 10e9/L    Absolute Nucleated RBC 0.0    INR   Result Value Ref Range    INR 1.69 (H) 0.86 - 1.14   Comprehensive metabolic panel   Result Value Ref Range    Sodium 141 133 - 144 mmol/L    Potassium 3.8 3.4 - 5.3 mmol/L    Chloride 106 94 - 109 mmol/L    Carbon Dioxide 25 20 - 32 mmol/L    Anion Gap 10 3 - 14 mmol/L    Glucose 124 (H) 70 - 99 mg/dL    Urea Nitrogen 14 7 - 30 mg/dL    Creatinine 1.06 (H) 0.52 - 1.04 mg/dL    GFR Estimate 50 (L) >60  mL/min/[1.73_m2]    GFR Estimate If Black 58 (L) >60 mL/min/[1.73_m2]    Calcium 8.6 8.5 - 10.1 mg/dL    Bilirubin Total 0.5 0.2 - 1.3 mg/dL    Albumin 3.1 (L) 3.4 - 5.0 g/dL    Protein Total 7.0 6.8 - 8.8 g/dL    Alkaline Phosphatase 80 40 - 150 U/L    ALT 11 0 - 50 U/L    AST 13 0 - 45 U/L   Lipase   Result Value Ref Range    Lipase 52 (L) 73 - 393 U/L   Lactic acid whole blood   Result Value Ref Range    Lactic Acid 1.2 0.7 - 2.0 mmol/L   Blood gas venous   Result Value Ref Range    Ph Venous 7.40 7.32 - 7.43 pH    PCO2 Venous 44 40 - 50 mm Hg    PO2 Venous 37 25 - 47 mm Hg    Bicarbonate Venous 27 21 - 28 mmol/L    Base Excess Venous 2.1 mmol/L    FIO2 28    CRP inflammation   Result Value Ref Range    CRP Inflammation 26.6 (H) 0.0 - 8.0 mg/L   Nt probnp inpatient (BNP)   Result Value Ref Range    N-Terminal Pro BNP Inpatient 2,259 (H) 0 - 1,800 pg/mL   UA reflex to Microscopic and Culture   Result Value Ref Range    Color Urine Padmini     Appearance Urine Cloudy     Glucose Urine Negative NEG^Negative mg/dL    Bilirubin Urine Negative NEG^Negative    Ketones Urine 5 (A) NEG^Negative mg/dL    Specific Gravity Urine 1.026 1.003 - 1.035    Blood Urine Moderate (A) NEG^Negative    pH Urine 5.0 5.0 - 7.0 pH    Protein Albumin Urine Negative NEG^Negative mg/dL    Urobilinogen mg/dL 0.0 0.0 - 2.0 mg/dL    Nitrite Urine Negative NEG^Negative    Leukocyte Esterase Urine Moderate (A) NEG^Negative    Source Catheterized Urine     RBC Urine 32 (H) 0 - 2 /HPF    WBC Urine 462 (H) 0 - 5 /HPF    WBC Clumps Present (A) NEG^Negative /HPF    Bacteria Urine Many (A) NEG^Negative /HPF    Squamous Epithelial /HPF Urine 1 0 - 1 /HPF    Mucous Urine Present (A) NEG^Negative /LPF    Hyaline Casts 5 (H) 0 - 2 /LPF   Influenza A/B antigen   Result Value Ref Range    Influenza A/B Agn Specimen Nasopharyngeal     Influenza A Negative NEG^Negative    Influenza B Negative NEG^Negative

## 2019-05-14 NOTE — PLAN OF CARE
S-(situation): shift note    B-(background): altered mental status, pneumonia, UTI    A-(assessment): max temp of 100.4. Pt has been very lethargic today, arouses with cares briefly. No oral intake due to increased lethargy Confused. Lungs coarse, loose productive cough, light tan, clear phlegm, sample sent. Large incontinent urine, [place] external cath applied @ 1100, no output noted.     R-(recommendations): will continue to monitor respiratory, vitals.

## 2019-05-14 NOTE — ED NOTES
ED Nursing criteria listed below was addressed during verbal handoff:     Abnormal vitals: Yes  Abnormal results: Yes  Med Reconciliation completed: Yes  Meds given in ED: Yes  Any Overdue Meds: N/A  Core Measures: Yes  Isolation: Yes  Special needs: Yes  Skin assessment: Yes    Observation Patient  Education provided: N/A    Report given to Del RN, pt to room 267.

## 2019-05-14 NOTE — ED PROVIDER NOTES
"  History     Chief Complaint   Patient presents with     Shortness of Breath     HPI  Kristine Cosby is a 79 year old female who presents to the ER via ambulance from the [facility] with concerns about harsh cough with fever and worsening dementia and confusion that reportedly started this evening after eating supper. EMS reported the patient is full code with history of chronic A. Fib, diabetes, dementia,\" recurrent baby talk spells\" and hypertension. They reported the patient's O2 sats were in the mid 80's so they placed her on O2 via nasal canula and started a neb treatment. The patient is a poor historian so obtaining additional history is difficult. Family reported she underwent a EGD procedure this AM at 07:30.  Was later found out that the patient had a EEG and not an EGD procedure this AM.    Patient had a recent hospitalization last month. I reviewed the discharge summary and recent provider nursing home notes:  Brief Summary of Hospital Course:   Briefly, her course is:  - found down at home and later determined to have hypoglycemia encephalopathy, hospitalized 12/27/18-1/8/19 with complications of  JOSTIN, dysphagia  1/8-2/1/19 - Acute rehab at [facility]   2/1- 4/2/19: [facility]  4/2/19: Moved to LTC   4/15-4/17/19: hospitalized for AMS thought 2/2 to CVA, was given tPA, transferred to [facility] where MRI showed no acute CVA, + UTI treated with antibiotics. Started on apixaban for a fib  4/23/19: patient was sent back to ED after further unresponsive episodes where sternal rub required. In ED patient at baseline and no focal deficits. No CVA found, no metabolic nor infectious etiology found. ECHo unchanged from prior. EMS noting possible syncopal episodes correlating with runs of PVCs. Zio patch ordered and patient transferred back to SNF.   4/25/19: patient had another unresponsive episode; family elected not to hospitalize and mentioned to nursing history of seizure.      I attempted to " find results of the EGD that was reported done this AM and was not able to through CareEverywhere.   Allergies:  Allergies   Allergen Reactions     Blood Transfusion Related (Informational Only) Other (See Comments)     Patient has a history of a clinically significant antibody against RBC antigens.  A delay in compatible RBCs may occur.       Problem List:    Patient Active Problem List    Diagnosis Date Noted     Encephalopathy 05/14/2019     Priority: Medium     Urinary tract infection without hematuria, site unspecified 05/14/2019     Priority: Medium     Pneumonia due to infectious organism-possible 05/14/2019     Priority: Medium     Acute respiratory failure with hypoxia (H) 05/14/2019     Priority: Medium     Syncope, unspecified syncope type 04/23/2019     Priority: Medium     Syncope 04/23/2019     Priority: Medium     Confusion 04/15/2019     Priority: Medium     Generalized muscle weakness 03/06/2019     Priority: Medium     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 02/06/2019     Priority: Medium     Chronic systolic heart failure (H) 01/08/2019     Priority: Medium     Coronary atherosclerosis 01/08/2019     Priority: Medium     Dysphagia 01/08/2019     Priority: Medium     Generalized weakness 01/08/2019     Priority: Medium     History of stroke 01/08/2019     Priority: Medium     Acute metabolic encephalopathy due to hypoglycemia 01/07/2019     Priority: Medium     Palliative care by specialist 01/04/2019     Priority: Medium     Hypoglycemia associated with type 2 diabetes mellitus (H) 12/27/2018     Priority: Medium     Acute CHF (congestive heart failure) (H) 12/22/2016     Priority: Medium     Anemia 12/22/2016     Priority: Medium     Persistent atrial fibrillation (H) 02/24/2016     Priority: Medium     Overview:   2.  Chronic atrial fibrillation  A. On Toprol 200mg, daily.  B. Pradaxa discontinued due to GI bleed. On aspirin. Risk of bleeding currently outweighs risk of  "stroke. Will have close follow-up with GI.       Acquired hypothyroidism 2016     Priority: Medium     GERD (gastroesophageal reflux disease) 2016     Priority: Medium     HTN (hypertension) 2016     Priority: Medium     Hyperlipidemia LDL goal <100 2016     Priority: Medium     Type 2 diabetes mellitus without complication (H) 2016     Priority: Medium        Past Medical History:    Past Medical History:   Diagnosis Date     Cellulitis of left lower extremity      GERD (gastroesophageal reflux disease)      HTN (hypertension)      Type 2 diabetes mellitus without complication (H)      Ulcer of left lower extremity with fat layer exposed (H)      PAST MEDICAL HISTORY:  has a past medical history of Cellulitis of left lower extremity, GERD (gastroesophageal reflux disease), HTN (hypertension), Type 2 diabetes mellitus without complication (H), and Ulcer of left lower extremity with fat layer exposed (H).    Past Surgical History:    Past Surgical History:   Procedure Laterality Date     ESOPHAGOGASTRODUODENOSCOPY       HYSTERECTOMY         Family History:    Family History   Problem Relation Age of Onset     Lung Cancer Father        Social History:  Marital Status:   [5]  Social History     Tobacco Use     Smoking status: Former Smoker     Last attempt to quit: 2019     Years since quittin.0     Smokeless tobacco: Former User     Tobacco comment: Pt states she quit \"over 30 years ago\"   Substance Use Topics     Alcohol use: No     Frequency: Never     Drug use: Not on file        Medications:      No current outpatient medications on file.      Review of Systems   Unable to perform ROS: Dementia   Constitutional: Positive for fatigue.   HENT: Positive for congestion.    Respiratory: Positive for cough and shortness of breath.    Cardiovascular: Positive for chest pain. Negative for palpitations and leg swelling.   Musculoskeletal: Positive for myalgias.       Physical " "Exam   BP: (!) 138/91  Pulse: 101  Heart Rate: 124  Temp: 98.6  F (37  C)  Resp: 27  Height: 175.3 cm (5' 9\")  Weight: 96.2 kg (212 lb)  SpO2: 96 %      Physical Exam   Constitutional: She appears well-developed and well-nourished. She appears distressed (Patient appears to be in moderate respiratory distress with tachypnea and coarse expiratory breath sounds with harsh cough noted.).   HENT:   Head: Atraumatic.   Eyes: Conjunctivae are normal. Right eye exhibits no discharge. Left eye exhibits no discharge.   Neck: Normal range of motion. No JVD present.   Cardiovascular: An irregular rhythm present. Tachycardia present. Exam reveals decreased pulses.   Pulses:       Dorsalis pedis pulses are 1+ on the right side, and 1+ on the left side.   Pulmonary/Chest: Accessory muscle usage present. Tachypnea noted. She is in respiratory distress. She has decreased breath sounds. She has rhonchi (Coarse lung sounds throughout especially in expiratory phase.).   Abdominal: Soft. Bowel sounds are normal.   Neurological: She is alert. She is disoriented.   Skin: Skin is warm. Capillary refill takes less than 2 seconds. She is not diaphoretic.   Psychiatric: She is not agitated, not aggressive and not hyperactive. She exhibits abnormal recent memory (Family thinks that she is having periods of hallucinations which the believe could be related to seizures.  She is in the process of being worked up by the neurologist.).   Nursing note and vitals reviewed.      ED Course        Procedures               EKG Interpretation:      Interpreted by Jonathon Moore  Time reviewed: 23:18  Symptoms at time of EKG: Confusion, Cough, SOB   Rhythm: atrial fibrillation - controlled  Rate: 100-110  Axis: Normal  Ectopy: none  Conduction: normal  ST Segments/ T Waves: No acute ischemic changes  Q Waves: none  Comparison to prior: Unchanged from 4/23/19    Clinical Impression: no acute changes and atrial fibrillation (chronic)      Critical " Care time:  none            Results for orders placed or performed during the hospital encounter of 05/13/19 (from the past 24 hour(s))   CBC with platelets differential   Result Value Ref Range    WBC 4.4 4.0 - 11.0 10e9/L    RBC Count 3.84 3.8 - 5.2 10e12/L    Hemoglobin 12.0 11.7 - 15.7 g/dL    Hematocrit 37.9 35.0 - 47.0 %    MCV 99 78 - 100 fl    MCH 31.3 26.5 - 33.0 pg    MCHC 31.7 31.5 - 36.5 g/dL    RDW 12.8 10.0 - 15.0 %    Platelet Count 156 150 - 450 10e9/L    Diff Method Automated Method     % Neutrophils 32.6 %    % Lymphocytes 44.9 %    % Monocytes 16.5 %    % Eosinophils 5.3 %    % Basophils 0.5 %    % Immature Granulocytes 0.2 %    Nucleated RBCs 0 0 /100    Absolute Neutrophil 1.4 (L) 1.6 - 8.3 10e9/L    Absolute Lymphocytes 2.0 0.8 - 5.3 10e9/L    Absolute Monocytes 0.7 0.0 - 1.3 10e9/L    Absolute Basophils 0.0 0.0 - 0.2 10e9/L    Abs Immature Granulocytes 0.0 0 - 0.4 10e9/L    Absolute Nucleated RBC 0.0    INR   Result Value Ref Range    INR 1.69 (H) 0.86 - 1.14   Comprehensive metabolic panel   Result Value Ref Range    Sodium 141 133 - 144 mmol/L    Potassium 3.8 3.4 - 5.3 mmol/L    Chloride 106 94 - 109 mmol/L    Carbon Dioxide 25 20 - 32 mmol/L    Anion Gap 10 3 - 14 mmol/L    Glucose 124 (H) 70 - 99 mg/dL    Urea Nitrogen 14 7 - 30 mg/dL    Creatinine 1.06 (H) 0.52 - 1.04 mg/dL    GFR Estimate 50 (L) >60 mL/min/[1.73_m2]    GFR Estimate If Black 58 (L) >60 mL/min/[1.73_m2]    Calcium 8.6 8.5 - 10.1 mg/dL    Bilirubin Total 0.5 0.2 - 1.3 mg/dL    Albumin 3.1 (L) 3.4 - 5.0 g/dL    Protein Total 7.0 6.8 - 8.8 g/dL    Alkaline Phosphatase 80 40 - 150 U/L    ALT 11 0 - 50 U/L    AST 13 0 - 45 U/L   Lipase   Result Value Ref Range    Lipase 52 (L) 73 - 393 U/L   Lactic acid whole blood   Result Value Ref Range    Lactic Acid 1.2 0.7 - 2.0 mmol/L   Blood gas venous   Result Value Ref Range    Ph Venous 7.40 7.32 - 7.43 pH    PCO2 Venous 44 40 - 50 mm Hg    PO2 Venous 37 25 - 47 mm Hg    Bicarbonate  Venous 27 21 - 28 mmol/L    Base Excess Venous 2.1 mmol/L    FIO2 28    CRP inflammation   Result Value Ref Range    CRP Inflammation 26.6 (H) 0.0 - 8.0 mg/L   Nt probnp inpatient (BNP)   Result Value Ref Range    N-Terminal Pro BNP Inpatient 2,259 (H) 0 - 1,800 pg/mL   Blood culture   Result Value Ref Range    Specimen Description Blood Right Hand     Special Requests Received in aerobic bottle only     Culture Micro PENDING    Blood culture   Result Value Ref Range    Specimen Description Blood Left Hand     Special Requests Aerobic and anaerobic bottles received     Culture Micro PENDING    Procalcitonin   Result Value Ref Range    Procalcitonin <0.05 ng/ml   Influenza A/B antigen   Result Value Ref Range    Influenza A/B Agn Specimen Nasopharyngeal     Influenza A Negative NEG^Negative    Influenza B Negative NEG^Negative   XR Chest Port 1 View    Narrative    XR PORTABLE CHEST ONE VIEW  5/13/2019 10:30 PM     HISTORY: Cough, shortness of breath, fever.    COMPARISON: 4/23/2019.    FINDINGS: Suboptimal inspiration with hypoventilatory changes.  Asymmetric elevation of the right hemidiaphragm.      Impression    IMPRESSION: Left basilar atelectasis versus infiltrate.    NEW LAGUNA MD   UA reflex to Microscopic and Culture   Result Value Ref Range    Color Urine Padmini     Appearance Urine Cloudy     Glucose Urine Negative NEG^Negative mg/dL    Bilirubin Urine Negative NEG^Negative    Ketones Urine 5 (A) NEG^Negative mg/dL    Specific Gravity Urine 1.026 1.003 - 1.035    Blood Urine Moderate (A) NEG^Negative    pH Urine 5.0 5.0 - 7.0 pH    Protein Albumin Urine Negative NEG^Negative mg/dL    Urobilinogen mg/dL 0.0 0.0 - 2.0 mg/dL    Nitrite Urine Negative NEG^Negative    Leukocyte Esterase Urine Moderate (A) NEG^Negative    Source Catheterized Urine     RBC Urine 32 (H) 0 - 2 /HPF    WBC Urine 462 (H) 0 - 5 /HPF    WBC Clumps Present (A) NEG^Negative /HPF    Bacteria Urine Many (A) NEG^Negative /HPF    Squamous  Epithelial /HPF Urine 1 0 - 1 /HPF    Mucous Urine Present (A) NEG^Negative /LPF    Hyaline Casts 5 (H) 0 - 2 /LPF       Medications   sodium chloride 0.9% infusion ( Intravenous New Bag 5/13/19 2221)   piperacillin-tazobactam (ZOSYN) intermittent infusion 4.5 g (4.5 g Intravenous New Bag 5/14/19 1484)   azithromycin (ZITHROMAX) 250 mg in sodium chloride 0.9 % 250 mL intermittent infusion (has no administration in time range)   apixaban ANTICOAGULANT (ELIQUIS) tablet 5 mg (has no administration in time range)   bisacodyl (DULCOLAX) Suppository 10 mg (has no administration in time range)   levETIRAcetam (KEPPRA) tablet 750 mg (has no administration in time range)   levothyroxine (SYNTHROID/LEVOTHROID) tablet 125 mcg (has no administration in time range)   metoprolol succinate ER (TOPROL-XL) 24 hr tablet 50 mg (has no administration in time range)   nitroGLYcerin (NITROSTAT) sublingual tablet 0.4 mg (has no administration in time range)   pantoprazole (PROTONIX) EC tablet 40 mg (has no administration in time range)   senna-docusate (SENOKOT-S/PERICOLACE) 8.6-50 MG per tablet 2 tablet (has no administration in time range)   tamsulosin (FLOMAX) capsule 0.8 mg (has no administration in time range)   naloxone (NARCAN) injection 0.1-0.4 mg (has no administration in time range)   melatonin tablet 1 mg (has no administration in time range)   lidocaine 1 % 0.1-1 mL (has no administration in time range)   lidocaine (LMX4) kit (has no administration in time range)   sodium chloride (PF) 0.9% PF flush 3 mL (has no administration in time range)   sodium chloride (PF) 0.9% PF flush 3 mL (3 mLs Intracatheter Not Given 5/14/19 0125)   Patient is already receiving anticoagulation with heparin, enoxaparin (LOVENOX), warfarin (COUMADIN)  or other anticoagulant medication (has no administration in time range)   acetaminophen (TYLENOL) tablet 650 mg (has no administration in time range)   ondansetron (ZOFRAN-ODT) ODT tab 4 mg (has no  administration in time range)     Or   ondansetron (ZOFRAN) injection 4 mg (has no administration in time range)   prochlorperazine (COMPAZINE) injection 5 mg (has no administration in time range)     Or   prochlorperazine (COMPAZINE) tablet 5 mg (has no administration in time range)     Or   prochlorperazine (COMPAZINE) Suppository 12.5 mg (has no administration in time range)   albuterol (PROVENTIL) neb solution 2.5 mg (has no administration in time range)   ipratropium - albuterol 0.5 mg/2.5 mg/3 mL (DUONEB) neb solution 3 mL (has no administration in time range)   azithromycin (ZITHROMAX) 500 mg in sodium chloride 0.9 % 250 mL intermittent infusion (0 mg Intravenous Stopped 5/14/19 0025)       Assessments & Plan (with Medical Decision Making)  79-year-old female to the ER via ambulance from her nursing home secondary to concerns of increasing shortness of breath with decreased oxygen saturations noted in the nursing home setting.  She has multiple medical conditions and was recently hospitalized at this hospital late last month.  Patient has had multiple episodes of syncope also of appropriate behaviors.  She had an EEG performed earlier this morning and attempts to evaluate for her syncope.  Those results are pending.  Patient presented with significant respiratory distress which was improved after couple DuoNeb treatments.  Patient's chest x-ray revealed possible early pneumonia in the left lower lobe.  Patient also found to have a fairly significant urinary tract infection as per the urinalysis results above.  Patient initiated on antibiotic therapy and concerns of possible sepsis with Zosyn.  Fortunately, there was not signs of sepsis identified on her exam today.  Patient was still requiring supplemental oxygen and decision was made to admit the patient to the hospital for further evaluation and treatment.  I spoke with I spoke to Dr. Thurston, hospitalist, who agrees to take over the care of the patient and  came to the ER to evaluate the patient and place orders for the hospital floor in the [facility] EMR.     I have reviewed the nursing notes.    I have reviewed the findings, diagnosis, plan and need for hospitalization with the patient.       Final diagnoses:   Acute respiratory distress   Pneumonia of left lower lobe due to infectious organism (H)   Acute cystitis without hematuria   Dementia without behavioral disturbance, unspecified dementia type       5/13/2019   [facility] EMERGENCY DEPARTMENT     Jonathon Moore,   05/14/19 0601

## 2019-05-14 NOTE — ED TRIAGE NOTES
Pt presents by EMS with concerns of altered mental status and increased work of breathing.  Pt comes from [facility].   After dinner pt's mentation changed.  Pt was checked O2 sats were 85%, started on nasal cannula.  Pt has a non productive cough.  Increased work breathing.  Pt received a duoneb en route.

## 2019-05-14 NOTE — PROGRESS NOTES
"S-(situation): Patient arrives to room 267 via cart from ED    B-(background): UTI and Pneumonia    A-(assessment): Oriented to person and disoriented to place, time and situation. Pt denies having SOB, pain and N/V. Lung sounds are coarse with expiratory wheezes. On 2L of 02 with sats in the mid to high 90s. VSS. Afebrile. Having a nonproductive cough. Pt does have some redness on buttock. Up with an assist X2 and bedside commode. Fluids running @150ml/hr. IV abx per providers orders. Continue to monitor. Blood pressure 115/58, pulse 108, temperature 96.9  F (36.1  C), temperature source Oral, resp. rate 24, height 1.753 m (5' 9\"), weight 95.1 kg (209 lb 10.5 oz), SpO2 97 %.    R-(recommendations): Orders reviewed with patient. Will monitor patient per MD orders.     Inpatient nursing criteria listed below were met:    Health care directives status obtained and documented: Yes  SCD's Documented: Yes  Skin issues/needs documented:Yes  Isolation needs addressed, if appropriate: Yes  Fall Prevention: Care plan updated, Education given and documented Yes  MRSA swab completed for patient 55 years and older (exclude MIK and TKA): Yes  Care Plan initiated: Yes  Education Assessment documented:Yes  Education Documented (Pre-existing chronic infection such as, MRSA/VRE need education on admission): Yes  Admission Medication Reconciliation completed: Yes  New medication patient education completed and documented (Possible Side Effects of Common Medications handout): Yes  Home medications if not able to send immediately home with family stored here: NA  Reminder note placed in discharge instructions: NA  Discharge planning review completed (admission navigator) Yes          "

## 2019-05-14 NOTE — PROGRESS NOTES
Met with patient's two daughters to discuss patient's overall care and treatment plan. Patient remains lethargic and, per nursing, not safe to take oral medications as of yet. Discontinued patient's daily 50 mg metoprolol XL and started IV lopressor 2.5 mg every six hours scheduled. Discontinued oral Keppra and started IV Keppra 750 mg every 12 hours.     Walter Paulino, NATIVIDAD-CNP  [facility]

## 2019-05-14 NOTE — ASSESSMENT & PLAN NOTE
Related to chronic A. fib.  Last echocardiogram showing an EF of 40%  Continue on rate control is on Eliquis for stroke prevention

## 2019-05-14 NOTE — ASSESSMENT & PLAN NOTE
Noted since her hypoglycemic event in December 2018  Has been seen since by speech-language therapy with recommendation for dysphasia diet level 3 with nectar thickened liquids  Patient does not follow her diet with family aware, oftentimes drinking thin liquids  Dysphagia diet with nectar thickened liquids ordered

## 2019-05-14 NOTE — CONSULTS
CARE TRANSITION SOCIAL WORK INITIAL ASSESSMENT:      Met with: Family.    DATA  Principal Problem:    Encephalopathy  Active Problems:    Acquired hypothyroidism    Chronic systolic heart failure (H)    Dysphagia    Hyperlipidemia LDL goal <100    Persistent atrial fibrillation (H)    Urinary tract infection without hematuria, site unspecified    Pneumonia due to infectious organism-possible    Acute respiratory failure with hypoxia (H)       Primary Care Clinic Name: [facility]   Primary Care MD Name: Jannie Cruz CNP     ASSESSMENT  Cognitive Status: patient sleeping.  Met with family.       Resources List: Skilled Nursing Facility      Description of Support System: Involved, Supportive   Who is your support system?: Children   Support Assessment: Adequate family and caregiver support   Insurance Concerns: No Insurance issues identified     This writer met with patient's daughters, Gloria and Amarilys, introduced self and role. Discussed discharge planning and medicare guidelines in regards to home care and SNF benefits.  Patient comes in from [facility] (Main Phone: [phone] Admissions Phone: [phone] Fax: [phone]).  Family states she started out in TCU and is now LTC.  Family met with [facility] today and held patient's bed.  Family states that patient just got on a new insurance yesterday.  Writer will update financial counselors.  Patient should have transport coverage through [place] for transport back to [facility] at discharge.     PLAN    Return to [facility] LT    DMITRY Warren  [facility] [phone]/ Callie [phone]

## 2019-05-14 NOTE — ASSESSMENT & PLAN NOTE
Presenting with increased shortness of breath and oxygen saturations at 85%  Improved after nebulization treatment  Came on acutely after dinner, not sure if she was following her dysphasia diet  Exam showing bilateral wheezes which chest x-ray showing possible left basilar infiltrate versus atelectasis  Will cover for pneumonia including aspiration pneumonia, nebs ordered, continue oxygen, wean as able

## 2019-05-14 NOTE — ASSESSMENT & PLAN NOTE
Left basilar infiltrate versus atelectasis noted  Broad-spectrum antibiotics with Zosyn started.  Consider that it could be possible aspiration and that she typically treats on her dysphasia diet with thin liquids and that her acute shortness of breath occurred this evening after dinner

## 2019-05-15 LAB
ANION GAP SERPL CALCULATED.3IONS-SCNC: 6 MMOL/L (ref 3–14)
BACTERIA SPEC CULT: NORMAL
BUN SERPL-MCNC: 10 MG/DL (ref 7–30)
CALCIUM SERPL-MCNC: 8.2 MG/DL (ref 8.5–10.1)
CHLORIDE SERPL-SCNC: 110 MMOL/L (ref 94–109)
CO2 SERPL-SCNC: 26 MMOL/L (ref 20–32)
CREAT SERPL-MCNC: 0.93 MG/DL (ref 0.52–1.04)
ERYTHROCYTE [DISTWIDTH] IN BLOOD BY AUTOMATED COUNT: 12.9 % (ref 10–15)
GFR SERPL CREATININE-BSD FRML MDRD: 59 ML/MIN/{1.73_M2}
GLUCOSE SERPL-MCNC: 102 MG/DL (ref 70–99)
HCT VFR BLD AUTO: 33.4 % (ref 35–47)
HGB BLD-MCNC: 10.6 G/DL (ref 11.7–15.7)
LACTATE BLD-SCNC: 1.9 MMOL/L (ref 0.7–2)
MCH RBC QN AUTO: 31.3 PG (ref 26.5–33)
MCHC RBC AUTO-ENTMCNC: 31.7 G/DL (ref 31.5–36.5)
MCV RBC AUTO: 99 FL (ref 78–100)
PLATELET # BLD AUTO: 146 10E9/L (ref 150–450)
POTASSIUM SERPL-SCNC: 4 MMOL/L (ref 3.4–5.3)
RBC # BLD AUTO: 3.39 10E12/L (ref 3.8–5.2)
SODIUM SERPL-SCNC: 142 MMOL/L (ref 133–144)
SPECIMEN SOURCE: NORMAL
WBC # BLD AUTO: 3.8 10E9/L (ref 4–11)

## 2019-05-15 PROCEDURE — 25000128 H RX IP 250 OP 636: Performed by: FAMILY MEDICINE

## 2019-05-15 PROCEDURE — 25800030 ZZH RX IP 258 OP 636: Performed by: NURSE PRACTITIONER

## 2019-05-15 PROCEDURE — 94640 AIRWAY INHALATION TREATMENT: CPT

## 2019-05-15 PROCEDURE — 25000132 ZZH RX MED GY IP 250 OP 250 PS 637: Performed by: NURSE PRACTITIONER

## 2019-05-15 PROCEDURE — 40000809 ZZH STATISTIC NO DOCUMENTATION TO SUPPORT CHARGE

## 2019-05-15 PROCEDURE — 12000000 ZZH R&B MED SURG/OB

## 2019-05-15 PROCEDURE — 80048 BASIC METABOLIC PNL TOTAL CA: CPT | Performed by: FAMILY MEDICINE

## 2019-05-15 PROCEDURE — 94640 AIRWAY INHALATION TREATMENT: CPT | Mod: 76

## 2019-05-15 PROCEDURE — 99233 SBSQ HOSP IP/OBS HIGH 50: CPT | Performed by: NURSE PRACTITIONER

## 2019-05-15 PROCEDURE — 25000128 H RX IP 250 OP 636: Performed by: NURSE PRACTITIONER

## 2019-05-15 PROCEDURE — 36415 COLL VENOUS BLD VENIPUNCTURE: CPT | Performed by: FAMILY MEDICINE

## 2019-05-15 PROCEDURE — 25000125 ZZHC RX 250: Performed by: FAMILY MEDICINE

## 2019-05-15 PROCEDURE — 25800029 ZZH RX IP 258 OP 250: Performed by: NURSE PRACTITIONER

## 2019-05-15 PROCEDURE — 85027 COMPLETE CBC AUTOMATED: CPT | Performed by: FAMILY MEDICINE

## 2019-05-15 PROCEDURE — 25000132 ZZH RX MED GY IP 250 OP 250 PS 637: Performed by: FAMILY MEDICINE

## 2019-05-15 PROCEDURE — 83605 ASSAY OF LACTIC ACID: CPT | Performed by: FAMILY MEDICINE

## 2019-05-15 PROCEDURE — 25000125 ZZHC RX 250: Performed by: NURSE PRACTITIONER

## 2019-05-15 RX ORDER — AZITHROMYCIN 250 MG/1
250 TABLET, FILM COATED ORAL DAILY
Status: DISCONTINUED | OUTPATIENT
Start: 2019-05-15 | End: 2019-05-18

## 2019-05-15 RX ORDER — METOPROLOL SUCCINATE 50 MG/1
50 TABLET, EXTENDED RELEASE ORAL DAILY
Status: DISCONTINUED | OUTPATIENT
Start: 2019-05-15 | End: 2019-05-18 | Stop reason: HOSPADM

## 2019-05-15 RX ADMIN — METOPROLOL TARTRATE 2.5 MG: 5 INJECTION, SOLUTION INTRAVENOUS at 04:09

## 2019-05-15 RX ADMIN — METOPROLOL TARTRATE 2.5 MG: 5 INJECTION, SOLUTION INTRAVENOUS at 11:04

## 2019-05-15 RX ADMIN — METOPROLOL SUCCINATE 50 MG: 50 TABLET, EXTENDED RELEASE ORAL at 16:38

## 2019-05-15 RX ADMIN — LEVETIRACETAM 750 MG: 100 INJECTION, SOLUTION INTRAVENOUS at 06:35

## 2019-05-15 RX ADMIN — TAMSULOSIN HYDROCHLORIDE 0.8 MG: 0.4 CAPSULE ORAL at 22:07

## 2019-05-15 RX ADMIN — ACETAMINOPHEN 650 MG: 325 TABLET ORAL at 18:46

## 2019-05-15 RX ADMIN — TAZOBACTAM SODIUM AND PIPERACILLIN SODIUM 4.5 G: 500; 4 INJECTION, SOLUTION INTRAVENOUS at 22:14

## 2019-05-15 RX ADMIN — APIXABAN 5 MG: 5 TABLET, FILM COATED ORAL at 22:06

## 2019-05-15 RX ADMIN — SODIUM CHLORIDE: 4.5 INJECTION, SOLUTION INTRAVENOUS at 04:08

## 2019-05-15 RX ADMIN — TAZOBACTAM SODIUM AND PIPERACILLIN SODIUM 4.5 G: 500; 4 INJECTION, SOLUTION INTRAVENOUS at 04:13

## 2019-05-15 RX ADMIN — LEVETIRACETAM 750 MG: 500 TABLET, FILM COATED ORAL at 18:22

## 2019-05-15 RX ADMIN — SENNOSIDES AND DOCUSATE SODIUM 2 TABLET: 8.6; 5 TABLET ORAL at 22:07

## 2019-05-15 RX ADMIN — TAZOBACTAM SODIUM AND PIPERACILLIN SODIUM 4.5 G: 500; 4 INJECTION, SOLUTION INTRAVENOUS at 16:38

## 2019-05-15 RX ADMIN — AZITHROMYCIN 250 MG: 250 TABLET, FILM COATED ORAL at 22:07

## 2019-05-15 RX ADMIN — TAZOBACTAM SODIUM AND PIPERACILLIN SODIUM 4.5 G: 500; 4 INJECTION, SOLUTION INTRAVENOUS at 11:04

## 2019-05-15 RX ADMIN — IPRATROPIUM BROMIDE AND ALBUTEROL SULFATE 3 ML: .5; 3 SOLUTION RESPIRATORY (INHALATION) at 18:02

## 2019-05-15 RX ADMIN — IPRATROPIUM BROMIDE AND ALBUTEROL SULFATE 3 ML: .5; 3 SOLUTION RESPIRATORY (INHALATION) at 12:26

## 2019-05-15 RX ADMIN — IPRATROPIUM BROMIDE AND ALBUTEROL SULFATE 3 ML: .5; 3 SOLUTION RESPIRATORY (INHALATION) at 22:09

## 2019-05-15 RX ADMIN — APIXABAN 5 MG: 5 TABLET, FILM COATED ORAL at 11:04

## 2019-05-15 ASSESSMENT — ACTIVITIES OF DAILY LIVING (ADL)
ADLS_ACUITY_SCORE: 29

## 2019-05-15 ASSESSMENT — MIFFLIN-ST. JEOR: SCORE: 1519.38

## 2019-05-15 NOTE — PROGRESS NOTES
[facility]    Medicine Progress Note - Hospitalist Service       Date of Admission:  5/13/2019  Assessment & Plan      Kristine Cosby is a 79 year old female admitted on 5/13/2019. She presented with altered mental status with acute shortness of breath.  She lives at [facility] and after dinner became acutely short of air with increased shortness of breath and hypoxemia requiring oxygen. She was transferred by ambulance to the hospital.  On arrival she was afebrile with a tachycardia and tachypnea.  She was requiring supplemental oxygen.  Blood gases were normal.  Laboratory work showing normal CBC and electrolytes with a normal lactic acid of 1.2.  BNP slightly elevated at 2259 with chest x-ray showing a left basilar infiltrate versus atelectasis.  Patient is chronic A. fib taking Eliquis for stroke prophylaxis.  Urinalysis was abnormal with moderate LE T and 462 white cells.  Patient has a recent history since December of altered mental status with episodes of unresponsiveness followed by spells where she talks baby talk.  Initial event was hypoglycemic encephalopathy in December but is been hospitalized for possible stroke in the consideration of whether she has seizures.  She was seen as an outpatient on the day of admission by neurology with an EEG and has follow-up neuro appointment in June.  Patient's altered status possibly secondary to pneumonia, consider aspiration pneumonitis versus secondary to UTI.  Patient admitted to inpatient status and started on broad-spectrum antibiotics including Zosyn and azithromycin. Procalcitonin ordered and came back less than 0.05.  She has history of dysphasia and is supposed be on a dysphagia diet with thickened liquids but evidently does not follow this diet completely and this was also consider aspiration pneumonitis. Expect she will be in the hospital for at least 2 days    Encephalopathy  Assessment: Patient  presented with altered mental status, not waking up normally. Recent recurrent history of spells of unresponsiveness associated at times with talking baby talk. History of encephalopathy secondary to low blood sugars in December 2018. Other admissions for similar symptoms related to infections including urinary tract infections. Recently started on Keppra for consideration that it could be seizures. Patient more alert today and at baseline mental status. Denies pain and shortness of breath. Blood cultures showing no growth but urine cultures growing Escherichia coli with susceptibility testing in progress.   Plan: Continue to treat underlying infections. Transitioned IV medications including Lopressor and IV Keppra to oral as patient more alert today. Follow up as outpatient regarding EEG results.     Urinary tract infection without hematuria, site unspecified  Assessment: Patient not complaining of any symptoms with urinalysis abnormal and altered mental status. Patient started on IV Zosyn and IV Azithromycin. Urine culture growing E coli with susceptibility testing in progress.  Plan: Continue IV Zosyn while awaiting susceptibility. Transition IV azithromycin to oral as patient more alert today.     Pneumonia due to infectious organism-possible  Assessment: Left basilar infiltrate versus atelectasis noted. Broad-spectrum antibiotics with Zosyn started. Consider that it could be possible aspiration and that she typically treats on her dysphasia diet with thin liquids and that her acute shortness of breath occurred this evening after dinner. Patient denies shortness of breath and is maintaining oxygen saturations above 90% on room air. Patient remains afebrile with stable WBC count of 3.8. Lung sounds are coarse with a productive cough.   Plan: Continue antibiotic treatment as above. Has as needed albuterol nebulizer treatments available.      Chronic systolic heart failure (H)  Assessment: Related to chronic A.  fib.  Last echocardiogram showing an EF of 40%. Patient is on Eliquis for stroke prevention. Started on IV Lopressor yesterday as she was too lethargic to take home dose of Toprol XL 50 mg once daily. Patient more alert today with heart rate in the 's.   Plan: Stopped IV Lopressor and restarted patient home dose of Toprol XL. Continue Eliquis for stroke prevention    Dysphagia  Assessment: Noted since her hypoglycemic event in December 2018. Has been seen since by speech-language therapy with recommendation for dysphasia diet level 3 with nectar thickened liquids  Patient does not follow her diet with family aware, oftentimes drinking thin liquids.   Plan: Dysphagia diet with nectar thickened liquids ordered    Persistent atrial fibrillation (H)  Assessment: Chronic issue, on rate control with metoprolol, taking Eliquis  Plan: Continue current meds, will monitor with telemetry    Acquired hypothyroidism  Assessment: Chronic and managed with daily Synthroid  Plan: Home dose of Synthroid ordered while patient in hospital     Hyperlipidemia LDL goal <100  Taking daily Lipitor  Restart at discharge       Diet: Combination Diet Regular Diet Adult; Dysphagia Diet Level 3: Advanced; Nectar Thickened Liquids (pre-thickened or use instant food thickener)    DVT Prophylaxis: Eliquis  Szymanski Catheter: not present  Code Status: Full Code      Disposition Plan   Expected discharge: 1-2 days, recommended to prior living arrangement once tolerating PO medications, back to baseline mental status, and antibiotic plan established.  Entered: Walter Paulino NP 05/15/2019, 3:14 PM       The patient's care was discussed with the Attending Physician, Dr. Ochoa and Patient.    Walter Paulino NP  Hospitalist Service  [facility]    ______________________________________________________________________    Interval History   Patient seen getting back into bed from the bathroom with no new complaints.  Patient denies pain and is ambulating in her room with assist of one. Patient denies shortness of breath and is maintaining oxygen saturations above 90% on room air. Patient does have coarse lung sounds with productive cough. Patient is afebrile and hemodynamically stable. Alert and appears back to baseline mental status.     Data reviewed today: I reviewed all medications, new labs and imaging results over the last 24 hours.    Physical Exam   Vital Signs: Temp: 96  F (35.6  C) Temp src: Oral BP: 145/77 Pulse: 87 Heart Rate: 87 Resp: 20 SpO2: 94 % O2 Device: None (Room air) Oxygen Delivery: 2 LPM  Weight: 214 lbs 15.18 oz  Constitutional: awake, alert, cooperative, no apparent distress, and appears stated age  Respiratory: No increased work of breathing, coarse lung sounds throughout; bilateral expiratory wheeze  Cardiovascular: irregularly irregular; no peripheral edema  GI: normal bowel sounds, non-distended and non-tender  Musculoskeletal: no lower extremity pitting edema present; there is no redness, warmth, or swelling of the joints; full range of motion noted  Neurologic: alert and oriented to self and place; not oriented to time or situation; able to answer questions and follow commands; no focal neurologic deficits    Data   Recent Labs   Lab 05/15/19  0551 05/14/19  0909 05/13/19  2215   WBC 3.8* 3.4* 4.4   HGB 10.6*  --  12.0   MCV 99  --  99   *  --  156   INR  --   --  1.69*    145* 141   POTASSIUM 4.0 4.1 3.8   CHLORIDE 110* 112* 106   CO2 26 25 25   BUN 10 11 14   CR 0.93 0.92 1.06*   ANIONGAP 6 8 10   MIKE 8.2* 8.3* 8.6   * 109* 124*   ALBUMIN  --   --  3.1*   PROTTOTAL  --   --  7.0   BILITOTAL  --   --  0.5   ALKPHOS  --   --  80   ALT  --   --  11   AST  --   --  13   LIPASE  --   --  52*     Medications     - MEDICATION INSTRUCTIONS -       NaCl 75 mL/hr at 05/15/19 0408       apixaban ANTICOAGULANT  5 mg Oral BID     azithromycin  250 mg Oral Daily     ipratropium - albuterol  0.5 mg/2.5 mg/3 mL  3 mL Nebulization 4x Daily     levETIRAcetam  750 mg Oral Q12H     levothyroxine  125 mcg Oral QAM AC     metoprolol succinate ER  50 mg Oral Daily     pantoprazole  40 mg Oral QAM AC     piperacillin-tazobactam  4.5 g Intravenous Q6H     senna-docusate  2 tablet Oral BID     sodium chloride (PF)  3 mL Intracatheter Q8H     tamsulosin  0.8 mg Oral At Bedtime

## 2019-05-15 NOTE — PROGRESS NOTES
Called Carl, financial counselor to review insurance information.  Family states that patient just changed insurances a couple of days ago and they do not have insurance cards yet.  Carl was updated and able to find that the patient is on MA and the facesheet has been updated.    DMITRY Warren  [facility] [phone]/ [facility] [phone]

## 2019-05-15 NOTE — PROGRESS NOTES
"S-(situation): update    B-(background): pneumonia, UTI    A-(assessment): pt has been A/O x4 for supper. Up to chair with ceiling lift for safety. Pt reports stress incontinent with coughing. Was steady on feet to stand while changing brief, with assist of 2 and walker, tolerated well. Fed herself for supper, refused Nectar thick liquids, \" I will throw up\", family has reported earlier that pt has been refusing thick liquids at NH.    R-(recommendations): will continue with POC.  "

## 2019-05-15 NOTE — PLAN OF CARE
S-(situation): shift note    B-(background): pneumonia, UTI    A-(assessment): afebrile, see VS f/s. Lungs coarse, loose productive cough. pt was sleepy for brkfst, was not alert enough to take PO meds. More alert and oriented x3, disoriented to situation for lunch, up in chair with 1-2 assist and walker. Continues to refuse nectar thick liquids. Took meds whole in applesauce without complications. Tray set up for lunch. Ambulated to  with A1 and walker. Intermittent stress incontinent.     R-(recommendations): will continue with POC.

## 2019-05-15 NOTE — PROGRESS NOTES
"S-(situation): End of shift note    B-(background): Pneumonia/UTI    A-(assessment): Pt was oriented to self through the night. VSS. Afebrile. Denies having pain and N/V. Lung sounds are coarse. Was able to go from the chair to bed with and assist X2 with walker and gait belt. PureWick in place with good output. Fluids running @75ml/hr. Telemetry intact. Continue to monitor. Blood pressure 122/64, pulse 125, temperature 97  F (36.1  C), temperature source Oral, resp. rate 24, height 1.753 m (5' 9\"), weight 97.5 kg (214 lb 15.2 oz), SpO2 94 %.    R-(recommendations): Continue to monitor    "

## 2019-05-15 NOTE — PROGRESS NOTES
S: RT note  B: UTI  A: Pt on room air. VSS. Lung sounds coarse with harsh cough. Nebs given as ordered with minimal change.   R: RT to follow til DC

## 2019-05-16 ENCOUNTER — APPOINTMENT (OUTPATIENT)
Dept: GENERAL RADIOLOGY | Facility: CLINIC | Age: 79
DRG: 177 | End: 2019-05-16
Attending: HOSPITALIST
Payer: COMMERCIAL

## 2019-05-16 LAB
ANION GAP SERPL CALCULATED.3IONS-SCNC: 6 MMOL/L (ref 3–14)
BACTERIA SPEC CULT: NORMAL
BUN SERPL-MCNC: 10 MG/DL (ref 7–30)
CALCIUM SERPL-MCNC: 8.4 MG/DL (ref 8.5–10.1)
CHLORIDE SERPL-SCNC: 111 MMOL/L (ref 94–109)
CO2 SERPL-SCNC: 27 MMOL/L (ref 20–32)
CREAT SERPL-MCNC: 0.76 MG/DL (ref 0.52–1.04)
ERYTHROCYTE [DISTWIDTH] IN BLOOD BY AUTOMATED COUNT: 12.9 % (ref 10–15)
GFR SERPL CREATININE-BSD FRML MDRD: 75 ML/MIN/{1.73_M2}
GLUCOSE BLDC GLUCOMTR-MCNC: 141 MG/DL (ref 70–99)
GLUCOSE SERPL-MCNC: 120 MG/DL (ref 70–99)
HCT VFR BLD AUTO: 34.9 % (ref 35–47)
HGB BLD-MCNC: 11.1 G/DL (ref 11.7–15.7)
LACTATE BLD-SCNC: 1.1 MMOL/L (ref 0.7–2)
MCH RBC QN AUTO: 31.1 PG (ref 26.5–33)
MCHC RBC AUTO-ENTMCNC: 31.8 G/DL (ref 31.5–36.5)
MCV RBC AUTO: 98 FL (ref 78–100)
PLATELET # BLD AUTO: 140 10E9/L (ref 150–450)
POTASSIUM SERPL-SCNC: 3.6 MMOL/L (ref 3.4–5.3)
RBC # BLD AUTO: 3.57 10E12/L (ref 3.8–5.2)
SODIUM SERPL-SCNC: 144 MMOL/L (ref 133–144)
SPECIMEN SOURCE: NORMAL
WBC # BLD AUTO: 4.4 10E9/L (ref 4–11)

## 2019-05-16 PROCEDURE — 25000132 ZZH RX MED GY IP 250 OP 250 PS 637: Performed by: FAMILY MEDICINE

## 2019-05-16 PROCEDURE — 94640 AIRWAY INHALATION TREATMENT: CPT | Mod: 76

## 2019-05-16 PROCEDURE — 25000132 ZZH RX MED GY IP 250 OP 250 PS 637: Performed by: NURSE PRACTITIONER

## 2019-05-16 PROCEDURE — 94640 AIRWAY INHALATION TREATMENT: CPT

## 2019-05-16 PROCEDURE — 71045 X-RAY EXAM CHEST 1 VIEW: CPT | Mod: TC

## 2019-05-16 PROCEDURE — 36415 COLL VENOUS BLD VENIPUNCTURE: CPT | Performed by: HOSPITALIST

## 2019-05-16 PROCEDURE — 00000146 ZZHCL STATISTIC GLUCOSE BY METER IP

## 2019-05-16 PROCEDURE — 83605 ASSAY OF LACTIC ACID: CPT | Performed by: HOSPITALIST

## 2019-05-16 PROCEDURE — 25000125 ZZHC RX 250: Performed by: FAMILY MEDICINE

## 2019-05-16 PROCEDURE — 12000000 ZZH R&B MED SURG/OB

## 2019-05-16 PROCEDURE — 36415 COLL VENOUS BLD VENIPUNCTURE: CPT | Performed by: NURSE PRACTITIONER

## 2019-05-16 PROCEDURE — 85027 COMPLETE CBC AUTOMATED: CPT | Performed by: NURSE PRACTITIONER

## 2019-05-16 PROCEDURE — 25000128 H RX IP 250 OP 636: Performed by: FAMILY MEDICINE

## 2019-05-16 PROCEDURE — 99233 SBSQ HOSP IP/OBS HIGH 50: CPT | Performed by: NURSE PRACTITIONER

## 2019-05-16 PROCEDURE — 80048 BASIC METABOLIC PNL TOTAL CA: CPT | Performed by: NURSE PRACTITIONER

## 2019-05-16 RX ORDER — HALOPERIDOL 5 MG/ML
2 INJECTION INTRAMUSCULAR ONCE
Status: DISCONTINUED | OUTPATIENT
Start: 2019-05-16 | End: 2019-05-18 | Stop reason: HOSPADM

## 2019-05-16 RX ORDER — LEVOFLOXACIN 750 MG/1
750 TABLET, FILM COATED ORAL DAILY
Status: DISCONTINUED | OUTPATIENT
Start: 2019-05-16 | End: 2019-05-18 | Stop reason: HOSPADM

## 2019-05-16 RX ADMIN — IPRATROPIUM BROMIDE AND ALBUTEROL SULFATE 3 ML: .5; 3 SOLUTION RESPIRATORY (INHALATION) at 07:41

## 2019-05-16 RX ADMIN — LEVOFLOXACIN 750 MG: 750 TABLET, FILM COATED ORAL at 14:38

## 2019-05-16 RX ADMIN — AZITHROMYCIN 250 MG: 250 TABLET, FILM COATED ORAL at 20:19

## 2019-05-16 RX ADMIN — LEVOTHYROXINE SODIUM 125 MCG: 25 TABLET ORAL at 06:40

## 2019-05-16 RX ADMIN — TAMSULOSIN HYDROCHLORIDE 0.8 MG: 0.4 CAPSULE ORAL at 20:19

## 2019-05-16 RX ADMIN — TAZOBACTAM SODIUM AND PIPERACILLIN SODIUM 4.5 G: 500; 4 INJECTION, SOLUTION INTRAVENOUS at 10:38

## 2019-05-16 RX ADMIN — IPRATROPIUM BROMIDE AND ALBUTEROL SULFATE 3 ML: .5; 3 SOLUTION RESPIRATORY (INHALATION) at 11:44

## 2019-05-16 RX ADMIN — LEVETIRACETAM 750 MG: 500 TABLET, FILM COATED ORAL at 20:31

## 2019-05-16 RX ADMIN — LEVETIRACETAM 750 MG: 500 TABLET, FILM COATED ORAL at 06:38

## 2019-05-16 RX ADMIN — METOPROLOL SUCCINATE 50 MG: 50 TABLET, EXTENDED RELEASE ORAL at 09:44

## 2019-05-16 RX ADMIN — APIXABAN 5 MG: 5 TABLET, FILM COATED ORAL at 09:44

## 2019-05-16 RX ADMIN — TAZOBACTAM SODIUM AND PIPERACILLIN SODIUM 4.5 G: 500; 4 INJECTION, SOLUTION INTRAVENOUS at 04:50

## 2019-05-16 RX ADMIN — APIXABAN 5 MG: 5 TABLET, FILM COATED ORAL at 20:19

## 2019-05-16 RX ADMIN — IPRATROPIUM BROMIDE AND ALBUTEROL SULFATE 3 ML: .5; 3 SOLUTION RESPIRATORY (INHALATION) at 17:33

## 2019-05-16 RX ADMIN — PANTOPRAZOLE SODIUM 40 MG: 40 TABLET, DELAYED RELEASE ORAL at 06:40

## 2019-05-16 ASSESSMENT — ACTIVITIES OF DAILY LIVING (ADL)
ADLS_ACUITY_SCORE: 29

## 2019-05-16 ASSESSMENT — MIFFLIN-ST. JEOR: SCORE: 1518.38

## 2019-05-16 NOTE — PROGRESS NOTES
"Patient with tachypnea and oxygen saturations maintaining 94-96% on room air. Patient conintues to be confused calling out for \"mommy and daddy\" and stating \"I'm short of breath\" and \"it hurts to breath.\"  Dr. Rios notified and MD came to bedside to assess. Dr. Rios ordered IV haldol for patient anxiety/confusion. Will continue to assess.   "

## 2019-05-16 NOTE — PLAN OF CARE
"Pt alert and oriented to self. Reorientation provided to pt. Pt up with 1 assist. Tolerates activity with some shortness of breath. Pt's vitals stable on room air and are as follows: /88 (BP Location: Left arm)   Pulse 86   Temp 96.4  F (35.8  C) (Oral)   Resp 20   Ht 1.753 m (5' 9\")   Wt 97.4 kg (214 lb 11.7 oz)   SpO2 95%   BMI 31.71 kg/m  . Pt denies pain. Meds given as ordered. IV antibiotic discontinued and oral antibiotic started. Ongoing explanation provided to pt.     "

## 2019-05-16 NOTE — PLAN OF CARE
Patient continues to be confused and hallucinating at times, only orientated to self. See progress note documenting shortness of breath episode.  After patient was able to calm down, patient remained calm and cooperative the rest of the evening.  Ordered haldol was not given for this reason. Vital signs stable. Patient remains on room air. Patient did not have interest in eating tonight and only had a few bites of applesauce with evening pills.

## 2019-05-16 NOTE — PROGRESS NOTES
No charge notes  Was called by nurse. Patient become confused, seem to talk to a person that is not in the room. Was noted to be tachycardic initially but when I was in the room patient was sleeping. I was able to wake her up. Chest sound clear,   Will do 1x cxr, will do lactic acid. Will do 1x 2mg iv haldol too for the confusion.   It was noted that patient has hx of recurrent confusion. Seem to be sun downing

## 2019-05-16 NOTE — PROGRESS NOTES
"1830: Patient with tachypnea with oxygen saturations maintaining 94-96% on room air. Patient conintues to be confused and hallucinating calling out for \"mommy and daddy\" and stating \"I'm short of breath\" and \"it hurts to breath.\" Patient is seeing/calling out for pet in the room. Unable to administer evening Keppra due to patient's confusion and inability follow directions to open up eyes or mouth. Dr. Rios notified and MD came to bedside to assess. Dr. Rios ordered IV haldol for patient anxiety/confusion. Will continue to assess.     1840:  Patient's IV access infiltrated at this time. Charge RN and another staff RN attempted IV placement x 5.  This RN able to place new IV in right forearm around 1900.      1900: Patient is calm and sleeping at this time. Will hold haldol for now. CXR and lactate complete. Lactate level of 1.1. CXR pending. Will continue to monitor.  Unable to administer oral medications at this time. Will attempt later this evening.     2030:  Patient still confused and hallucinating about pet in room, however, able to follow commands, open eyes, and swallow pills safely.     2130:  Patient resting comfortably in bed. Appears to be sleeping at this time. Will continue to monitor mental and respiratory status.                 "

## 2019-05-16 NOTE — PROGRESS NOTES
[facility]    Medicine Progress Note - Hospitalist Service       Date of Admission:  5/13/2019  Assessment & Plan      Kristine Cosby is a 79 year old female admitted on 5/13/2019. She presented with altered mental status with acute shortness of breath.  She lives at [facility] and after dinner became acutely short of air with increased shortness of breath and hypoxemia requiring oxygen. She was transferred by ambulance to the hospital.  On arrival she was afebrile with a tachycardia and tachypnea.  She was requiring supplemental oxygen.  Blood gases were normal.  Laboratory work showing normal CBC and electrolytes with a normal lactic acid of 1.2.  BNP slightly elevated at 2259 with chest x-ray showing a left basilar infiltrate versus atelectasis.  Patient has chronic A. fib and is taking Eliquis for stroke prophylaxis.  Urinalysis was abnormal with moderate LE T and 462 white cells.  Patient has a recent history since December of altered mental status with episodes of unresponsiveness followed by spells where she talks baby talk.  Initial event was hypoglycemic encephalopathy in December but is been hospitalized for possible stroke in the consideration of whether she has seizures.  She was seen as an outpatient on the day of admission by neurology with an EEG and has follow-up neuro appointment in June.  Patient's altered status possibly secondary to pneumonia, consider aspiration pneumonitis versus secondary to UTI.  Patient admitted to inpatient status and started on broad-spectrum antibiotics including Zosyn and azithromycin. Procalcitonin ordered and came back less than 0.05.  She has history of dysphagia and is supposed be on a dysphagia diet with thickened liquids but evidently does not follow this diet completely and this was also consider aspiration pneumonitis. Expect she will be in the hospital for at least 2 days    Encephalopathy  Assessment: Patient  presented with altered mental status, not waking up normally. Recent recurrent history of spells of unresponsiveness associated at times with talking baby talk. History of encephalopathy secondary to low blood sugars in December 2018. Other admissions for similar symptoms related to infections including urinary tract infections. Recently started on Keppra for consideration that it could be seizures. 5/16-Patient more alert and at baseline mental status. Denies pain and shortness of breath. Patient is afebrile and hemodynamically stable. Maintaining oxygen saturations above 90% on room air. Urine culture grew Escherichia coli with susceptibility testing completed  Plan: Continue oral azithromycin. Stop IV Zosyn and start oral Levaquin for coverage of UTI and possible pneumonia. Will continue to monitor patient in hospital tonight to ensure ongoing improvement on oral antibiotics. Suspect patient will be able to discharge back to [facility] tomorrow if she remains stable. Follow up as outpatient regarding EEG results.     Urinary tract infection without hematuria, site unspecified  Assessment: Patient not complaining of any symptoms with urinalysis abnormal and altered mental status. Patient started on IV Zosyn and IV Azithromycin. Urine culture growing E coli with susceptibility testing completed  Plan: Continue oral azithromycin and start oral Levaquin as above.     Pneumonia due to infectious organism-possible  Assessment: Left basilar infiltrate versus atelectasis noted. Broad-spectrum antibiotics with Zosyn started. Consider that it could be possible aspiration and that she typically treats on her dysphasia diet with thin liquids and that her acute shortness of breath occurred after dinner. Patient denies shortness of breath and is maintaining oxygen saturations above 90% on room air. Patient remains afebrile. Lung sounds are coarse with a productive cough.   Plan: Continue antibiotic treatment  as above. Has as needed albuterol nebulizer treatments available.      Chronic systolic heart failure (H)  Assessment: Related to chronic A. fib.  Last echocardiogram showing an EF of 40%. Patient is on Eliquis for stroke prevention.   Plan: Continue home dose of Toprol XL. Continue Eliquis for stroke prevention    Dysphagia  Assessment: Noted since her hypoglycemic event in December 2018. Has been seen since by speech-language therapy with recommendation for dysphasia diet level 3 with nectar thickened liquids. Patient does not follow her diet with family aware, oftentimes drinking thin liquids.   Plan: Dysphagia diet with nectar thickened liquids ordered while patient in hospital    Persistent atrial fibrillation (H)  Assessment: Chronic issue, on rate control with metoprolol, taking Eliquis  Plan: Continue current meds, continue to monitor with telemetry    Acquired hypothyroidism  Assessment: Chronic and managed with daily Synthroid  Plan: Home dose of Synthroid ordered while patient in hospital     Hyperlipidemia LDL goal <100  Taking daily Lipitor  Restart at discharge     Diet: Combination Diet Regular Diet Adult; Dysphagia Diet Level 3: Advanced; Nectar Thickened Liquids (pre-thickened or use instant food thickener)    DVT Prophylaxis: Taking Eliquis  Szymanski Catheter: not present  Code Status: Full Code      Disposition Plan   Expected discharge: Tomorrow, recommended to prior living arrangement once tolerating PO medications and mental status at baseline.  Entered: Walter Paulino NP 05/16/2019, 1:26 PM       The patient's care was discussed with the Attending Physician, Dr. Rios and Patient.    Walter Paulino NP  Hospitalist Service  [facility]    ______________________________________________________________________    Interval History   Patient seen sitting up in chair with no new complaints. She is alert and oriented to self, place, and time. Inconsistently oriented to  situation. Patient denies pain and shortness of breath. She is maintaining oxygen saturations above 90% on room air. Patient continues to have coarse lung sounds with expiratory wheezes bilaterally. Patient is afebrile and hemodynamically stable. Tolerating oral intake with no nausea or vomiting. Maintaining hydration status.     Data reviewed today: I reviewed all medications, new labs and imaging results over the last 24 hours.     Physical Exam   Vital Signs: Temp: 96.4  F (35.8  C) Temp src: Oral BP: 152/88 Pulse: 86 Heart Rate: 90 Resp: 20 SpO2: 95 % O2 Device: None (Room air)    Weight: 214 lbs 11.65 oz  Constitutional: awake, alert, cooperative, no apparent distress, and appears stated age  Respiratory: no increased work of breathing; coarse lung sounds throughout; bilateral expiratory wheeze  Cardiovascular: regular rate and rhythm, normal S1 and S2 and no murmur noted  GI: normal bowel sounds, non-distended and non-tender  Musculoskeletal: no lower extremity pitting edema present; there is no redness, warmth, or swelling of the joints; full range of motion noted  Neurologic: Awake, alert, oriented to name, place and time; inconsistently oriented to situation; able to answer questions and follow commands; no focal neurologic deficits    Data   Recent Labs   Lab 05/16/19  0556 05/15/19  0551 05/14/19  0909 05/13/19  2215   WBC 4.4 3.8* 3.4* 4.4   HGB 11.1* 10.6*  --  12.0   MCV 98 99  --  99   * 146*  --  156   INR  --   --   --  1.69*    142 145* 141   POTASSIUM 3.6 4.0 4.1 3.8   CHLORIDE 111* 110* 112* 106   CO2 27 26 25 25   BUN 10 10 11 14   CR 0.76 0.93 0.92 1.06*   ANIONGAP 6 6 8 10   IMKE 8.4* 8.2* 8.3* 8.6   * 102* 109* 124*   ALBUMIN  --   --   --  3.1*   PROTTOTAL  --   --   --  7.0   BILITOTAL  --   --   --  0.5   ALKPHOS  --   --   --  80   ALT  --   --   --  11   AST  --   --   --  13   LIPASE  --   --   --  52*     Medications     - MEDICATION INSTRUCTIONS -       NaCl 10  mL/hr at 05/15/19 1635       apixaban ANTICOAGULANT  5 mg Oral BID     azithromycin  250 mg Oral Daily     ipratropium - albuterol 0.5 mg/2.5 mg/3 mL  3 mL Nebulization 4x Daily     levETIRAcetam  750 mg Oral Q12H     levofloxacin  750 mg Oral Daily     levothyroxine  125 mcg Oral QAM AC     metoprolol succinate ER  50 mg Oral Daily     pantoprazole  40 mg Oral QAM AC     senna-docusate  2 tablet Oral BID     sodium chloride (PF)  3 mL Intracatheter Q8H     tamsulosin  0.8 mg Oral At Bedtime

## 2019-05-16 NOTE — PLAN OF CARE
Problem: Adult Inpatient Plan of Care  Goal: Optimal Comfort and Wellbeing  Outcome: Improving     Pt engaged in conversation with this writer, pleasant and cooperative, confused, grouchy with orientation questions.    Patient ate 50% of dinner, ambulated with one assist and walker to the bathroom.    No complaints of pain. Pt flagged for lactate, results came back 1.9, flagged due to heart rate.     Pt was in Afib with frequent PVC's. Patient on remote tele.    Maryanne Bashir RN on 5/15/2019 at 7:52 PM

## 2019-05-16 NOTE — PROGRESS NOTES
"S-(situation): End of shift note    B-(background): Encephalopathy    A-(assessment): Alert and oriented. VSS. Afebrile. Denies having N/V and pain. Has SOB with exertion. Up with an assist X1 with walker and gait belt. PureWick in for the night since pt has been having stress incontinence with cough. Non productive cough. Lung sounds are coarse and on RA. Fluids running @ 10ml/hr. IV abx per providers orders. Telemetry intact and showing Afib. Continue to monitor. Blood pressure 135/68, pulse 86, temperature 98.3  F (36.8  C), temperature source Oral, resp. rate 20, height 1.753 m (5' 9\"), weight 97.4 kg (214 lb 11.7 oz), SpO2 91 %.    R-(recommendations): Continue to monitor    "

## 2019-05-16 NOTE — PROGRESS NOTES
Reason for Follow up: not ready for discharge today    Anticipated discharge needs: this writer notified Maggie at [facility], patient not ready to dischage today.  Return to [facility] when ready.      Next steps:Return to [facility] when ready    Discharge Planner   Discharge Plans in progress: return to LTC  Barriers to discharge plan: medical stability       Entered by: Lilly Larson 05/16/2019 10:51 AM       Lilly Larson RN  [facility] [phone]  [facility] [phone]

## 2019-05-16 NOTE — PROGRESS NOTES
S: respiratory therapy shift note  B: encephalopathy, PNA, CHF  A: Bs are crackles in both lower lobes with expiratory wheezes in RUL.  Been on room air all day.    R: continue to see patient until discharge.    Nirav Lara, RT on 5/16/2019 at 5:42 PM

## 2019-05-17 LAB
BACTERIA SPEC CULT: ABNORMAL
BACTERIA SPEC CULT: ABNORMAL
LACTATE BLD-SCNC: 1 MMOL/L (ref 0.7–2)
Lab: ABNORMAL
SPECIMEN SOURCE: ABNORMAL

## 2019-05-17 PROCEDURE — 25000125 ZZHC RX 250: Performed by: FAMILY MEDICINE

## 2019-05-17 PROCEDURE — 99233 SBSQ HOSP IP/OBS HIGH 50: CPT | Performed by: NURSE PRACTITIONER

## 2019-05-17 PROCEDURE — 12000000 ZZH R&B MED SURG/OB

## 2019-05-17 PROCEDURE — 36415 COLL VENOUS BLD VENIPUNCTURE: CPT | Performed by: FAMILY MEDICINE

## 2019-05-17 PROCEDURE — 25000132 ZZH RX MED GY IP 250 OP 250 PS 637: Performed by: NURSE PRACTITIONER

## 2019-05-17 PROCEDURE — 25000132 ZZH RX MED GY IP 250 OP 250 PS 637: Performed by: HOSPITALIST

## 2019-05-17 PROCEDURE — 83605 ASSAY OF LACTIC ACID: CPT | Performed by: FAMILY MEDICINE

## 2019-05-17 PROCEDURE — 40000275 ZZH STATISTIC RCP TIME EA 10 MIN

## 2019-05-17 PROCEDURE — 25000132 ZZH RX MED GY IP 250 OP 250 PS 637: Performed by: FAMILY MEDICINE

## 2019-05-17 PROCEDURE — 94640 AIRWAY INHALATION TREATMENT: CPT

## 2019-05-17 PROCEDURE — 94640 AIRWAY INHALATION TREATMENT: CPT | Mod: 76

## 2019-05-17 PROCEDURE — 40000274 ZZH STATISTIC RCP CONSULT EA 30 MIN

## 2019-05-17 RX ORDER — OLANZAPINE 2.5 MG/1
2.5 TABLET, FILM COATED ORAL 2 TIMES DAILY
Status: DISCONTINUED | OUTPATIENT
Start: 2019-05-17 | End: 2019-05-18 | Stop reason: HOSPADM

## 2019-05-17 RX ORDER — OLANZAPINE 2.5 MG/1
2.5 TABLET, FILM COATED ORAL 2 TIMES DAILY
Status: DISCONTINUED | OUTPATIENT
Start: 2019-05-17 | End: 2019-05-17

## 2019-05-17 RX ORDER — OLANZAPINE 2.5 MG/1
5 TABLET, FILM COATED ORAL 2 TIMES DAILY
Status: DISCONTINUED | OUTPATIENT
Start: 2019-05-17 | End: 2019-05-17

## 2019-05-17 RX ADMIN — IPRATROPIUM BROMIDE AND ALBUTEROL SULFATE 3 ML: .5; 3 SOLUTION RESPIRATORY (INHALATION) at 07:44

## 2019-05-17 RX ADMIN — OLANZAPINE 2.5 MG: 2.5 TABLET, FILM COATED ORAL at 10:11

## 2019-05-17 RX ADMIN — AZITHROMYCIN 250 MG: 250 TABLET, FILM COATED ORAL at 20:52

## 2019-05-17 RX ADMIN — LEVETIRACETAM 750 MG: 500 TABLET, FILM COATED ORAL at 06:06

## 2019-05-17 RX ADMIN — SENNOSIDES AND DOCUSATE SODIUM 2 TABLET: 8.6; 5 TABLET ORAL at 20:54

## 2019-05-17 RX ADMIN — METOPROLOL SUCCINATE 50 MG: 50 TABLET, EXTENDED RELEASE ORAL at 08:14

## 2019-05-17 RX ADMIN — IPRATROPIUM BROMIDE AND ALBUTEROL SULFATE 3 ML: .5; 3 SOLUTION RESPIRATORY (INHALATION) at 00:24

## 2019-05-17 RX ADMIN — IPRATROPIUM BROMIDE AND ALBUTEROL SULFATE 3 ML: .5; 3 SOLUTION RESPIRATORY (INHALATION) at 17:53

## 2019-05-17 RX ADMIN — APIXABAN 5 MG: 5 TABLET, FILM COATED ORAL at 20:52

## 2019-05-17 RX ADMIN — IPRATROPIUM BROMIDE AND ALBUTEROL SULFATE 3 ML: .5; 3 SOLUTION RESPIRATORY (INHALATION) at 11:48

## 2019-05-17 RX ADMIN — IPRATROPIUM BROMIDE AND ALBUTEROL SULFATE 3 ML: .5; 3 SOLUTION RESPIRATORY (INHALATION) at 21:51

## 2019-05-17 RX ADMIN — OLANZAPINE 2.5 MG: 2.5 TABLET, FILM COATED ORAL at 20:52

## 2019-05-17 RX ADMIN — TAMSULOSIN HYDROCHLORIDE 0.8 MG: 0.4 CAPSULE ORAL at 20:52

## 2019-05-17 ASSESSMENT — ACTIVITIES OF DAILY LIVING (ADL)
ADLS_ACUITY_SCORE: 29

## 2019-05-17 ASSESSMENT — MIFFLIN-ST. JEOR: SCORE: 1507.38

## 2019-05-17 NOTE — PROGRESS NOTES
S: shift update  B: daughters x2 at bedside, many questions re: pt. Current state and decrease in responsiveness. Unable to answer all questions with clarity, Courtney NP summoned who came to room and spent over 30 minutes talking with the daughters about the many issues the pt. Is currently facing. EEG results still pending as waiting for outside health care facility to fax. Appears infection status is improving based on labs. Much time spent talking about dementia and the various stages and why that  Could also be playing a factor in pt. Responsiveness. Daughters express appreciation for time given by Courtney, in addition, they would like to look into a code status for the pt, but will wait for test result to arrive. Courtney in contact with [place] to see if pt. May return there but would require a higher level of care than what presented before this hospital admission. Facundo RENDON

## 2019-05-17 NOTE — PLAN OF CARE
Problem: Adult Inpatient Plan of Care  Goal: Optimal Comfort and Wellbeing  5/17/2019 0631 by Maryanne Bashir RN  Outcome: No Change  5/16/2019 1805 by Sierra Martin RN  Outcome: No Change     Problem: Fall Injury Risk  Goal: Absence of Fall and Fall-Related Injury  5/17/2019 0631 by Maryanne Bashir RN  Outcome: No Change  5/16/2019 1805 by Sierra Martin RN  Outcome: No Change     Problem: Gas Exchange Impaired  Goal: Optimal Gas Exchange  5/17/2019 0631 by Maryanne Bashir RN  Outcome: No Change     Problem: Confusion Acute  Goal: Optimal Cognitive Function  5/17/2019 0631 by Maryanne Bashir RN  Outcome: No Change  5/16/2019 1805 by Sierra Martin RN    Outcome: No Change     S-(situation): shift note    B-(background): respiratory distress and AMS    A-(assessment): confused, disoriented x3, becomes tachypneic with anxiety, easily resolved with guidance for deep breathing. On RA > 92%. VSS. On remote tele, Afib 110-120's. MD aware of rate. Incontinent of urine, skin intact. No acute events overnight.    R-(recommendations): Continue to follow POC.

## 2019-05-17 NOTE — PROGRESS NOTES
Care Transitions Progress note    Reason for Follow up: This writer called the patients [employer] for coverage for possible need of stretcher transportation.    Anticipated DC Needs: No Stretcher transportation available over the weekend. [facility]ried [facility]. Left message with  [facility] [phone] awaiting return call    Next Steps: Transportation using a w/c if able is set up for 10:30am tomorrow 5/18/2019 with [facility] [phone] call [place] to cancel if patient needs a stretcher or not ready for discharge.    Vashti Chaparro RN Care Coordinator  [facility] [phone]  [facility] [phone]

## 2019-05-17 NOTE — PROGRESS NOTES
Care Transitions Progress note    Reason for Follow up: This writer spoke with the patients daughter Gloria Cosby regarding plan for discharge for tomorrow 5/18/2019 in agreement. This writer spoke with Jackeline hayden nurse at [facility], they are aware of the patients return plan for tomorrow saturday 5/18/2019 with [facility] transport  at 10:30AM in agreement.    Anticipated DC Needs: [facility] to transport the patient via w/c at 10:30 5/18/2019    Next Steps: Awaiting medical stabilty    Vashti Chaparro RN Care Coordinator  [facility] [phone]  [facility] [phone]        Name: Kristine Cosby    MRN#: [id number]    Reason for Hospitalization: Acute respiratory distress [R06.03]    Discharge Date: 05/18/19    Patient/Family Response to DC Plan: Gloria Cosby Daughter in agreement    Transportation: [facility] 10:30 am 5/18/2019    Other Providers (Care Coordinator, [facility], [facility] etc.):  No    Future Appointments : No future appointments.    Discharge Disposition: long term care facility, return to [facility] (Phone: [phone] Fax: [phone])    Vashti Chaparro RN Care Coordinator  [facility] [phone]  [facility] [phone]

## 2019-05-17 NOTE — PROGRESS NOTES
S: Respiratory Therapy  B: encephalopathy, PNA, CHF  A: Bs are crackles in both lower lobes with expiratory wheezes post neb treatments.  SpO2 on room air is 98%   R: RCP will follow until discharge.

## 2019-05-17 NOTE — PLAN OF CARE
Pt.still has some hypertension with blood pressure checks her heart rate was in the 110's-120's this morning and is now in the 90's.Her metoprolol was increased.Her lungs have exp.wheeze.Pt.has not eaten much this shift only pudding with her pills.Pt.spit out her morning pills this morning except for metoprolol and  notified.She will open her eyes spontaneously at times and then other times she does not answer.Pt.started on zyprexa.She is incontinent of urine and has been turned every 2 hours.Pt.has a rash between her buttocks and rectal area barrier cream applied.Tele is a-fib with uncontrolled vent.rate.

## 2019-05-17 NOTE — PROGRESS NOTES
[facility]    Medicine Progress Note - Hospitalist Service       Date of Admission:  5/13/2019  Assessment & Plan     Encephalopathy   Kristine Cosby is a 79 year old female admitted on 5/13/2019. She presented with altered mental status with acute shortness of breath.  She lives at [facility] and after dinner became acutely short of air with increased shortness of breath and hypoxemia requiring oxygen. She was transferred by ambulance to the hospital.  On arrival she was afebrile with a tachycardia and tachypnea.  She was requiring supplemental oxygen.  Blood gases were normal.  Laboratory work showing normal CBC and electrolytes with a normal lactic acid of 1.2.  BNP slightly elevated at 2259 with chest x-ray showing a left basilar infiltrate versus atelectasis.  Patient has chronic A. fib and is taking Eliquis for stroke prophylaxis.  Urinalysis was abnormal with moderate LE T and 462 white cells.  Patient has a recent history since December of altered mental status with episodes of unresponsiveness followed by spells where she talks baby talk. Patient was hospitalized at that time after being found down.  Patient with prolonged hospitalization for hypoglycemic Initial event was hypoglycemic neurologic injury.  Patient had been intubated.  Patient did well initially after discharge and acute rehab.  Patient was transitioned to [facility] short-term rehab and then long-term care due to her inability to return to initial baseline.  Patient has been hospitalized for stroke evaluation as well as seizure evaluation.  Patient had been placed on Eliquis as well as Keppra.  She was seen as an outpatient on the day of admission by neurology with an EEG and has follow-up neuro appointment in June.    In general patient has had a decline in cognitive status since December.  Patient in the last 2 months has had an increased number of days and hours in which she is in this  "\"childlike state\".  Patient has also been forgetful, often refusing to eat, hard to redirect, losing short-term memory, and becoming somnolent for longer periods of time.  Each of these \"episode\" has been accompanied by infection or possible source.  With each \"episode\" she has not returned to to previous baseline for either physical function status or cognitive function.    Patient's current altered status possibly secondary to pneumonia, likely aspiration pneumonitis versus secondary to UTI.  Patient admitted and started on broad-spectrum antibiotics including Zosyn and azithromycin.  She has history of dysphagia and is supposed be on a dysphagia diet with thickened liquids but evidently does not follow this diet completely and this was also consider aspiration pneumonitis.     Discussed patient's current status and ongoing care with daughters Gloria and Amarilys.  Both daughters are very concerned about her cognitive status and the overall decline that they have been seen as a family.  Patient's daughters feel as if they do not have the answers for patient's decline.  Discussed patient's CT scan results with right-sided encephalomalacia.  Patient is exhibiting multiple signs of progressive cognitive decline.  Discussed that diagnosis of dementia and other neurologic illnesses are difficult when patient cannot cooperate with testing.  Results of EEG from May 13 are pending at this time.  There was no known diagnosis of seizure activity with previous EEG.  Patient has been on Keppra without change and even noted worsening in her cognitive function.  Patient is also been on Eliquis for stroke prevention.  Patient family feels as if they would be able to move forward with the goals of care conversation if they knew a for sure diagnosis of what was happening with her mom.  I did call and talk to Dr. Arellano, neurologist at [facility].  He indicated that there would be no specific new testing that could be done " "at this time.  The only thing that is missing is a lumbar puncture, she is not showing signs of CNS infection and is currently being treated for pneumonia and UTI.  If patient cognitive status is significantly different, would repeat a CT scan.  It is very likely some type of cognitive decline that is not going to improve.  This kind of illness is very difficult to predict as well as difficult for family to cope with as there is no \" one test\" to confirm a specific diagnosis of \"dementia\".  It is possible that the patient is just not able to improve, with cognitive dysfunction as a person has frequent and recurrent infections it is more and more difficult for cognitive status to return to a baseline.     Discussed CODE STATUS, patient to daughter state they will are leaning towards changing this to DNR/DNI.  They will need to talk to their other family members.  Discussed that even if patient does improve, if she were ill enough where she would need CPR our ability to return her to a baseline or improved cognitive function is very poor.     Patient's cognition is waxing and waning.  Patient will have hours of alertness.  Patient will then not to be able to function, will not feed herself, will not take medication, and will talk in a childlike tone.  Patient has patient more alert and at baseline mental status.  Patient was started on Zyprexa 2.5 mg twice daily today.  Patient occasionally will complain of chest wall pain with coughing.  Patient has had mild episodes of increased shortness of breath and anxiety.  Patient is afebrile and hemodynamically stable. Maintaining oxygen saturations above 90% on room air. Urine culture grew Escherichia coli with susceptibility testing completed    Plan: Continue oral azithromycin and Levaquin for coverage of UTI and possible pneumonia.  Patient was improving overnight although this morning she was somnolent and has not been able to \"come out of it\" today.  This afternoon " patient was not able to really open her eyes but was able to talk with her family.  Patient was alert only to self this afternoon.  She was telling stories of her children's early childhood which is roughly 40 years ago.  Will continue to monitor patient in hospital [facility] to ensure ongoing improvement on oral antibiotics.  Suspect patient will be able to discharge back to [facility].  The difficulty and discharge is at the patient is at a significant decline from baseline.  She is currently a total care and is not able to sit on her own.  Patient was not able to feed herself lunch.  Patient also declined most of her medications today.  We will need to ensure that she gets her heart medications as well as her antibiotics.  If she continues to refuse to eat we will need to restart IV fluids and reassess patient situation.     Urinary tract infection without hematuria, site unspecified  Assessment: Patient not complaining of any symptoms with urinalysis abnormal and altered mental status. Patient started on IV Zosyn and IV Azithromycin. Urine culture growing E coli with susceptibility testing completed  Plan: Continue oral azithromycin and oral Levaquin     Pneumonia due to infectious organism-possible  Assessment: Left basilar infiltrate versus atelectasis noted. Broad-spectrum antibiotics with Zosyn started. Consider that it could be possible aspiration and that she typically treats on her dysphasia diet with thin liquids and that her acute shortness of breath occurred after dinner. Patient denies shortness of breath and is maintaining oxygen saturations above 90% on room air. Patient remains afebrile. Lung sounds are coarse with a productive cough.   Plan: Continue antibiotic treatment as above.  Continue as needed albuterol nebulizer treatments available.       Chronic systolic heart failure (H)  Assessment: Related to chronic A. fib.  Last echocardiogram showing an EF of 40%. Patient is on  Eliquis for stroke prevention.   Plan: Continue home dose of Toprol XL. Continue Eliquis for stroke prevention     Dysphagia  Assessment: Noted since her hypoglycemic event in December 2018. Has been seen since by speech-language therapy with recommendation for dysphasia diet level 3 with nectar thickened liquids. Patient does not follow her diet with family aware, often times drinking thin liquids.  Discussed with family if she were to continue to not follow her diet we would need to have a more clear discussion on goals of care with quality of life versus quantity of life.  It would be okay if patient would choose to drink thin liquids , but the family would need to be aware that this would cause frequent infections and most likely shorten her life.  Plan: Dysphagia diet with nectar thickened liquids ordered while patient in hospital     Persistent atrial fibrillation (H)  Assessment: Chronic issue, on rate control with metoprolol, taking Eliquis  Plan: Continue current meds, continue to monitor with telemetry     Acquired hypothyroidism  Assessment: Chronic and managed with daily Synthroid  Plan: Home dose of Synthroid ordered while patient in hospital      Hyperlipidemia LDL goal <100  Taking daily Lipitor  Restart at discharge      Diet: Combination Diet Regular Diet Adult; Dysphagia Diet Level 3: Advanced; Nectar Thickened Liquids (pre-thickened or use instant food thickener)    DVT Prophylaxis: Eliquis  Szymanski Catheter: not present  Code Status: Full Code      Disposition Plan   Expected discharge: 1-2 days, recommended to prior living arrangement once adequate pain management/ tolerating PO medications, antibiotic plan established and safe disposition plan/ TCU bed available.  Entered: Courtney Lopez CNP 05/17/2019, 1:05 PM       The patient's care was discussed with the Attending Physician, Dr. Rios, Bedside Nurse, Care Coordinator/, Patient and Patient's Family.    Courtney Lopez,  "CNP  Hospitalist Service  [facility]    ______________________________________________________________________    Interval History   Patient seen while lying in bed.  Patient was more alert this morning and interactive with nursing staff, although this was in a childlike voice.  Patient was speaking to the nurses as if she was a child and they were the \"mom\".  Patient did initially refuse several medications and refused to open her mouth.  Nursing staff was able to get a few medications in.  Since around 11 AM patient has been relatively somnolent, required nursing staff to feed her.  Patient has been refusing to eat today and has only taken a few bites.  Patient has been afebrile and oxygenating well.  She does have episodes of shortness of breath with anxiety and coughing.  Patient blood pressure and heart rate have been mildly elevated, although stable when she does take her medication.  Patient remains in atrial fibrillation.  Patient is incontinent of bowel and bladder at this time.  This afternoon patient is not able to sit up on her own.  Patient has had several of these \"episodes\" in the past but they have only been for hours.  Patient's family states her time of being altered has increased over the last 2 months.  Unclear discharge plan as patient does live in long-term care, although she has been able to sit in a wheelchair and move herself through the unit.  She has also been able to feed herself and engage in conversation.    Data reviewed today: I reviewed all medications, new labs and imaging results over the last 24 hours.      Physical Exam   Vital Signs: Temp: 97.1  F (36.2  C) Temp src: Oral BP: 142/68 Pulse: 108 Heart Rate: 108 Resp: 24 SpO2: 92 % O2 Device: None (Room air)    Weight: 212 lbs 4.85 oz  Exam:  Constitutional: mild distress, uncooperative and nearly unresponsive  Cardiovascular: Irregular heartbeat.  Tachycardic at times.  Good perfusion with positive " CMS.  Positive peripheral pulses.  Respiratory: Poor diaphragmatic excursion. Lungs congested.  Gastrointestinal: Abdomen soft, non-tender. BS normal.   Musculoskeletal: Difficult to assess as patient was uncooperative and unable to sit up.   Skin: no suspicious lesions or rashes and diffuse ecchymotic areas on remedies.  Reddened perineum.  Neurologic: positive findings: confused, delirious, no focal deficits.      Data   Recent Labs   Lab 05/16/19  0556 05/15/19  0551 05/14/19  0909 05/13/19  2215   WBC 4.4 3.8* 3.4* 4.4   HGB 11.1* 10.6*  --  12.0   MCV 98 99  --  99   * 146*  --  156   INR  --   --   --  1.69*    142 145* 141   POTASSIUM 3.6 4.0 4.1 3.8   CHLORIDE 111* 110* 112* 106   CO2 27 26 25 25   BUN 10 10 11 14   CR 0.76 0.93 0.92 1.06*   ANIONGAP 6 6 8 10   MIKE 8.4* 8.2* 8.3* 8.6   * 102* 109* 124*   ALBUMIN  --   --   --  3.1*   PROTTOTAL  --   --   --  7.0   BILITOTAL  --   --   --  0.5   ALKPHOS  --   --   --  80   ALT  --   --   --  11   AST  --   --   --  13   LIPASE  --   --   --  52*     Recent Results (from the past 24 hour(s))   XR Chest Port 1 View    Narrative    CHEST PORTABLE ONE VIEW   5/16/2019 7:02 PM     HISTORY: Confusion.    COMPARISON: Chest x-rays dated 5/13/2019.    FINDINGS:  Mild widening of mediastinum is noted and is likely due to  technique. There is minimal vascular prominence most consistent mild  vascular congestion. There is a questionable small left pleural fluid  collection. Cardiac silhouette is mildly enlarged. No pneumothorax or  right pleural fluid collection. No fracture. No definite infiltrate.  Degenerative changes of the shoulders are again noted.      Impression    IMPRESSION:  1. Left basilar opacity likely represents a small left pleural fluid  collection.  2. Probable cardiomegaly, small left pleural fluid collection and mild  vascular congestion could represent a component of congestive heart  failure.  3. No definite pneumonia.

## 2019-05-18 VITALS
DIASTOLIC BLOOD PRESSURE: 79 MMHG | WEIGHT: 209.66 LBS | TEMPERATURE: 98 F | OXYGEN SATURATION: 95 % | HEART RATE: 121 BPM | HEIGHT: 69 IN | RESPIRATION RATE: 22 BRPM | SYSTOLIC BLOOD PRESSURE: 140 MMHG | BODY MASS INDEX: 31.05 KG/M2

## 2019-05-18 PROCEDURE — 25000132 ZZH RX MED GY IP 250 OP 250 PS 637: Performed by: HOSPITALIST

## 2019-05-18 PROCEDURE — 40000275 ZZH STATISTIC RCP TIME EA 10 MIN

## 2019-05-18 PROCEDURE — 25000132 ZZH RX MED GY IP 250 OP 250 PS 637: Performed by: NURSE PRACTITIONER

## 2019-05-18 PROCEDURE — 94640 AIRWAY INHALATION TREATMENT: CPT

## 2019-05-18 PROCEDURE — 99239 HOSP IP/OBS DSCHRG MGMT >30: CPT | Performed by: NURSE PRACTITIONER

## 2019-05-18 PROCEDURE — 25000125 ZZHC RX 250: Performed by: FAMILY MEDICINE

## 2019-05-18 PROCEDURE — 25000132 ZZH RX MED GY IP 250 OP 250 PS 637: Performed by: FAMILY MEDICINE

## 2019-05-18 PROCEDURE — 40000274 ZZH STATISTIC RCP CONSULT EA 30 MIN

## 2019-05-18 RX ORDER — AZITHROMYCIN 250 MG/1
250 TABLET, FILM COATED ORAL DAILY
Status: DISCONTINUED | OUTPATIENT
Start: 2019-05-18 | End: 2019-05-18 | Stop reason: HOSPADM

## 2019-05-18 RX ORDER — LEVOFLOXACIN 750 MG/1
750 TABLET, FILM COATED ORAL DAILY
Qty: 10 TABLET | Refills: 0 | Status: SHIPPED | OUTPATIENT
Start: 2019-05-19 | End: 2019-06-12

## 2019-05-18 RX ORDER — OLANZAPINE 2.5 MG/1
2.5 TABLET, FILM COATED ORAL 2 TIMES DAILY
Qty: 60 TABLET | Refills: 0 | Status: SHIPPED | OUTPATIENT
Start: 2019-05-18 | End: 2019-05-21

## 2019-05-18 RX ADMIN — PANTOPRAZOLE SODIUM 40 MG: 40 TABLET, DELAYED RELEASE ORAL at 06:46

## 2019-05-18 RX ADMIN — LEVOTHYROXINE SODIUM 125 MCG: 25 TABLET ORAL at 06:46

## 2019-05-18 RX ADMIN — IPRATROPIUM BROMIDE AND ALBUTEROL SULFATE 3 ML: .5; 3 SOLUTION RESPIRATORY (INHALATION) at 07:17

## 2019-05-18 RX ADMIN — APIXABAN 5 MG: 5 TABLET, FILM COATED ORAL at 09:06

## 2019-05-18 RX ADMIN — LEVOFLOXACIN 750 MG: 750 TABLET, FILM COATED ORAL at 09:06

## 2019-05-18 RX ADMIN — LEVETIRACETAM 750 MG: 500 TABLET, FILM COATED ORAL at 06:45

## 2019-05-18 RX ADMIN — OLANZAPINE 2.5 MG: 2.5 TABLET, FILM COATED ORAL at 09:06

## 2019-05-18 RX ADMIN — SENNOSIDES AND DOCUSATE SODIUM 2 TABLET: 8.6; 5 TABLET ORAL at 09:06

## 2019-05-18 RX ADMIN — AZITHROMYCIN 250 MG: 250 TABLET, FILM COATED ORAL at 09:55

## 2019-05-18 RX ADMIN — METOPROLOL SUCCINATE 50 MG: 50 TABLET, EXTENDED RELEASE ORAL at 09:06

## 2019-05-18 ASSESSMENT — ACTIVITIES OF DAILY LIVING (ADL)
ADLS_ACUITY_SCORE: 28
ADLS_ACUITY_SCORE: 28
ADLS_ACUITY_SCORE: 29

## 2019-05-18 ASSESSMENT — MIFFLIN-ST. JEOR: SCORE: 1495.38

## 2019-05-18 NOTE — PROGRESS NOTES
Care Transitions Progress note    Reason for Follow up: This writer spoke with the patients daughter Gloria regarding the patients planned discharge for today to return to [facility], Patient and daughter in agreement. This writer spoke with Hayley the nurse at [facility], in agreement with the discharge plans for the patient to be transported from the hospital at 10:30 am today.    Anticipated DC Needs: [facility] transport @ 10:30am.    Next Steps: Follow up EKG results with specialty clinic.    Vashti Chaparro RN Care Coordinator  [facility] [phone]  [facility] [phone]      Name: Kristine Cosby    MRN#: [id number]    Reason for Hospitalization: Acute respiratory distress [R06.03]    Discharge Date: 05/18/19    Patient/Family Response to DC Plan: in agreement    Transportation: [facility] @ 10:30 am    Other Providers (Care Coordinator, [facility], [facility] etc.):  No    Future Appointments : No future appointments.    Discharge Disposition: long term care facility [facility] (Phone: [phone] Fax: [phone])    Vashti Chaparro RN Care Coordinator  [facility] [phone]  [facility] [phone]

## 2019-05-18 NOTE — DISCHARGE SUMMARY
[facility]  Hospitalist Discharge Summary       Date of Admission:  5/13/2019  Date of Discharge:  5/18/2019  Discharging Provider: Courtney Lopez CNP      Discharge Diagnoses   Principal Problem:    Encephalopathy  Active Problems:    Acquired hypothyroidism    Chronic systolic heart failure (H)    Dysphagia    Hyperlipidemia LDL goal <100    Persistent atrial fibrillation (H)    Urinary tract infection without hematuria, site unspecified    Pneumonia due to infectious organism-possible    Acute respiratory failure with hypoxia (H)      Follow-ups Needed After Discharge   Follow-up Appointments     Follow Up and recommended labs and tests      Follow up with Nursing home physician.  No follow up labs or test are   needed.  Follow-up with neurology as scheduled.             Unresulted Labs Ordered in the Past 30 Days of this Admission     Date and Time Order Name Status Description    5/13/2019 2147 Blood culture Preliminary     5/13/2019 2147 Blood culture Preliminary       These results will be followed up by Nursing Home provider    Discharge Disposition   Discharged to long-term care facility  Condition at discharge: Stable    Hospital Course        Encephalopathy   Kristine Cosby is a 79 year old female admitted on 5/13/2019. She presented with altered mental status with acute shortness of breath.  She lives at [facility] and after dinner became acutely short of air with increased shortness of breath and hypoxemia requiring oxygen. She was transferred by ambulance to the hospital.  On arrival she was afebrile with a tachycardia and tachypnea.  She was requiring supplemental oxygen.  Blood gases were normal.  Laboratory work showing normal CBC and electrolytes with a normal lactic acid of 1.2.  BNP slightly elevated at 2259 with chest x-ray showing a left basilar infiltrate versus atelectasis.  Patient has chronic A. fib and is taking Eliquis for stroke  "prophylaxis.  Urinalysis was abnormal with moderate LE T and 462 white cells.  Patient has a recent history since December of altered mental status with episodes of unresponsiveness followed by spells where she talks baby talk. Patient was hospitalized at that time after being found down.  Patient with prolonged hospitalization for hypoglycemic Initial event was hypoglycemic neurologic injury.  Patient had been intubated.  Patient did well initially after discharge and acute rehab.  Patient was transitioned to [facility] short-term rehab and then long-term care due to her inability to return to initial baseline.  Patient has been hospitalized for infections, altered mental status, stroke evaluation as well as seizure evaluation.  Patient had been placed on Eliquis as well as Keppra.  She was seen as an outpatient on the day of admission by neurology with an EEG and has follow-up neuro appointment in June.     In general patient has had a decline in cognitive status since December.  Patient initially did well after her hospitalization in December. In the last 2 months she has had an increased number of days and hours in which she is in this \"childlike state\".  Patient has also been forgetful, often refusing to eat, hard to redirect, losing short-term memory, and becoming somnolent for longer periods of time.  Some of these \"episodes\" has been accompanied by infection or possible source.  With each \"episode\" she has not returned to to previous baseline for either physical function status or cognitive function. The difficult part is that she will be \" normal\", up with SBA, eating and having a conversation and then without a noted cause, she will change. There have been several workups without a clear etiology found.     Patient's current altered status possibly secondary to pneumonia, likely aspiration pneumonitis versus secondary to UTI.  Patient admitted and started on broad-spectrum antibiotics including Zosyn and " "azithromycin.  She has history of dysphagia and is supposed be on a dysphagia diet with thickened liquids but evidently does not follow this diet completely and this was also consider aspiration pneumonitis.      Discussed patient's current status and ongoing care with daughters Gloria and Amarilys.  Both daughters are very concerned about her cognitive status and the overall decline that they have been seen as a family.  Patient's daughters feel as if they do not have the answers for patient's decline.  Discussed patient's CT scan results with right-sided encephalomalacia.  Patient is exhibiting multiple signs of progressive cognitive decline.  Discussed that diagnosis of dementia and other neurologic illnesses are difficult when patient cannot cooperate with testing.  Results of EEG from May 13 are pending at this time.  There was no known diagnosis of seizure activity with previous EEG.  Patient has been on Keppra without change and even noted worsening in her cognitive function.  Patient is also been on Eliquis for stroke prevention.  Patient family feels as if they would be able to move forward with the goals of care conversation if they knew a for sure diagnosis of what was happening with her mom.  I did call and talk to Dr. Arellano, neurologist at [facility].  He indicated that there would be no specific new testing that could be done at this time.  The only thing that is missing is a lumbar puncture, she is not showing signs of CNS infection and is currently being treated for pneumonia and UTI.  If patient cognitive status is significantly different, would repeat a CT scan.  It is very likely some type of cognitive decline that is not going to improve.  This kind of illness is very difficult to predict as well as difficult for family to cope with as there is no \" one test\" to confirm a specific diagnosis of \"dementia\".  It is possible that the patient is just not able to improve, with cognitive " dysfunction as a person has frequent and recurrent infections it is more and more difficult for cognitive status to return to a baseline.     Discussed CODE STATUS, patient to daughter state they will are leaning towards changing this to DNR/DNI.  They will need to talk to their other family members.  Discussed that even if patient does improve, if she were ill enough where she would need CPR our ability to return her to a baseline or improved cognitive function is very poor.     Discussed with patient family that we do not have a diagnosis at this time. The EEG and follow up with neurology may be able to define what is going on.  Patient may also have a cognitive decline that is not going to be able to have a clear diagnosis. Given her extensive work up and recent history, I am expecting she will have ongoing recurrent infections with worsening cognitive changes that will last longer and be more pronounced. I was clear with the family that this is very difficult to predict, especially with multiple other factors including her history of right temporal lobe stroke, infections, hypoglycemic neurologic injury in December.      Patient's cognition is waxing and waning.  Patient will have hours of alertness.  Patient will then not to be able to function, will not feed herself, will not take medication, and will talk in a childlike tone.  Patient has patient more alert and at baseline mental status.  Patient was started on Zyprexa 2.5 mg twice daily today.  Patient occasionally will complain of chest wall pain with coughing.  Patient has had mild episodes of increased shortness of breath and anxiety.  Patient is afebrile and hemodynamically stable. Maintaining oxygen saturations above 90% on room air. Urine culture grew Escherichia coli with susceptibility testing completed. Plan to continue oral Levaquin for coverage of UTI and possible pneumonia. Patient completed a course of Zithromax. Patient was improving overnight  "on 5/17,  although in the morning she was somnolent and has not been able to \"come out of it\" yesterday until after dinner. Patient was very alert and seemed to be at her cognitive baseline all night and this morning.      Urinary tract infection without hematuria, site unspecified  Patient not complaining of any symptoms with urinalysis abnormal and altered mental status. Patient started on IV Zosyn and IV Azithromycin. Urine culture growing E coli with susceptibility testing complete. Patient completed azithromycin and will continue oral Levaquin.      Pneumonia due to infectious organism-possible  Left basilar infiltrate versus atelectasis noted. Broad-spectrum antibiotics with Zosyn started. Consider that it could be possible aspiration and that she typically treats on her dysphasia diet with thin liquids and that her acute shortness of breath occurred after dinner. Patient denies shortness of breath and is maintaining oxygen saturations above 90% on room air. Patient remains afebrile. Lung sounds are coarse with a productive cough.     Chronic systolic heart failure (H)  Related to chronic atrial fibrillation.  Last echocardiogram showing an EF of 40%. Patient is on Eliquis for stroke prevention. Will Continue home dose of Toprol XL. Continue Eliquis for stroke prevention     Dysphagia  Noted since her hypoglycemic event in December 2018. Has been seen since by speech-language therapy with recommendation for dysphasia diet level 3 with nectar thickened liquids. Patient does not follow her diet with family aware, often times drinking thin liquids.  Discussed with family if she were to continue to not follow her diet we would need to have a more clear discussion on goals of care with quality of life versus quantity of life.  It would be okay if patient would choose to drink thin liquids , but the family would need to be aware that this would cause frequent infections and most likely shorten her life. Dysphagia " diet with nectar thickened liquids ordered. SLP to follow in the nursing home.      Persistent atrial fibrillation (H)  Chronic issue, on rate control with metoprolol, taking Eliquis. Continue current meds, continue to monitor with telemetry     Acquired hypothyroidism  Chronic and managed with daily Synthroid.  Home dose of Synthroid ordered while patient in hospital      Hyperlipidemia LDL goal <100  Taking daily Lipitor. Restart at discharge with no change.       Diet: Combination Diet Regular Diet Adult; Dysphagia Diet Level 3: Advanced; Nectar Thickened Liquids (pre-thickened or use instant food thickener)    DVT Prophylaxis: Eliquis  Szymanski Catheter: not present  Code Status: Full Code        Consultations This Hospital Stay   CARE TRANSITION RN/SW IP CONSULT    Code Status   Full Code    Time Spent on this Encounter   I, Courtney Lopez, personally saw the patient today and spent greater than 30 minutes discharging this patient.       Courtney Lopez, [facility]  ______________________________________________________________________    Physical Exam   Vital Signs: Temp: 98  F (36.7  C) Temp src: Oral BP: 140/79 Pulse: 121 Heart Rate: 96 Resp: 22 SpO2: 95 % O2 Device: None (Room air)    Weight: 209 lbs 10.52 oz  Constitutional: awake, alert, cooperative, no apparent distress, forgetful  Respiratory: Mild increased work of breathing, decreased air exchange and course crackles with no wheezing  Cardiovascular: normal apical pulses , irregularly irregular rhythm and normal S1 and S2  GI: No scars, normal bowel sounds, soft, non-distended, non-tender   Skin: no bruising or bleeding  Musculoskeletal: no lower extremity pitting edema present  there is no redness, warmth, or swelling of the joints  Neurologic: Awake, alert, oriented to name, not place and time.  Cranial nerves II-XII are grossly intact.        Primary Care Physician   Jannie Cruz    Discharge Orders     "  General info for SNF    Length of Stay Estimate: Long Term Care  Condition at Discharge: Stable  Level of care:board and care  Rehabilitation Potential: Fair  Admission H&P remains valid and up-to-date: Yes  Recent Chemotherapy: N/A  Use Nursing Home Standing Orders: Yes     Mantoux instructions    Give two-step Mantoux (PPD) Per Facility Policy Yes     Reason for your hospital stay    You were in the hospital for pneumonia and UTI. You also had a change in cognition, that has been worsening over the last few months.     Additional Discharge Instructions    Discussed with patient family her overall decline since December.  Patient initially improved at rehab and then has been declining in the last few months.  Patient has significantly declined in the last week.  Discussed this is likely a neurocognitive decline.  Patient with an essential negative work-up.  This is very common when patients have a dementia-like illness, when they become ill or have an infection their ability to recover become less and less.  Patient has been having \"episodes\" of altered mental status, including childlike behavior as well as somnolence.  Discussed CODE STATUS, as well as goals of care.  Patient's family would like to get the EEG results and have a conversation with neurology.  If this is a dementia like, neurocognitive decline patient family would like to focus more on comfort.     Activity - Up with nursing assistance     Follow Up and recommended labs and tests    Follow up with Nursing home physician.  No follow up labs or test are needed.  Follow-up with neurology as scheduled.     Full Code    Discussed code status, patient family is considering.     Advance Diet as Tolerated    Follow this diet upon discharge: Orders Placed This Encounter      Combination Diet Regular Diet Adult; Dysphagia Diet Level 3: Advanced; Nectar Thickened Liquids (pre-thickened or use instant food thickener)       Significant Results and Procedures "   Most Recent 3 CBC's:  Recent Labs   Lab Test 05/16/19  0556 05/15/19  0551 05/14/19  0909 05/13/19  2215   WBC 4.4 3.8* 3.4* 4.4   HGB 11.1* 10.6*  --  12.0   MCV 98 99  --  99   * 146*  --  156     Most Recent 3 BMP's:  Recent Labs   Lab Test 05/16/19  0556 05/15/19  0551 05/14/19  0909    142 145*   POTASSIUM 3.6 4.0 4.1   CHLORIDE 111* 110* 112*   CO2 27 26 25   BUN 10 10 11   CR 0.76 0.93 0.92   ANIONGAP 6 6 8   MIKE 8.4* 8.2* 8.3*   * 102* 109*     Most Recent 6 Bacteria Isolates From Any Culture (See EPIC Reports for Culture Details):  Recent Labs   Lab Test 05/14/19  1200 05/14/19  0130 05/13/19  2258 05/13/19  2215 04/23/19  1109 04/23/19  1037   CULT Light growth  Normal brandon   No MRSA isolated 50,000 to 100,000 colonies/mL  Escherichia coli  *  50,000 to 100,000 colonies/mL  Aerococcus urinae  Identification obtained by MALDI-TOF mass spectrometry research use only database. Test   characteristics determined and verified by the [facility]   ([facility]) [place], [place].  * No growth after 4 days  No growth after 4 days No growth No growth     Most Recent Urinalysis:  Recent Labs   Lab Test 05/13/19  2257   COLOR Padmini   APPEARANCE Cloudy   URINEGLC Negative   URINEBILI Negative   URINEKETONE 5*   SG 1.026   UBLD Moderate*   URINEPH 5.0   PROTEIN Negative   NITRITE Negative   LEUKEST Moderate*   RBCU 32*   WBCU 462*   ,   Results for orders placed or performed during the hospital encounter of 05/13/19   XR Chest Port 1 View    Narrative    XR PORTABLE CHEST ONE VIEW  5/13/2019 10:30 PM     HISTORY: Cough, shortness of breath, fever.    COMPARISON: 4/23/2019.    FINDINGS: Suboptimal inspiration with hypoventilatory changes.  Asymmetric elevation of the right hemidiaphragm.      Impression    IMPRESSION: Left basilar atelectasis versus infiltrate.    NEW LAGUNA MD   XR Chest Port 1 View    Narrative    CHEST PORTABLE ONE VIEW   5/16/2019 7:02 PM     HISTORY:  Confusion.    COMPARISON: Chest x-rays dated 5/13/2019.    FINDINGS:  Mild widening of mediastinum is noted and is likely due to  technique. There is minimal vascular prominence most consistent mild  vascular congestion. There is a questionable small left pleural fluid  collection. Cardiac silhouette is mildly enlarged. No pneumothorax or  right pleural fluid collection. No fracture. No definite infiltrate.  Degenerative changes of the shoulders are again noted.      Impression    IMPRESSION:  1. Left basilar opacity likely represents a small left pleural fluid  collection.  2. Probable cardiomegaly, small left pleural fluid collection and mild  vascular congestion could represent a component of congestive heart  failure.  3. No definite pneumonia.    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         CONTINUE these medications which have NOT CHANGED    Details   acetaminophen (TYLENOL) 500 MG tablet Take 1,000 mg by mouth 3 times daily       albuterol (PROVENTIL) (2.5 MG/3ML) 0.083% neb solution Take 2.5 mg by nebulization every 4 hours as needed for shortness of breath / dyspnea or wheezing      apixaban ANTICOAGULANT (ELIQUIS) 5 MG tablet Take 1 tablet (5 mg) by mouth 2 times daily    Associated Diagnoses: History of stroke      atorvastatin (LIPITOR) 20 MG tablet Take 20 mg by mouth At Bedtime      bisacodyl (DULCOLAX) 10 MG suppository Place 1 suppository (10 mg) rectally daily as  needed for constipation    Associated Diagnoses: Constipation, unspecified constipation type      cholecalciferol 1000 units TABS Take 2,000 Units by mouth daily      levETIRAcetam (KEPPRA) 500 MG tablet Take 750 mg by mouth 2 times daily       levothyroxine (SYNTHROID/LEVOTHROID) 125 MCG tablet Take 125 mcg by mouth daily      lidocaine (LIDODERM) 5 % patch Place 1 patch onto the skin every 24 hours      metoprolol succinate ER (TOPROL-XL) 50 MG 24 hr tablet Take 50 mg by mouth daily      nitroGLYcerin (NITROSTAT) 0.4 MG sublingual tablet Place 0.4 mg under the tongue every 5 minutes as needed for chest pain For chest pain place 1 tablet under the tongue every 5 minutes for 3 doses. If symptoms persist 5 minutes after 1st dose call 911.      nystatin (MYCOSTATIN) 960943 UNIT/GM external powder Apply topically 2 times daily as needed      pantoprazole (PROTONIX) 40 MG EC tablet Take 40 mg by mouth daily      senna-docusate (SENOKOT-S/PERICOLACE) 8.6-50 MG tablet Take 2 tablets by mouth 2 times daily       Skin Protectants, Misc. (EUCERIN) cream Apply topically 2 times daily ; apply to arms, legs, hands, feet for dry skin      tamsulosin (FLOMAX) 0.4 MG capsule Take 0.8 mg by mouth At Bedtime           Allergies   Allergies   Allergen Reactions     Blood Transfusion Related (Informational Only) Other (See Comments)     Patient has a history of a clinically significant antibody against RBC antigens.  A delay in compatible RBCs may occur.

## 2019-05-18 NOTE — PROGRESS NOTES
"Kristine Cosby  Gender: female  : 1940  [address] [place] [address] [phone] (home)     Medical Record: [id number]  Pharmacy: [facility] - [place], [place] - [address]  Primary Care Provider: Jannie Cruz    Parent's names are: Data Unavailable (mother) and Data Unavailable (father).      [facility]  May 18, 2019     Discharge Phone Call:  Key Words/Key Times      Introduction - AIDET (Acknowledge, Introduce, Duration, Explanation)      Empathy-   We are calling to see how you are since your recent stay in the hospital?     Call back COMMENTS:        Clinical Questions -  (f/u appts, medication side effects/purpose, ability to care for self at home) \"For your safety, it is important to us that you understand the purpose and side effects of your medications, can you tell me what your new medications are?\"     Call back COMMENTS:        Staff Recognition -  We like to recognize staff and physicians who have done an excellent job.  Do you remember any people from your care team that you would like recognize?     Call back COMMENTS:        Very Good Care -  We want to provide very good care to all patients.  How was your care?     Call back COMMENTS:        Opportunities for Improvement -  Our goal is to be the best.  Do you have any suggestions for things that we could improve upon?     Call back COMMENTS:        Thank You       Discharge to [facility] at 1030am 2019      "

## 2019-05-18 NOTE — PLAN OF CARE
Patient has been alert and oriented x 3 throughout the night. She has been very talkative, conversing with staff and her daughter. Pt also spoke to a grandson on the phone last evening that is stationed in [place]. Vitals have been stable and she remains on room air. Lungs coarse throughout with mild expiratory wheeze. She has been incontinent of urine and at times has called appropriately to report that. Tele has been a fib with rates in the low 100's. Pt reported having a headache this morning but declined offered Tylenol with her early morning medications.

## 2019-05-20 ENCOUNTER — NURSE TRIAGE (OUTPATIENT)
Dept: NURSING | Facility: CLINIC | Age: 79
End: 2019-05-20

## 2019-05-20 NOTE — TELEPHONE ENCOUNTER
Caller is daughter trying to connect with inpatient unit  Call transferred to [facility]    Isabel Toney RN  FNA      Reason for Disposition    General information question, no triage required and triager able to answer question    Protocols used: INFORMATION ONLY CALL-A-AH

## 2019-05-20 NOTE — PROGRESS NOTES
"[facility]  PRIMARY CARE PROVIDER AND CLINIC:  Jannie Cruz, NATIVIDAD CNP, [address] [place] [address] / [place] [address]  Chief Complaint   Patient presents with     Hospital F/U     [facility] Medical Record Number:  [id number]  Place of Service where encounter took place:  [facility] ([facility] [[id number]]    Kristine Cosby  is a 79 year old  (1940), re-admitted to the above facility from  [facility]. Hospital stay 5/13/19 - 5/18/19. .  Admitted to this facility for  medical management and nursing care.    HPI:    HPI information obtained from: facility chart records, facility staff, patient report, [facility] chart review and family/first contact pt's daughters' report.   Brief Summary of Hospital Course:   Patient is a 79 year old woman with significant PMH of DM2, A fib with RVR, CVA with seizure, HTN, HLD, CAD, JOSTIN 2/2 Rhabdomyolysis, systolic CHF, GERD with large hiatal hernia, History STEMI, hypothyroidism   Who had been hospitalized 12/27/18-1/8/19 for severe hypoglycemia and was treated for metabolic encephalopathy.  Initially post hospitalization she did well with acute rehab, then transitioned to TCU and now to LTC with need for nursing for increased care needs.  However for the past 2 months patient has been having unresponsive episodes and has been seen in the ED and been admitted various times.  She most recently was hospitalized 5/13-5/18 for unresponsiveness, treated for PNA (thought 2/2 acute aspiration pneumonitis) vs UTI with antibiotics.  SLP was consulted and patient was transitioned to dysphagic diet with nectar-thick liquids to reduce risk of aspiration.  Neurology was consulted and noted no further testing they thought was necessary.  She was started on Zyprexa 2.5 mg BID for her \"childlike talk\" episodes prior to unresponsiveness.  EEG prior to hospitalization showed no evidence of seizures.     Updates on Status Since " Skilled nursing Admission:   Since arrival back to the SNF patient again had unresponsive episode that lasted near 14 hours.  VS were stable during this time.  Keppra and scheduled Zyprexa were stopped d/t lethargy and no evidence of seizure on EEG.      Patient is met today in her SNF room with two of her daughters.  She reports some hand pain (prompted by her daughter) ad significant arthritic changes are noted.  She denies SOB, CP, HA, H, heartburn, upset stomach, constipation.  She is awake, alert and answering questions (although is likely a poor historian d/t cognitive impairment).      Family is requesting Cardiology consult.     CODE STATUS/ADVANCE DIRECTIVES DISCUSSION:   CPR/Full code   Patient's living condition: lives in a skilled nursing facility  ALLERGIES: Blood transfusion related (informational only)  PAST MEDICAL HISTORY:  has a past medical history of Cellulitis of left lower extremity, GERD (gastroesophageal reflux disease), HTN (hypertension), Type 2 diabetes mellitus without complication (H), and Ulcer of left lower extremity with fat layer exposed (H).  PAST SURGICAL HISTORY:   has a past surgical history that includes Hysterectomy and ESOPHAGOGASTRODUODENOSCOPY.  FAMILY HISTORY: family history includes Lung Cancer in her father.  SOCIAL HISTORY:   reports that she quit smoking about 5 weeks ago. She has quit using smokeless tobacco. She reports that she does not drink alcohol.    Post Discharge Medication Reconciliation Status: discharge medications reconciled, continue medications without change    Current Outpatient Medications   Medication Sig Dispense Refill     acetaminophen (TYLENOL) 500 MG tablet Take 1,000 mg by mouth 3 times daily        albuterol (PROVENTIL) (2.5 MG/3ML) 0.083% neb solution Take 2.5 mg by nebulization every 4 hours as needed for shortness of breath / dyspnea or wheezing       apixaban ANTICOAGULANT (ELIQUIS) 5 MG tablet Take 1 tablet (5 mg) by mouth 2 times daily    "    atorvastatin (LIPITOR) 20 MG tablet Take 20 mg by mouth At Bedtime       bisacodyl (DULCOLAX) 10 MG suppository Place 1 suppository (10 mg) rectally daily as needed for constipation       cholecalciferol 1000 units TABS Take 2,000 Units by mouth daily       levofloxacin (LEVAQUIN) 750 MG tablet Take 1 tablet (750 mg) by mouth daily 10 tablet 0     levothyroxine (SYNTHROID/LEVOTHROID) 125 MCG tablet Take 125 mcg by mouth daily       lidocaine (LIDODERM) 5 % patch Place 1 patch onto the skin every 24 hours       metoprolol succinate ER (TOPROL-XL) 50 MG 24 hr tablet Take 50 mg by mouth daily       nitroGLYcerin (NITROSTAT) 0.4 MG sublingual tablet Place 0.4 mg under the tongue every 5 minutes as needed for chest pain For chest pain place 1 tablet under the tongue every 5 minutes for 3 doses. If symptoms persist 5 minutes after 1st dose call 911.       nystatin (MYCOSTATIN) 027545 UNIT/GM external powder Apply topically 2 times daily as needed       pantoprazole (PROTONIX) 40 MG EC tablet Take 40 mg by mouth daily       senna-docusate (SENOKOT-S/PERICOLACE) 8.6-50 MG tablet Take 2 tablets by mouth 2 times daily        Skin Protectants, Misc. (EUCERIN) cream Apply topically 2 times daily ; apply to arms, legs, hands, feet for dry skin       tamsulosin (FLOMAX) 0.4 MG capsule Take 0.8 mg by mouth At Bedtime         ROS:  Limited secondary to cognitive impairment but today pt reports 10 point ROS of systems including Constitutional, Eyes, Respiratory, Cardiovascular, Gastroenterology, Genitourinary, Integumentary, Musculoskeletal, Psychiatric were all negative except for pertinent positives noted in my HPI.    Vitals:  BP (!) 145/92   Pulse 81   Temp 98  F (36.7  C)   Resp 20   Ht 1.702 m (5' 7\")   Wt 97.5 kg (215 lb)   SpO2 92%   BMI 33.67 kg/m    Exam:  GENERAL APPEARANCE:  Alert, in no distress, some expressive aphasia noted  RESP:  respiratory effort and palpation of chest normal, auscultation of lungs " "clear , no respiratory distress  CV:  Palpation and auscultation of heart done , rate and rhythm irregular, no murmur, mild LE peripheral edema  ABDOMEN:  Obese, normal bowel sounds, soft, nontender, LANA fully for hepatosplenomegaly or other masses d/t body habitus and clothed seated assessment   M/S:   Gait and station with W/C for mobility, Digits and nails with arthritic changes, reduced muscle mass  SKIN:  Inspection and Palpation of skin and subcutaneous tissue pale, seemingly intact  PSYCH:  insight and judgement, memory with impairment, affect and mood normal, follows commands with repeated request        Lab/Diagnostic data:  CBC RESULTS:   Recent Labs   Lab Test 05/16/19  0556 05/15/19  0551   WBC 4.4 3.8*   RBC 3.57* 3.39*   HGB 11.1* 10.6*   HCT 34.9* 33.4*   MCV 98 99   MCH 31.1 31.3   MCHC 31.8 31.7   RDW 12.9 12.9   * 146*       Last Basic Metabolic Panel:  Recent Labs   Lab Test 05/16/19  0556 05/15/19  0551    142   POTASSIUM 3.6 4.0   CHLORIDE 111* 110*   MIKE 8.4* 8.2*   CO2 27 26   BUN 10 10   CR 0.76 0.93   * 102*       Liver Function Studies -   Recent Labs   Lab Test 05/13/19  2215 04/23/19  1038   PROTTOTAL 7.0 7.0   ALBUMIN 3.1* 3.3*   BILITOTAL 0.5 0.6   ALKPHOS 80 80   AST 13 11   ALT 11 12       TSH   Date Value Ref Range Status   04/10/2019 0.86 0.40 - 4.00 mU/L Final   03/27/2019 1.62 0.40 - 4.00 mU/L Final   ]    Lab Results   Component Value Date    A1C 6.7 04/15/2019         ASSESSMENT/PLAN:  Recurrent episodes of unresponsiveness  Cognitive impairment  Hx of Hypoglycemic encephalopathy  Previous CT noting encephalomalacia.   Patient showing signs of generalized decline.  Extensive discussion with hospital MDs regarding decline with dementia and no \"one test\" to determine.    EEG showing no evidence of seizure activity. Has Neurology f/u in June.  Keppra discontinued yesterday d/t lethargy and unresponsiveness.     Patient was started on (new) Zyprexa 2.5 mg BID for " episodes of baby talk/child-like state, forgetfulness, hard to redirect.  This was changed to PRN yesterday d/t lethargy and family would like this DC'd today.     Patient is alert today and answering questions, seemingly at baseline cognition.  [facility] 8/15 - SLUMS testing ordered in past - asking Occupational Therapy for results.      PLAN:  - Continue to monitor for episodes.   - Nursing for supportive cares, VS monitoring with episodes  - discontinue Zyprexa at this time and monitor for need    Aspiration pneumonitis (H)  Dysphagia, unspecified type  Imaging showing left basilar infiltrates vs atelectasis while in the hospital  Treated with IV antibiotics --> po azithromycin (completed) and levaquin  History of dysphagia with dysphagic diet ordered with thickened liquids but patient reporting she does not like this and it makes her vomit.    RvB discussed with patient and family today about aspiration and continued risk with thin liquids. Patient reports she hates it; family urging her to keep trying.     Sats 92-94%  LS clear today    PLAN:  - dysphagic diet with nectar thick liquids   - High Fowlers position for po intake  - continue levaquin as ordered  - monitor for recurrent PNA    Urinary tract infection without hematuria, site unspecified  Cx showing + e coli  Treated with Levaquin for possible PNA (as Cipro showing sensitivity)  Patient today denies symptoms of dysuria, urgency, frequency.     Plan  - continue Levaquin as ordered  - follow clinically    Chronic systolic heart failure (H)  Chronic atrial fibrillation (H)  V tach (H)  PVC's (premature ventricular contractions)  History of ST elevation myocardial infarction (STEMI)  Essential hypertension  Hyperlipidemia LDL goal <100  Atherosclerosis of native coronary artery of native heart without angina pectoris  Last ECHO with EF 40%  Patient is on Eliquis or CVA ppx with a fib.  Toprol XL 50 mg daily for rate control    Recent Zio patch monitoring  showing 373 episodes of v tach with 100% a fib burden underlying rhythm.  Average vtach rate: 124, average a fib rate: 89, frequent PVCs as well.    Family asking for Cardiology consult as possible contributing factor to unresponsive episdoes    BPs 130s/70-80s  HRs , irregular today  Patient denies CP, HA, lightheadedness, SOB  Weights stable around 215 lbs.   LS clear  Mild LE edema     PLAN:  - Cardiology consult  - continue Eliquis for CVA ppx  - continue Metoprolol XL for rate control  - monitor for titration needs    ACP (advance care planning)  Extensive discussion today with family about RvB of FUll Code vs DNR/DNI. Discussion about CPR and consequences.  Family reporting patient would not want to be intubated.  Family reported desire to speak to the rest of their family and would return with decision.  Nursing later reported family wishing for DNR/DNI. Can change order and sign POLST at next visit to SNF.   This discussion was >16 minutes.        Orders written by provider at facility and transcribed by : Sherlyn Faulkner MA  1.  In House Psych consult to evaluate and treat.  2.  Cardiology consult to evaluate and treat.  Dx: A fib, Vtach, unresponsive episodes   3.  Discontinue Zyprexa.  4. DNR/DNI    Total time spent with patient visit at the skilled nursing facility was >55 min (with at least 16 min devotes to ACP) including patient visit, review of past records and discussion with pt and her family. Greater than 50% of total time spent with counseling and coordinating care due to review of records, coordination of care with nursing for care during unresponsive episodes and also with POC, discussion with family re code status.    Electronically signed by:  NATIVIDAD Chapa CNP

## 2019-05-21 ENCOUNTER — NURSING HOME VISIT (OUTPATIENT)
Dept: GERIATRICS | Facility: CLINIC | Age: 79
End: 2019-05-21
Payer: COMMERCIAL

## 2019-05-21 VITALS
OXYGEN SATURATION: 92 % | HEIGHT: 67 IN | SYSTOLIC BLOOD PRESSURE: 145 MMHG | WEIGHT: 215 LBS | HEART RATE: 81 BPM | TEMPERATURE: 98 F | BODY MASS INDEX: 33.74 KG/M2 | DIASTOLIC BLOOD PRESSURE: 92 MMHG | RESPIRATION RATE: 20 BRPM

## 2019-05-21 DIAGNOSIS — R41.89 COGNITIVE IMPAIRMENT: ICD-10-CM

## 2019-05-21 DIAGNOSIS — I47.20 V TACH (H): ICD-10-CM

## 2019-05-21 DIAGNOSIS — R40.4 RECURRENT EPISODES OF UNRESPONSIVENESS: Primary | ICD-10-CM

## 2019-05-21 DIAGNOSIS — E16.2 HYPOGLYCEMIC ENCEPHALOPATHY: ICD-10-CM

## 2019-05-21 DIAGNOSIS — E78.5 HYPERLIPIDEMIA LDL GOAL <100: ICD-10-CM

## 2019-05-21 DIAGNOSIS — I49.3 PVC'S (PREMATURE VENTRICULAR CONTRACTIONS): ICD-10-CM

## 2019-05-21 DIAGNOSIS — R13.10 DYSPHAGIA, UNSPECIFIED TYPE: ICD-10-CM

## 2019-05-21 DIAGNOSIS — I25.2 HISTORY OF ST ELEVATION MYOCARDIAL INFARCTION (STEMI): ICD-10-CM

## 2019-05-21 DIAGNOSIS — I10 ESSENTIAL HYPERTENSION: ICD-10-CM

## 2019-05-21 DIAGNOSIS — I25.10 ATHEROSCLEROSIS OF NATIVE CORONARY ARTERY OF NATIVE HEART WITHOUT ANGINA PECTORIS: ICD-10-CM

## 2019-05-21 DIAGNOSIS — J69.0 ASPIRATION PNEUMONITIS (H): ICD-10-CM

## 2019-05-21 DIAGNOSIS — I50.22 CHRONIC SYSTOLIC HEART FAILURE (H): ICD-10-CM

## 2019-05-21 DIAGNOSIS — I48.20 CHRONIC ATRIAL FIBRILLATION (H): ICD-10-CM

## 2019-05-21 DIAGNOSIS — N39.0 URINARY TRACT INFECTION WITHOUT HEMATURIA, SITE UNSPECIFIED: ICD-10-CM

## 2019-05-21 DIAGNOSIS — Z71.89 ACP (ADVANCE CARE PLANNING): ICD-10-CM

## 2019-05-21 PROCEDURE — 99497 ADVNCD CARE PLAN 30 MIN: CPT | Performed by: NURSE PRACTITIONER

## 2019-05-21 PROCEDURE — 99207 ZZC CDG-MDM COMPONENT: MEETS LOW - DOWN CODED: CPT | Performed by: NURSE PRACTITIONER

## 2019-05-21 PROCEDURE — 99309 SBSQ NF CARE MODERATE MDM 30: CPT | Performed by: NURSE PRACTITIONER

## 2019-05-21 RX ORDER — OLANZAPINE 2.5 MG/1
2.5 TABLET, FILM COATED ORAL
COMMUNITY
End: 2019-05-21

## 2019-05-21 ASSESSMENT — MIFFLIN-ST. JEOR: SCORE: 1487.86

## 2019-05-21 NOTE — LETTER
"    5/21/2019        RE: Kristine Cosby  [address] [place] [address]        [facility]  PRIMARY CARE PROVIDER AND CLINIC:  NATIVIDAD Chapa [facility], [address] [place] [address] / [place] [address]  Chief Complaint   Patient presents with     Hospital F/U     [facility] Medical Record Number:  [id number]  Place of Service where encounter took place:  [facility] ([facility] [[id number]]    Kristine Cosby  is a 79 year old  (1940), re-admitted to the above facility from  [facility]. Hospital stay 5/13/19 - 5/18/19. .  Admitted to this facility for  medical management and nursing care.    HPI:    HPI information obtained from: facility chart records, facility staff, patient report, [facility] chart review and family/first contact pt's daughters' report.   Brief Summary of Hospital Course:   Patient is a 79 year old woman with significant PMH of DM2, A fib with RVR, CVA with seizure, HTN, HLD, CAD, JOSTIN 2/2 Rhabdomyolysis, systolic CHF, GERD with large hiatal hernia, History STEMI, hypothyroidism   Who had been hospitalized 12/27/18-1/8/19 for severe hypoglycemia and was treated for metabolic encephalopathy.  Initially post hospitalization she did well with acute rehab, then transitioned to TCU and now to LTC with need for nursing for increased care needs.  However for the past 2 months patient has been having unresponsive episodes and has been seen in the ED and been admitted various times.  She most recently was hospitalized 5/13-5/18 for unresponsiveness, treated for PNA (thought 2/2 acute aspiration pneumonitis) vs UTI with antibiotics.  SLP was consulted and patient was transitioned to dysphagic diet with nectar-thick liquids to reduce risk of aspiration.  Neurology was consulted and noted no further testing they thought was necessary.  She was started on Zyprexa 2.5 mg BID for her \"childlike talk\" episodes prior to " unresponsiveness.  EEG prior to hospitalization showed no evidence of seizures.     Updates on Status Since Skilled nursing Admission:   Since arrival back to the SNF patient again had unresponsive episode that lasted near 14 hours.  VS were stable during this time.  Keppra and scheduled Zyprexa were stopped d/t lethargy and no evidence of seizure on EEG.      Patient is met today in her SNF room with two of her daughters.  She reports some hand pain (prompted by her daughter) ad significant arthritic changes are noted.  She denies SOB, CP, HA, H, heartburn, upset stomach, constipation.  She is awake, alert and answering questions (although is likely a poor historian d/t cognitive impairment).      Family is requesting Cardiology consult.     CODE STATUS/ADVANCE DIRECTIVES DISCUSSION:   CPR/Full code   Patient's living condition: lives in a skilled nursing facility  ALLERGIES: Blood transfusion related (informational only)  PAST MEDICAL HISTORY:  has a past medical history of Cellulitis of left lower extremity, GERD (gastroesophageal reflux disease), HTN (hypertension), Type 2 diabetes mellitus without complication (H), and Ulcer of left lower extremity with fat layer exposed (H).  PAST SURGICAL HISTORY:   has a past surgical history that includes Hysterectomy and ESOPHAGOGASTRODUODENOSCOPY.  FAMILY HISTORY: family history includes Lung Cancer in her father.  SOCIAL HISTORY:   reports that she quit smoking about 5 weeks ago. She has quit using smokeless tobacco. She reports that she does not drink alcohol.    Post Discharge Medication Reconciliation Status: discharge medications reconciled, continue medications without change    Current Outpatient Medications   Medication Sig Dispense Refill     acetaminophen (TYLENOL) 500 MG tablet Take 1,000 mg by mouth 3 times daily        albuterol (PROVENTIL) (2.5 MG/3ML) 0.083% neb solution Take 2.5 mg by nebulization every 4 hours as needed for shortness of breath / dyspnea  "or wheezing       apixaban ANTICOAGULANT (ELIQUIS) 5 MG tablet Take 1 tablet (5 mg) by mouth 2 times daily       atorvastatin (LIPITOR) 20 MG tablet Take 20 mg by mouth At Bedtime       bisacodyl (DULCOLAX) 10 MG suppository Place 1 suppository (10 mg) rectally daily as needed for constipation       cholecalciferol 1000 units TABS Take 2,000 Units by mouth daily       levofloxacin (LEVAQUIN) 750 MG tablet Take 1 tablet (750 mg) by mouth daily 10 tablet 0     levothyroxine (SYNTHROID/LEVOTHROID) 125 MCG tablet Take 125 mcg by mouth daily       lidocaine (LIDODERM) 5 % patch Place 1 patch onto the skin every 24 hours       metoprolol succinate ER (TOPROL-XL) 50 MG 24 hr tablet Take 50 mg by mouth daily       nitroGLYcerin (NITROSTAT) 0.4 MG sublingual tablet Place 0.4 mg under the tongue every 5 minutes as needed for chest pain For chest pain place 1 tablet under the tongue every 5 minutes for 3 doses. If symptoms persist 5 minutes after 1st dose call 911.       nystatin (MYCOSTATIN) 341515 UNIT/GM external powder Apply topically 2 times daily as needed       pantoprazole (PROTONIX) 40 MG EC tablet Take 40 mg by mouth daily       senna-docusate (SENOKOT-S/PERICOLACE) 8.6-50 MG tablet Take 2 tablets by mouth 2 times daily        Skin Protectants, Misc. (EUCERIN) cream Apply topically 2 times daily ; apply to arms, legs, hands, feet for dry skin       tamsulosin (FLOMAX) 0.4 MG capsule Take 0.8 mg by mouth At Bedtime         ROS:  Limited secondary to cognitive impairment but today pt reports 10 point ROS of systems including Constitutional, Eyes, Respiratory, Cardiovascular, Gastroenterology, Genitourinary, Integumentary, Musculoskeletal, Psychiatric were all negative except for pertinent positives noted in my HPI.    Vitals:  BP (!) 145/92   Pulse 81   Temp 98  F (36.7  C)   Resp 20   Ht 1.702 m (5' 7\")   Wt 97.5 kg (215 lb)   SpO2 92%   BMI 33.67 kg/m     Exam:  GENERAL APPEARANCE:  Alert, in no distress, some " "expressive aphasia noted  RESP:  respiratory effort and palpation of chest normal, auscultation of lungs clear , no respiratory distress  CV:  Palpation and auscultation of heart done , rate and rhythm irregular, no murmur, mild LE peripheral edema  ABDOMEN:  Obese, normal bowel sounds, soft, nontender, LANA fully for hepatosplenomegaly or other masses d/t body habitus and clothed seated assessment   M/S:   Gait and station with W/C for mobility, Digits and nails with arthritic changes, reduced muscle mass  SKIN:  Inspection and Palpation of skin and subcutaneous tissue pale, seemingly intact  PSYCH:  insight and judgement, memory with impairment, affect and mood normal, follows commands with repeated request        Lab/Diagnostic data:  CBC RESULTS:   Recent Labs   Lab Test 05/16/19  0556 05/15/19  0551   WBC 4.4 3.8*   RBC 3.57* 3.39*   HGB 11.1* 10.6*   HCT 34.9* 33.4*   MCV 98 99   MCH 31.1 31.3   MCHC 31.8 31.7   RDW 12.9 12.9   * 146*       Last Basic Metabolic Panel:  Recent Labs   Lab Test 05/16/19  0556 05/15/19  0551    142   POTASSIUM 3.6 4.0   CHLORIDE 111* 110*   MIKE 8.4* 8.2*   CO2 27 26   BUN 10 10   CR 0.76 0.93   * 102*       Liver Function Studies -   Recent Labs   Lab Test 05/13/19  2215 04/23/19  1038   PROTTOTAL 7.0 7.0   ALBUMIN 3.1* 3.3*   BILITOTAL 0.5 0.6   ALKPHOS 80 80   AST 13 11   ALT 11 12       TSH   Date Value Ref Range Status   04/10/2019 0.86 0.40 - 4.00 mU/L Final   03/27/2019 1.62 0.40 - 4.00 mU/L Final   ]    Lab Results   Component Value Date    A1C 6.7 04/15/2019         ASSESSMENT/PLAN:  Recurrent episodes of unresponsiveness  Cognitive impairment  Hx of Hypoglycemic encephalopathy  Previous CT noting encephalomalacia.   Patient showing signs of generalized decline.  Extensive discussion with hospital MDs regarding decline with dementia and no \"one test\" to determine.    EEG showing no evidence of seizure activity. Has Neurology f/u in June.  Keppra " discontinued yesterday d/t lethargy and unresponsiveness.     Patient was started on (new) Zyprexa 2.5 mg BID for episodes of baby talk/child-like state, forgetfulness, hard to redirect.  This was changed to PRN yesterday d/t lethargy and family would like this DC'd today.     Patient is alert today and answering questions, seemingly at baseline cognition.  BIMS 8/15 - SLUMS testing ordered in past - asking Occupational Therapy for results.      PLAN:  - Continue to monitor for episodes.   - Nursing for supportive cares, VS monitoring with episodes  - discontinue Zyprexa at this time and monitor for need    Aspiration pneumonitis (H)  Dysphagia, unspecified type  Imaging showing left basilar infiltrates vs atelectasis while in the hospital  Treated with IV antibiotics --> po azithromycin (completed) and levaquin  History of dysphagia with dysphagic diet ordered with thickened liquids but patient reporting she does not like this and it makes her vomit.    RvB discussed with patient and family today about aspiration and continued risk with thin liquids. Patient reports she hates it; family urging her to keep trying.     Sats 92-94%  LS clear today    PLAN:  - dysphagic diet with nectar thick liquids   - High Fowlers position for po intake  - continue levaquin as ordered  - monitor for recurrent PNA    Urinary tract infection without hematuria, site unspecified  Cx showing + e coli  Treated with Levaquin for possible PNA (as Cipro showing sensitivity)  Patient today denies symptoms of dysuria, urgency, frequency.     Plan  - continue Levaquin as ordered  - follow clinically    Chronic systolic heart failure (H)  Chronic atrial fibrillation (H)  V tach (H)  PVC's (premature ventricular contractions)  History of ST elevation myocardial infarction (STEMI)  Essential hypertension  Hyperlipidemia LDL goal <100  Atherosclerosis of native coronary artery of native heart without angina pectoris  Last ECHO with EF 40%  Patient  is on Eliquis or CVA ppx with a fib.  Toprol XL 50 mg daily for rate control    Recent Zio patch monitoring showing 373 episodes of v tach with 100% a fib burden underlying rhythm.  Average vtach rate: 124, average a fib rate: 89, frequent PVCs as well.    Family asking for Cardiology consult as possible contributing factor to unresponsive episdoes    BPs 130s/70-80s  HRs , irregular today  Patient denies CP, HA, lightheadedness, SOB  Weights stable around 215 lbs.   LS clear  Mild LE edema     PLAN:  - Cardiology consult  - continue Eliquis for CVA ppx  - continue Metoprolol XL for rate control  - monitor for titration needs    ACP (advance care planning)  Extensive discussion today with family about RvB of FUll Code vs DNR/DNI. Discussion about CPR and consequences.  Family reporting patient would not want to be intubated.  Family reported desire to speak to the rest of their family and would return with decision.  Nursing later reported family wishing for DNR/DNI. Can change order and sign POLST at next visit to SNF.   This discussion was >16 minutes.        Orders written by provider at facility and transcribed by : Sherlyn Faulkner MA  1.  In House Psych consult to evaluate and treat.  2.  Cardiology consult to evaluate and treat.  Dx: A fib, Vtach, unresponsive episodes   3.  Discontinue Zyprexa.  4. DNR/DNI    Total time spent with patient visit at the skilled nursing facility was >55 min (with at least 16 min devotes to ACP) including patient visit, review of past records and discussion with pt and her family. Greater than 50% of total time spent with counseling and coordinating care due to review of records, coordination of care with nursing for care during unresponsive episodes and also with POC, discussion with family re code status.    Electronically signed by:  NATIVIDAD Chapa CNP                         Sincerely,        NATIVIDAD Chapa CNP

## 2019-05-22 VITALS
BODY MASS INDEX: 33.74 KG/M2 | OXYGEN SATURATION: 92 % | HEART RATE: 102 BPM | RESPIRATION RATE: 18 BRPM | WEIGHT: 215 LBS | HEIGHT: 67 IN | TEMPERATURE: 98 F | DIASTOLIC BLOOD PRESSURE: 81 MMHG | SYSTOLIC BLOOD PRESSURE: 134 MMHG

## 2019-05-22 ASSESSMENT — MIFFLIN-ST. JEOR: SCORE: 1487.86

## 2019-05-23 ENCOUNTER — NURSING HOME VISIT (OUTPATIENT)
Dept: GERIATRICS | Facility: CLINIC | Age: 79
End: 2019-05-23
Payer: COMMERCIAL

## 2019-05-23 DIAGNOSIS — I48.20 CHRONIC ATRIAL FIBRILLATION (H): ICD-10-CM

## 2019-05-23 DIAGNOSIS — I47.20 V TACH (H): ICD-10-CM

## 2019-05-23 DIAGNOSIS — R13.10 DYSPHAGIA, UNSPECIFIED TYPE: ICD-10-CM

## 2019-05-23 DIAGNOSIS — R40.4 RECURRENT EPISODES OF UNRESPONSIVENESS: Primary | ICD-10-CM

## 2019-05-23 DIAGNOSIS — E16.2 HYPOGLYCEMIC ENCEPHALOPATHY: ICD-10-CM

## 2019-05-23 DIAGNOSIS — R41.89 COGNITIVE IMPAIRMENT: ICD-10-CM

## 2019-05-23 DIAGNOSIS — I50.22 CHRONIC SYSTOLIC HEART FAILURE (H): ICD-10-CM

## 2019-05-23 PROCEDURE — 99310 SBSQ NF CARE HIGH MDM 45: CPT | Performed by: FAMILY MEDICINE

## 2019-05-23 NOTE — LETTER
5/23/2019        RE: Kristine Cosby  [address] [place] [address]        . duplicate    [facility] [facility]  PRIMARY CARE PROVIDER AND CLINIC:  NATIVIDAD Chapa [facility], [address] [place] [address] / [place] [address]  Chief Complaint   Patient presents with     Hospital F/U     [place] Medical Record Number:  [id number]  Place of Service where encounter took place:  [facility] ([facility] [[id number]]    Kristine Cosby  is a 79 year old  (1940), re-admitted to the above facility from  [facility]. Hospital stay 5/13/19 - 5/18/19. .  Admitted to this facility for  medical management and nursing care.    HPI:    HPI information obtained from: facility staff, patient report, [facility] chart review and DNP.   Brief Summary of Hospital Course:   - Pt admitted to the hospital with unresponsive episode, felt to have aspiration pneumonitis and e-coli UTI started on abx,  SLP recommend nectar thick liquid.  CT brain shows right-sided encephalomalacia, noted to have progressive cognitive decline, curbside consult with neurlogy at [facility] no further testing was ordered.    Updates on Status Since Skilled nursing Admission:   - had a prolonged unresponsive episode, VSS, EEG negative hence keppra and zyprexa stopped.   - Pt seen and examiend today, reports doing fine. Denies fever, chills, CP, SOB, edema. Reports appetite is good, had 2 BM this week, but reports does not sleep well. Denies visual or auditory hallucination.   - RN reports no change in the Resident's pattern/behavior.   ---------------------------------------------------------------------------------\  Past Medical, social, family histories, medications, and allergies reviewed and updated  - Medications reviewed: in the chart and EHR.   - Case Management:   I have reviewed the care plan and MDS and do agree with the plan. Patient's desire to return to the community is not  present.  Information reviewed:  Medications, vital signs, orders, and nursing notes.    Post Discharge Medication Reconciliation Status: discharge medications reconciled and changed, per note/orders (see AVS)  Current Outpatient Medications   Medication Sig Dispense Refill     acetaminophen (TYLENOL) 500 MG tablet Take 1,000 mg by mouth 3 times daily        albuterol (PROVENTIL) (2.5 MG/3ML) 0.083% neb solution Take 2.5 mg by nebulization every 4 hours as needed for shortness of breath / dyspnea or wheezing       apixaban ANTICOAGULANT (ELIQUIS) 5 MG tablet Take 1 tablet (5 mg) by mouth 2 times daily       atorvastatin (LIPITOR) 20 MG tablet Take 20 mg by mouth At Bedtime       bisacodyl (DULCOLAX) 10 MG suppository Place 1 suppository (10 mg) rectally daily as needed for constipation       cholecalciferol 1000 units TABS Take 2,000 Units by mouth daily       levofloxacin (LEVAQUIN) 750 MG tablet Take 1 tablet (750 mg) by mouth daily 10 tablet 0     levothyroxine (SYNTHROID/LEVOTHROID) 125 MCG tablet Take 125 mcg by mouth daily       lidocaine (LIDODERM) 5 % patch Place 1 patch onto the skin every 24 hours       metoprolol succinate ER (TOPROL-XL) 50 MG 24 hr tablet Take 50 mg by mouth daily       nitroGLYcerin (NITROSTAT) 0.4 MG sublingual tablet Place 0.4 mg under the tongue every 5 minutes as needed for chest pain For chest pain place 1 tablet under the tongue every 5 minutes for 3 doses. If symptoms persist 5 minutes after 1st dose call 911.       nystatin (MYCOSTATIN) 255315 UNIT/GM external powder Apply topically 2 times daily as needed       pantoprazole (PROTONIX) 40 MG EC tablet Take 40 mg by mouth daily       senna-docusate (SENOKOT-S/PERICOLACE) 8.6-50 MG tablet Take 2 tablets by mouth 2 times daily        Skin Protectants, Misc. (EUCERIN) cream Apply topically 2 times daily ; apply to arms, legs, hands, feet for dry skin       tamsulosin (FLOMAX) 0.4 MG capsule Take 0.8 mg by mouth At Bedtime       ROS:  "Limited secondary to cognitive impairment but today pt reports- see HPI    Vitals:  /81   Pulse 102   Temp 98  F (36.7  C)   Resp 18   Ht 1.702 m (5' 7\")   Wt 97.5 kg (215 lb)   SpO2 92%   BMI 33.67 kg/m     Exam:  GENERAL APPEARANCE:   sitting in WC, no acute distress.   EYES:  EOMI, Pupil rounded and equal.  RESP:  lungs clear to auscultation   CV:  S1S2 audible, rapid but regular HR, no murmur appreciated. No edema  ABDOMEN:  soft, NT/ND, BS audible. no mass appreciated on palpation.   M/S:   no joint deformity noted on observation.   SKIN:  Copper discoloration legs  NEURO:   no NFD appreciated on observation.   PSYCH: AAOx person, state, month and day but not year.     Lab/Diagnostic data:  Labs done in SNF are in [place] EPIC. Please refer to them using [employer] Everywhere.    ASSESSMENT/PLAN:  Recurrent episodes of unresponsiveness  Cognitive impairment  Hx of Hypoglycemic encephalopathy  Encephalomalacia On CT  - has extensive work up including EEG negative;  etiology continued to be uncertain at this stage. Noted to have V-tack on Zio patch, however, unlikely the cause for morning behavior/symptoms.   - recommend comfort care approach  - follow on neurology.   - BIMS 8/15     Chronic systolic heart failure (H)  Chronic atrial fibrillation (H)  - compensated. Z-patch showed V-tack and afib. Follow on cardiology consult.   - on Eliquis and toprol xl.    Dysphagia: on DD, non compliant.     Electronically signed by:  Parris Navarro MD                         Sincerely,        Parris Navarro MD    "

## 2019-05-23 NOTE — PROGRESS NOTES
[place] [facility]  PRIMARY CARE PROVIDER AND CLINIC:  NATIVIDAD Chapa CNP, [address] [place] [address] / [place] [address]  Chief Complaint   Patient presents with     Hospital F/U     [place] Medical Record Number:  [id number]  Place of Service where encounter took place:  [facility] ([facility] [[id number]]    Kristine Cosby  is a 79 year old  (1940), re-admitted to the above facility from  [facility]. Hospital stay 5/13/19 - 5/18/19. .  Admitted to this facility for  medical management and nursing care.    HPI:    HPI information obtained from: facility staff, patient report, [facility] chart review and DNP.   Brief Summary of Hospital Course:   - Pt admitted to the hospital with unresponsive episode, felt to have aspiration pneumonitis and e-coli UTI started on abx,  SLP recommend nectar thick liquid.  CT brain shows right-sided encephalomalacia, noted to have progressive cognitive decline, curbside consult with neurlogy at [facility] no further testing was ordered.    Updates on Status Since Skilled nursing Admission:   - had a prolonged unresponsive episode, VSS, EEG negative hence keppra and zyprexa stopped.   - Pt seen and examiend today, reports doing fine. Denies fever, chills, CP, SOB, edema. Reports appetite is good, had 2 BM this week, but reports does not sleep well. Denies visual or auditory hallucination.   - RN reports no change in the Resident's pattern/behavior.   ---------------------------------------------------------------------------------\  - Past Medical, social, family histories, medications, and allergies reviewed and updated  - Medications reviewed: in the chart and EHR.   - Case Management:   I have reviewed the care plan and MDS and do agree with the plan. Patient's desire to return to the community is not present.  Information reviewed:  Medications, vital signs, orders, and nursing notes.    Post Discharge Medication  Reconciliation Status: discharge medications reconciled and changed, per note/orders (see AVS)  Current Outpatient Medications   Medication Sig Dispense Refill     acetaminophen (TYLENOL) 500 MG tablet Take 1,000 mg by mouth 3 times daily        albuterol (PROVENTIL) (2.5 MG/3ML) 0.083% neb solution Take 2.5 mg by nebulization every 4 hours as needed for shortness of breath / dyspnea or wheezing       apixaban ANTICOAGULANT (ELIQUIS) 5 MG tablet Take 1 tablet (5 mg) by mouth 2 times daily       atorvastatin (LIPITOR) 20 MG tablet Take 20 mg by mouth At Bedtime       bisacodyl (DULCOLAX) 10 MG suppository Place 1 suppository (10 mg) rectally daily as needed for constipation       cholecalciferol 1000 units TABS Take 2,000 Units by mouth daily       levofloxacin (LEVAQUIN) 750 MG tablet Take 1 tablet (750 mg) by mouth daily 10 tablet 0     levothyroxine (SYNTHROID/LEVOTHROID) 125 MCG tablet Take 125 mcg by mouth daily       lidocaine (LIDODERM) 5 % patch Place 1 patch onto the skin every 24 hours       metoprolol succinate ER (TOPROL-XL) 50 MG 24 hr tablet Take 50 mg by mouth daily       nitroGLYcerin (NITROSTAT) 0.4 MG sublingual tablet Place 0.4 mg under the tongue every 5 minutes as needed for chest pain For chest pain place 1 tablet under the tongue every 5 minutes for 3 doses. If symptoms persist 5 minutes after 1st dose call 911.       nystatin (MYCOSTATIN) 241325 UNIT/GM external powder Apply topically 2 times daily as needed       pantoprazole (PROTONIX) 40 MG EC tablet Take 40 mg by mouth daily       senna-docusate (SENOKOT-S/PERICOLACE) 8.6-50 MG tablet Take 2 tablets by mouth 2 times daily        Skin Protectants, Misc. (EUCERIN) cream Apply topically 2 times daily ; apply to arms, legs, hands, feet for dry skin       tamsulosin (FLOMAX) 0.4 MG capsule Take 0.8 mg by mouth At Bedtime       ROS: Limited secondary to cognitive impairment but today pt reports- see HPI    Vitals:  /81   Pulse 102   Temp  "98  F (36.7  C)   Resp 18   Ht 1.702 m (5' 7\")   Wt 97.5 kg (215 lb)   SpO2 92%   BMI 33.67 kg/m    Exam:  GENERAL APPEARANCE:  sitting in WC, no acute distress.   EYES:  EOMI, Pupil rounded and equal.  RESP:  lungs clear to auscultation   CV:  S1S2 audible, rapid but regular HR, no murmur appreciated. No edema  ABDOMEN:  soft, NT/ND, BS audible. no mass appreciated on palpation.   M/S:   no joint deformity noted on observation.   SKIN:  Copper discoloration legs  NEURO:  no NFD appreciated on observation.   PSYCH: AAOx person, state, month and day but not year.     Lab/Diagnostic data:  Labs done in SNF are in [place] EPIC. Please refer to them using [employer] Everywhere.    ASSESSMENT/PLAN:  Recurrent episodes of unresponsiveness  Cognitive impairment  Hx of Hypoglycemic encephalopathy  Encephalomalacia On CT  - has extensive work up including EEG negative;  etiology continued to be uncertain at this stage. Noted to have V-tack on Zio patch, however, unlikely the cause for morning behavior/symptoms.   - recommend comfort care approach  - follow on neurology.   - BIMS 8/15     Chronic systolic heart failure (H)  Chronic atrial fibrillation (H)  - compensated. Z-patch showed V-tack and afib. Follow on cardiology consult.   - on Eliquis and toprol xl.    Dysphagia: on DD, non compliant.     Electronically signed by:  Parris Navarro MD                     "

## 2019-06-05 ENCOUNTER — NURSING HOME VISIT (OUTPATIENT)
Dept: GERIATRICS | Facility: CLINIC | Age: 79
End: 2019-06-05
Payer: COMMERCIAL

## 2019-06-05 ENCOUNTER — HOSPITAL LABORATORY (OUTPATIENT)
Dept: NURSING HOME | Facility: OTHER | Age: 79
End: 2019-06-05

## 2019-06-05 VITALS
DIASTOLIC BLOOD PRESSURE: 89 MMHG | SYSTOLIC BLOOD PRESSURE: 136 MMHG | HEART RATE: 89 BPM | WEIGHT: 206.2 LBS | RESPIRATION RATE: 18 BRPM | BODY MASS INDEX: 32.36 KG/M2 | OXYGEN SATURATION: 93 % | TEMPERATURE: 97.2 F | HEIGHT: 67 IN

## 2019-06-05 DIAGNOSIS — E03.9 ACQUIRED HYPOTHYROIDISM: Primary | ICD-10-CM

## 2019-06-05 LAB — TSH SERPL DL<=0.005 MIU/L-ACNC: 0.84 MU/L (ref 0.4–4)

## 2019-06-05 PROCEDURE — 99308 SBSQ NF CARE LOW MDM 20: CPT | Performed by: NURSE PRACTITIONER

## 2019-06-05 RX ORDER — LEVOTHYROXINE SODIUM 100 UG/1
100 TABLET ORAL DAILY
COMMUNITY
End: 2019-08-09

## 2019-06-05 ASSESSMENT — MIFFLIN-ST. JEOR: SCORE: 1447.95

## 2019-06-05 NOTE — LETTER
6/5/2019        RE: Kristine Cosby  [address] [place] [address]        [facility] [facility]  [place] Medical Record Number:  [id number]  Place of Service where encounter took place:  [facility] [[id number]]  Chief Complaint   Patient presents with     Nursing Home Acute       HPI:    Kristine Cosby  is a 79 year old (1940), who is being seen today for an episodic care visit.  HPI information obtained from: facility chart records, facility staff, patient report and [facility] chart review. Today's concern is:    Acquired hypothyroidism  Lab Results   Component Value Date    TSH 0.84 06/05/2019     Patient has had episodes of unresponsiveness vs alertness; has been seen by Neurology and Cardiology.    Today pt is met in the dining room where she is at baseline mentation.  She denies SOB, CP, HA, lightheadedness.         Past Medical and Surgical History reviewed in Epic today.    MEDICATIONS:  Current Outpatient Medications   Medication Sig Dispense Refill     acetaminophen (TYLENOL) 500 MG tablet Take 1,000 mg by mouth 3 times daily        albuterol (PROVENTIL) (2.5 MG/3ML) 0.083% neb solution Take 2.5 mg by nebulization every 4 hours as needed for shortness of breath / dyspnea or wheezing       apixaban ANTICOAGULANT (ELIQUIS) 5 MG tablet Take 1 tablet (5 mg) by mouth 2 times daily       atorvastatin (LIPITOR) 20 MG tablet Take 20 mg by mouth At Bedtime       bisacodyl (DULCOLAX) 10 MG suppository Place 1 suppository (10 mg) rectally daily as needed for constipation       cholecalciferol 1000 units TABS Take 2,000 Units by mouth daily       levothyroxine (SYNTHROID/LEVOTHROID) 100 MCG tablet Take 100 mcg by mouth daily       levothyroxine (SYNTHROID/LEVOTHROID) 125 MCG tablet Take 125 mcg by mouth daily       lidocaine (LIDODERM) 5 % patch Place 1 patch onto the skin every 24 hours       metoprolol succinate ER (TOPROL-XL) 50 MG 24 hr tablet  "Take 50 mg by mouth daily       nitroGLYcerin (NITROSTAT) 0.4 MG sublingual tablet Place 0.4 mg under the tongue every 5 minutes as needed for chest pain For chest pain place 1 tablet under the tongue every 5 minutes for 3 doses. If symptoms persist 5 minutes after 1st dose call 911.       nystatin (MYCOSTATIN) 875034 UNIT/GM external powder Apply topically 2 times daily as needed       pantoprazole (PROTONIX) 40 MG EC tablet Take 40 mg by mouth daily       senna-docusate (SENOKOT-S/PERICOLACE) 8.6-50 MG tablet Take 2 tablets by mouth 2 times daily        Skin Protectants, Misc. (EUCERIN) cream Apply topically 2 times daily ; apply to arms, legs, hands, feet for dry skin       tamsulosin (FLOMAX) 0.4 MG capsule Take 0.8 mg by mouth At Bedtime       levofloxacin (LEVAQUIN) 750 MG tablet Take 1 tablet (750 mg) by mouth daily 10 tablet 0     REVIEW OF SYSTEMS:  Limited secondary to cognitive impairment but today pt reports 10 point ROS of systems including Constitutional, Eyes, Respiratory, Cardiovascular, Gastroenterology, Genitourinary, Integumentary, Musculoskeletal, Psychiatric were all negative except for pertinent positives noted in my HPI.    Objective:  /89   Pulse 89   Temp 97.2  F (36.2  C)   Resp 18   Ht 1.702 m (5' 7\")   Wt 93.5 kg (206 lb 3.2 oz)   SpO2 93%   BMI 32.30 kg/m     Exam:  GENERAL APPEARANCE:  Alert, in no distress, pleasant, smiling  RESP:  respiratory effort normal, no respiratory distress  CV:  No LE peripheral edema  ABDOMEN:  nondistended  M/S:   Gait and station with W/C for mobility, Digits and nails with significant arthritic changes, reduced muscle mass  SKIN:  Inspection and Palpation of skin and subcutaneous tissue pale, intact  PSYCH:  insight and judgement, memory with impairment, affect and mood normal, follows commands readily       Labs:   TSH   Date Value Ref Range Status   06/05/2019 0.84 0.40 - 4.00 mU/L Final   04/10/2019 0.86 0.40 - 4.00 mU/L Final   03/27/2019 " 1.62 0.40 - 4.00 mU/L Final       ASSESSMENT/PLAN:  Acquired hypothyroidism     Will decrease levothyroxine and monitor for symptoms, recheck lab in 8 weeks.    Orders written by provider at facility and transcribed by : Sherlyn Faulkner MA  1.  Decrease Levothyroxine to 100 mcg po every day.  Dx: hypothyroidism  2.  Recheck TSH in 8 weeks.  Dx: hypothyroidism       Electronically signed by:  NATIVIDAD Chapa CNP               Sincerely,        NATIVIDAD Chapa CNP

## 2019-06-05 NOTE — PROGRESS NOTES
[facility]  [place] Medical Record Number:  [id number]  Place of Service where encounter took place:  [facility] [[id number]]  Chief Complaint   Patient presents with     Nursing Home Acute       HPI:    Kristine Cosby  is a 79 year old (1940), who is being seen today for an episodic care visit.  HPI information obtained from: facility chart records, facility staff, patient report and [facility] chart review. Today's concern is:    Acquired hypothyroidism  Lab Results   Component Value Date    TSH 0.84 06/05/2019     Patient has had episodes of unresponsiveness vs alertness; has been seen by Neurology and Cardiology.    Today pt is met in the dining room where she is at baseline mentation.  She denies SOB, CP, HA, lightheadedness.         Past Medical and Surgical History reviewed in Epic today.    MEDICATIONS:  Current Outpatient Medications   Medication Sig Dispense Refill     acetaminophen (TYLENOL) 500 MG tablet Take 1,000 mg by mouth 3 times daily        albuterol (PROVENTIL) (2.5 MG/3ML) 0.083% neb solution Take 2.5 mg by nebulization every 4 hours as needed for shortness of breath / dyspnea or wheezing       apixaban ANTICOAGULANT (ELIQUIS) 5 MG tablet Take 1 tablet (5 mg) by mouth 2 times daily       atorvastatin (LIPITOR) 20 MG tablet Take 20 mg by mouth At Bedtime       bisacodyl (DULCOLAX) 10 MG suppository Place 1 suppository (10 mg) rectally daily as needed for constipation       cholecalciferol 1000 units TABS Take 2,000 Units by mouth daily       levothyroxine (SYNTHROID/LEVOTHROID) 100 MCG tablet Take 100 mcg by mouth daily       levothyroxine (SYNTHROID/LEVOTHROID) 125 MCG tablet Take 125 mcg by mouth daily       lidocaine (LIDODERM) 5 % patch Place 1 patch onto the skin every 24 hours       metoprolol succinate ER (TOPROL-XL) 50 MG 24 hr tablet Take 50 mg by mouth daily       nitroGLYcerin (NITROSTAT) 0.4 MG sublingual tablet Place 0.4 mg  "under the tongue every 5 minutes as needed for chest pain For chest pain place 1 tablet under the tongue every 5 minutes for 3 doses. If symptoms persist 5 minutes after 1st dose call 911.       nystatin (MYCOSTATIN) 453234 UNIT/GM external powder Apply topically 2 times daily as needed       pantoprazole (PROTONIX) 40 MG EC tablet Take 40 mg by mouth daily       senna-docusate (SENOKOT-S/PERICOLACE) 8.6-50 MG tablet Take 2 tablets by mouth 2 times daily        Skin Protectants, Misc. (EUCERIN) cream Apply topically 2 times daily ; apply to arms, legs, hands, feet for dry skin       tamsulosin (FLOMAX) 0.4 MG capsule Take 0.8 mg by mouth At Bedtime       levofloxacin (LEVAQUIN) 750 MG tablet Take 1 tablet (750 mg) by mouth daily 10 tablet 0     REVIEW OF SYSTEMS:  Limited secondary to cognitive impairment but today pt reports 10 point ROS of systems including Constitutional, Eyes, Respiratory, Cardiovascular, Gastroenterology, Genitourinary, Integumentary, Musculoskeletal, Psychiatric were all negative except for pertinent positives noted in my HPI.    Objective:  /89   Pulse 89   Temp 97.2  F (36.2  C)   Resp 18   Ht 1.702 m (5' 7\")   Wt 93.5 kg (206 lb 3.2 oz)   SpO2 93%   BMI 32.30 kg/m    Exam:  GENERAL APPEARANCE:  Alert, in no distress, pleasant, smiling  RESP:  respiratory effort normal, no respiratory distress  CV:  No LE peripheral edema  ABDOMEN:  nondistended  M/S:   Gait and station with W/C for mobility, Digits and nails with significant arthritic changes, reduced muscle mass  SKIN:  Inspection and Palpation of skin and subcutaneous tissue pale, intact  PSYCH:  insight and judgement, memory with impairment, affect and mood normal, follows commands readily       Labs:   TSH   Date Value Ref Range Status   06/05/2019 0.84 0.40 - 4.00 mU/L Final   04/10/2019 0.86 0.40 - 4.00 mU/L Final   03/27/2019 1.62 0.40 - 4.00 mU/L Final       ASSESSMENT/PLAN:  Acquired hypothyroidism     Will decrease " levothyroxine and monitor for symptoms, recheck lab in 8 weeks.    Orders written by provider at facility and transcribed by : Sherlyn Faulkner MA  1.  Decrease Levothyroxine to 100 mcg po every day.  Dx: hypothyroidism  2.  Recheck TSH in 8 weeks.  Dx: hypothyroidism       Electronically signed by:  NATIVIDAD Chapa CNP

## 2019-06-12 ENCOUNTER — NURSING HOME VISIT (OUTPATIENT)
Dept: GERIATRICS | Facility: CLINIC | Age: 79
End: 2019-06-12
Payer: COMMERCIAL

## 2019-06-12 VITALS
SYSTOLIC BLOOD PRESSURE: 126 MMHG | TEMPERATURE: 97.1 F | RESPIRATION RATE: 18 BRPM | HEIGHT: 67 IN | DIASTOLIC BLOOD PRESSURE: 95 MMHG | WEIGHT: 211.4 LBS | OXYGEN SATURATION: 93 % | BODY MASS INDEX: 33.18 KG/M2 | HEART RATE: 78 BPM

## 2019-06-12 DIAGNOSIS — K21.9 GASTROESOPHAGEAL REFLUX DISEASE, ESOPHAGITIS PRESENCE NOT SPECIFIED: ICD-10-CM

## 2019-06-12 DIAGNOSIS — M15.0 PRIMARY OSTEOARTHRITIS INVOLVING MULTIPLE JOINTS: ICD-10-CM

## 2019-06-12 DIAGNOSIS — I48.20 CHRONIC ATRIAL FIBRILLATION (H): Primary | ICD-10-CM

## 2019-06-12 DIAGNOSIS — F03.90 DEMENTIA WITHOUT BEHAVIORAL DISTURBANCE, UNSPECIFIED DEMENTIA TYPE: ICD-10-CM

## 2019-06-12 DIAGNOSIS — I50.22 CHRONIC SYSTOLIC HEART FAILURE (H): ICD-10-CM

## 2019-06-12 DIAGNOSIS — I25.2 HISTORY OF ST ELEVATION MYOCARDIAL INFARCTION (STEMI): ICD-10-CM

## 2019-06-12 DIAGNOSIS — E11.9 TYPE 2 DIABETES MELLITUS WITHOUT COMPLICATION, WITHOUT LONG-TERM CURRENT USE OF INSULIN (H): ICD-10-CM

## 2019-06-12 DIAGNOSIS — Z86.73 HISTORY OF STROKE: ICD-10-CM

## 2019-06-12 DIAGNOSIS — E78.5 HYPERLIPIDEMIA LDL GOAL <100: ICD-10-CM

## 2019-06-12 DIAGNOSIS — I25.10 ATHEROSCLEROSIS OF NATIVE CORONARY ARTERY OF NATIVE HEART WITHOUT ANGINA PECTORIS: ICD-10-CM

## 2019-06-12 DIAGNOSIS — I10 ESSENTIAL HYPERTENSION: ICD-10-CM

## 2019-06-12 DIAGNOSIS — F06.2 PSYCHOTIC DISORDER DUE TO MEDICAL CONDITION WITH DELUSIONS: ICD-10-CM

## 2019-06-12 PROCEDURE — 99309 SBSQ NF CARE MODERATE MDM 30: CPT | Performed by: NURSE PRACTITIONER

## 2019-06-12 ASSESSMENT — MIFFLIN-ST. JEOR: SCORE: 1471.53

## 2019-06-12 NOTE — PROGRESS NOTES
[facility]  Chief Complaint   Patient presents with     senior care Regulatory     [place] Medical Record Number:  [id number]  Place of Service where encounter took place:  [facility] ([facility] [[id number]]    HPI:    Kristine Cosby  is 79 year old (1940), who is being seen today for a federally mandated E/M visit.  HPI information obtained from: facility chart records, facility staff, patient report and [facility] chart review. Today's concerns are:  Chronic atrial fibrillation (H)  On apixaban 5 mg BID  HRs 78-90, irregular today    History of previous Pradaxa which was stopped d/t GIB (2017) but apixaban was started last hospitalization as patient has been having unresponsive episodes concerning for CVA with known a fib history.     Chronic systolic heart failure (H)  Essential hypertension  Hyperlipidemia LDL goal <100  Atherosclerosis of native coronary artery of native heart without angina pectoris  History of ST elevation myocardial infarction (STEMI)  On atorvastatin 20 mg q HS, nitroglycerin PRN, Toprol XL 50 mg daily    BPs 130-150s/70-90s  HRs 78-90  Patient denies CP, HA, lightheadedness (may be poor historian)    Weights:  6/5/19 - 206.2  6/6/19 - 206  6/8/19 - 207.4  6/9/19 - 208  6/10/19 - 205.8  6/11/19 - 211.4  6/12/19 - 212.2     Type 2 diabetes mellitus without complication, without long-term current use of insulin (H)  Lab Results   Component Value Date    A1C 6.7 04/15/2019     On no meds  BID accucchecks:  AM: 105-155  PM: 152-192    On statin    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    Since then has had episodes of unresponsiveness (unarousable but stable VS) and baby talk which can last several hours to over 24 hours.   Last saw Neurology on  6/4/19 who stopped tamsulosin and agreed with discontinue of keppra.  MRI was ordered and completed 6/7/29:  IMPRESSION:  1. No interval change.   2. No acute intracranial abnormality.   3. Normal brain parenchymal morphology. Stable focal encephalomalacia and gliosis ovoid infarct involving the right frontal lobe and frontal operculum.   4. Stable patchy T2/FLAIR signal hyperintensity within the white matter consistent with chronic small vessel ischemic changes. Multiple lacunar infarcts of the bilateral cerebellar hemispheres   5. No acute or chronic intracranial hemorrhage    On apixaban and statin for cva ppx.   In-house psychology is following.  Patient A&O x 2 today (self, possible guessing at date)  BIMS 3/15  PHQ9 - 7     Gastroesophageal reflux disease, esophagitis presence not specified  History of GIB in 2017 while on pradaxa  Now on apixaban  On pantoprazole 40 mg daily  Hemoglobin   Date Value Ref Range Status   05/16/2019 11.1 (L) 11.7 - 15.7 g/dL Final   05/15/2019 10.6 (L) 11.7 - 15.7 g/dL Final     Patient denies heartburn, upset  Stomach (may be poor historian)     Primary osteoarthritis of multiple joints  Patient today reporting left knee is painful with therapy, unable to bear weight for therapy.     Analgesia with Tylenol 1000 mg TID      ALLERGIES:Blood transfusion related (informational only)  PAST MEDICAL HISTORY:   has a past medical history of Cellulitis of left lower extremity, GERD (gastroesophageal reflux disease), HTN (hypertension), Type 2 diabetes mellitus without complication (H), and Ulcer of left lower extremity with fat layer exposed (H).  PAST SURGICAL HISTORY:   has a past surgical history that includes Hysterectomy and ESOPHAGOGASTRODUODENOSCOPY.  FAMILY HISTORY: family history includes Lung Cancer in her father.  SOCIAL HISTORY:  reports that she quit smoking about 8 weeks ago. She has quit using smokeless tobacco. She reports that she does not drink  alcohol.    MEDICATIONS:  Current Outpatient Medications   Medication Sig Dispense Refill     acetaminophen (TYLENOL) 500 MG tablet Take 1,000 mg by mouth 3 times daily        albuterol (PROVENTIL) (2.5 MG/3ML) 0.083% neb solution Take 2.5 mg by nebulization every 4 hours as needed for shortness of breath / dyspnea or wheezing       apixaban ANTICOAGULANT (ELIQUIS) 5 MG tablet Take 1 tablet (5 mg) by mouth 2 times daily       atorvastatin (LIPITOR) 20 MG tablet Take 20 mg by mouth At Bedtime       bisacodyl (DULCOLAX) 10 MG suppository Place 1 suppository (10 mg) rectally daily as needed for constipation       cholecalciferol 1000 units TABS Take 2,000 Units by mouth daily       Furosemide (LASIX PO) Take 10 mg by mouth daily       levothyroxine (SYNTHROID/LEVOTHROID) 100 MCG tablet Take 100 mcg by mouth daily       lidocaine (LIDODERM) 5 % patch Place 1 patch onto the skin every 24 hours       metoprolol succinate ER (TOPROL-XL) 50 MG 24 hr tablet Take 50 mg by mouth daily       nitroGLYcerin (NITROSTAT) 0.4 MG sublingual tablet Place 0.4 mg under the tongue every 5 minutes as needed for chest pain For chest pain place 1 tablet under the tongue every 5 minutes for 3 doses. If symptoms persist 5 minutes after 1st dose call 911.       nystatin (MYCOSTATIN) 509123 UNIT/GM external powder Apply topically 2 times daily as needed       omeprazole (PRILOSEC) 20 MG DR capsule Take 20 mg by mouth daily       senna-docusate (SENOKOT-S/PERICOLACE) 8.6-50 MG tablet Take 2 tablets by mouth 2 times daily        Skin Protectants, Misc. (EUCERIN) cream Apply topically 2 times daily ; apply to arms, legs, hands, feet for dry skin       Case Management:  I have reviewed the care plan and MDS and do agree with the plan. Patient's desire to return to the community is not assessible due to cognitive impairment. Information reviewed:  Medications, vital signs, orders, and nursing notes.    ROS:  Limited secondary to cognitive impairment  "but today pt reports 10 point ROS of systems including Constitutional, Eyes, Respiratory, Cardiovascular, Gastroenterology, Genitourinary, Integumentary, Musculoskeletal, Psychiatric were all negative except for pertinent positives noted in my HPI.    Vitals:  BP (!) 126/95   Pulse 78   Temp 97.1  F (36.2  C)   Resp 18   Ht 1.702 m (5' 7\")   Wt 95.9 kg (211 lb 6.4 oz)   SpO2 93%   BMI 33.11 kg/m    Body mass index is 33.11 kg/m .  Exam:  GENERAL APPEARANCE:  Alert, in no distress, pleasant today, conversant  RESP:  respiratory effort and palpation of chest normal, auscultation of lungs clear , no respiratory distress  CV:  Palpation and auscultation of heart done , rate and rhythm irregular, no murmur, mild LE peripheral edema  ABDOMEN:  normal bowel sounds, soft, nontender, no hepatosplenomegaly or other masses  M/S:   Gait and station with W/C for mobility, Digits and nails with arthritic changes, reduced muscle mass,   SKIN:  Inspection and Palpation of skin and subcutaneous tissue pale, intact  PSYCH:  insight and judgement, memory with impairment , affect and mood normal, follows commands readily         Lab/Diagnostic data:   CBC RESULTS:   Recent Labs   Lab Test 05/16/19  0556 05/15/19  0551   WBC 4.4 3.8*   RBC 3.57* 3.39*   HGB 11.1* 10.6*   HCT 34.9* 33.4*   MCV 98 99   MCH 31.1 31.3   MCHC 31.8 31.7   RDW 12.9 12.9   * 146*       Last Basic Metabolic Panel:  Recent Labs   Lab Test 05/16/19  0556 05/15/19  0551    142   POTASSIUM 3.6 4.0   CHLORIDE 111* 110*   MIKE 8.4* 8.2*   CO2 27 26   BUN 10 10   CR 0.76 0.93   * 102*       Liver Function Studies -   Recent Labs   Lab Test 05/13/19  2215 04/23/19  1038   PROTTOTAL 7.0 7.0   ALBUMIN 3.1* 3.3*   BILITOTAL 0.5 0.6   ALKPHOS 80 80   AST 13 11   ALT 11 12       TSH   Date Value Ref Range Status   06/05/2019 0.84 0.40 - 4.00 mU/L Final   04/10/2019 0.86 0.40 - 4.00 mU/L Final   ]    Lab Results   Component Value Date    A1C 6.7 " 04/15/2019         ASSESSMENT/PLAN  Chronic atrial fibrillation (H)  OK to continue with apixaban at this time  On Toprol XL for rate control - stable on current dosing.  Monitor for bleeding    Chronic systolic heart failure (H)  Essential hypertension  Hyperlipidemia LDL goal <100  Atherosclerosis of native coronary artery of native heart without angina pectoris  History of ST elevation myocardial infarction (STEMI)  With increasing weights and LE edema, and borderline HTN, recommend starting low-dose diuretic and monitor electrolytes for stability.  If patient has large response and is not taking in enough po - can discontinue. Will order lab f/u.       Type 2 diabetes mellitus without complication, without long-term current use of insulin (H)  Goal: HgbA1C between 8-9%. Per AGS there is potential harm in lowering the A1C to <6.5% in older adults with diabetes.   OK to discontinue accuchecks  Monitor.     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   MRI showing evidence of dementia but recommend family f/u with Neurology for official read as likely family has questions best answered by Neurology.   Nursing for supportive cares     Gastroesophageal reflux disease, esophagitis presence not specified  Will attempt GDR of PPI as PPIs increase risk of pneumonia, C.Diff., fractures, hypomagnesemia.  Will change to Omeprazole and decrease to 230 mg daily. Recommend holding here while on OA.     Primary osteoarthritis involving multiple joints  Can order Xray fo rleft knee eval and if degenerative changes noted, can do steroid injection as patient voiced interest in this.  This could possibly also help with therapy progress.       Orders written by provider at facility and transcribed by : Sherlyn Faulkner MA  1.   Discontinue Accuchecks.  2.  Discontinue Pantoprazole.  3.  Omeprazole 20 mg po every day.  Hx GIB/Gerd  4.  Left knee Xray, 2 view.  Dx: pain, immobility  5.  Lasix 10 mg po every day.  Dx: CHF  6.  BMP on 6/17/19.  Dx: CHF  7. discontinue tamsulosin         Electronically signed by:  NATIVIDAD Chapa CNP

## 2019-06-12 NOTE — LETTER
6/12/2019        RE: Kristine Cosby  [facility]  [address] [facility] [address]        [place] [facility]  Chief Complaint   Patient presents with     [facility] Regulatory     [facility] Record Number:  [id number]  Place of Service where encounter took place:  [facility] ([facility] [[id number]]    HPI:    Kristine Cosby  is 79 year old (1940), who is being seen today for a federally mandated E/M visit.  HPI information obtained from: facility chart records, facility staff, patient report and [facility] chart review. Today's concerns are:  Chronic atrial fibrillation (H)  On apixaban 5 mg BID  HRs 78-90, irregular today    History of previous Pradaxa which was stopped d/t GIB (2017) but apixaban was started last hospitalization as patient has been having unresponsive episodes concerning for CVA with known a fib history.     Chronic systolic heart failure (H)  Essential hypertension  Hyperlipidemia LDL goal <100  Atherosclerosis of native coronary artery of native heart without angina pectoris  History of ST elevation myocardial infarction (STEMI)  On atorvastatin 20 mg q HS, nitroglycerin PRN, Toprol XL 50 mg daily    BPs 130-150s/70-90s  HRs 78-90  Patient denies CP, HA, lightheadedness (may be poor historian)    Weights:  6/5/19 - 206.2  6/6/19 - 206  6/8/19 - 207.4  6/9/19 - 208  6/10/19 - 205.8  6/11/19 - 211.4  6/12/19 - 212.2     Type 2 diabetes mellitus without complication, without long-term current use of insulin (H)  Lab Results   Component Value Date    A1C 6.7 04/15/2019     On no meds  BID accucchecks:  AM: 105-155  PM: 152-192    On statin    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    Since then has had episodes of unresponsiveness  (unarousable but stable VS) and baby talk which can last several hours to over 24 hours.   Last saw Neurology on 6/4/19 who stopped tamsulosin and agreed with discontinue of keppra.  MRI was ordered and completed 6/7/29:  IMPRESSION:  1. No interval change.   2. No acute intracranial abnormality.   3. Normal brain parenchymal morphology. Stable focal encephalomalacia and gliosis ovoid infarct involving the right frontal lobe and frontal operculum.   4. Stable patchy T2/FLAIR signal hyperintensity within the white matter consistent with chronic small vessel ischemic changes. Multiple lacunar infarcts of the bilateral cerebellar hemispheres   5. No acute or chronic intracranial hemorrhage    On apixaban and statin for cva ppx.   In-house psychology is following.  Patient A&O x 2 today (self, possible guessing at date)  BIMS 3/15  PHQ9 - 7     Gastroesophageal reflux disease, esophagitis presence not specified  History of GIB in 2017 while on pradaxa  Now on apixaban  On pantoprazole 40 mg daily  Hemoglobin   Date Value Ref Range Status   05/16/2019 11.1 (L) 11.7 - 15.7 g/dL Final   05/15/2019 10.6 (L) 11.7 - 15.7 g/dL Final     Patient denies heartburn, upset  Stomach (may be poor historian)     Primary osteoarthritis of multiple joints  Patient today reporting left knee is painful with therapy, unable to bear weight for therapy.     Analgesia with Tylenol 1000 mg TID      ALLERGIES:Blood transfusion related (informational only)  PAST MEDICAL HISTORY:   has a past medical history of Cellulitis of left lower extremity, GERD (gastroesophageal reflux disease), HTN (hypertension), Type 2 diabetes mellitus without complication (H), and Ulcer of left lower extremity with fat layer exposed (H).  PAST SURGICAL HISTORY:   has a past surgical history that includes Hysterectomy and ESOPHAGOGASTRODUODENOSCOPY.  FAMILY HISTORY: family history includes Lung Cancer in her father.  SOCIAL HISTORY:  reports that she quit smoking about  8 weeks ago. She has quit using smokeless tobacco. She reports that she does not drink alcohol.    MEDICATIONS:  Current Outpatient Medications   Medication Sig Dispense Refill     acetaminophen (TYLENOL) 500 MG tablet Take 1,000 mg by mouth 3 times daily        albuterol (PROVENTIL) (2.5 MG/3ML) 0.083% neb solution Take 2.5 mg by nebulization every 4 hours as needed for shortness of breath / dyspnea or wheezing       apixaban ANTICOAGULANT (ELIQUIS) 5 MG tablet Take 1 tablet (5 mg) by mouth 2 times daily       atorvastatin (LIPITOR) 20 MG tablet Take 20 mg by mouth At Bedtime       bisacodyl (DULCOLAX) 10 MG suppository Place 1 suppository (10 mg) rectally daily as needed for constipation       cholecalciferol 1000 units TABS Take 2,000 Units by mouth daily       Furosemide (LASIX PO) Take 10 mg by mouth daily       levothyroxine (SYNTHROID/LEVOTHROID) 100 MCG tablet Take 100 mcg by mouth daily       lidocaine (LIDODERM) 5 % patch Place 1 patch onto the skin every 24 hours       metoprolol succinate ER (TOPROL-XL) 50 MG 24 hr tablet Take 50 mg by mouth daily       nitroGLYcerin (NITROSTAT) 0.4 MG sublingual tablet Place 0.4 mg under the tongue every 5 minutes as needed for chest pain For chest pain place 1 tablet under the tongue every 5 minutes for 3 doses. If symptoms persist 5 minutes after 1st dose call 911.       nystatin (MYCOSTATIN) 333162 UNIT/GM external powder Apply topically 2 times daily as needed       omeprazole (PRILOSEC) 20 MG DR capsule Take 20 mg by mouth daily       senna-docusate (SENOKOT-S/PERICOLACE) 8.6-50 MG tablet Take 2 tablets by mouth 2 times daily        Skin Protectants, Misc. (EUCERIN) cream Apply topically 2 times daily ; apply to arms, legs, hands, feet for dry skin       Case Management:  I have reviewed the care plan and MDS and do agree with the plan. Patient's desire to return to the community is not assessible due to cognitive impairment. Information reviewed:  Medications,  "vital signs, orders, and nursing notes.    ROS:  Limited secondary to cognitive impairment but today pt reports 10 point ROS of systems including Constitutional, Eyes, Respiratory, Cardiovascular, Gastroenterology, Genitourinary, Integumentary, Musculoskeletal, Psychiatric were all negative except for pertinent positives noted in my HPI.    Vitals:  BP (!) 126/95   Pulse 78   Temp 97.1  F (36.2  C)   Resp 18   Ht 1.702 m (5' 7\")   Wt 95.9 kg (211 lb 6.4 oz)   SpO2 93%   BMI 33.11 kg/m     Body mass index is 33.11 kg/m .  Exam:  GENERAL APPEARANCE:  Alert, in no distress, pleasant today, conversant  RESP:  respiratory effort and palpation of chest normal, auscultation of lungs clear , no respiratory distress  CV:  Palpation and auscultation of heart done , rate and rhythm irregular, no murmur, mild LE peripheral edema  ABDOMEN:  normal bowel sounds, soft, nontender, no hepatosplenomegaly or other masses  M/S:   Gait and station with W/C for mobility, Digits and nails with arthritic changes, reduced muscle mass,   SKIN:  Inspection and Palpation of skin and subcutaneous tissue pale, intact  PSYCH:  insight and judgement, memory with impairment , affect and mood normal, follows commands readily         Lab/Diagnostic data:   CBC RESULTS:   Recent Labs   Lab Test 05/16/19  0556 05/15/19  0551   WBC 4.4 3.8*   RBC 3.57* 3.39*   HGB 11.1* 10.6*   HCT 34.9* 33.4*   MCV 98 99   MCH 31.1 31.3   MCHC 31.8 31.7   RDW 12.9 12.9   * 146*       Last Basic Metabolic Panel:  Recent Labs   Lab Test 05/16/19  0556 05/15/19  0551    142   POTASSIUM 3.6 4.0   CHLORIDE 111* 110*   MIKE 8.4* 8.2*   CO2 27 26   BUN 10 10   CR 0.76 0.93   * 102*       Liver Function Studies -   Recent Labs   Lab Test 05/13/19  2215 04/23/19  1038   PROTTOTAL 7.0 7.0   ALBUMIN 3.1* 3.3*   BILITOTAL 0.5 0.6   ALKPHOS 80 80   AST 13 11   ALT 11 12       TSH   Date Value Ref Range Status   06/05/2019 0.84 0.40 - 4.00 mU/L Final "   04/10/2019 0.86 0.40 - 4.00 mU/L Final   ]    Lab Results   Component Value Date    A1C 6.7 04/15/2019         ASSESSMENT/PLAN  Chronic atrial fibrillation (H)  OK to continue with apixaban at this time  On Toprol XL for rate control - stable on current dosing.  Monitor for bleeding    Chronic systolic heart failure (H)  Essential hypertension  Hyperlipidemia LDL goal <100  Atherosclerosis of native coronary artery of native heart without angina pectoris  History of ST elevation myocardial infarction (STEMI)  With increasing weights and LE edema, and borderline HTN, recommend starting low-dose diuretic and monitor electrolytes for stability.  If patient has large response and is not taking in enough po - can discontinue. Will order lab f/u.       Type 2 diabetes mellitus without complication, without long-term current use of insulin (H)  Goal: HgbA1C between 8-9%. Per AGS there is potential harm in lowering the A1C to <6.5% in older adults with diabetes.   OK to discontinue accuchecks  Monitor.     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   MRI showing evidence of dementia but recommend family f/u with Neurology for official read as likely family has questions best answered by Neurology.   Nursing for supportive cares     Gastroesophageal reflux disease, esophagitis presence not specified  Will attempt GDR of PPI as PPIs increase risk of pneumonia, C.Diff., fractures, hypomagnesemia.  Will change to Omeprazole and decrease to 230 mg daily. Recommend holding here while on OA.     Primary osteoarthritis involving multiple joints  Can order Xray fo rleft knee eval and if degenerative changes noted, can do steroid injection as patient voiced interest in this.  This could possibly also help with therapy progress.       Orders  written by provider at facility and transcribed by : Sherlyn Faulkner MA  1.  Discontinue Accuchecks.  2.  Discontinue Pantoprazole.  3.  Omeprazole 20 mg po every day.  Hx GIB/Gerd  4.  Left knee Xray, 2 view.  Dx: pain, immobility  5.  Lasix 10 mg po every day.  Dx: CHF  6.  BMP on 6/17/19.  Dx: CHF  7. discontinue tamsulosin         Electronically signed by:  NATIVIDAD Chapa CNP              Sincerely,        NATIVIDAD Chapa CNP

## 2019-06-17 ENCOUNTER — HOSPITAL LABORATORY (OUTPATIENT)
Dept: NURSING HOME | Facility: OTHER | Age: 79
End: 2019-06-17

## 2019-06-17 LAB
ANION GAP SERPL CALCULATED.3IONS-SCNC: 4 MMOL/L (ref 3–14)
BUN SERPL-MCNC: 11 MG/DL (ref 7–30)
CALCIUM SERPL-MCNC: 8.2 MG/DL (ref 8.5–10.1)
CHLORIDE SERPL-SCNC: 110 MMOL/L (ref 94–109)
CO2 SERPL-SCNC: 30 MMOL/L (ref 20–32)
CREAT SERPL-MCNC: 0.75 MG/DL (ref 0.52–1.04)
GFR SERPL CREATININE-BSD FRML MDRD: 76 ML/MIN/{1.73_M2}
GLUCOSE SERPL-MCNC: 102 MG/DL (ref 70–99)
POTASSIUM SERPL-SCNC: 4.1 MMOL/L (ref 3.4–5.3)
SODIUM SERPL-SCNC: 144 MMOL/L (ref 133–144)

## 2019-06-25 ENCOUNTER — OFFICE VISIT (OUTPATIENT)
Dept: CARDIOLOGY | Facility: CLINIC | Age: 79
End: 2019-06-25
Payer: COMMERCIAL

## 2019-06-25 VITALS
OXYGEN SATURATION: 96 % | RESPIRATION RATE: 12 BRPM | BODY MASS INDEX: 32.29 KG/M2 | HEART RATE: 94 BPM | DIASTOLIC BLOOD PRESSURE: 62 MMHG | HEIGHT: 69 IN | WEIGHT: 218 LBS | SYSTOLIC BLOOD PRESSURE: 108 MMHG

## 2019-06-25 DIAGNOSIS — I50.9 CONGESTIVE HEART FAILURE, UNSPECIFIED HF CHRONICITY, UNSPECIFIED HEART FAILURE TYPE (H): Primary | ICD-10-CM

## 2019-06-25 PROCEDURE — 99204 OFFICE O/P NEW MOD 45 MIN: CPT | Performed by: INTERNAL MEDICINE

## 2019-06-25 RX ORDER — LISINOPRIL 10 MG/1
10 TABLET ORAL DAILY
Qty: 30 TABLET | Refills: 11 | Status: SHIPPED | OUTPATIENT
Start: 2019-06-25 | End: 2020-12-01

## 2019-06-25 ASSESSMENT — MIFFLIN-ST. JEOR: SCORE: 1533.22

## 2019-06-25 ASSESSMENT — PAIN SCALES - GENERAL: PAINLEVEL: NO PAIN (0)

## 2019-06-25 NOTE — PROGRESS NOTES
CARDIOLOGY CONSULT    REASON FOR CONSULT: chronic atrial fibrillation, CAD      PRIMARY CARE PHYSICIAN:  Jannie Cruz    HISTORY OF PRESENT ILLNESS:   Ms. Cosby is a pleasant 79-year-old woman with past medical history significant for coronary artery disease, chronic atrial fibrillation, ischemic cardiomyopathy with an ejection fraction of 40% who presents to clinic today for follow-up.  She was previously followed by the [facility] [place] and was last seen there in August 2017.  More recently, she has had difficulty with recurrent hospital stations for encephalopathy felt to be secondary to infectious and metabolic causes.  She is currently residing at a transitional care unit.  Today, Lexii is without cardiac concerns.  She denies any exertional chest pain, chest discomfort or shortness of breath.  She denies any orthopnea, PND.  She denies any heart palpitations, lightheadedness or dizziness.  She has chronic lower extremity edema and is currently not taking any Lasix.  She has previously been on up to 40 mg twice daily of Lasix in the past.  She is not on optimal medical therapy for her cardiomyopathy, however, some of this is the result of her own thoughts and desires about her medication to treatment.  She does not feel that Lasix has helped in the past.  She did not think the metoprolol had previously been helping either.    PAST MEDICAL HISTORY:  1.  Coronary artery disease:  NSTEMI in 2016, PCI to LAD.    2.  Chronic atrial fibrillation  3.  Ischemic cardiomyopathy:  EF 40%.  4.  Hypertension  5.  GERD  6.  Dementia/cognitive decline  7.  Diabetes Type II  8.  Recurrent hospitalizations for encephalopathy felt to be secondary to infection/metabolic     MEDICATIONS:  Current Outpatient Medications   Medication     acetaminophen (TYLENOL) 500 MG tablet     albuterol (PROVENTIL) (2.5 MG/3ML) 0.083% neb solution     apixaban ANTICOAGULANT (ELIQUIS) 5 MG tablet     atorvastatin (LIPITOR) 20  MG tablet     bisacodyl (DULCOLAX) 10 MG suppository     cholecalciferol 1000 units TABS     Furosemide (LASIX PO)     levothyroxine (SYNTHROID/LEVOTHROID) 100 MCG tablet     lidocaine (LIDODERM) 5 % patch     lisinopril (PRINIVIL/ZESTRIL) 10 MG tablet     metoprolol succinate ER (TOPROL-XL) 50 MG 24 hr tablet     nitroGLYcerin (NITROSTAT) 0.4 MG sublingual tablet     nystatin (MYCOSTATIN) [id number] UNIT/GM external powder     omeprazole (PRILOSEC) 20 MG DR capsule     senna-docusate (SENOKOT-S/PERICOLACE) 8.6-50 MG tablet     Skin Protectants, Misc. (EUCERIN) cream     No current facility-administered medications for this visit.        ALLERGIES:  Allergies   Allergen Reactions     Blood Transfusion Related (Informational Only) Other (See Comments)     Patient has a history of a clinically significant antibody against RBC antigens.  A delay in compatible RBCs may occur.       SOCIAL HISTORY:  I have reviewed this patient's social history and updated it with pertinent information if needed. Kristine Cosby  reports that she quit smoking about 2 months ago. She has quit using smokeless tobacco. She reports that she does not drink alcohol.    FAMILY HISTORY:  I have reviewed this patient's family history and updated it with pertinent information if needed.   Family History   Problem Relation Age of Onset     Lung Cancer Father        REVIEW OF SYSTEMS:  A complete ROS was obtained and the pertinent positives are outlined in the history of present illness above.  The remainder of systems is negative.      PHYSICAL EXAM:      BP: 108/62 Pulse: 94   Resp: 12 SpO2: 96 %      Vital Signs with Ranges  Pulse:  [94] 94  Resp:  [12] 12  BP: (108)/(62) 108/62  SpO2:  [96 %] 96 %  218 lbs 0 oz    Constitutional: awake, alert, no distress  Eyes: PERRL, sclera nonicteric  ENT: trachea midline  Respiratory: CTAB  Cardiovascular:   RRR no m/r/g, no JVD  GI: nondistended, nontender, bowel sounds present  Lymph/Hematologic: no  lymphadenopathy  Skin: dry, no rash  Musculoskeletal: good muscle tone, +2 edema bilaterally  Neurologic: no focal deficits  Neuropsychiatric: appropriate affact    DATA:  Reviewed in EPIC        ASSESSMENT:  1.  Coronary artery disease:  Stable without symptoms concerning for obstructive CAD  2.  Chronic atrial fibrillation:  Asymptomatic; tolerating metoprolol, apixaban  3.  Frequent PVCs  4.  Hypertension  5.  Dementia/cognitive decline    RECOMMENDATIONS:  1.  Given lower extremity edema, I encouraged her Lexii to continue with the Lasix 20 mg daily that was started by her primary care provider.  Of note, she has been on up to 40 mg twice daily in the past so this dose can be titrated upwards if needed.  Lexii was somewhat skeptical that this would be of any help to her.  2.  She is not on optimal medical therapy for her cardiomyopathy.  In addition to metoprolol, will start lisinopril 10 mg daily.  Repeat BMP in 1 week.  Do not anticipate further titration of these medications as long as she remains stable from a cardiac standpoint.  3.  Plan for follow-up in 6 months or before then as needed.    Karyn Gomez MD  Cardiology - [facility]  Pager:  [phone]  June 25, 2019

## 2019-06-25 NOTE — LETTER
6/25/2019    Jannie Cruz, APRN CNP  [address] [address]    RE: Lexii Cosby       Dear Colleague,    I had the pleasure of seeing Lexii Cosby in the [facility].    CARDIOLOGY CONSULT    REASON FOR CONSULT: chronic atrial fibrillation, CAD      PRIMARY CARE PHYSICIAN:  Jannie Cruz    HISTORY OF PRESENT ILLNESS:   Ms. Cosby is a pleasant 79-year-old woman with past medical history significant for coronary artery disease, chronic atrial fibrillation, ischemic cardiomyopathy with an ejection fraction of 40% who presents to clinic today for follow-up.  She was previously followed by the [facility] [place] and was last seen there in August 2017.  More recently, she has had difficulty with recurrent hospital stations for encephalopathy felt to be secondary to infectious and metabolic causes.  She is currently residing at a transitional care unit.  Today, Lexii is without cardiac concerns.  She denies any exertional chest pain, chest discomfort or shortness of breath.  She denies any orthopnea, PND.  She denies any heart palpitations, lightheadedness or dizziness.  She has chronic lower extremity edema and is currently not taking any Lasix.  She has previously been on up to 40 mg twice daily of Lasix in the past.  She is not on optimal medical therapy for her cardiomyopathy, however, some of this is the result of her own thoughts and desires about her medication to treatment.  She does not feel that Lasix has helped in the past.  She did not think the metoprolol had previously been helping either.    PAST MEDICAL HISTORY:  1.  Coronary artery disease:  NSTEMI in 2016, PCI to LAD.    2.  Chronic atrial fibrillation  3.  Ischemic cardiomyopathy:  EF 40%.  4.  Hypertension  5.  GERD  6.  Dementia/cognitive decline  7.  Diabetes Type II  8.  Recurrent hospitalizations for encephalopathy felt to be secondary to infection/metabolic      MEDICATIONS:  Current Outpatient Medications   Medication     acetaminophen (TYLENOL) 500 MG tablet     albuterol (PROVENTIL) (2.5 MG/3ML) 0.083% neb solution     apixaban ANTICOAGULANT (ELIQUIS) 5 MG tablet     atorvastatin (LIPITOR) 20 MG tablet     bisacodyl (DULCOLAX) 10 MG suppository     cholecalciferol 1000 units TABS     Furosemide (LASIX PO)     levothyroxine (SYNTHROID/LEVOTHROID) 100 MCG tablet     lidocaine (LIDODERM) 5 % patch     lisinopril (PRINIVIL/ZESTRIL) 10 MG tablet     metoprolol succinate ER (TOPROL-XL) 50 MG 24 hr tablet     nitroGLYcerin (NITROSTAT) 0.4 MG sublingual tablet     nystatin (MYCOSTATIN) [id number] UNIT/GM external powder     omeprazole (PRILOSEC) 20 MG DR capsule     senna-docusate (SENOKOT-S/PERICOLACE) 8.6-50 MG tablet     Skin Protectants, Misc. (EUCERIN) cream     No current facility-administered medications for this visit.        ALLERGIES:  Allergies   Allergen Reactions     Blood Transfusion Related (Informational Only) Other (See Comments)     Patient has a history of a clinically significant antibody against RBC antigens.  A delay in compatible RBCs may occur.       SOCIAL HISTORY:  I have reviewed this patient's social history and updated it with pertinent information if needed. Kristine Cosby  reports that she quit smoking about 2 months ago. She has quit using smokeless tobacco. She reports that she does not drink alcohol.    FAMILY HISTORY:  I have reviewed this patient's family history and updated it with pertinent information if needed.   Family History   Problem Relation Age of Onset     Lung Cancer Father        REVIEW OF SYSTEMS:  A complete ROS was obtained and the pertinent positives are outlined in the history of present illness above.  The remainder of systems is negative.      PHYSICAL EXAM:      BP: 108/62 Pulse: 94   Resp: 12 SpO2: 96 %      Vital Signs with Ranges  Pulse:  [94] 94  Resp:  [12] 12  BP: (108)/(62) 108/62  SpO2:  [96 %] 96 %  218 lbs 0  oz    Constitutional: awake, alert, no distress  Eyes: PERRL, sclera nonicteric  ENT: trachea midline  Respiratory: CTAB  Cardiovascular:   RRR no m/r/g, no JVD  GI: nondistended, nontender, bowel sounds present  Lymph/Hematologic: no lymphadenopathy  Skin: dry, no rash  Musculoskeletal: good muscle tone, +2 edema bilaterally  Neurologic: no focal deficits  Neuropsychiatric: appropriate affact    DATA:  Reviewed in EPIC        ASSESSMENT:  1.  Coronary artery disease:  Stable without symptoms concerning for obstructive CAD  2.  Chronic atrial fibrillation:  Asymptomatic; tolerating metoprolol, apixaban  3.  Frequent PVCs  4.  Hypertension  5.  Dementia/cognitive decline    RECOMMENDATIONS:  1.  Given lower extremity edema, I encouraged her Lexii to continue with the Lasix 20 mg daily that was started by her primary care provider.  Of note, she has been on up to 40 mg twice daily in the past so this dose can be titrated upwards if needed.  Lexii was somewhat skeptical that this would be of any help to her.  2.  She is not on optimal medical therapy for her cardiomyopathy.  In addition to metoprolol, will start lisinopril 10 mg daily.  Repeat BMP in 1 week.  Do not anticipate further titration of these medications as long as she remains stable from a cardiac standpoint.  3.  Plan for follow-up in 6 months or before then as needed.    Karyn Gomez MD  Cardiology - [facility]  Pager:  [phone] 25, 2019      Thank you for allowing me to participate in the care of your patient.      Sincerely,     Karyn Gomez MD     [facility]    cc:   No referring provider defined for this encounter.

## 2019-06-25 NOTE — LETTER
6/25/2019    Jannie Cruz, APRN CNP  [address] [address]    RE: Lexii Cosby       Dear Colleague,    I had the pleasure of seeing Lexii Cosby in the [facility].    CARDIOLOGY CONSULT    REASON FOR CONSULT: chronic atrial fibrillation, CAD      PRIMARY CARE PHYSICIAN:  Jannie Cruz    HISTORY OF PRESENT ILLNESS:   Ms. Cosby is a pleasant 79-year-old woman with past medical history significant for coronary artery disease, chronic atrial fibrillation, ischemic cardiomyopathy with an ejection fraction of 40% who presents to clinic today for follow-up.  She was previously followed by the [facility] [place] and was last seen there in August 2017.  More recently, she has had difficulty with recurrent hospital stations for encephalopathy felt to be secondary to infectious and metabolic causes.  She is currently residing at a transitional care unit.  Today, Lexii is without cardiac concerns.  She denies any exertional chest pain, chest discomfort or shortness of breath.  She denies any orthopnea, PND.  She denies any heart palpitations, lightheadedness or dizziness.  She has chronic lower extremity edema and is currently not taking any Lasix.  She has previously been on up to 40 mg twice daily of Lasix in the past.  She is not on optimal medical therapy for her cardiomyopathy, however, some of this is the result of her own thoughts and desires about her medication to treatment.  She does not feel that Lasix has helped in the past.  She did not think the metoprolol had previously been helping either.    PAST MEDICAL HISTORY:  1.  Coronary artery disease:  NSTEMI in 2016, PCI to LAD.    2.  Chronic atrial fibrillation  3.  Ischemic cardiomyopathy:  EF 40%.  4.  Hypertension  5.  GERD  6.  Dementia/cognitive decline  7.  Diabetes Type II  8.  Recurrent hospitalizations for encephalopathy felt to be secondary to infection/metabolic      MEDICATIONS:  Current Outpatient Medications   Medication     acetaminophen (TYLENOL) 500 MG tablet     albuterol (PROVENTIL) (2.5 MG/3ML) 0.083% neb solution     apixaban ANTICOAGULANT (ELIQUIS) 5 MG tablet     atorvastatin (LIPITOR) 20 MG tablet     bisacodyl (DULCOLAX) 10 MG suppository     cholecalciferol 1000 units TABS     Furosemide (LASIX PO)     levothyroxine (SYNTHROID/LEVOTHROID) 100 MCG tablet     lidocaine (LIDODERM) 5 % patch     lisinopril (PRINIVIL/ZESTRIL) 10 MG tablet     metoprolol succinate ER (TOPROL-XL) 50 MG 24 hr tablet     nitroGLYcerin (NITROSTAT) 0.4 MG sublingual tablet     nystatin (MYCOSTATIN) [id number] UNIT/GM external powder     omeprazole (PRILOSEC) 20 MG DR capsule     senna-docusate (SENOKOT-S/PERICOLACE) 8.6-50 MG tablet     Skin Protectants, Misc. (EUCERIN) cream     No current facility-administered medications for this visit.        ALLERGIES:  Allergies   Allergen Reactions     Blood Transfusion Related (Informational Only) Other (See Comments)     Patient has a history of a clinically significant antibody against RBC antigens.  A delay in compatible RBCs may occur.       SOCIAL HISTORY:  I have reviewed this patient's social history and updated it with pertinent information if needed. Kristine Cosby  reports that she quit smoking about 2 months ago. She has quit using smokeless tobacco. She reports that she does not drink alcohol.    FAMILY HISTORY:  I have reviewed this patient's family history and updated it with pertinent information if needed.   Family History   Problem Relation Age of Onset     Lung Cancer Father        REVIEW OF SYSTEMS:  A complete ROS was obtained and the pertinent positives are outlined in the history of present illness above.  The remainder of systems is negative.      PHYSICAL EXAM:      BP: 108/62 Pulse: 94   Resp: 12 SpO2: 96 %      Vital Signs with Ranges  Pulse:  [94] 94  Resp:  [12] 12  BP: (108)/(62) 108/62  SpO2:  [96 %] 96 %  218 lbs 0  oz    Constitutional: awake, alert, no distress  Eyes: PERRL, sclera nonicteric  ENT: trachea midline  Respiratory: CTAB  Cardiovascular:   RRR no m/r/g, no JVD  GI: nondistended, nontender, bowel sounds present  Lymph/Hematologic: no lymphadenopathy  Skin: dry, no rash  Musculoskeletal: good muscle tone, +2 edema bilaterally  Neurologic: no focal deficits  Neuropsychiatric: appropriate affact    DATA:  Reviewed in EPIC        ASSESSMENT:  1.  Coronary artery disease:  Stable without symptoms concerning for obstructive CAD  2.  Chronic atrial fibrillation:  Asymptomatic; tolerating metoprolol, apixaban  3.  Frequent PVCs  4.  Hypertension  5.  Dementia/cognitive decline    RECOMMENDATIONS:  1.  Given lower extremity edema, I encouraged her Lexii to continue with the Lasix 20 mg daily that was started by her primary care provider.  Of note, she has been on up to 40 mg twice daily in the past so this dose can be titrated upwards if needed.  Lexii was somewhat skeptical that this would be of any help to her.  2.  She is not on optimal medical therapy for her cardiomyopathy.  In addition to metoprolol, will start lisinopril 10 mg daily.  Repeat BMP in 1 week.  Do not anticipate further titration of these medications as long as she remains stable from a cardiac standpoint.  3.  Plan for follow-up in 6 months or before then as needed.    Karyn Gomez MD  Cardiology - [facility]  Pager:  [phone] 25, 2019      Thank you for allowing me to participate in the care of your patient.      Sincerely,     Karyn Gomez MD     [facility]

## 2019-07-01 ENCOUNTER — HOSPITAL LABORATORY (OUTPATIENT)
Dept: NURSING HOME | Facility: OTHER | Age: 79
End: 2019-07-01

## 2019-07-01 LAB
ANION GAP SERPL CALCULATED.3IONS-SCNC: 5 MMOL/L (ref 3–14)
BUN SERPL-MCNC: 14 MG/DL (ref 7–30)
CALCIUM SERPL-MCNC: 8.4 MG/DL (ref 8.5–10.1)
CHLORIDE SERPL-SCNC: 110 MMOL/L (ref 94–109)
CO2 SERPL-SCNC: 30 MMOL/L (ref 20–32)
CREAT SERPL-MCNC: 0.86 MG/DL (ref 0.52–1.04)
GFR SERPL CREATININE-BSD FRML MDRD: 65 ML/MIN/{1.73_M2}
GLUCOSE SERPL-MCNC: 108 MG/DL (ref 70–99)
POTASSIUM SERPL-SCNC: 4.4 MMOL/L (ref 3.4–5.3)
SODIUM SERPL-SCNC: 145 MMOL/L (ref 133–144)

## 2019-07-11 ENCOUNTER — NURSING HOME VISIT (OUTPATIENT)
Dept: GERIATRICS | Facility: CLINIC | Age: 79
End: 2019-07-11
Payer: COMMERCIAL

## 2019-07-11 VITALS
TEMPERATURE: 96 F | BODY MASS INDEX: 32.58 KG/M2 | HEIGHT: 67 IN | DIASTOLIC BLOOD PRESSURE: 76 MMHG | WEIGHT: 207.6 LBS | HEART RATE: 70 BPM | RESPIRATION RATE: 16 BRPM | OXYGEN SATURATION: 93 % | SYSTOLIC BLOOD PRESSURE: 150 MMHG

## 2019-07-11 DIAGNOSIS — Z86.73 HISTORY OF STROKE: ICD-10-CM

## 2019-07-11 DIAGNOSIS — F06.2 PSYCHOTIC DISORDER DUE TO MEDICAL CONDITION WITH DELUSIONS: ICD-10-CM

## 2019-07-11 DIAGNOSIS — E78.5 HYPERLIPIDEMIA LDL GOAL <100: ICD-10-CM

## 2019-07-11 DIAGNOSIS — I10 ESSENTIAL HYPERTENSION: ICD-10-CM

## 2019-07-11 DIAGNOSIS — F03.90 DEMENTIA WITHOUT BEHAVIORAL DISTURBANCE, UNSPECIFIED DEMENTIA TYPE: ICD-10-CM

## 2019-07-11 DIAGNOSIS — R13.10 DYSPHAGIA, UNSPECIFIED TYPE: ICD-10-CM

## 2019-07-11 DIAGNOSIS — I50.22 CHRONIC SYSTOLIC HEART FAILURE (H): Primary | ICD-10-CM

## 2019-07-11 DIAGNOSIS — I48.20 CHRONIC ATRIAL FIBRILLATION (H): ICD-10-CM

## 2019-07-11 PROCEDURE — 99309 SBSQ NF CARE MODERATE MDM 30: CPT | Performed by: FAMILY MEDICINE

## 2019-07-11 ASSESSMENT — MIFFLIN-ST. JEOR: SCORE: 1454.3

## 2019-07-11 NOTE — PROGRESS NOTES
[facility]  Chief Complaint   Patient presents with     long [facility] Reg[facility]:  [id number]  [facility] [[id number]]    HPI:    Kristine Cosby  is 79 year old (1940), who is being seen today for a federally mandated E/M visit.  HPI information obtained from: facility staff, patient report and [facility] chart review.     Today's concerns are:  - Resident seen and examined in her room today. GNP reports no more unresponsiveness episodes. RN reports dizziness with standing up. Resident reports feeling better. Denies HA, dizziness.  Denies having issue with dysphagia. RN reports Resident is scheduled today for VSS.   -----------------------------------------------------------------  - Past Medical, social, family histories, medications, and allergies reviewed and updated  - Medications reviewed: in the chart and EHR.   - Case Management:   I have reviewed the care plan and MDS and do agree with the plan. Patient's desire to return to the community is not present.  Information reviewed:  Medications, vital signs, orders, and nursing notes.    MEDICATIONS:  Current Outpatient Medications   Medication Sig Dispense Refill     acetaminophen (TYLENOL) 500 MG tablet Take 1,000 mg by mouth 3 times daily        albuterol (PROVENTIL) (2.5 MG/3ML) 0.083% neb solution Take 2.5 mg by nebulization every 4 hours as needed for shortness of breath / dyspnea or wheezing       apixaban ANTICOAGULANT (ELIQUIS) 5 MG tablet Take 1 tablet (5 mg) by mouth 2 times daily       atorvastatin (LIPITOR) 20 MG tablet Take 20 mg by mouth At Bedtime       bisacodyl (DULCOLAX) 10 MG suppository Place 1 suppository (10 mg) rectally daily as needed for constipation       cholecalciferol 1000 units TABS Take 2,000 Units by mouth daily       Furosemide (LASIX PO) Take 10 mg by mouth daily       levothyroxine  "(SYNTHROID/LEVOTHROID) 100 MCG tablet Take 100 mcg by mouth daily       lidocaine (LIDODERM) 5 % patch Place 1 patch onto the skin every 24 hours       lisinopril (PRINIVIL/ZESTRIL) 10 MG tablet Take 1 tablet (10 mg) by mouth daily 30 tablet 11     metoprolol succinate ER (TOPROL-XL) 50 MG 24 hr tablet Take 50 mg by mouth daily       nitroGLYcerin (NITROSTAT) 0.4 MG sublingual tablet Place 0.4 mg under the tongue every 5 minutes as needed for chest pain For chest pain place 1 tablet under the tongue every 5 minutes for 3 doses. If symptoms persist 5 minutes after 1st dose call 911.       nystatin (MYCOSTATIN) 699711 UNIT/GM external powder Apply topically 2 times daily as needed       omeprazole (PRILOSEC) 20 MG DR capsule Take 20 mg by mouth daily       senna-docusate (SENOKOT-S/PERICOLACE) 8.6-50 MG tablet Take 2 tablets by mouth 2 times daily        Skin Protectants, Misc. (EUCERIN) cream Apply topically 2 times daily ; apply to arms, legs, hands, feet for dry skin       ROS:  Limited secondary to cognitive impairment but today pt reports- see HPI    Vitals:  /76   Pulse 70   Temp 96  F (35.6  C)   Resp 16   Ht 1.702 m (5' 7\")   Wt 94.2 kg (207 lb 9.6 oz)   SpO2 93%   BMI 32.51 kg/m    Body mass index is 32.51 kg/m .  Exam:   GENERAL APPEARANCE:  sitting in WC, no acute distress.   EYES:  EOMI, Pupil rounded and equal.  RESP:  lungs clear to auscultation   CV:  S1S2 audible, rapid but regular HR, no murmur appreciated. No edema  ABDOMEN:  soft, NT/ND, BS audible. no mass appreciated on palpation.   M/S:   no joint deformity noted on observation.   SKIN:  Copper discoloration legs  NEURO:  no NFD appreciated on observation.   PSYCH: AAOx person only, mood and affect appropriate.      Lab/Diagnostic data: Reviewed in the chart and EHR.    ---------------------------------------------------------------------------------------------------------------    ASSESSMENT/PLAN  Chronic systolic heart failure (H) " "EF 40%  Chronic atrial fibrillation (H)/ Frequent PVC  Essential hypertension  Hyperlipidemia LDL goal <100  - compensated, CVR.   -  stable, seen by Cardiology, follow on the recommendations.   Hx of GIB, however, Eliquis is known to cause less GIB incidence than other NOAC.     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  - followed by Neurology. Off keppra and zyprexa. Much awake and alert, no more \"unresponsive episodes\".    Dysphagia: on DD, today for VSS. Follow on the result and recommendations.     Frailty:  - Significant  Deficits requiring NH placement. Requiring extensive assistance from nursing. Up for meals only o/w spends the day resting in bed.     Order: See above, otherwise, continue the rest of the current POC.     Electronically signed by:  Parris Navarro MD          "

## 2019-07-11 NOTE — LETTER
7/11/2019        RE: Kristine Cosby  [facility]  [address] [facility] [address]        [place] GERIATRIC SERVICES  Chief Complaint   Patient presents with     senior care Regulatory     [facility]:  [id number]  Place of Service where encounter took place:  [facility] ([facility]) [[id number]]    HPI:    Kristine Cosby  is 79 year old (1940), who is being seen today for a federally mandated E/M visit.  HPI information obtained from: facility staff, patient report and [facility] chart review.     Today's concerns are:  - Resident seen and examined in her room today. GNP reports no more unresponsiveness episodes. RN reports dizziness with standing up. Resident reports feeling better. Denies HA, dizziness.  Denies having issue with dysphagia. RN reports Resident is scheduled today for VSS.   -----------------------------------------------------------------  - Past Medical, social, family histories, medications, and allergies reviewed and updated  - Medications reviewed: in the chart and EHR.   - Case Management:   I have reviewed the care plan and MDS and do agree with the plan. Patient's desire to return to the community is not present.  Information reviewed:  Medications, vital signs, orders, and nursing notes.    MEDICATIONS:  Current Outpatient Medications   Medication Sig Dispense Refill     acetaminophen (TYLENOL) 500 MG tablet Take 1,000 mg by mouth 3 times daily        albuterol (PROVENTIL) (2.5 MG/3ML) 0.083% neb solution Take 2.5 mg by nebulization every 4 hours as needed for shortness of breath / dyspnea or wheezing       apixaban ANTICOAGULANT (ELIQUIS) 5 MG tablet Take 1 tablet (5 mg) by mouth 2 times daily       atorvastatin (LIPITOR) 20 MG tablet Take 20 mg by mouth At Bedtime       bisacodyl (DULCOLAX) 10 MG suppository Place 1 suppository (10 mg) rectally daily as needed for constipation       cholecalciferol 1000 units  "TABS Take 2,000 Units by mouth daily       Furosemide (LASIX PO) Take 10 mg by mouth daily       levothyroxine (SYNTHROID/LEVOTHROID) 100 MCG tablet Take 100 mcg by mouth daily       lidocaine (LIDODERM) 5 % patch Place 1 patch onto the skin every 24 hours       lisinopril (PRINIVIL/ZESTRIL) 10 MG tablet Take 1 tablet (10 mg) by mouth daily 30 tablet 11     metoprolol succinate ER (TOPROL-XL) 50 MG 24 hr tablet Take 50 mg by mouth daily       nitroGLYcerin (NITROSTAT) 0.4 MG sublingual tablet Place 0.4 mg under the tongue every 5 minutes as needed for chest pain For chest pain place 1 tablet under the tongue every 5 minutes for 3 doses. If symptoms persist 5 minutes after 1st dose call 911.       nystatin (MYCOSTATIN) 723621 UNIT/GM external powder Apply topically 2 times daily as needed       omeprazole (PRILOSEC) 20 MG DR capsule Take 20 mg by mouth daily       senna-docusate (SENOKOT-S/PERICOLACE) 8.6-50 MG tablet Take 2 tablets by mouth 2 times daily        Skin Protectants, Misc. (EUCERIN) cream Apply topically 2 times daily ; apply to arms, legs, hands, feet for dry skin       ROS:  Limited secondary to cognitive impairment but today pt reports- see HPI    Vitals:  /76   Pulse 70   Temp 96  F (35.6  C)   Resp 16   Ht 1.702 m (5' 7\")   Wt 94.2 kg (207 lb 9.6 oz)   SpO2 93%   BMI 32.51 kg/m     Body mass index is 32.51 kg/m .  Exam:   GENERAL APPEARANCE:  sitting in WC, no acute distress.   EYES:  EOMI, Pupil rounded and equal.  RESP:  lungs clear to auscultation   CV:  S1S2 audible, rapid but regular HR, no murmur appreciated. No edema  ABDOMEN:  soft, NT/ND, BS audible. no mass appreciated on palpation.   M/S:   no joint deformity noted on observation.   SKIN:  Copper discoloration legs  NEURO:  no NFD appreciated on observation.   PSYCH: AAOx person only, mood and affect appropriate.      Lab/Diagnostic data: Reviewed in the chart and EHR.  " "  ---------------------------------------------------------------------------------------------------------------    ASSESSMENT/PLAN  Chronic systolic heart failure (H) EF 40%  Chronic atrial fibrillation (H)/ Frequent PVC  Essential hypertension  Hyperlipidemia LDL goal <100  - compensated, CVR.   -  stable, seen by Cardiology, follow on the recommendations.   Hx of GIB, however, Eliquis is known to cause less GIB incidence than other NOAC.     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  - followed by Neurology. Off keppra and zyprexa. Much awake and alert, no more \"unresponsive episodes\".    Dysphagia: on DD, today for VSS. Follow on the result and recommendations.     Frailty:  - Significant  Deficits requiring NH placement. Requiring extensive assistance from nursing. Up for meals only o/w spends the day resting in bed.     Order: See above, otherwise, continue the rest of the current POC.     Electronically signed by:  Parris Navarro MD              Sincerely,        Parris Navarro MD    "

## 2019-07-25 ENCOUNTER — TELEPHONE (OUTPATIENT)
Dept: NEUROLOGY | Facility: CLINIC | Age: 79
End: 2019-07-25

## 2019-07-25 NOTE — TELEPHONE ENCOUNTER
Call placed to the patient to complete the mRS questionnaire. Attempt #1   LVM for patient to return call.     Ashwin PENA CMA

## 2019-07-26 NOTE — TELEPHONE ENCOUNTER
Patient/Caregiver was contacted to complete the mRS questionnaire. Patient score was 4    Ashwin PENA CMA

## 2019-07-31 ENCOUNTER — HOSPITAL LABORATORY (OUTPATIENT)
Dept: NURSING HOME | Facility: OTHER | Age: 79
End: 2019-07-31

## 2019-07-31 LAB — TSH SERPL DL<=0.005 MIU/L-ACNC: 1.84 MU/L (ref 0.4–4)

## 2019-08-09 ENCOUNTER — NURSING HOME VISIT (OUTPATIENT)
Dept: GERIATRICS | Facility: CLINIC | Age: 79
End: 2019-08-09
Payer: COMMERCIAL

## 2019-08-09 VITALS
DIASTOLIC BLOOD PRESSURE: 81 MMHG | HEART RATE: 85 BPM | BODY MASS INDEX: 33.3 KG/M2 | SYSTOLIC BLOOD PRESSURE: 146 MMHG | TEMPERATURE: 97.9 F | RESPIRATION RATE: 16 BRPM | WEIGHT: 212.6 LBS

## 2019-08-09 DIAGNOSIS — R41.89 COGNITIVE IMPAIRMENT: ICD-10-CM

## 2019-08-09 DIAGNOSIS — E03.9 ACQUIRED HYPOTHYROIDISM: Primary | ICD-10-CM

## 2019-08-09 PROCEDURE — 99308 SBSQ NF CARE LOW MDM 20: CPT | Performed by: NURSE PRACTITIONER

## 2019-08-09 RX ORDER — LEVOTHYROXINE SODIUM 88 UG/1
88 TABLET ORAL DAILY
COMMUNITY
End: 2020-05-29

## 2019-08-09 RX ORDER — TAMSULOSIN HYDROCHLORIDE 0.4 MG/1
0.8 CAPSULE ORAL AT BEDTIME
COMMUNITY
End: 2020-05-29

## 2019-08-09 NOTE — LETTER
8/9/2019        RE: Kristine Cosby  [facility]  [address] [facility] [address]        [place] GERIATRIC SERVICES  [place] Medical Record Number:  [id number]  Place of Service where encounter took place:  [facility] ([facility]) [[id number]]  Chief Complaint   Patient presents with     Nursing Home Acute       HPI:    Kristine Cosby  is a 79 year old (1940), who is being seen today for an episodic care visit.  HPI information obtained from: facility chart records, facility staff, patient report and [facility] chart review. Today's concern is:    Acquired hypothyroidism  Cognitive Impairment  TSH   Date Value Ref Range Status   07/31/2019 1.84 0.40 - 4.00 mU/L Final     Patient is on levothyroxine 100 mcg daily  Patient denies constipation, lethargy, depression, anxiety (may be a poor historian d/t cognitive impairment.       Past Medical and Surgical History reviewed in Epic today.    MEDICATIONS:  Current Outpatient Medications   Medication Sig Dispense Refill     acetaminophen (TYLENOL) 500 MG tablet Take 1,000 mg by mouth 3 times daily        albuterol (PROVENTIL) (2.5 MG/3ML) 0.083% neb solution Take 2.5 mg by nebulization every 4 hours as needed for shortness of breath / dyspnea or wheezing       apixaban ANTICOAGULANT (ELIQUIS) 5 MG tablet Take 1 tablet (5 mg) by mouth 2 times daily       atorvastatin (LIPITOR) 20 MG tablet Take 20 mg by mouth At Bedtime       bisacodyl (DULCOLAX) 10 MG suppository Place 1 suppository (10 mg) rectally daily as needed for constipation       cholecalciferol 1000 units TABS Take 2,000 Units by mouth daily       diclofenac (VOLTAREN) 1 % topical gel Place 2 g onto the skin 2 times daily Apply to upper extremities. ALSO bid prn       Furosemide (LASIX PO) Take 10 mg by mouth daily       levothyroxine (SYNTHROID/LEVOTHROID) 88 MCG tablet Take 88 mcg by mouth daily       lidocaine (LIDODERM) 5 % patch Place 1 patch onto the skin every  24 hours       lisinopril (PRINIVIL/ZESTRIL) 10 MG tablet Take 1 tablet (10 mg) by mouth daily 30 tablet 11     metoprolol succinate ER (TOPROL-XL) 50 MG 24 hr tablet Take 50 mg by mouth daily       nitroGLYcerin (NITROSTAT) 0.4 MG sublingual tablet Place 0.4 mg under the tongue every 5 minutes as needed for chest pain For chest pain place 1 tablet under the tongue every 5 minutes for 3 doses. If symptoms persist 5 minutes after 1st dose call 911.       nystatin (MYCOSTATIN) 916591 UNIT/GM external powder Apply topically 2 times daily as needed       omeprazole (PRILOSEC) 20 MG DR capsule Take 20 mg by mouth daily       senna-docusate (SENOKOT-S/PERICOLACE) 8.6-50 MG tablet Take 2 tablets by mouth daily        tamsulosin (FLOMAX) 0.4 MG capsule Take 0.4 mg by mouth At Bedtime       REVIEW OF SYSTEMS:  Limited secondary to cognitive impairment but today pt reports 4 point ROS including Respiratory, CV, GI and , other than that noted in the HPI,  is negative    Objective:  BP (!) 146/81   Pulse 85   Temp 97.9  F (36.6  C)   Resp 16   Wt 96.4 kg (212 lb 9.6 oz)   BMI 33.30 kg/m     Exam:  GENERAL APPEARANCE:  Alert, in no distress, pleasantly confused  RESP:  respiratory effort normal, no respiratory distress  CV:  No LE peripheral edema  ABDOMEN:  nondistended  M/S:   Gait and station with wc for mobility, Digits and nails with arthritic changes, reduced muscle mass  SKIN:  Inspection and Palpation of skin and subcutaneous tissue pale, intact  PSYCH:  insight and judgement, memory with impairment , affect and mood normal, follows commands readily           Labs:   Labs done in SNF are in [place] EPIC. Please refer to them using EPIC/Care Everywhere.    ASSESSMENT/PLAN:     Acquired hypothyroidism  Cognitive impairment     Can reduce levothyroxine to 88 mcg dan recheck TSH with reflex Free T4 in 8 weeks.       Orders written by provider at facility and transcribed by : Sherlyn Faulkner MA  1.   Decrease Levothyroxine to 88 mcg po every day.  Dx: hypothyroidism  2.  TSH w/reflex to Free T4 in 8 weeks.  Dx: hypothyroidism       Electronically signed by:  NATIVIDAD Chapa CNP             Sincerely,        NATIVIDAD Chapa CNP

## 2019-08-09 NOTE — PROGRESS NOTES
[place] GERIATRIC SERVICES  [place] Medical Record Number:  [id number]  Place of Service where encounter took place:  [facility] [[id number]]  Chief Complaint   Patient presents with     Nursing Home Acute       HPI:    Kristine Cosby  is a 79 year old (1940), who is being seen today for an episodic care visit.  HPI information obtained from: facility chart records, facility staff, patient report and [facility] chart review. Today's concern is:    Acquired hypothyroidism  Cognitive Impairment  TSH   Date Value Ref Range Status   07/31/2019 1.84 0.40 - 4.00 mU/L Final     Patient is on levothyroxine 100 mcg daily  Patient denies constipation, lethargy, depression, anxiety (may be a poor historian d/t cognitive impairment.       Past Medical and Surgical History reviewed in Epic today.    MEDICATIONS:  Current Outpatient Medications   Medication Sig Dispense Refill     acetaminophen (TYLENOL) 500 MG tablet Take 1,000 mg by mouth 3 times daily        albuterol (PROVENTIL) (2.5 MG/3ML) 0.083% neb solution Take 2.5 mg by nebulization every 4 hours as needed for shortness of breath / dyspnea or wheezing       apixaban ANTICOAGULANT (ELIQUIS) 5 MG tablet Take 1 tablet (5 mg) by mouth 2 times daily       atorvastatin (LIPITOR) 20 MG tablet Take 20 mg by mouth At Bedtime       bisacodyl (DULCOLAX) 10 MG suppository Place 1 suppository (10 mg) rectally daily as needed for constipation       cholecalciferol 1000 units TABS Take 2,000 Units by mouth daily       diclofenac (VOLTAREN) 1 % topical gel Place 2 g onto the skin 2 times daily Apply to upper extremities. ALSO bid prn       Furosemide (LASIX PO) Take 10 mg by mouth daily       levothyroxine (SYNTHROID/LEVOTHROID) 88 MCG tablet Take 88 mcg by mouth daily       lidocaine (LIDODERM) 5 % patch Place 1 patch onto the skin every 24 hours       lisinopril (PRINIVIL/ZESTRIL) 10 MG tablet Take 1 tablet (10 mg) by mouth daily 30 tablet 11      metoprolol succinate ER (TOPROL-XL) 50 MG 24 hr tablet Take 50 mg by mouth daily       nitroGLYcerin (NITROSTAT) 0.4 MG sublingual tablet Place 0.4 mg under the tongue every 5 minutes as needed for chest pain For chest pain place 1 tablet under the tongue every 5 minutes for 3 doses. If symptoms persist 5 minutes after 1st dose call 911.       nystatin (MYCOSTATIN) 089183 UNIT/GM external powder Apply topically 2 times daily as needed       omeprazole (PRILOSEC) 20 MG DR capsule Take 20 mg by mouth daily       senna-docusate (SENOKOT-S/PERICOLACE) 8.6-50 MG tablet Take 2 tablets by mouth daily        tamsulosin (FLOMAX) 0.4 MG capsule Take 0.4 mg by mouth At Bedtime       REVIEW OF SYSTEMS:  Limited secondary to cognitive impairment but today pt reports 4 point ROS including Respiratory, CV, GI and , other than that noted in the HPI,  is negative    Objective:  BP (!) 146/81   Pulse 85   Temp 97.9  F (36.6  C)   Resp 16   Wt 96.4 kg (212 lb 9.6 oz)   BMI 33.30 kg/m    Exam:  GENERAL APPEARANCE:  Alert, in no distress, pleasantly confused  RESP:  respiratory effort normal, no respiratory distress  CV:  No LE peripheral edema  ABDOMEN:  nondistended  M/S:   Gait and station with wc for mobility, Digits and nails with arthritic changes, reduced muscle mass  SKIN:  Inspection and Palpation of skin and subcutaneous tissue pale, intact  PSYCH:  insight and judgement, memory with impairment , affect and mood normal, follows commands readily           Labs:   Labs done in SNF are in [place] EPIC. Please refer to them using [facility]/Care Everywhere.    ASSESSMENT/PLAN:     Acquired hypothyroidism  Cognitive impairment     Can reduce levothyroxine to 88 mcg dan recheck TSH with reflex Free T4 in 8 weeks.       Orders written by provider at facility and transcribed by : Sherlyn Faulkner MA  1.  Decrease Levothyroxine to 88 mcg po every day.  Dx: hypothyroidism  2.  TSH w/reflex to Free T4 in 8 weeks.   Dx: hypothyroidism       Electronically signed by:  NATIVIDAD Chapa CNP

## 2019-08-19 ENCOUNTER — HOSPITAL ENCOUNTER (OUTPATIENT)
Dept: GENERAL RADIOLOGY | Facility: CLINIC | Age: 79
Discharge: HOME | End: 2019-08-19
Attending: NURSE PRACTITIONER | Admitting: NURSE PRACTITIONER
Payer: COMMERCIAL

## 2019-08-19 ENCOUNTER — NURSE TRIAGE (OUTPATIENT)
Dept: NURSING | Facility: CLINIC | Age: 79
End: 2019-08-19

## 2019-08-19 ENCOUNTER — HOSPITAL ENCOUNTER (OUTPATIENT)
Dept: SPEECH THERAPY | Facility: CLINIC | Age: 79
Setting detail: THERAPIES SERIES
End: 2019-08-19
Attending: NURSE PRACTITIONER
Payer: COMMERCIAL

## 2019-08-19 DIAGNOSIS — R13.10 DYSPHAGIA, UNSPECIFIED TYPE: ICD-10-CM

## 2019-08-19 PROCEDURE — 74230 X-RAY XM SWLNG FUNCJ C+: CPT | Mod: TC

## 2019-08-19 PROCEDURE — 92611 MOTION FLUOROSCOPY/SWALLOW: CPT | Mod: GN | Performed by: SPEECH-LANGUAGE PATHOLOGIST

## 2019-08-19 NOTE — PROGRESS NOTES
Appropriate assistive devices provided during their visit. yes (Yes, No, N/A) wheelchair (list device)    Exam table and/or cart  placed in the lowest position. yes (Yes, No, N/A)    Brakes on tables/carts/wheelchairs used at all times. yes (Yes, No, N/A)    Non slip footwear applied. yes (Yes, No, NA)    Patient was accompanied by staff throughout visit. yes (Yes, No, N/A)    Equipment safety straps used. n/a (Yes, No, N/A)    Assist with toileting. n/a (Yes, No, N/A)

## 2019-08-20 NOTE — TELEPHONE ENCOUNTER
Caller requesting information on specialty departments at [facility], states pt needs to see a podiatrist due to being diabetic and having long toenails.  Caller not with pt, unable to triage.      Information given as requested and advised caller to have someone call back that is with pt if a triage assessment is desired.      She verbalized understanding and had no further questions.     Lisa Becerril, JUSTICE/FNA

## 2019-10-03 ENCOUNTER — NURSING HOME VISIT (OUTPATIENT)
Dept: GERIATRICS | Facility: CLINIC | Age: 79
End: 2019-10-03
Payer: COMMERCIAL

## 2019-10-03 VITALS
DIASTOLIC BLOOD PRESSURE: 85 MMHG | SYSTOLIC BLOOD PRESSURE: 153 MMHG | HEART RATE: 65 BPM | HEIGHT: 67 IN | OXYGEN SATURATION: 94 % | TEMPERATURE: 98.2 F | RESPIRATION RATE: 16 BRPM | WEIGHT: 211.4 LBS | BODY MASS INDEX: 33.18 KG/M2

## 2019-10-03 DIAGNOSIS — D64.9 ANEMIA, UNSPECIFIED TYPE: ICD-10-CM

## 2019-10-03 DIAGNOSIS — F03.90 DEMENTIA WITHOUT BEHAVIORAL DISTURBANCE, UNSPECIFIED DEMENTIA TYPE: ICD-10-CM

## 2019-10-03 DIAGNOSIS — G44.209 TENSION HEADACHE: Primary | ICD-10-CM

## 2019-10-03 DIAGNOSIS — F06.2 PSYCHOTIC DISORDER DUE TO MEDICAL CONDITION WITH DELUSIONS: ICD-10-CM

## 2019-10-03 DIAGNOSIS — K92.2 GASTROINTESTINAL HEMORRHAGE, UNSPECIFIED GASTROINTESTINAL HEMORRHAGE TYPE: ICD-10-CM

## 2019-10-03 PROCEDURE — 99309 SBSQ NF CARE MODERATE MDM 30: CPT | Performed by: NURSE PRACTITIONER

## 2019-10-03 ASSESSMENT — MIFFLIN-ST. JEOR: SCORE: 1471.53

## 2019-10-03 NOTE — LETTER
"    10/3/2019        RE: Kristine Cosby  [facility] [place]  [address] [facility] [address]        [place] GERIATRIC SERVICES  [place] Medical Record Number:  [id number]  Place of Service where encounter took place:  [facility] ([facility]) [[id number]]  Chief Complaint   Patient presents with     Nursing Home Acute       HPI:    Kristine Cosby  is a 79 year old (1940), who is being seen today for an episodic care visit.  HPI information obtained from: facility chart records, facility staff, patient report and [facility] chart review. 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  Nursing reporting patient has almost daily reports of headache with some days better than others patient will occasionally ask that ice packs or hot pack.  Patient is on Tylenol 1000 mg twice daily and after a.m. dose is given, patient usually has improvement by 11:00 with scheduled Tylenol.  Nursing reporting onset when patient was having unresponsive symptoms and was in the hospital for neurological changes    Patient reports HA frontal and behind her eyes, bilaterally.  She expressed that the HA gets worse when I press on her occiputs.      Hx of GIB (hiatal hernia, erosive gastritis)  Anemia, unspecified type  History of previous Pradaxa which was stopped d/t GIB (2017) but apixaban was started last hospitalization as patient has been having unresponsive episodes concerning for CVA with known a fib history.     Hemoglobin   Date Value Ref Range Status   05/16/2019 11.1 (L) 11.7 - 15.7 g/dL Final   05/15/2019 10.6 (L) 11.7 - 15.7 g/dL Final     Nursing asking for order to \"monitor stools for GIB\" still needs to be in place.        Past Medical and Surgical History reviewed in Epic today.    MEDICATIONS:  Current " Outpatient Medications   Medication Sig Dispense Refill     acetaminophen (TYLENOL) 500 MG tablet Take 1,000 mg by mouth 3 times daily        albuterol (PROVENTIL) (2.5 MG/3ML) 0.083% neb solution Take 2.5 mg by nebulization every 4 hours as needed for shortness of breath / dyspnea or wheezing       apixaban ANTICOAGULANT (ELIQUIS) 5 MG tablet Take 1 tablet (5 mg) by mouth 2 times daily       atorvastatin (LIPITOR) 20 MG tablet Take 20 mg by mouth At Bedtime       bisacodyl (DULCOLAX) 10 MG suppository Place 1 suppository (10 mg) rectally daily as needed for constipation       cholecalciferol 1000 units TABS Take 2,000 Units by mouth daily       diclofenac (VOLTAREN) 1 % topical gel Place 2 g onto the skin 2 times daily Apply to upper extremities. ALSO bid prn       Furosemide (LASIX PO) Take 10 mg by mouth daily       levothyroxine (SYNTHROID/LEVOTHROID) 88 MCG tablet Take 88 mcg by mouth daily       lidocaine (LIDODERM) 5 % patch Place 1 patch onto the skin every 24 hours       lisinopril (PRINIVIL/ZESTRIL) 10 MG tablet Take 1 tablet (10 mg) by mouth daily 30 tablet 11     metoprolol succinate ER (TOPROL-XL) 50 MG 24 hr tablet Take 50 mg by mouth daily       nitroGLYcerin (NITROSTAT) 0.4 MG sublingual tablet Place 0.4 mg under the tongue every 5 minutes as needed for chest pain For chest pain place 1 tablet under the tongue every 5 minutes for 3 doses. If symptoms persist 5 minutes after 1st dose call 911.       nystatin (MYCOSTATIN) 558198 UNIT/GM external powder Apply topically 2 times daily as needed       omeprazole (PRILOSEC) 20 MG DR capsule Take 20 mg by mouth daily       senna-docusate (SENOKOT-S/PERICOLACE) 8.6-50 MG tablet Take 2 tablets by mouth daily        tamsulosin (FLOMAX) 0.4 MG capsule Take 0.4 mg by mouth At Bedtime       REVIEW OF SYSTEMS:  Limited secondary to cognitive impairment but today pt reports 10 point ROS of systems including Constitutional, Eyes, Respiratory, Cardiovascular,  "Gastroenterology, Genitourinary, Integumentary, Musculoskeletal, Psychiatric were all negative except for pertinent positives noted in my HPI.    Objective:  BP (!) 153/85   Pulse 65   Temp 98.2  F (36.8  C)   Resp 16   Ht 1.702 m (5' 7\")   Wt 95.9 kg (211 lb 6.4 oz)   SpO2 94%   BMI 33.11 kg/m     Exam:  GENERAL APPEARANCE:  Asleep at start of visit but awoke easily to voice, in no distress, pleasantly confused per baseline  RESP:  respiratory effort normal, no respiratory distress  CV:  No LE peripheral edema  ABDOMEN:  nondistended  M/S:   Gait and station with W/C fo rmobility, Digits and nails with arthritic changes, reduced muscle mass, suboccipitals mildy hypertonic, posterior cervicals not as hypertonic as would expect  SKIN:  Inspection and Palpation of skin and subcutaneous tissue pale, intact  PSYCH:  insight and judgement, memory with impairment, affect and mood normal, follows commands readily           Labs:   Recent labs in [facility] reviewed by me today.      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  Spoke with nursing about management and in patient's best interest not to start further medication, especially as it seems headaches are relieved late morning after Tylenol is given.  If AM dose needs to be moved up, nursing has authority to make time changes in patient's best interest. As patient has had unresponsive episodes in the past and is baseline confused with dementia, would like to avoid narcotics or muscle relaxers.      Hx of GIB (hiatal hernia, erosive gastritis)  Anemia, unspecified type  Can discontinue order for monitoring stools and " recheck Hgb for stability.       Orders written by provider at facility and transcribed by : Sherlyn Faulkner MA  1.  Hgb 10/4/19.  Dx: hx GIB  2.  Discontinue order for monitoring stools for GIB.      Electronically signed by:  NATIVIDAD Chapa CNP               Sincerely,        NATIVIDAD Chapa CNP

## 2019-10-03 NOTE — PROGRESS NOTES
"[facility]  [place] Medical Record Number:  [id number]  Place of Service where encounter took place:  [facility] ([facility]) [[id number]]  Chief Complaint   Patient presents with     Nursing Home Acute       HPI:    Kristine Cosby  is a 79 year old (1940), who is being seen today for an episodic care visit.  HPI information obtained from: facility chart records, facility staff, patient report and [facility] chart review. 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  Nursing reporting patient has almost daily reports of headache with some days better than others patient will occasionally ask that ice packs or hot pack.  Patient is on Tylenol 1000 mg twice daily and after a.m. dose is given, patient usually has improvement by 11:00 with scheduled Tylenol.  Nursing reporting onset when patient was having unresponsive symptoms and was in the hospital for neurological changes    Patient reports HA frontal and behind her eyes, bilaterally.  She expressed that the HA gets worse when I press on her occiputs.      Hx of GIB (hiatal hernia, erosive gastritis)  Anemia, unspecified type  History of previous Pradaxa which was stopped d/t GIB (2017) but apixaban was started last hospitalization as patient has been having unresponsive episodes concerning for CVA with known a fib history.     Hemoglobin   Date Value Ref Range Status   05/16/2019 11.1 (L) 11.7 - 15.7 g/dL Final   05/15/2019 10.6 (L) 11.7 - 15.7 g/dL Final     Nursing asking for order to \"monitor stools for GIB\" still needs to be in place.        Past Medical and Surgical History reviewed in Epic today.    MEDICATIONS:  Current Outpatient Medications   Medication Sig Dispense Refill     acetaminophen (TYLENOL) 500 MG tablet Take 1,000 " mg by mouth 3 times daily        albuterol (PROVENTIL) (2.5 MG/3ML) 0.083% neb solution Take 2.5 mg by nebulization every 4 hours as needed for shortness of breath / dyspnea or wheezing       apixaban ANTICOAGULANT (ELIQUIS) 5 MG tablet Take 1 tablet (5 mg) by mouth 2 times daily       atorvastatin (LIPITOR) 20 MG tablet Take 20 mg by mouth At Bedtime       bisacodyl (DULCOLAX) 10 MG suppository Place 1 suppository (10 mg) rectally daily as needed for constipation       cholecalciferol 1000 units TABS Take 2,000 Units by mouth daily       diclofenac (VOLTAREN) 1 % topical gel Place 2 g onto the skin 2 times daily Apply to upper extremities. ALSO bid prn       Furosemide (LASIX PO) Take 10 mg by mouth daily       levothyroxine (SYNTHROID/LEVOTHROID) 88 MCG tablet Take 88 mcg by mouth daily       lidocaine (LIDODERM) 5 % patch Place 1 patch onto the skin every 24 hours       lisinopril (PRINIVIL/ZESTRIL) 10 MG tablet Take 1 tablet (10 mg) by mouth daily 30 tablet 11     metoprolol succinate ER (TOPROL-XL) 50 MG 24 hr tablet Take 50 mg by mouth daily       nitroGLYcerin (NITROSTAT) 0.4 MG sublingual tablet Place 0.4 mg under the tongue every 5 minutes as needed for chest pain For chest pain place 1 tablet under the tongue every 5 minutes for 3 doses. If symptoms persist 5 minutes after 1st dose call 911.       nystatin (MYCOSTATIN) 748644 UNIT/GM external powder Apply topically 2 times daily as needed       omeprazole (PRILOSEC) 20 MG DR capsule Take 20 mg by mouth daily       senna-docusate (SENOKOT-S/PERICOLACE) 8.6-50 MG tablet Take 2 tablets by mouth daily        tamsulosin (FLOMAX) 0.4 MG capsule Take 0.4 mg by mouth At Bedtime       REVIEW OF SYSTEMS:  Limited secondary to cognitive impairment but today pt reports 10 point ROS of systems including Constitutional, Eyes, Respiratory, Cardiovascular, Gastroenterology, Genitourinary, Integumentary, Musculoskeletal, Psychiatric were all negative except for pertinent  "positives noted in my HPI.    Objective:  BP (!) 153/85   Pulse 65   Temp 98.2  F (36.8  C)   Resp 16   Ht 1.702 m (5' 7\")   Wt 95.9 kg (211 lb 6.4 oz)   SpO2 94%   BMI 33.11 kg/m    Exam:  GENERAL APPEARANCE:  Asleep at start of visit but awoke easily to voice, in no distress, pleasantly confused per baseline  RESP:  respiratory effort normal, no respiratory distress  CV:  No LE peripheral edema  ABDOMEN:  nondistended  M/S:   Gait and station with W/C fo rmobility, Digits and nails with arthritic changes, reduced muscle mass, suboccipitals mildy hypertonic, posterior cervicals not as hypertonic as would expect  SKIN:  Inspection and Palpation of skin and subcutaneous tissue pale, intact  PSYCH:  insight and judgement, memory with impairment, affect and mood normal, follows commands readily           Labs:   Recent labs in [facility] reviewed by me today.      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  Spoke with nursing about management and in patient's best interest not to start further medication, especially as it seems headaches are relieved late morning after Tylenol is given.  If AM dose needs to be moved up, nursing has authority to make time changes in patient's best interest. As patient has had unresponsive episodes in the past and is baseline confused with dementia, would like to avoid narcotics or muscle relaxers.      Hx of GIB (hiatal hernia, erosive gastritis)  Anemia, unspecified type  Can discontinue order for monitoring stools and recheck Hgb for stability.       Orders written by provider at facility and transcribed by : Sherlyn" RACHEL Faulkner  1.  Hgb 10/4/19.  Dx: hx GIB  2.  Discontinue order for monitoring stools for GIB.      Electronically signed by:  NATIVIDAD Chapa CNP

## 2019-10-04 ENCOUNTER — HOSPITAL LABORATORY (OUTPATIENT)
Dept: NURSING HOME | Facility: OTHER | Age: 79
End: 2019-10-04

## 2019-10-04 LAB
HGB BLD-MCNC: 10.6 G/DL (ref 11.7–15.7)
T4 FREE SERPL-MCNC: 1.18 NG/DL (ref 0.76–1.46)
TSH SERPL DL<=0.005 MIU/L-ACNC: 4.73 MU/L (ref 0.4–4)

## 2019-10-09 NOTE — PROGRESS NOTES
[facility]  Chief Complaint   Patient presents with     correction Regulatory     [place] Medical Record Number:  [id number]  Place of Service where encounter took place:  [facility] ([facility] [[id number]]    HPI:    Kristine Cosby  is 79 year old (1940), who is being seen today for a federally mandated E/M visit.  HPI information obtained from: facility chart records, facility staff, patient report and [facility] chart review. Today's concerns are:  Type 2 diabetes mellitus without complication, without long-term current use of insulin (H)  Lab Results   Component Value Date    A1C 6.7 04/15/2019     History of found down at home and later determined to have hypoglycemia encephalopathy, hospitalized 12/27/18-1/8/19 with complications of JOSTIN, dysphagia  1/8-2/1/19 - Acute rehab at [facility]   2/1- 4/2/19: [facility]  4/2/19: Moved to LTC     On statin and ACEI; not on ASA d/t apixaban usage  F/b in house [facility]     On no meds for DM.  Patient denies numbness/tingling     Chronic systolic heart failure (H)  Essential hypertension  Hyperlipidemia LDL goal <100  History of ST elevation myocardial infarction (STEMI)  4/23/19 ECHO: The left ventricle is normal in size. The visual ejection fraction is estimated at 40%. Mid-distal anteroseptal and apical walls are severely hypokinetic, mild hypokinesis elsewhere. The right ventricle is normal in structure, function and size. No significant valve disease. The ascending aorta is Mildly dilated (3.9cm).  F/b Karyn Gomez, [facility]    On Lasix 10 mg daily, lisinopril 10 mg daily, Toprol XL 50 mg daily,    BPs 130-160s/80-90s  HRs 65-83 mostly  Patient denies CP, HA, lightheadedness, SOB (may be poor hsitorian d/t cognitive impairment)     Weights:   9/12/19 - 208.8  9/21/19 - 208.6  10/1/19 - 211.4    Chronic atrial fibrillation  Currently on apixaban (see below for history of pradaxa) 5 mg  "BID  On Toprol XL for rate control  HRs 65-83 mostly, irregular today      Anemia, unspecified type  Hx of GIB (hiatal hernia, erosive gastritis)  History of previous Pradaxa which was stopped d/t GIB (2017) but apixaban was started last hospitalization as patient has been having unresponsive episodes concerning for CVA with known a fib history.    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  Also has been to Neurology and EEG neg for seizure activity.   F/u with Cardiology who noted unlikely cardiac- related.     Resides in SNF  A x 1-2 for ADLs, transfers, ambulation  A&O x 1 (self) today   Able to make her needs known.     Primary osteoarthritis   Patient on Tylenol 1000 mg TID  Patient denies pain.     ALLERGIES:Blood transfusion related (informational only)  PAST MEDICAL HISTORY:   has a past medical history of Cellulitis of left lower extremity, GERD (gastroesophageal reflux disease), HTN (hypertension), Type 2 diabetes mellitus without complication (H), and Ulcer of left lower extremity with fat layer exposed (H).  PAST SURGICAL HISTORY:   has a past surgical history that includes Hysterectomy and ESOPHAGOGASTRODUODENOSCOPY.  FAMILY HISTORY: family history includes Lung Cancer in her father.  SOCIAL HISTORY:  reports that she quit smoking about 5 months ago. She has quit using smokeless tobacco. She reports that she does not drink alcohol.    MEDICATIONS:  Current Outpatient Medications   Medication Sig Dispense Refill     acetaminophen (TYLENOL) 500 MG tablet Take 1,000 mg by mouth 2 times daily as needed " for mild pain       acetaminophen (TYLENOL) 500 MG tablet Take 1,000 mg by mouth every morning        apixaban ANTICOAGULANT (ELIQUIS) 5 MG tablet Take 1 tablet (5 mg) by mouth 2 times daily       atorvastatin (LIPITOR) 20 MG tablet Take 20 mg by mouth At Bedtime       bisacodyl (DULCOLAX) 10 MG suppository Place 1 suppository (10 mg) rectally daily as needed for constipation       cholecalciferol 1000 units TABS Take 2,000 Units by mouth daily       Furosemide (LASIX PO) Take 10 mg by mouth daily       levothyroxine (SYNTHROID/LEVOTHROID) 88 MCG tablet Take 88 mcg by mouth daily       lidocaine (LIDODERM) 5 % patch Place 1 patch onto the skin every 24 hours       lisinopril (PRINIVIL/ZESTRIL) 10 MG tablet Take 1 tablet (10 mg) by mouth daily 30 tablet 11     metoprolol succinate ER (TOPROL-XL) 50 MG 24 hr tablet Take 50 mg by mouth daily       nitroGLYcerin (NITROSTAT) 0.4 MG sublingual tablet Place 0.4 mg under the tongue every 5 minutes as needed for chest pain For chest pain place 1 tablet under the tongue every 5 minutes for 3 doses. If symptoms persist 5 minutes after 1st dose call 911.       nystatin (MYCOSTATIN) 645273 UNIT/GM external powder Apply topically 2 times daily as needed       omeprazole (PRILOSEC) 20 MG DR capsule Take 20 mg by mouth daily       senna (SENOKOT) 8.6 MG tablet Take 2 tablets by mouth 2 times daily as needed for constipation       senna-docusate (SENOKOT-S/PERICOLACE) 8.6-50 MG tablet Take 2 tablets by mouth daily        tamsulosin (FLOMAX) 0.4 MG capsule Take 0.8 mg by mouth At Bedtime        Case Management:  I have reviewed the care plan and MDS and do agree with the plan. Patient's desire to return to the community is not present. Information reviewed:  Medications, vital signs, orders, and nursing notes.    ROS:  Limited secondary to cognitive impairment but today pt reports 10 point ROS of systems including Constitutional, Eyes, Respiratory, Cardiovascular, Gastroenterology,  "Genitourinary, Integumentary, Musculoskeletal, Psychiatric were all negative except for pertinent positives noted in my HPI.    Vitals:  BP (!) 177/103   Pulse 76   Temp 97.5  F (36.4  C)   Resp 22   Ht 1.702 m (5' 7\")   Wt 95.9 kg (211 lb 6.4 oz)   SpO2 94%   PF (!) 9 L/min   BMI 33.11 kg/m    Body mass index is 33.11 kg/m .  Exam:  GENERAL APPEARANCE:  Alert, in no distress, pleasantly confused  RESP:  respiratory effort and palpation of chest normal, auscultation of lungs clear , no respiratory distress  CV:  Palpation and auscultation of heart done , rate and rhythm irregular, no murmur, no LE peripheral edema  ABDOMEN:  normal bowel sounds, soft, nontender, no hepatosplenomegaly or other masses  M/S:   Gait and station with W/C for mobility, Digits and nails with arthritic changes, reduced muscle mass  SKIN:  Inspection and Palpation of skin and subcutaneous tissue pale, thin, intact  PSYCH:  insight and judgement, memory with impairment, affect and mood normal, follows commands readily         Lab/Diagnostic data:   CBC RESULTS:   Recent Labs   Lab Test 10/04/19  0600 05/16/19  0556 05/15/19  0551   WBC  --  4.4 3.8*   RBC  --  3.57* 3.39*   HGB 10.6* 11.1* 10.6*   HCT  --  34.9* 33.4*   MCV  --  98 99   MCH  --  31.1 31.3   MCHC  --  31.8 31.7   RDW  --  12.9 12.9   PLT  --  140* 146*       Last Basic Metabolic Panel:  Recent Labs   Lab Test 07/01/19  0806 06/17/19  0744   * 144   POTASSIUM 4.4 4.1   CHLORIDE 110* 110*   MIKE 8.4* 8.2*   CO2 30 30   BUN 14 11   CR 0.86 0.75   * 102*       Liver Function Studies -   Recent Labs   Lab Test 05/13/19  2215 04/23/19  1038   PROTTOTAL 7.0 7.0   ALBUMIN 3.1* 3.3*   BILITOTAL 0.5 0.6   ALKPHOS 80 80   AST 13 11   ALT 11 12       TSH   Date Value Ref Range Status   10/04/2019 4.73 (H) 0.40 - 4.00 mU/L Final   07/31/2019 1.84 0.40 - 4.00 mU/L Final   ]    Lab Results   Component Value Date    A1C 6.7 04/15/2019         ASSESSMENT/PLAN  Type 2 " diabetes mellitus without complication, without long-term current use of insulin (H)  Goal: HgbA1C between 8-9%. Per AGS there is potential harm in lowering the A1C to <6.5% in older adults with diabetes.   Stable without meds    Chronic systolic heart failure (H)  Essential hypertension  Hyperlipidemia LDL goal <100  History of ST elevation myocardial infarction (STEMI)  Patient appears euvolemic   BP goals are <150/90 mm Hg.This is higher than ACC and AHA recommendations due to risk for hypotension, risk of dizziness and falls and risk of tissue/cerebral hypoperfusion. Patient is stable with current plan of care and routine assessment.    Chronic atrial fibrillation  Stable on current anticoagulation. Hgb being monitored  Rate stable on Metoprolol.     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     Primary osteoarthritis  Discussion with patient to decrease Tylenol.  Will start by going down to daily, monitor for ability to discontinue if patient does not have any pain.     Orders written by provider at facility and transcribed by : Sherlyn Faulkner MA  1.  Tylenol 1000 mg po Q am.  Dx; pain  2.  Tylenol 1000 mg po BID PRN.  Dx: pain  3.  Discontinue albuterol nebs PRN.      Electronically signed by:  NATIVIDAD Chapa CNP

## 2019-10-11 ENCOUNTER — NURSING HOME VISIT (OUTPATIENT)
Dept: GERIATRICS | Facility: CLINIC | Age: 79
End: 2019-10-11
Payer: COMMERCIAL

## 2019-10-11 VITALS
SYSTOLIC BLOOD PRESSURE: 177 MMHG | HEIGHT: 67 IN | BODY MASS INDEX: 33.18 KG/M2 | OXYGEN SATURATION: 94 % | RESPIRATION RATE: 22 BRPM | HEART RATE: 76 BPM | DIASTOLIC BLOOD PRESSURE: 103 MMHG | TEMPERATURE: 97.5 F | WEIGHT: 211.4 LBS

## 2019-10-11 DIAGNOSIS — D64.9 ANEMIA, UNSPECIFIED TYPE: ICD-10-CM

## 2019-10-11 DIAGNOSIS — F03.90 DEMENTIA WITHOUT BEHAVIORAL DISTURBANCE, UNSPECIFIED DEMENTIA TYPE: ICD-10-CM

## 2019-10-11 DIAGNOSIS — I48.20 CHRONIC ATRIAL FIBRILLATION (H): ICD-10-CM

## 2019-10-11 DIAGNOSIS — I25.2 HISTORY OF ST ELEVATION MYOCARDIAL INFARCTION (STEMI): ICD-10-CM

## 2019-10-11 DIAGNOSIS — I50.22 CHRONIC SYSTOLIC HEART FAILURE (H): ICD-10-CM

## 2019-10-11 DIAGNOSIS — E78.5 HYPERLIPIDEMIA LDL GOAL <100: ICD-10-CM

## 2019-10-11 DIAGNOSIS — M15.0 PRIMARY OSTEOARTHRITIS INVOLVING MULTIPLE JOINTS: ICD-10-CM

## 2019-10-11 DIAGNOSIS — E11.9 TYPE 2 DIABETES MELLITUS WITHOUT COMPLICATION, WITHOUT LONG-TERM CURRENT USE OF INSULIN (H): Primary | ICD-10-CM

## 2019-10-11 DIAGNOSIS — F06.2 PSYCHOTIC DISORDER DUE TO MEDICAL CONDITION WITH DELUSIONS: ICD-10-CM

## 2019-10-11 DIAGNOSIS — K92.2 GASTROINTESTINAL HEMORRHAGE, UNSPECIFIED GASTROINTESTINAL HEMORRHAGE TYPE: ICD-10-CM

## 2019-10-11 DIAGNOSIS — I10 ESSENTIAL HYPERTENSION: ICD-10-CM

## 2019-10-11 PROCEDURE — 99309 SBSQ NF CARE MODERATE MDM 30: CPT | Performed by: NURSE PRACTITIONER

## 2019-10-11 RX ORDER — SENNOSIDES A AND B 8.6 MG/1
2 TABLET, FILM COATED ORAL DAILY PRN
COMMUNITY
End: 2020-12-01

## 2019-10-11 RX ORDER — ACETAMINOPHEN 500 MG
1000 TABLET ORAL 2 TIMES DAILY PRN
COMMUNITY
End: 2020-02-14

## 2019-10-11 ASSESSMENT — MIFFLIN-ST. JEOR: SCORE: 1471.53

## 2019-10-11 NOTE — LETTER
10/11/2019        RE: Kristine Cosby  [facility]  [address] [facility] [address]        [place] [facility]  Chief Complaint   Patient presents with     [facility] Regulatory     [place] Medical Record Number:  [id number]  Place of Service where encounter took place:  [facility] ([place], [place]) ([facility]) [[id number]]    HPI:    Kristine Cosby  is 79 year old (1940), who is being seen today for a federally mandated E/M visit.  HPI information obtained from: facility chart records, facility staff, patient report and [facility] chart review. Today's concerns are:  Type 2 diabetes mellitus without complication, without long-term current use of insulin (H)  Lab Results   Component Value Date    A1C 6.7 04/15/2019     History of found down at home and later determined to have hypoglycemia encephalopathy, hospitalized 12/27/18-1/8/19 with complications of JOSTIN, dysphagia  1/8-2/1/19 - Acute rehab at [facility]   2/1- 4/2/19: [facility]  4/2/19: Moved to LTC     On statin and ACEI; not on ASA d/t apixaban usage  F/b in house [facility]     On no meds for DM.  Patient denies numbness/tingling     Chronic systolic heart failure (H)  Essential hypertension  Hyperlipidemia LDL goal <100  History of ST elevation myocardial infarction (STEMI)  4/23/19 ECHO: The left ventricle is normal in size. The visual ejection fraction is estimated at 40%. Mid-distal anteroseptal and apical walls are severely hypokinetic, mild hypokinesis elsewhere. The right ventricle is normal in structure, function and size. No significant valve disease. The ascending aorta is Mildly dilated (3.9cm).  F/b Karyn Gomez, [facility]    On Lasix 10 mg daily, lisinopril 10 mg daily, Toprol XL 50 mg daily,    BPs 130-160s/80-90s  HRs 65-83 mostly  Patient denies CP, HA, lightheadedness, SOB (may be poor hsitorian d/t cognitive impairment)     Weights:   9/12/19 - 208.8  9/21/19 -  "208.6  10/1/19 - 211.4    Chronic atrial fibrillation  Currently on apixaban (see below for history of pradaxa) 5 mg BID  On Toprol XL for rate control  HRs 65-83 mostly, irregular today      Anemia, unspecified type  Hx of GIB (hiatal hernia, erosive gastritis)  History of previous Pradaxa which was stopped d/t GIB (2017) but apixaban was started last hospitalization as patient has been having unresponsive episodes concerning for CVA with known a fib history.    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  Also has been to Neurology and EEG neg for seizure activity.   F/u with Cardiology who noted unlikely cardiac- related.     Resides in SNF  A x 1-2 for ADLs, transfers, ambulation  A&O x 1 (self) today   Able to make her needs known.     Primary osteoarthritis   Patient on Tylenol 1000 mg TID  Patient denies pain.     ALLERGIES:Blood transfusion related (informational only)  PAST MEDICAL HISTORY:   has a past medical history of Cellulitis of left lower extremity, GERD (gastroesophageal reflux disease), HTN (hypertension), Type 2 diabetes mellitus without complication (H), and Ulcer of left lower extremity with fat layer exposed (H).  PAST SURGICAL HISTORY:   has a past surgical history that includes Hysterectomy and ESOPHAGOGASTRODUODENOSCOPY.  FAMILY HISTORY: family history includes Lung Cancer in her father.  SOCIAL HISTORY:  reports that she quit smoking about 5 months ago. She has quit using smokeless tobacco. She reports that she does not drink alcohol.    MEDICATIONS:  Current Outpatient Medications "   Medication Sig Dispense Refill     acetaminophen (TYLENOL) 500 MG tablet Take 1,000 mg by mouth 2 times daily as needed for mild pain       acetaminophen (TYLENOL) 500 MG tablet Take 1,000 mg by mouth every morning        apixaban ANTICOAGULANT (ELIQUIS) 5 MG tablet Take 1 tablet (5 mg) by mouth 2 times daily       atorvastatin (LIPITOR) 20 MG tablet Take 20 mg by mouth At Bedtime       bisacodyl (DULCOLAX) 10 MG suppository Place 1 suppository (10 mg) rectally daily as needed for constipation       cholecalciferol 1000 units TABS Take 2,000 Units by mouth daily       Furosemide (LASIX PO) Take 10 mg by mouth daily       levothyroxine (SYNTHROID/LEVOTHROID) 88 MCG tablet Take 88 mcg by mouth daily       lidocaine (LIDODERM) 5 % patch Place 1 patch onto the skin every 24 hours       lisinopril (PRINIVIL/ZESTRIL) 10 MG tablet Take 1 tablet (10 mg) by mouth daily 30 tablet 11     metoprolol succinate ER (TOPROL-XL) 50 MG 24 hr tablet Take 50 mg by mouth daily       nitroGLYcerin (NITROSTAT) 0.4 MG sublingual tablet Place 0.4 mg under the tongue every 5 minutes as needed for chest pain For chest pain place 1 tablet under the tongue every 5 minutes for 3 doses. If symptoms persist 5 minutes after 1st dose call 911.       nystatin (MYCOSTATIN) 127408 UNIT/GM external powder Apply topically 2 times daily as needed       omeprazole (PRILOSEC) 20 MG DR capsule Take 20 mg by mouth daily       senna (SENOKOT) 8.6 MG tablet Take 2 tablets by mouth 2 times daily as needed for constipation       senna-docusate (SENOKOT-S/PERICOLACE) 8.6-50 MG tablet Take 2 tablets by mouth daily        tamsulosin (FLOMAX) 0.4 MG capsule Take 0.8 mg by mouth At Bedtime        Case Management:  I have reviewed the care plan and MDS and do agree with the plan. Patient's desire to return to the community is not present. Information reviewed:  Medications, vital signs, orders, and nursing notes.    ROS:  Limited secondary to cognitive impairment but  "today pt reports 10 point ROS of systems including Constitutional, Eyes, Respiratory, Cardiovascular, Gastroenterology, Genitourinary, Integumentary, Musculoskeletal, Psychiatric were all negative except for pertinent positives noted in my HPI.    Vitals:  BP (!) 177/103   Pulse 76   Temp 97.5  F (36.4  C)   Resp 22   Ht 1.702 m (5' 7\")   Wt 95.9 kg (211 lb 6.4 oz)   SpO2 94%   PF (!) 9 L/min   BMI 33.11 kg/m     Body mass index is 33.11 kg/m .  Exam:  GENERAL APPEARANCE:  Alert, in no distress, pleasantly confused  RESP:  respiratory effort and palpation of chest normal, auscultation of lungs clear , no respiratory distress  CV:  Palpation and auscultation of heart done , rate and rhythm irregular, no murmur, no LE peripheral edema  ABDOMEN:  normal bowel sounds, soft, nontender, no hepatosplenomegaly or other masses  M/S:   Gait and station with W/C for mobility, Digits and nails with arthritic changes, reduced muscle mass  SKIN:  Inspection and Palpation of skin and subcutaneous tissue pale, thin, intact  PSYCH:  insight and judgement, memory with impairment, affect and mood normal, follows commands readily         Lab/Diagnostic data:   CBC RESULTS:   Recent Labs   Lab Test 10/04/19  0600 05/16/19  0556 05/15/19  0551   WBC  --  4.4 3.8*   RBC  --  3.57* 3.39*   HGB 10.6* 11.1* 10.6*   HCT  --  34.9* 33.4*   MCV  --  98 99   MCH  --  31.1 31.3   MCHC  --  31.8 31.7   RDW  --  12.9 12.9   PLT  --  140* 146*       Last Basic Metabolic Panel:  Recent Labs   Lab Test 07/01/19  0806 06/17/19  0744   * 144   POTASSIUM 4.4 4.1   CHLORIDE 110* 110*   MIKE 8.4* 8.2*   CO2 30 30   BUN 14 11   CR 0.86 0.75   * 102*       Liver Function Studies -   Recent Labs   Lab Test 05/13/19  2215 04/23/19  1038   PROTTOTAL 7.0 7.0   ALBUMIN 3.1* 3.3*   BILITOTAL 0.5 0.6   ALKPHOS 80 80   AST 13 11   ALT 11 12       TSH   Date Value Ref Range Status   10/04/2019 4.73 (H) 0.40 - 4.00 mU/L Final   07/31/2019 1.84 0.40 " - 4.00 mU/L Final   ]    Lab Results   Component Value Date    A1C 6.7 04/15/2019         ASSESSMENT/PLAN  Type 2 diabetes mellitus without complication, without long-term current use of insulin (H)  Goal: HgbA1C between 8-9%. Per AGS there is potential harm in lowering the A1C to <6.5% in older adults with diabetes.   Stable without meds    Chronic systolic heart failure (H)  Essential hypertension  Hyperlipidemia LDL goal <100  History of ST elevation myocardial infarction (STEMI)  Patient appears euvolemic   BP goals are <150/90 mm Hg.This is higher than ACC and AHA recommendations due to risk for hypotension, risk of dizziness and falls and risk of tissue/cerebral hypoperfusion. Patient is stable with current plan of care and routine assessment.    Chronic atrial fibrillation  Stable on current anticoagulation. Hgb being monitored  Rate stable on Metoprolol.     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     Primary osteoarthritis  Discussion with patient to decrease Tylenol.  Will start by going down to daily, monitor for ability to discontinue if patient does not have any pain.     Orders written by provider at facility and transcribed by : Sherlyn Faulkner MA  1.  Tylenol 1000 mg po Q am.  Dx; pain  2.  Tylenol 1000 mg po BID PRN.  Dx: pain  3.  Discontinue albuterol nebs PRN.      Electronically signed by:  NATIVIDAD Chapa CNP              Sincerely,        NATIVIDAD Chapa CNP

## 2019-10-18 ENCOUNTER — HOSPITAL LABORATORY (OUTPATIENT)
Dept: NURSING HOME | Facility: OTHER | Age: 79
End: 2019-10-18

## 2019-10-18 LAB — HGB BLD-MCNC: 10.6 G/DL (ref 11.7–15.7)

## 2019-11-15 ENCOUNTER — HOSPITAL LABORATORY (OUTPATIENT)
Dept: NURSING HOME | Facility: OTHER | Age: 79
End: 2019-11-15

## 2019-11-15 LAB — HGB BLD-MCNC: 11 G/DL (ref 11.7–15.7)

## 2019-12-13 ENCOUNTER — NURSING HOME VISIT (OUTPATIENT)
Dept: GERIATRICS | Facility: CLINIC | Age: 79
End: 2019-12-13
Payer: COMMERCIAL

## 2019-12-13 VITALS
WEIGHT: 212.1 LBS | OXYGEN SATURATION: 94 % | RESPIRATION RATE: 16 BRPM | BODY MASS INDEX: 33.22 KG/M2 | HEART RATE: 50 BPM | SYSTOLIC BLOOD PRESSURE: 133 MMHG | TEMPERATURE: 96 F | DIASTOLIC BLOOD PRESSURE: 74 MMHG

## 2019-12-13 DIAGNOSIS — E11.9 DIET-CONTROLLED DIABETES MELLITUS (H): ICD-10-CM

## 2019-12-13 DIAGNOSIS — E78.5 HYPERLIPIDEMIA LDL GOAL <100: ICD-10-CM

## 2019-12-13 DIAGNOSIS — I10 ESSENTIAL HYPERTENSION: ICD-10-CM

## 2019-12-13 DIAGNOSIS — I48.20 CHRONIC ATRIAL FIBRILLATION (H): ICD-10-CM

## 2019-12-13 DIAGNOSIS — F06.2 PSYCHOTIC DISORDER DUE TO MEDICAL CONDITION WITH DELUSIONS: ICD-10-CM

## 2019-12-13 DIAGNOSIS — I50.22 CHRONIC SYSTOLIC HEART FAILURE (H): ICD-10-CM

## 2019-12-13 DIAGNOSIS — F03.90 DEMENTIA WITHOUT BEHAVIORAL DISTURBANCE, UNSPECIFIED DEMENTIA TYPE: Primary | ICD-10-CM

## 2019-12-13 PROCEDURE — 99309 SBSQ NF CARE MODERATE MDM 30: CPT | Performed by: FAMILY MEDICINE

## 2019-12-13 NOTE — LETTER
12/13/2019        RE: Kristine Cosby  [facility]  [address] [facility] [address]        [place] [facility]  Chief Complaint   Patient presents with     snf Regulatory     [place] Medical Record Number:  [id number]  Place of Service where encounter took place:  [facility] ([facility]) [[id number]]    HPI:    Kristine Cosby  is 79 year old (1940), who is being seen today for a federally mandated E/M visit.  HPI information obtained from: facility staff, patient report and [facility] chart review.     Today's concerns are:  - Resident seen and examined today, reports doing fine, has no concern. Denies HA, dizziness.  Denies having issue with dysphagia.   - GNP reports Rt has been stable, and no concern.    -----------------------------------------------------------------  - Past Medical, social, family histories, medications, and allergies reviewed and updated  - Medications reviewed: in the chart and EHR.   - Case Management:   I have reviewed the care plan and MDS and do agree with the plan. Patient's desire to return to the community is not present.  Information reviewed:  Medications, vital signs, orders, and nursing notes.    MEDICATIONS:  Current Outpatient Medications   Medication Sig Dispense Refill     acetaminophen (TYLENOL) 500 MG tablet Take 1,000 mg by mouth 2 times daily as needed for mild pain       acetaminophen (TYLENOL) 500 MG tablet Take 1,000 mg by mouth every morning        apixaban ANTICOAGULANT (ELIQUIS) 5 MG tablet Take 1 tablet (5 mg) by mouth 2 times daily       atorvastatin (LIPITOR) 20 MG tablet Take 20 mg by mouth At Bedtime       bisacodyl (DULCOLAX) 10 MG suppository Place 1 suppository (10 mg) rectally daily as needed for constipation       cholecalciferol 1000 units TABS Take 2,000 Units by mouth daily       Furosemide (LASIX PO) Take 10 mg by mouth daily       levothyroxine (SYNTHROID/LEVOTHROID) 88 MCG tablet Take 88  mcg by mouth daily       lidocaine (LIDODERM) 5 % patch Place 1 patch onto the skin every 24 hours       lisinopril (PRINIVIL/ZESTRIL) 10 MG tablet Take 1 tablet (10 mg) by mouth daily 30 tablet 11     metoprolol succinate ER (TOPROL-XL) 50 MG 24 hr tablet Take 50 mg by mouth daily       nitroGLYcerin (NITROSTAT) 0.4 MG sublingual tablet Place 0.4 mg under the tongue every 5 minutes as needed for chest pain For chest pain place 1 tablet under the tongue every 5 minutes for 3 doses. If symptoms persist 5 minutes after 1st dose call 911.       nystatin (MYCOSTATIN) 881913 UNIT/GM external powder Apply topically 2 times daily as needed       omeprazole (PRILOSEC) 20 MG DR capsule Take 20 mg by mouth daily       senna (SENOKOT) 8.6 MG tablet Take 2 tablets by mouth 2 times daily as needed for constipation       senna-docusate (SENOKOT-S/PERICOLACE) 8.6-50 MG tablet Take 2 tablets by mouth daily        tamsulosin (FLOMAX) 0.4 MG capsule Take 0.8 mg by mouth At Bedtime        ROS:  Limited secondary to cognitive impairment but today pt reports- see HPI    Vitals:  /74   Pulse 50   Temp 96  F (35.6  C)   Resp 16   Wt 96.2 kg (212 lb 1.6 oz)   SpO2 94%   BMI 33.22 kg/m     Body mass index is 33.22 kg/m .  Exam:   GENERAL APPEARANCE:  sitting in WC, no acute distress.   EYES:  EOMI, Pupil rounded and equal.  RESP:  lungs clear to auscultation   CV:  S1S2 audible, rapid but regular HR, no murmur appreciated. No edema  ABDOMEN:  soft, NT/ND, BS audible. no mass appreciated on palpation.   M/S:   no joint deformity noted on observation.   SKIN:  Copper discoloration legs  NEURO:  no NFD appreciated on observation.   PSYCH: AAOx person only, mood and affect appropriate.      Lab/Diagnostic data: Reviewed in the chart and EHR.    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  - followed by Neurology. Off keppra and zyprexa due to hx of hypersomnia and episode of unresponsiveness. .  - stable.     Chronic systolic heart failure (H) EF 40%  Chronic atrial fibrillation;  Hx of Frequent PVC  Essential hypertension  Hyperlipidemia LDL goal <100  - compensated. BP in general w/i acceptable range  - CVR. On metoprolol for rate control. On Eliquis. .     Diet controlled diabetes Mellitus (H):    A1C 6.7 04/15/2019       Dysphagia:  Hx of GIB (erosive gastritis, hiatal hernia):  - on omeprazole.     Frailty:  - Significant  Deficits requiring NH placement. Requiring extensive assistance from nursing. Up for meals only o/w spends the day resting in bed.     Order: See above, otherwise, continue the rest of the current POC.     Electronically signed by:  Parris Navarro MD              Sincerely,        Parris Navarro MD

## 2019-12-13 NOTE — PROGRESS NOTES
[place] [facility]  Chief Complaint   Patient presents with     correction Regulatory     [place] Medical Record Number:  [id number]  [facility] [[id number][facility]    HPI:    Kristine Cosby  is 79 year old (1940), who is being seen today for a federally mandated E/M visit.  HPI information obtained from: facility staff, patient report and [facility] chart review.     Today's concerns are:  - Resident seen and examined today, reports doing fine, has no concern. Denies HA, dizziness.  Denies having issue with dysphagia.   - GNP reports Rt has been stable, and no concern.    -----------------------------------------------------------------  - Past Medical, social, family histories, medications, and allergies reviewed and updated  - Medications reviewed: in the chart and EHR.   - Case Management:   I have reviewed the care plan and MDS and do agree with the plan. Patient's desire to return to the community is not present.  Information reviewed:  Medications, vital signs, orders, and nursing notes.    MEDICATIONS:  Current Outpatient Medications   Medication Sig Dispense Refill     acetaminophen (TYLENOL) 500 MG tablet Take 1,000 mg by mouth 2 times daily as needed for mild pain       acetaminophen (TYLENOL) 500 MG tablet Take 1,000 mg by mouth every morning        apixaban ANTICOAGULANT (ELIQUIS) 5 MG tablet Take 1 tablet (5 mg) by mouth 2 times daily       atorvastatin (LIPITOR) 20 MG tablet Take 20 mg by mouth At Bedtime       bisacodyl (DULCOLAX) 10 MG suppository Place 1 suppository (10 mg) rectally daily as needed for constipation       cholecalciferol 1000 units TABS Take 2,000 Units by mouth daily       Furosemide (LASIX PO) Take 10 mg by mouth daily       levothyroxine (SYNTHROID/LEVOTHROID) 88 MCG tablet Take 88 mcg by mouth daily       lidocaine (LIDODERM) 5 % patch Place 1 patch onto the skin every 24 hours        lisinopril (PRINIVIL/ZESTRIL) 10 MG tablet Take 1 tablet (10 mg) by mouth daily 30 tablet 11     metoprolol succinate ER (TOPROL-XL) 50 MG 24 hr tablet Take 50 mg by mouth daily       nitroGLYcerin (NITROSTAT) 0.4 MG sublingual tablet Place 0.4 mg under the tongue every 5 minutes as needed for chest pain For chest pain place 1 tablet under the tongue every 5 minutes for 3 doses. If symptoms persist 5 minutes after 1st dose call 911.       nystatin (MYCOSTATIN) 691694 UNIT/GM external powder Apply topically 2 times daily as needed       omeprazole (PRILOSEC) 20 MG DR capsule Take 20 mg by mouth daily       senna (SENOKOT) 8.6 MG tablet Take 2 tablets by mouth 2 times daily as needed for constipation       senna-docusate (SENOKOT-S/PERICOLACE) 8.6-50 MG tablet Take 2 tablets by mouth daily        tamsulosin (FLOMAX) 0.4 MG capsule Take 0.8 mg by mouth At Bedtime        ROS:  Limited secondary to cognitive impairment but today pt reports- see HPI    Vitals:  /74   Pulse 50   Temp 96  F (35.6  C)   Resp 16   Wt 96.2 kg (212 lb 1.6 oz)   SpO2 94%   BMI 33.22 kg/m    Body mass index is 33.22 kg/m .  Exam:   GENERAL APPEARANCE:  sitting in WC, no acute distress.   EYES:  EOMI, Pupil rounded and equal.  RESP:  lungs clear to auscultation   CV:  S1S2 audible, rapid but regular HR, no murmur appreciated. No edema  ABDOMEN:  soft, NT/ND, BS audible. no mass appreciated on palpation.   M/S:   no joint deformity noted on observation.   SKIN:  Copper discoloration legs  NEURO:  no NFD appreciated on observation.   PSYCH: AAOx person only, mood and affect appropriate.      Lab/Diagnostic data: Reviewed in the chart and EHR.    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  - followed by Neurology. Off keppra and zyprexa due to hx of hypersomnia and episode of unresponsiveness. .  - stable.     Chronic systolic heart failure (H) EF 40%  Chronic atrial fibrillation;  Hx of Frequent PVC  Essential hypertension  Hyperlipidemia LDL goal <100  - compensated. BP in general w/i acceptable range  - CVR. On metoprolol for rate control. On Eliquis. .     Diet controlled diabetes Mellitus (H):    A1C 6.7 04/15/2019       Dysphagia:  Hx of GIB (erosive gastritis, hiatal hernia):  - on omeprazole.     Frailty:  - Significant  Deficits requiring NH placement. Requiring extensive assistance from nursing. Up for meals only o/w spends the day resting in bed.     Order: See above, otherwise, continue the rest of the current POC.     Electronically signed by:  Parris Navarro MD

## 2020-02-13 NOTE — PROGRESS NOTES
"[place] [facility]  Chief Complaint   Patient presents with     longterm Regulatory     [place] Medical Record Number:  [id number]  Place of Service where encounter took place:  [facility] ([facility] [[id number]]    HPI:    Kristine Cosby  is 79 year old (1940), who is being seen today for a federally mandated E/M visit.  HPI information obtained from: facility chart records, facility staff, patient report and [facility] chart review. 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   Also has been to Neurology and EEG neg for seizure activity.   F/u with Cardiology who noted unlikely cardiac- related.    On no meds  Resides in SNF with nursing for supportive cares  BIMS 6/15  PHQ9 - 0   f/b in house psych    Ax1-2 for transfers, bed mobility, ambulation  Incontinent of bladder and bowel  Cognitive status varies greatly   Patient today reports she has trouble sleeping     Chronic atrial fibrillation  Previously on Prradaxa which was discontinue\"d d/t GIB, then restarted on apixaban d/t concern for CVA with known a fib  On Toprol XL 50 mg daily for rate control  HRs 85-91, irregular today     Chronic systolic heart failure (H)  Essential hypertension  Hyperlipidemia LDL goal <100  History of ST elevation myocardial infarction (STEMI)  Atherosclerosis of native coronary artery of native heart without angina pectoris  4/23/19 ECHO: The left ventricle is normal in size. The visual ejection fraction is estimated at 40%. Mid-distal anteroseptal and apical walls are severely hypokinetic, mild hypokinesis elsewhere. The right ventricle is normal in structure, function and size. No significant valve disease. The ascending aorta is Mildly dilated (3.9cm).  F/b " Karyn Gomez, [facility] Cardiology    On Lasix 10 mg daily (no K supplement. Last K 4.4 on 7/1/19), atorvastatin 20 mg q HS, lisinopril 10 mg daily, nitroglycerin PRN, Toprol XL 50 mg daily  BPs 130-150s/60-70s  HRs 85-91  Patient denies CP, HA, lightheadedness, SOB    LS clear  LE edema none  Weights:  1/18/20 - 211.2  1/25/20 - 221.4  2/1/20 - 218.7      History of stroke  On statin, apixaban for ppx  Deficits include cognitive impairment, (did have periods of unresponsiveness which may be consistent with TIA)    Anemia, unspecified type  Hx of GIB (hiatal hernia, erosive gastritis)  BL Hgb seemingly 10.6-11.0  History of previous Pradaxa which was stopped d/t GIB (2017) but apixaban was started d/t concern for CVA with known a fib history.  Hemoglobin   Date Value Ref Range Status   11/15/2019 11.0 (L) 11.7 - 15.7 g/dL Final   10/18/2019 10.6 (L) 11.7 - 15.7 g/dL Final     No bleeding noted by patient or nursing     Diet-controlled diabetes mellitus (H)  Hx of Hypoglycemic encephalopathy  Lab Results   Component Value Date    A1C 6.7 04/15/2019     On no meds  + statin, ACE; not on ASA d/t apixaban  Patient reports no numbness/tingling (although does c/o pain in right hand 3rd finger)    Acquired hypothyroidism  TSH   Date Value Ref Range Status   10/04/2019 4.73 (H) 0.40 - 4.00 mU/L Final   Free T4 - 1.18  On levothyroxine 88 mcg daily     Cough  Chest congestion  Patient reports increase in phlegm and nonproductive cough  She has been afebrile  She denies SOB  Ls clear  No history of COPD or asthma but has had aspiration pneumonitis in the past       ALLERGIES:Blood transfusion related (informational only)  PAST MEDICAL HISTORY:   has a past medical history of Cellulitis of left lower extremity, GERD (gastroesophageal reflux disease), HTN (hypertension), Type 2 diabetes mellitus without complication (H), and Ulcer of left lower extremity with fat layer exposed (H).  PAST SURGICAL HISTORY:   has a past surgical  history that includes Hysterectomy and ESOPHAGOGASTRODUODENOSCOPY.  FAMILY HISTORY: family history includes Lung Cancer in her father.  SOCIAL HISTORY:  reports that she quit smoking about 10 months ago. She has quit using smokeless tobacco. She reports that she does not drink alcohol.    MEDICATIONS:  Current Outpatient Medications   Medication Sig Dispense Refill     acetaminophen (TYLENOL) 500 MG tablet Take 1,000 mg by mouth every morning        apixaban ANTICOAGULANT (ELIQUIS) 5 MG tablet Take 1 tablet (5 mg) by mouth 2 times daily       atorvastatin (LIPITOR) 20 MG tablet Take 20 mg by mouth At Bedtime       bisacodyl (DULCOLAX) 10 MG suppository Place 1 suppository (10 mg) rectally daily as needed for constipation       cholecalciferol 1000 units TABS Take 2,000 Units by mouth daily       diclofenac (VOLTAREN) 1 % topical gel Apply 2 g topically daily AND BID PRN - Apply to right hand       Furosemide (LASIX PO) Take 10 mg by mouth daily       guaiFENesin (MUCINEX) 600 MG 12 hr tablet Take 600 mg by mouth 2 times daily       levothyroxine (SYNTHROID/LEVOTHROID) 88 MCG tablet Take 88 mcg by mouth daily       lisinopril (PRINIVIL/ZESTRIL) 10 MG tablet Take 1 tablet (10 mg) by mouth daily 30 tablet 11     melatonin 3 MG tablet Take 3 mg by mouth At Bedtime       metoprolol succinate ER (TOPROL-XL) 50 MG 24 hr tablet Take 50 mg by mouth daily       nitroGLYcerin (NITROSTAT) 0.4 MG sublingual tablet Place 0.4 mg under the tongue every 5 minutes as needed for chest pain For chest pain place 1 tablet under the tongue every 5 minutes for 3 doses. If symptoms persist 5 minutes after 1st dose call 911.       nystatin (MYCOSTATIN) 089365 UNIT/GM external powder Apply topically 2 times daily as needed       omeprazole (PRILOSEC) 20 MG DR capsule Take 20 mg by mouth daily       oseltamivir (TAMIFLU) 75 MG capsule Take 75 mg by mouth daily       senna (SENOKOT) 8.6 MG tablet Take 1 tablet by mouth daily as needed for  "constipation        senna-docusate (SENOKOT-S/PERICOLACE) 8.6-50 MG tablet Take 2 tablets by mouth daily        tamsulosin (FLOMAX) 0.4 MG capsule Take 0.8 mg by mouth At Bedtime        Case Management:  I have reviewed the care plan and MDS and do agree with the plan. Patient's desire to return to the community is not present. Information reviewed:  Medications, vital signs, orders, and nursing notes.    ROS:  Limited secondary to cognitive impairment but today pt reports 10 point ROS of systems including Constitutional, Eyes, Respiratory, Cardiovascular, Gastroenterology, Genitourinary, Integumentary, Musculoskeletal, Psychiatric were all negative except for pertinent positives noted in my HPI.    Vitals:  /68   Pulse 96   Temp 97.1  F (36.2  C)   Resp 16   Ht 1.676 m (5' 6\")   Wt 70 kg (154 lb 6.4 oz)   SpO2 91%   BMI 24.92 kg/m    Body mass index is 24.92 kg/m .  Exam:  GENERAL APPEARANCE:  Alert, in no distress, pleasant  RESP:  respiratory effort and palpation of chest normal, auscultation of lungs clear, no respiratory distress  CV:  Palpation and auscultation of heart done , rate and rhythm irregular, no murmur, no LE peripheral edema  ABDOMEN:  normal bowel sounds, soft, nontender, no hepatosplenomegaly or other masses  M/S:   Gait and station with w/c for mobility, Digits and nails with arthritic changes, reduced muscle mass  SKIN:  Inspection and Palpation of skin and subcutaneous tissue pale, intact  PSYCH:  insight and judgement, memory with impairment , affect and mood normal, follows commands readily     Lab/Diagnostic data:   CBC RESULTS:   Recent Labs   Lab Test 11/15/19  0735 10/18/19  0732  05/16/19  0556 05/15/19  0551   WBC  --   --   --  4.4 3.8*   RBC  --   --   --  3.57* 3.39*   HGB 11.0* 10.6*   < > 11.1* 10.6*   HCT  --   --   --  34.9* 33.4*   MCV  --   --   --  98 99   MCH  --   --   --  31.1 31.3   MCHC  --   --   --  31.8 31.7   RDW  --   --   --  12.9 12.9   PLT  --   --   " --  140* 146*    < > = values in this interval not displayed.       Last Basic Metabolic Panel:  Recent Labs   Lab Test 07/01/19  0806 06/17/19  0744   * 144   POTASSIUM 4.4 4.1   CHLORIDE 110* 110*   MIKE 8.4* 8.2*   CO2 30 30   BUN 14 11   CR 0.86 0.75   * 102*       Liver Function Studies -   Recent Labs   Lab Test 05/13/19  2215 04/23/19  1038   PROTTOTAL 7.0 7.0   ALBUMIN 3.1* 3.3*   BILITOTAL 0.5 0.6   ALKPHOS 80 80   AST 13 11   ALT 11 12       TSH   Date Value Ref Range Status   10/04/2019 4.73 (H) 0.40 - 4.00 mU/L Final   07/31/2019 1.84 0.40 - 4.00 mU/L Final   ]    Lab Results   Component Value Date    A1C 6.7 04/15/2019           ASSESSMENT/PLAN  Dementia without behavioral disturbance, unspecified dementia type (H)  Insomnia related to medical condition  Can order melatonin for sleep assistance. Monitor for titration needs  Stable without meds - no behaviors. Always very pleasant.   Nursing for supportive cares.    Chronic atrial fibrillation  Stable on eliquis. Can order Hgb for monitoring  Stable with current BB for rate control      Chronic systolic heart failure (H)Essential hypertension  Hyperlipidemia LDL goal <100  History of ST elevation myocardial infarction (STEMI)  Atherosclerosis of native coronary artery of native heart without angina pectoris  Will recheck BMP to monitor electrolytes and kidney function with Lasix, monitor for titration needs.   BP goals are <150/90 mm Hg.This is higher than ACC and AHA recommendations due to risk for hypotension, risk of dizziness and falls, risk of tissue/cerebral hypoperfusion and frailty. Patient is stable with current plan of care and routine assessment.      History of stroke  On ppx anticoagulation and statin  Nursing for supportive cares of cognitive impairment (deficit)    Anemia, unspecified type  Hx of GIB (hiatal hernia, erosive gastritis)  Recheck Hgb for monitoring, karthik with history of bleeding on anticoagulation prior.  Continue  with PPI despite known risks.     Diet-controlled diabetes mellitus (H)  Hx of Hypoglycemic encephalopathy  Stable without meds.  Recheck Hgb A1C d/t nearly 1 year ago since last check.   Goal: HgbA1C between 8-9%. Per AGS there is potential harm in lowering the A1C to <6.5% in older adults with diabetes.     Acquired hypothyroidism  Recheck TSH with reflex to Free T4 for monitoring.     Cough  Chest congestion  Can order trial of Mucinex to determine if acute.  And getting BMP to monitor with CHF. If not resolved, recommend CHEST XRAY to look for pulm congestion.   LS clear today. No LE edema.       transcribed by : Padmini Reno  1) Discontinue Lidoderm patch  2) Voltaren 1% gel to right hand - 2gm topically every day AND BID PRN - Dx: Osteoarthritis  3) Mucinex 600 mg PO BID x 5 days - Dx: Cough  4) Melatonin 3 mg PO at bedtime - Dx: Insomnia  5) BMP, Hgb, HgbA1C, TSH with reflex to Free T4 on Wed, 2/19/20 Dx: hypothyroidism, DM2, CHF, anemia       Electronically signed by:  NATIVIDAD Chapa CNP

## 2020-02-14 ENCOUNTER — NURSING HOME VISIT (OUTPATIENT)
Dept: GERIATRICS | Facility: CLINIC | Age: 80
End: 2020-02-14
Payer: COMMERCIAL

## 2020-02-14 VITALS
BODY MASS INDEX: 24.81 KG/M2 | HEIGHT: 66 IN | HEART RATE: 96 BPM | WEIGHT: 154.4 LBS | TEMPERATURE: 97.1 F | SYSTOLIC BLOOD PRESSURE: 130 MMHG | DIASTOLIC BLOOD PRESSURE: 68 MMHG | RESPIRATION RATE: 16 BRPM | OXYGEN SATURATION: 91 %

## 2020-02-14 DIAGNOSIS — E16.2 HYPOGLYCEMIC ENCEPHALOPATHY: ICD-10-CM

## 2020-02-14 DIAGNOSIS — I50.22 CHRONIC SYSTOLIC HEART FAILURE (H): ICD-10-CM

## 2020-02-14 DIAGNOSIS — F03.90 DEMENTIA WITHOUT BEHAVIORAL DISTURBANCE, UNSPECIFIED DEMENTIA TYPE: Primary | ICD-10-CM

## 2020-02-14 DIAGNOSIS — R09.89 CHEST CONGESTION: ICD-10-CM

## 2020-02-14 DIAGNOSIS — Z86.73 HISTORY OF STROKE: ICD-10-CM

## 2020-02-14 DIAGNOSIS — E03.9 ACQUIRED HYPOTHYROIDISM: ICD-10-CM

## 2020-02-14 DIAGNOSIS — K92.2 GASTROINTESTINAL HEMORRHAGE, UNSPECIFIED GASTROINTESTINAL HEMORRHAGE TYPE: ICD-10-CM

## 2020-02-14 DIAGNOSIS — G47.01 INSOMNIA DUE TO MEDICAL CONDITION: ICD-10-CM

## 2020-02-14 DIAGNOSIS — I25.2 HISTORY OF ST ELEVATION MYOCARDIAL INFARCTION (STEMI): ICD-10-CM

## 2020-02-14 DIAGNOSIS — I10 ESSENTIAL HYPERTENSION: ICD-10-CM

## 2020-02-14 DIAGNOSIS — I25.10 ATHEROSCLEROSIS OF NATIVE CORONARY ARTERY OF NATIVE HEART WITHOUT ANGINA PECTORIS: ICD-10-CM

## 2020-02-14 DIAGNOSIS — E78.5 HYPERLIPIDEMIA LDL GOAL <100: ICD-10-CM

## 2020-02-14 DIAGNOSIS — I48.20 CHRONIC ATRIAL FIBRILLATION (H): ICD-10-CM

## 2020-02-14 DIAGNOSIS — R05.9 COUGH: ICD-10-CM

## 2020-02-14 DIAGNOSIS — D64.9 ANEMIA, UNSPECIFIED TYPE: ICD-10-CM

## 2020-02-14 DIAGNOSIS — E11.9 DIET-CONTROLLED DIABETES MELLITUS (H): ICD-10-CM

## 2020-02-14 PROBLEM — J96.01 ACUTE RESPIRATORY FAILURE WITH HYPOXIA (H): Status: RESOLVED | Noted: 2019-05-14 | Resolved: 2020-02-14

## 2020-02-14 PROCEDURE — 99309 SBSQ NF CARE MODERATE MDM 30: CPT | Performed by: NURSE PRACTITIONER

## 2020-02-14 RX ORDER — LANOLIN ALCOHOL/MO/W.PET/CERES
3 CREAM (GRAM) TOPICAL AT BEDTIME
COMMUNITY
End: 2020-02-25

## 2020-02-14 RX ORDER — OSELTAMIVIR PHOSPHATE 75 MG/1
75 CAPSULE ORAL DAILY
COMMUNITY
Start: 2020-02-02 | End: 2020-02-25

## 2020-02-14 RX ORDER — GUAIFENESIN 600 MG/1
600 TABLET, EXTENDED RELEASE ORAL 2 TIMES DAILY
COMMUNITY
Start: 2020-02-14 | End: 2020-02-25

## 2020-02-14 ASSESSMENT — MIFFLIN-ST. JEOR: SCORE: 1197.1

## 2020-02-14 NOTE — LETTER
"    2/14/2020        RE: Kristine Cosby  [facility] [place]  [address] [facility] [address]        [place] [facility]  Chief Complaint   Patient presents with     shelter Regulatory     [place] Medical Record Number:  [id number]  Place of Service where encounter took place:  [facility] [[id number]]    HPI:    Kristine Cosby  is 79 year old (1940), who is being seen today for a federally mandated E/M visit.  HPI information obtained from: facility chart records, facility staff, patient report and [facility] chart review. 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   Also has been to Neurology and EEG neg for seizure activity.   F/u with Cardiology who noted unlikely cardiac- related.    On no meds  Resides in SNF with nursing for supportive cares  BIMS 6/15  PHQ9 - 0   f/b in house psych    Ax1-2 for transfers, bed mobility, ambulation  Incontinent of bladder and bowel  Cognitive status varies greatly   Patient today reports she has trouble sleeping     Chronic atrial fibrillation  Previously on Prradaxa which was discontinue\"d d/t GIB, then restarted on apixaban d/t concern for CVA with known a fib  On Toprol XL 50 mg daily for rate control  HRs 85-91, irregular today     Chronic systolic heart failure (H)  Essential hypertension  Hyperlipidemia LDL goal <100  History of ST elevation myocardial infarction (STEMI)  Atherosclerosis of native coronary artery of native heart without angina pectoris  4/23/19 ECHO: The left ventricle is normal in size. The visual ejection fraction is estimated at 40%. Mid-distal anteroseptal and apical walls are severely hypokinetic, mild hypokinesis elsewhere. The right ventricle is normal in " structure, function and size. No significant valve disease. The ascending aorta is Mildly dilated (3.9cm).  F/b Karyn Gomez, [facility] Cardiology    On Lasix 10 mg daily (no K supplement. Last K 4.4 on 7/1/19), atorvastatin 20 mg q HS, lisinopril 10 mg daily, nitroglycerin PRN, Toprol XL 50 mg daily  BPs 130-150s/60-70s  HRs 85-91  Patient denies CP, HA, lightheadedness, SOB    LS clear  LE edema none  Weights:  1/18/20 - 211.2  1/25/20 - 221.4  2/1/20 - 218.7      History of stroke  On statin, apixaban for ppx  Deficits include cognitive impairment, (did have periods of unresponsiveness which may be consistent with TIA)    Anemia, unspecified type  Hx of GIB (hiatal hernia, erosive gastritis)  BL Hgb seemingly 10.6-11.0  History of previous Pradaxa which was stopped d/t GIB (2017) but apixaban was started d/t concern for CVA with known a fib history.  Hemoglobin   Date Value Ref Range Status   11/15/2019 11.0 (L) 11.7 - 15.7 g/dL Final   10/18/2019 10.6 (L) 11.7 - 15.7 g/dL Final     No bleeding noted by patient or nursing     Diet-controlled diabetes mellitus (H)  Hx of Hypoglycemic encephalopathy  Lab Results   Component Value Date    A1C 6.7 04/15/2019     On no meds  + statin, ACE; not on ASA d/t apixaban  Patient reports no numbness/tingling (although does c/o pain in right hand 3rd finger)    Acquired hypothyroidism  TSH   Date Value Ref Range Status   10/04/2019 4.73 (H) 0.40 - 4.00 mU/L Final   Free T4 - 1.18  On levothyroxine 88 mcg daily     Cough  Chest congestion  Patient reports increase in phlegm and nonproductive cough  She has been afebrile  She denies SOB  Ls clear  No history of COPD or asthma but has had aspiration pneumonitis in the past       ALLERGIES:Blood transfusion related (informational only)  PAST MEDICAL HISTORY:   has a past medical history of Cellulitis of left lower extremity, GERD (gastroesophageal reflux disease), HTN (hypertension), Type 2 diabetes mellitus without complication (H),  and Ulcer of left lower extremity with fat layer exposed (H).  PAST SURGICAL HISTORY:   has a past surgical history that includes Hysterectomy and ESOPHAGOGASTRODUODENOSCOPY.  FAMILY HISTORY: family history includes Lung Cancer in her father.  SOCIAL HISTORY:  reports that she quit smoking about 10 months ago. She has quit using smokeless tobacco. She reports that she does not drink alcohol.    MEDICATIONS:  Current Outpatient Medications   Medication Sig Dispense Refill     acetaminophen (TYLENOL) 500 MG tablet Take 1,000 mg by mouth every morning        apixaban ANTICOAGULANT (ELIQUIS) 5 MG tablet Take 1 tablet (5 mg) by mouth 2 times daily       atorvastatin (LIPITOR) 20 MG tablet Take 20 mg by mouth At Bedtime       bisacodyl (DULCOLAX) 10 MG suppository Place 1 suppository (10 mg) rectally daily as needed for constipation       cholecalciferol 1000 units TABS Take 2,000 Units by mouth daily       diclofenac (VOLTAREN) 1 % topical gel Apply 2 g topically daily AND BID PRN - Apply to right hand       Furosemide (LASIX PO) Take 10 mg by mouth daily       guaiFENesin (MUCINEX) 600 MG 12 hr tablet Take 600 mg by mouth 2 times daily       levothyroxine (SYNTHROID/LEVOTHROID) 88 MCG tablet Take 88 mcg by mouth daily       lisinopril (PRINIVIL/ZESTRIL) 10 MG tablet Take 1 tablet (10 mg) by mouth daily 30 tablet 11     melatonin 3 MG tablet Take 3 mg by mouth At Bedtime       metoprolol succinate ER (TOPROL-XL) 50 MG 24 hr tablet Take 50 mg by mouth daily       nitroGLYcerin (NITROSTAT) 0.4 MG sublingual tablet Place 0.4 mg under the tongue every 5 minutes as needed for chest pain For chest pain place 1 tablet under the tongue every 5 minutes for 3 doses. If symptoms persist 5 minutes after 1st dose call 911.       nystatin (MYCOSTATIN) 085227 UNIT/GM external powder Apply topically 2 times daily as needed       omeprazole (PRILOSEC) 20 MG DR capsule Take 20 mg by mouth daily       oseltamivir (TAMIFLU) 75 MG capsule  "Take 75 mg by mouth daily       senna (SENOKOT) 8.6 MG tablet Take 1 tablet by mouth daily as needed for constipation        senna-docusate (SENOKOT-S/PERICOLACE) 8.6-50 MG tablet Take 2 tablets by mouth daily        tamsulosin (FLOMAX) 0.4 MG capsule Take 0.8 mg by mouth At Bedtime        Case Management:  I have reviewed the care plan and MDS and do agree with the plan. Patient's desire to return to the community is not present. Information reviewed:  Medications, vital signs, orders, and nursing notes.    ROS:  Limited secondary to cognitive impairment but today pt reports 10 point ROS of systems including Constitutional, Eyes, Respiratory, Cardiovascular, Gastroenterology, Genitourinary, Integumentary, Musculoskeletal, Psychiatric were all negative except for pertinent positives noted in my HPI.    Vitals:  /68   Pulse 96   Temp 97.1  F (36.2  C)   Resp 16   Ht 1.676 m (5' 6\")   Wt 70 kg (154 lb 6.4 oz)   SpO2 91%   BMI 24.92 kg/m     Body mass index is 24.92 kg/m .  Exam:  GENERAL APPEARANCE:  Alert, in no distress, pleasant  RESP:  respiratory effort and palpation of chest normal, auscultation of lungs clear, no respiratory distress  CV:  Palpation and auscultation of heart done , rate and rhythm irregular, no murmur, no LE peripheral edema  ABDOMEN:  normal bowel sounds, soft, nontender, no hepatosplenomegaly or other masses  M/S:   Gait and station with w/c for mobility, Digits and nails with arthritic changes, reduced muscle mass  SKIN:  Inspection and Palpation of skin and subcutaneous tissue pale, intact  PSYCH:  insight and judgement, memory with impairment , affect and mood normal, follows commands readily     Lab/Diagnostic data:   CBC RESULTS:   Recent Labs   Lab Test 11/15/19  0735 10/18/19  0732  05/16/19  0556 05/15/19  0551   WBC  --   --   --  4.4 3.8*   RBC  --   --   --  3.57* 3.39*   HGB 11.0* 10.6*   < > 11.1* 10.6*   HCT  --   --   --  34.9* 33.4*   MCV  --   --   --  98 99 "   MCH  --   --   --  31.1 31.3   MCHC  --   --   --  31.8 31.7   RDW  --   --   --  12.9 12.9   PLT  --   --   --  140* 146*    < > = values in this interval not displayed.       Last Basic Metabolic Panel:  Recent Labs   Lab Test 07/01/19  0806 06/17/19  0744   * 144   POTASSIUM 4.4 4.1   CHLORIDE 110* 110*   MIKE 8.4* 8.2*   CO2 30 30   BUN 14 11   CR 0.86 0.75   * 102*       Liver Function Studies -   Recent Labs   Lab Test 05/13/19  2215 04/23/19  1038   PROTTOTAL 7.0 7.0   ALBUMIN 3.1* 3.3*   BILITOTAL 0.5 0.6   ALKPHOS 80 80   AST 13 11   ALT 11 12       TSH   Date Value Ref Range Status   10/04/2019 4.73 (H) 0.40 - 4.00 mU/L Final   07/31/2019 1.84 0.40 - 4.00 mU/L Final   ]    Lab Results   Component Value Date    A1C 6.7 04/15/2019           ASSESSMENT/PLAN  Dementia without behavioral disturbance, unspecified dementia type (H)  Insomnia related to medical condition  Can order melatonin for sleep assistance. Monitor for titration needs  Stable without meds - no behaviors. Always very pleasant.   Nursing for supportive cares.    Chronic atrial fibrillation  Stable on eliquis. Can order Hgb for monitoring  Stable with current BB for rate control      Chronic systolic heart failure (H)Essential hypertension  Hyperlipidemia LDL goal <100  History of ST elevation myocardial infarction (STEMI)  Atherosclerosis of native coronary artery of native heart without angina pectoris  Will recheck BMP to monitor electrolytes and kidney function with Lasix, monitor for titration needs.   BP goals are <150/90 mm Hg.This is higher than ACC and AHA recommendations due to risk for hypotension, risk of dizziness and falls, risk of tissue/cerebral hypoperfusion and frailty. Patient is stable with current plan of care and routine assessment.      History of stroke  On ppx anticoagulation and statin  Nursing for supportive cares of cognitive impairment (deficit)    Anemia, unspecified type  Hx of GIB (hiatal hernia,  erosive gastritis)  Recheck Hgb for monitoring, karthik with history of bleeding on anticoagulation prior.  Continue with PPI despite known risks.     Diet-controlled diabetes mellitus (H)  Hx of Hypoglycemic encephalopathy  Stable without meds.  Recheck Hgb A1C d/t nearly 1 year ago since last check.   Goal: HgbA1C between 8-9%. Per AGS there is potential harm in lowering the A1C to <6.5% in older adults with diabetes.     Acquired hypothyroidism  Recheck TSH with reflex to Free T4 for monitoring.     Cough  Chest congestion  Can order trial of Mucinex to determine if acute.  And getting BMP to monitor with CHF. If not resolved, recommend CHEST XRAY to look for pulm congestion.   LS clear today. No LE edema.       transcribed by : Padmini Reno  1) Discontinue Lidoderm patch  2) Voltaren 1% gel to right hand - 2gm topically every day AND BID PRN - Dx: Osteoarthritis  3) Mucinex 600 mg PO BID x 5 days - Dx: Cough  4) Melatonin 3 mg PO at bedtime - Dx: Insomnia  5) BMP, Hgb, HgbA1C, TSH with reflex to Free T4 on Wed, 2/19/20 Dx: hypothyroidism, DM2, CHF, anemia       Electronically signed by:  NATIVIDAD Chapa CNP          Sincerely,        NATIVIDAD Chapa CNP

## 2020-02-19 ENCOUNTER — HOSPITAL LABORATORY (OUTPATIENT)
Dept: NURSING HOME | Facility: OTHER | Age: 80
End: 2020-02-19

## 2020-02-19 LAB
ANION GAP SERPL CALCULATED.3IONS-SCNC: 7 MMOL/L (ref 3–14)
BUN SERPL-MCNC: 20 MG/DL (ref 7–30)
CALCIUM SERPL-MCNC: 8.9 MG/DL (ref 8.5–10.1)
CHLORIDE SERPL-SCNC: 109 MMOL/L (ref 94–109)
CO2 SERPL-SCNC: 25 MMOL/L (ref 20–32)
CREAT SERPL-MCNC: 0.98 MG/DL (ref 0.52–1.04)
GFR SERPL CREATININE-BSD FRML MDRD: 55 ML/MIN/{1.73_M2}
GLUCOSE SERPL-MCNC: 108 MG/DL (ref 70–99)
HBA1C MFR BLD: 6.2 % (ref 0–5.6)
HGB BLD-MCNC: 11.7 G/DL (ref 11.7–15.7)
POTASSIUM SERPL-SCNC: 3.8 MMOL/L (ref 3.4–5.3)
SODIUM SERPL-SCNC: 141 MMOL/L (ref 133–144)
TSH SERPL DL<=0.005 MIU/L-ACNC: 2.25 MU/L (ref 0.4–4)

## 2020-02-22 ENCOUNTER — APPOINTMENT (OUTPATIENT)
Dept: GENERAL RADIOLOGY | Facility: CLINIC | Age: 80
End: 2020-02-22
Attending: FAMILY MEDICINE
Payer: COMMERCIAL

## 2020-02-22 ENCOUNTER — HOSPITAL ENCOUNTER (EMERGENCY)
Facility: CLINIC | Age: 80
Discharge: HOME | End: 2020-02-22
Attending: FAMILY MEDICINE | Admitting: FAMILY MEDICINE
Payer: COMMERCIAL

## 2020-02-22 VITALS
TEMPERATURE: 97.3 F | HEART RATE: 65 BPM | OXYGEN SATURATION: 99 % | RESPIRATION RATE: 20 BRPM | DIASTOLIC BLOOD PRESSURE: 50 MMHG | SYSTOLIC BLOOD PRESSURE: 130 MMHG

## 2020-02-22 DIAGNOSIS — R07.81 PLEURITIC CHEST PAIN: ICD-10-CM

## 2020-02-22 LAB
ALBUMIN SERPL-MCNC: 3.4 G/DL (ref 3.4–5)
ALP SERPL-CCNC: 73 U/L (ref 40–150)
ALT SERPL W P-5'-P-CCNC: 12 U/L (ref 0–50)
ANION GAP SERPL CALCULATED.3IONS-SCNC: 8 MMOL/L (ref 3–14)
AST SERPL W P-5'-P-CCNC: 14 U/L (ref 0–45)
BASOPHILS # BLD AUTO: 0 10E9/L (ref 0–0.2)
BASOPHILS NFR BLD AUTO: 0.6 %
BILIRUB SERPL-MCNC: 0.4 MG/DL (ref 0.2–1.3)
BUN SERPL-MCNC: 20 MG/DL (ref 7–30)
CALCIUM SERPL-MCNC: 9.1 MG/DL (ref 8.5–10.1)
CHLORIDE SERPL-SCNC: 110 MMOL/L (ref 94–109)
CO2 SERPL-SCNC: 24 MMOL/L (ref 20–32)
CREAT SERPL-MCNC: 1.02 MG/DL (ref 0.52–1.04)
DIFFERENTIAL METHOD BLD: NORMAL
EOSINOPHIL NFR BLD AUTO: 4.2 %
ERYTHROCYTE [DISTWIDTH] IN BLOOD BY AUTOMATED COUNT: 13.3 % (ref 10–15)
GFR SERPL CREATININE-BSD FRML MDRD: 52 ML/MIN/{1.73_M2}
GLUCOSE SERPL-MCNC: 133 MG/DL (ref 70–99)
HCT VFR BLD AUTO: 36.8 % (ref 35–47)
HGB BLD-MCNC: 11.8 G/DL (ref 11.7–15.7)
IMM GRANULOCYTES # BLD: 0 10E9/L (ref 0–0.4)
IMM GRANULOCYTES NFR BLD: 0.5 %
LYMPHOCYTES # BLD AUTO: 2.1 10E9/L (ref 0.8–5.3)
LYMPHOCYTES NFR BLD AUTO: 32.6 %
MCH RBC QN AUTO: 30.6 PG (ref 26.5–33)
MCHC RBC AUTO-ENTMCNC: 32.1 G/DL (ref 31.5–36.5)
MCV RBC AUTO: 96 FL (ref 78–100)
MONOCYTES # BLD AUTO: 0.7 10E9/L (ref 0–1.3)
MONOCYTES NFR BLD AUTO: 10.2 %
NEUTROPHILS # BLD AUTO: 3.4 10E9/L (ref 1.6–8.3)
NEUTROPHILS NFR BLD AUTO: 51.9 %
NRBC # BLD AUTO: 0 10*3/UL
NRBC BLD AUTO-RTO: 0 /100
NT-PROBNP SERPL-MCNC: 3155 PG/ML (ref 0–1800)
PLATELET # BLD AUTO: 209 10E9/L (ref 150–450)
POTASSIUM SERPL-SCNC: 3.9 MMOL/L (ref 3.4–5.3)
PROT SERPL-MCNC: 7.2 G/DL (ref 6.8–8.8)
RBC # BLD AUTO: 3.85 10E12/L (ref 3.8–5.2)
SODIUM SERPL-SCNC: 142 MMOL/L (ref 133–144)
TROPONIN I SERPL-MCNC: 0.02 UG/L (ref 0–0.04)
WBC # BLD AUTO: 6.5 10E9/L (ref 4–11)

## 2020-02-22 PROCEDURE — 71046 X-RAY EXAM CHEST 2 VIEWS: CPT | Mod: TC

## 2020-02-22 PROCEDURE — 99285 EMERGENCY DEPT VISIT HI MDM: CPT | Mod: 25 | Performed by: FAMILY MEDICINE

## 2020-02-22 PROCEDURE — 84484 ASSAY OF TROPONIN QUANT: CPT | Performed by: FAMILY MEDICINE

## 2020-02-22 PROCEDURE — 83880 ASSAY OF NATRIURETIC PEPTIDE: CPT | Performed by: FAMILY MEDICINE

## 2020-02-22 PROCEDURE — 99284 EMERGENCY DEPT VISIT MOD MDM: CPT | Mod: 25 | Performed by: FAMILY MEDICINE

## 2020-02-22 PROCEDURE — 85025 COMPLETE CBC W/AUTO DIFF WBC: CPT | Performed by: FAMILY MEDICINE

## 2020-02-22 PROCEDURE — 25000128 H RX IP 250 OP 636: Performed by: FAMILY MEDICINE

## 2020-02-22 PROCEDURE — 93005 ELECTROCARDIOGRAM TRACING: CPT | Performed by: FAMILY MEDICINE

## 2020-02-22 PROCEDURE — 93010 ELECTROCARDIOGRAM REPORT: CPT | Mod: Z6 | Performed by: FAMILY MEDICINE

## 2020-02-22 PROCEDURE — 80053 COMPREHEN METABOLIC PANEL: CPT | Performed by: FAMILY MEDICINE

## 2020-02-22 PROCEDURE — 96374 THER/PROPH/DIAG INJ IV PUSH: CPT | Performed by: FAMILY MEDICINE

## 2020-02-22 RX ORDER — HYDROMORPHONE HYDROCHLORIDE 1 MG/ML
0.5 INJECTION, SOLUTION INTRAMUSCULAR; INTRAVENOUS; SUBCUTANEOUS ONCE
Status: COMPLETED | OUTPATIENT
Start: 2020-02-22 | End: 2020-02-22

## 2020-02-22 RX ADMIN — HYDROMORPHONE HYDROCHLORIDE 0.5 MG: 1 INJECTION, SOLUTION INTRAMUSCULAR; INTRAVENOUS; SUBCUTANEOUS at 00:37

## 2020-02-22 NOTE — ED AVS SNAPSHOT
[facility] Emergency Department  [address] [place] [address]  Phone:  [phone]  Fax:  [phone]                                    Kristine Cosby   MRN: [id number]    Department:  [facility] Emergency Department   Date of Visit:  2/22/2020           After Visit Summary Signature Page    I have received my discharge instructions, and my questions have been answered. I have discussed any challenges I see with this plan with the nurse or doctor.    ..........................................................................................................................................  Patient/Patient Representative Signature      ..........................................................................................................................................  Patient Representative Print Name and Relationship to Patient    ..................................................               ................................................  Date                                   Time    ..........................................................................................................................................  Reviewed by Signature/Title    ...................................................              ..............................................  Date                                               Time          22EPIC Rev 08/18

## 2020-02-22 NOTE — DISCHARGE INSTRUCTIONS
Your blood work and EKG were reassuring.  You most likely have pleurisy.  Please see the attached handout.  Take Tylenol up to 1000 mg every 6 hours as needed for pain.  Follow-up with your primary care provider in 2-3 days if not improving.  Return to the emergency department if your symptoms worsen.

## 2020-02-22 NOTE — ED PROVIDER NOTES
[facility] ED Provider Note   Patient: Kristine Cosby  MRN #:  [id number]  Date of Visit: February 22, 2020    CC:     Chief Complaint   Patient presents with     Chest Pain     HPI:  Kristine Cosby is a 79 year old female who presented to the emergency department by EMS from the nursing home with complaints of acute pleuritic chest pain.  Is suspicious for dementia as the patient denied having any significant health problems and states that she is not on any pills although she has a long list of medications.  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  Patient does not have any associated cough, fever, chills.  She states that her chest pain is in the upper chest and is sharp and worsened by deep breathing.    Problem List:  Patient Active Problem List    Diagnosis Date Noted     Encephalopathy 05/14/2019     Priority: Medium     Urinary tract infection without hematuria, site unspecified 05/14/2019     Priority: Medium     Pneumonia due to infectious organism-possible 05/14/2019     Priority: Medium     Syncope, unspecified syncope type 04/23/2019     Priority: Medium     Syncope 04/23/2019     Priority: Medium     Confusion 04/15/2019     Priority: Medium     Generalized muscle weakness 03/06/2019     Priority: Medium     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 02/06/2019     Priority: Medium     Chronic systolic heart failure (H) 01/08/2019     Priority: Medium     Coronary atherosclerosis 01/08/2019     Priority: Medium     Dysphagia 01/08/2019     Priority: Medium     Generalized weakness 01/08/2019     Priority: Medium     History of stroke 01/08/2019     Priority: Medium     Acute metabolic  encephalopathy due to hypoglycemia 01/07/2019     Priority: Medium     Palliative care by specialist 01/04/2019     Priority: Medium     Hypoglycemia associated with type 2 diabetes mellitus (H) 12/27/2018     Priority: Medium     Acute CHF (congestive heart failure) (H) 12/22/2016     Priority: Medium     Anemia 12/22/2016     Priority: Medium     Persistent atrial fibrillation 02/24/2016     Priority: Medium     Overview:   2.  Chronic atrial fibrillation  A. On Toprol 200mg, daily.  B. Pradaxa discontinued due to GI bleed. On aspirin. Risk of bleeding currently outweighs risk of stroke. Will have close follow-up with GI.       Acquired hypothyroidism 02/01/2016     Priority: Medium     GERD (gastroesophageal reflux disease) 02/01/2016     Priority: Medium     HTN (hypertension) 02/01/2016     Priority: Medium     Hyperlipidemia LDL goal <100 02/01/2016     Priority: Medium     Type 2 diabetes mellitus without complication (H) 02/01/2016     Priority: Medium       Past Medical History:   Diagnosis Date     Cellulitis of left lower extremity      GERD (gastroesophageal reflux disease)      HTN (hypertension)      Type 2 diabetes mellitus without complication (H)      Ulcer of left lower extremity with fat layer exposed (H)        MEDS: acetaminophen (TYLENOL) 500 MG tablet  apixaban ANTICOAGULANT (ELIQUIS) 5 MG tablet  atorvastatin (LIPITOR) 20 MG tablet  bisacodyl (DULCOLAX) 10 MG suppository  cholecalciferol 1000 units TABS  diclofenac (VOLTAREN) 1 % topical gel  Furosemide (LASIX PO)  levothyroxine (SYNTHROID/LEVOTHROID) 88 MCG tablet  lisinopril (PRINIVIL/ZESTRIL) 10 MG tablet  melatonin 3 MG tablet  metoprolol succinate ER (TOPROL-XL) 50 MG 24 hr tablet  nitroGLYcerin (NITROSTAT) 0.4 MG sublingual tablet  nystatin (MYCOSTATIN) 182800 UNIT/GM external powder  omeprazole (PRILOSEC) 20 MG DR capsule  senna (SENOKOT) 8.6 MG tablet  senna-docusate (SENOKOT-S/PERICOLACE) 8.6-50 MG tablet  tamsulosin (FLOMAX) 0.4 MG  "capsule        ALLERGIES:    Allergies   Allergen Reactions     Blood Transfusion Related (Informational Only) Other (See Comments)     Patient has a history of a clinically significant antibody against RBC antigens.  A delay in compatible RBCs may occur.       Past Surgical History:   Procedure Laterality Date     ESOPHAGOGASTRODUODENOSCOPY       HYSTERECTOMY         Social History     Tobacco Use     Smoking status: Former Smoker     Last attempt to quit: 2019     Years since quittin.8     Smokeless tobacco: Former User     Tobacco comment: Pt states she quit \"over 30 years ago\"   Substance Use Topics     Alcohol use: No     Frequency: Never     Drug use: Not on file         Review of Systems   Except as noted in HPI, all other systems were reviewed and are negative    Physical Exam   Vitals were reviewed  Patient Vitals for the past 12 hrs:   BP Temp Temp src Heart Rate Resp SpO2   20 0009 (!) 151/116 97.7  F (36.5  C) Oral 100 20 96 %     GENERAL APPEARANCE: Moderate distress due to pleuritic chest pain.  Pain is worsened by deep breathing  FACE: normal facies  EYES: Pupils are equal  HENT: normal external exam  NECK: no adenopathy or asymmetry  RESP: Patient is taking some shallow breaths; there is bilateral expiratory wheezes  CV: Distant heart sounds; tachycardia  ABD: soft, no tenderness; no rebound or guarding; bowel sounds are normal  MS: no gross deformities noted; normal muscle tone.  EXT: No significant pitting edema  SKIN: no worrisome rash  NEURO: no facial droop; no focal deficits, speech is normal        Available Lab/Imaging Results     Results for orders placed or performed during the hospital encounter of 20 (from the past 24 hour(s))   CBC with platelets differential   Result Value Ref Range    WBC 6.5 4.0 - 11.0 10e9/L    RBC Count 3.85 3.8 - 5.2 10e12/L    Hemoglobin 11.8 11.7 - 15.7 g/dL    Hematocrit 36.8 35.0 - 47.0 %    MCV 96 78 - 100 fl    MCH 30.6 26.5 - 33.0 pg    " MCHC 32.1 31.5 - 36.5 g/dL    RDW 13.3 10.0 - 15.0 %    Platelet Count 209 150 - 450 10e9/L    Diff Method Automated Method     % Neutrophils 51.9 %    % Lymphocytes 32.6 %    % Monocytes 10.2 %    % Eosinophils 4.2 %    % Basophils 0.6 %    % Immature Granulocytes 0.5 %    Nucleated RBCs 0 0 /100    Absolute Neutrophil 3.4 1.6 - 8.3 10e9/L    Absolute Lymphocytes 2.1 0.8 - 5.3 10e9/L    Absolute Monocytes 0.7 0.0 - 1.3 10e9/L    Absolute Basophils 0.0 0.0 - 0.2 10e9/L    Abs Immature Granulocytes 0.0 0 - 0.4 10e9/L    Absolute Nucleated RBC 0.0    Troponin I   Result Value Ref Range    Troponin I ES 0.016 0.000 - 0.045 ug/L   Comprehensive metabolic panel   Result Value Ref Range    Sodium 142 133 - 144 mmol/L    Potassium 3.9 3.4 - 5.3 mmol/L    Chloride 110 (H) 94 - 109 mmol/L    Carbon Dioxide 24 20 - 32 mmol/L    Anion Gap 8 3 - 14 mmol/L    Glucose 133 (H) 70 - 99 mg/dL    Urea Nitrogen 20 7 - 30 mg/dL    Creatinine 1.02 0.52 - 1.04 mg/dL    GFR Estimate 52 (L) >60 mL/min/[1.73_m2]    GFR Estimate If Black 61 >60 mL/min/[1.73_m2]    Calcium 9.1 8.5 - 10.1 mg/dL    Bilirubin Total 0.4 0.2 - 1.3 mg/dL    Albumin 3.4 3.4 - 5.0 g/dL    Protein Total 7.2 6.8 - 8.8 g/dL    Alkaline Phosphatase 73 40 - 150 U/L    ALT 12 0 - 50 U/L    AST 14 0 - 45 U/L   Nt probnp inpatient   Result Value Ref Range    N-Terminal Pro BNP Inpatient 3,155 (H) 0 - 1,800 pg/mL   XR Chest 2 Views    Narrative    EXAM: CHEST 2 VIEWS  LOCATION: [facility]  DATE/TIME: 2/22/2020 1:06 AM    INDICATION: Acute pleuritic chest pain.    COMPARISON: None.    FINDINGS: The lungs are clear. Normal-sized cardiac silhouette. Moderate to large hiatal hernia.      Impression    IMPRESSION: No convincing evidence of active cardiopulmonary disease.        EKG reviewed by me: Atrial fibrillation with rapid ventricular rate of 103.  QT interval of 330 ms, QRS duration of 99 ms, normal axis of 18.  There is some nonspecific ST depression and  nonspecific T wave abnormality.  There are frequent multiform ventricular ectopic beats.  There is no acute ST-T wave changes.  Impression: Atrial fibrillation with slightly rapid ventricular rate.  Frequent PVCs but no acute ischemic changes.           Impression     Final diagnoses:   Pleuritic chest pain         ED Course & Medical Decision Making   Kristine Cosby is a 79 year old female who presented to the emergency department with acute onset of chest pain that was suspicious for pleurisy.  Pain came on abruptly and was associated with breathing.  The patient's medical records indicate a history of chronic systolic heart failure, persistent atrial fibrillation, acute metabolic encephalopathy, and home health disorders including psychosis and delusions.  Patient was seen shortly after arrival.  Vital signs reveal a blood pressure of 151/116,  temp of 97.7, heart rate of 100, respiratory rate of 20.  The patient's work-up reveals a normal CBC, troponin enzyme of 0.016, essentially normal comprehensive metabolic panel with slight decrease in her GFR and nonfasting glucose of 133.  Her BMP was 3155.  Chest x-ray was reviewed by me and reveals mild cardiomegaly, and large hiatal hernia.  There is no signs of pulmonary edema.  Her EKG revealed atrial fibrillation but no acute ischemic changes.  Patient received Dilaudid as an anti-inflammatory medication was contraindicated given that she is on Eliquis.  Patient reported much improvement in her pain about an hour later.  She was able to sleep.  Her headache and chest pain have resolved.  Patient is stable for discharge back to the nursing home.  Continue Tylenol as needed for pain.  Follow-up with her primary care provider in 2-3 days if she is still having some intermittent symptoms.  Return to the ED at anytime with new or worsening symptoms.           Written after-visit summary and instructions were given at the time of discharge.    Follow up Plan:    Jannie Cruz, APRN CNP  [address] [address]  [phone]    In 3 days  if not improving    [facility] Emergency Department  [address] [place] [address] [phone]    If symptoms worsen         Disclaimer: This note consists of words and symbols derived from keyboarding and dictation using voice recognition software.  As a result, there may be errors that have gone undetected.  Please consider this when interpreting information found in this note.       Sharif Cabrera MD  02/22/20 0226

## 2020-02-22 NOTE — ED TRIAGE NOTES
Patient came from nursing home. Patient having chest pain and has given 4 nitros and 325 aspirin. Sharp chest pain.

## 2020-02-24 ENCOUNTER — TELEPHONE (OUTPATIENT)
Dept: GERIATRICS | Facility: CLINIC | Age: 80
End: 2020-02-24

## 2020-02-24 PROBLEM — R07.1 CHEST PAIN ON BREATHING: Status: ACTIVE | Noted: 2020-02-24

## 2020-02-25 ENCOUNTER — NURSING HOME VISIT (OUTPATIENT)
Dept: GERIATRICS | Facility: CLINIC | Age: 80
End: 2020-02-25
Payer: COMMERCIAL

## 2020-02-25 VITALS
TEMPERATURE: 97.3 F | HEART RATE: 53 BPM | BODY MASS INDEX: 33.14 KG/M2 | HEIGHT: 66 IN | RESPIRATION RATE: 16 BRPM | WEIGHT: 206.2 LBS | OXYGEN SATURATION: 95 % | DIASTOLIC BLOOD PRESSURE: 62 MMHG | SYSTOLIC BLOOD PRESSURE: 134 MMHG

## 2020-02-25 DIAGNOSIS — F06.2 PSYCHOTIC DISORDER DUE TO MEDICAL CONDITION WITH DELUSIONS: ICD-10-CM

## 2020-02-25 DIAGNOSIS — I48.20 CHRONIC ATRIAL FIBRILLATION (H): ICD-10-CM

## 2020-02-25 DIAGNOSIS — R07.1 CHEST PAIN ON BREATHING: Primary | ICD-10-CM

## 2020-02-25 DIAGNOSIS — I25.2 HISTORY OF ST ELEVATION MYOCARDIAL INFARCTION (STEMI): ICD-10-CM

## 2020-02-25 DIAGNOSIS — F03.90 DEMENTIA WITHOUT BEHAVIORAL DISTURBANCE, UNSPECIFIED DEMENTIA TYPE: ICD-10-CM

## 2020-02-25 DIAGNOSIS — I10 ESSENTIAL HYPERTENSION: ICD-10-CM

## 2020-02-25 DIAGNOSIS — Z87.09 HX OF ASPIRATION PNEUMONITIS: ICD-10-CM

## 2020-02-25 DIAGNOSIS — E78.5 HYPERLIPIDEMIA LDL GOAL <100: ICD-10-CM

## 2020-02-25 DIAGNOSIS — I50.22 CHRONIC SYSTOLIC HEART FAILURE (H): ICD-10-CM

## 2020-02-25 DIAGNOSIS — I25.10 ATHEROSCLEROSIS OF NATIVE CORONARY ARTERY OF NATIVE HEART WITHOUT ANGINA PECTORIS: ICD-10-CM

## 2020-02-25 PROCEDURE — 99309 SBSQ NF CARE MODERATE MDM 30: CPT | Performed by: NURSE PRACTITIONER

## 2020-02-25 RX ORDER — CALCIUM CARBONATE 500 MG/1
2 TABLET, CHEWABLE ORAL EVERY 4 HOURS PRN
COMMUNITY
End: 2020-12-01

## 2020-02-25 ASSESSMENT — MIFFLIN-ST. JEOR: SCORE: 1432.07

## 2020-02-25 NOTE — PROGRESS NOTES
"[facility]  [place] Medical Record Number:  [id number]  Place of Service where encounter took place:  [facility] ([facility] [[id number]]  Chief Complaint   Patient presents with     ER F/U       HPI:    Kristine Cosby  is a 79 year old (1940), who is being seen today for an episodic care visit.  HPI information obtained from: facility chart records, facility staff, patient report and [facility] chart review. 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  SHe was seen in the ED on 20 for CP that was thought to be 2/2 pleurisy as it occurred most when she was breathing.  She has History of CHF, a fib on anticoagulation, HTN, History STEMI, CAD, dementia, aspiration pneumonitis.  Labs were essential unremakrable, AVSS, trop neg, and CHEST XRAY unremarkable for pulmonary edema.  EKG revealed a fib but no acute changes.    She was given PRN dilaudid as anti-inflammatories are contraindicated with her anticoagulation.  She was stabilized and returned to the SNF.    She had another episode on 20 and PRN Tylenol was ordered.      Nursing also reports patient had more episodes of her \"baby talk\" and was talking to her son \"Missael\" who is .      Today she is met in her SNF room where she reports CP that \"comes and goes,\" is worse when breathing and does not radiate.  She endorses more heartburn recently.  She denies HA, lightheadedness, coughing, sore throat, fever/chills.      BPs recently have been " 130-140s/60-70s  HRs 60-90s, irregular today  She has no LE edema.   Weights have been stable around 206 lbs.      Past Medical and Surgical History reviewed in Epic today.    MEDICATIONS:    Current Outpatient Medications   Medication Sig Dispense Refill     acetaminophen (TYLENOL) 500 MG tablet Take 1,000 mg by mouth every morning Also BID PRN       apixaban ANTICOAGULANT (ELIQUIS) 5 MG tablet Take 1 tablet (5 mg) by mouth 2 times daily       atorvastatin (LIPITOR) 20 MG tablet Take 20 mg by mouth At Bedtime       calcium carbonate (TUMS) 500 MG chewable tablet Take 2 chew tab by mouth every 4 hours as needed for heartburn       cholecalciferol 1000 units TABS Take 2,000 Units by mouth daily       diclofenac (VOLTAREN) 1 % topical gel Apply 2 g topically daily AND BID PRN - Apply to right hand       Furosemide (LASIX PO) Take 10 mg by mouth daily       levothyroxine (SYNTHROID/LEVOTHROID) 88 MCG tablet Take 88 mcg by mouth daily       lisinopril (PRINIVIL/ZESTRIL) 10 MG tablet Take 1 tablet (10 mg) by mouth daily 30 tablet 11     metoprolol succinate ER (TOPROL-XL) 50 MG 24 hr tablet Take 50 mg by mouth daily       nitroGLYcerin (NITROSTAT) 0.4 MG sublingual tablet Place 0.4 mg under the tongue every 5 minutes as needed for chest pain For chest pain place 1 tablet under the tongue every 5 minutes for 3 doses. If symptoms persist 5 minutes after 1st dose call 911.       nystatin (MYCOSTATIN) 928797 UNIT/GM external powder Apply topically 2 times daily as needed       omeprazole (PRILOSEC) 20 MG DR capsule Take 20 mg by mouth daily       senna (SENOKOT) 8.6 MG tablet Take 2 tablets by mouth daily as needed for constipation        senna-docusate (SENOKOT-S/PERICOLACE) 8.6-50 MG tablet Take 2 tablets by mouth daily        tamsulosin (FLOMAX) 0.4 MG capsule Take 0.8 mg by mouth At Bedtime        REVIEW OF SYSTEMS:  Limited secondary to cognitive impairment but today pt reports 10 point ROS of systems including  "Constitutional, Eyes, Respiratory, Cardiovascular, Gastroenterology, Genitourinary, Integumentary, Musculoskeletal, Psychiatric were all negative except for pertinent positives noted in my HPI.    Objective:  /62   Pulse 53   Temp 97.3  F (36.3  C)   Resp 16   Ht 1.676 m (5' 6\")   Wt 93.5 kg (206 lb 3.2 oz)   SpO2 95%   BMI 33.28 kg/m    Exam:  GENERAL APPEARANCE:  Alert, in no distress, confused per baseline  RESP:  respiratory effort and palpation of chest normal, auscultation of lungs clear , no respiratory distress  CV:  Palpation and auscultation of heart done , rate and rhythm irregular, no murmur, no LE peripheral edema  ABDOMEN:  normal bowel sounds, soft, nontender, no hepatosplenomegaly or other masses  M/S:   Gait and station with w/c for mobility, Digits and nails with arthritic changes, reduced muscle mass  SKIN:  Inspection and Palpation of skin and subcutaneous tissue pale, intact, no rashes present  PSYCH:  insight and judgement, memory with impairment at baseline , affect and mood normal, follows commands readily     Labs:   CBC RESULTS:   Recent Labs   Lab Test 02/22/20  0030 02/19/20  0748  05/16/19  0556   WBC 6.5  --   --  4.4   RBC 3.85  --   --  3.57*   HGB 11.8 11.7   < > 11.1*   HCT 36.8  --   --  34.9*   MCV 96  --   --  98   MCH 30.6  --   --  31.1   MCHC 32.1  --   --  31.8   RDW 13.3  --   --  12.9     --   --  140*    < > = values in this interval not displayed.       Last Basic Metabolic Panel:  Recent Labs   Lab Test 02/22/20  0030 02/19/20  0748    141   POTASSIUM 3.9 3.8   CHLORIDE 110* 109   MIKE 9.1 8.9   CO2 24 25   BUN 20 20   CR 1.02 0.98   * 108*       Liver Function Studies -   Recent Labs   Lab Test 02/22/20  0030 05/13/19  2215   PROTTOTAL 7.2 7.0   ALBUMIN 3.4 3.1*   BILITOTAL 0.4 0.5   ALKPHOS 73 80   AST 14 13   ALT 12 11       TSH   Date Value Ref Range Status   02/19/2020 2.25 0.40 - 4.00 mU/L Final   10/04/2019 4.73 (H) 0.40 - 4.00 mU/L " Final   ]    Lab Results   Component Value Date    A1C 6.2 02/19/2020    A1C 6.7 04/15/2019           ASSESSMENT/PLAN:     Chest pain on breathing  Chronic systolic heart failure (H)  Chronic atrial fibrillation  Essential hypertension  Hyperlipidemia LDL goal <100  History of ST elevation myocardial infarction (STEMI)  Atherosclerosis of native coronary artery of native heart without angina pectoris  Dementia without behavioral disturbance, unspecified dementia type (H)  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   Work-up in ED neg for infectious or metabolic etiology for increased psychotic episodes or reasons for CP.  CP seemingly not cardiac in origin, thought to be pleurisy and I agree as it seems to come/go with breathing, angel snot radiate.  She does not exhibit signs of URI at this time; possibly viral pleurisy.  Differential also includes GERD - can increase current Omeprazole 20 mg daily --> BID x 7 days and monitor for more CP.  She does have Tylenol scheduled and PRN. Will monitor usage. AVSS.     Orders written by provider at facility and transcribed by : Sherlyn Faulkner MA  1.  Increase Omeprazole to 20 mg po BID x 7 days, then return to 20 mg po every day.  Dx: GERD    Electronically signed by:  NATIVIDAD Chapa CNP

## 2020-02-25 NOTE — TELEPHONE ENCOUNTER
Patient is reporting intermittent chest pain. She was seen in the ED on 2/21/20 for this and it was determined to be pleurisy. She has had one nitroglycerin as well as tums with no relief. Vitals are all WNL and she does not appear to be in distress.     PLAN:  Asked staff to reassure her that it is not cardiac  Add acetaminophen 1000mg BID prn (not to be given within 4 hours of scheduled morning dose)

## 2020-02-25 NOTE — LETTER
"    2020        RE: Kristine Cosby  [facility]  [address] [facility] [address]        [place] [facility]  [place] Medical Record Number:  [id number]  Place of Service where encounter took place:  [facility] [[id number]]  Chief Complaint   Patient presents with     ER F/U       HPI:    Kristine Cosby  is a 79 year old (1940), who is being seen today for an episodic care visit.  HPI information obtained from: facility chart records, facility staff, patient report and [facility] chart review. 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  SHe was seen in the ED on 20 for CP that was thought to be 2/2 pleurisy as it occurred most when she was breathing.  She has History of CHF, a fib on anticoagulation, HTN, History STEMI, CAD, dementia, aspiration pneumonitis.  Labs were essential unremakrable, AVSS, trop neg, and CHEST XRAY unremarkable for pulmonary edema.  EKG revealed a fib but no acute changes.    She was given PRN dilaudid as anti-inflammatories are contraindicated with her anticoagulation.  She was stabilized and returned to the SNF.    She had another episode on 20 and PRN Tylenol was ordered.      Nursing also reports patient had more episodes of her \"baby talk\" and was talking to her son \"Missael\" who is .      Today she is met in her SNF room where she reports CP that \"comes and goes,\" is worse when breathing and does not radiate.  She endorses more heartburn recently.  " She denies HA, lightheadedness, coughing, sore throat, fever/chills.      BPs recently have been 130-140s/60-70s  HRs 60-90s, irregular today  She has no LE edema.   Weights have been stable around 206 lbs.      Past Medical and Surgical History reviewed in Epic today.    MEDICATIONS:    Current Outpatient Medications   Medication Sig Dispense Refill     acetaminophen (TYLENOL) 500 MG tablet Take 1,000 mg by mouth every morning Also BID PRN       apixaban ANTICOAGULANT (ELIQUIS) 5 MG tablet Take 1 tablet (5 mg) by mouth 2 times daily       atorvastatin (LIPITOR) 20 MG tablet Take 20 mg by mouth At Bedtime       calcium carbonate (TUMS) 500 MG chewable tablet Take 2 chew tab by mouth every 4 hours as needed for heartburn       cholecalciferol 1000 units TABS Take 2,000 Units by mouth daily       diclofenac (VOLTAREN) 1 % topical gel Apply 2 g topically daily AND BID PRN - Apply to right hand       Furosemide (LASIX PO) Take 10 mg by mouth daily       levothyroxine (SYNTHROID/LEVOTHROID) 88 MCG tablet Take 88 mcg by mouth daily       lisinopril (PRINIVIL/ZESTRIL) 10 MG tablet Take 1 tablet (10 mg) by mouth daily 30 tablet 11     metoprolol succinate ER (TOPROL-XL) 50 MG 24 hr tablet Take 50 mg by mouth daily       nitroGLYcerin (NITROSTAT) 0.4 MG sublingual tablet Place 0.4 mg under the tongue every 5 minutes as needed for chest pain For chest pain place 1 tablet under the tongue every 5 minutes for 3 doses. If symptoms persist 5 minutes after 1st dose call 911.       nystatin (MYCOSTATIN) 352951 UNIT/GM external powder Apply topically 2 times daily as needed       omeprazole (PRILOSEC) 20 MG DR capsule Take 20 mg by mouth daily       senna (SENOKOT) 8.6 MG tablet Take 2 tablets by mouth daily as needed for constipation        senna-docusate (SENOKOT-S/PERICOLACE) 8.6-50 MG tablet Take 2 tablets by mouth daily        tamsulosin (FLOMAX) 0.4 MG capsule Take 0.8 mg by mouth At Bedtime        REVIEW OF SYSTEMS:  Limited  "secondary to cognitive impairment but today pt reports 10 point ROS of systems including Constitutional, Eyes, Respiratory, Cardiovascular, Gastroenterology, Genitourinary, Integumentary, Musculoskeletal, Psychiatric were all negative except for pertinent positives noted in my HPI.    Objective:  /62   Pulse 53   Temp 97.3  F (36.3  C)   Resp 16   Ht 1.676 m (5' 6\")   Wt 93.5 kg (206 lb 3.2 oz)   SpO2 95%   BMI 33.28 kg/m     Exam:  GENERAL APPEARANCE:  Alert, in no distress, confused per baseline  RESP:  respiratory effort and palpation of chest normal, auscultation of lungs clear , no respiratory distress  CV:  Palpation and auscultation of heart done , rate and rhythm irregular, no murmur, no LE peripheral edema  ABDOMEN:  normal bowel sounds, soft, nontender, no hepatosplenomegaly or other masses  M/S:   Gait and station with w/c for mobility, Digits and nails with arthritic changes, reduced muscle mass  SKIN:  Inspection and Palpation of skin and subcutaneous tissue pale, intact, no rashes present  PSYCH:  insight and judgement, memory with impairment at baseline , affect and mood normal, follows commands readily     Labs:   CBC RESULTS:   Recent Labs   Lab Test 02/22/20 0030 02/19/20  0748  05/16/19  0556   WBC 6.5  --   --  4.4   RBC 3.85  --   --  3.57*   HGB 11.8 11.7   < > 11.1*   HCT 36.8  --   --  34.9*   MCV 96  --   --  98   MCH 30.6  --   --  31.1   MCHC 32.1  --   --  31.8   RDW 13.3  --   --  12.9     --   --  140*    < > = values in this interval not displayed.       Last Basic Metabolic Panel:  Recent Labs   Lab Test 02/22/20 0030 02/19/20  0748    141   POTASSIUM 3.9 3.8   CHLORIDE 110* 109   MIKE 9.1 8.9   CO2 24 25   BUN 20 20   CR 1.02 0.98   * 108*       Liver Function Studies -   Recent Labs   Lab Test 02/22/20 0030 05/13/19  2215   PROTTOTAL 7.2 7.0   ALBUMIN 3.4 3.1*   BILITOTAL 0.4 0.5   ALKPHOS 73 80   AST 14 13   ALT 12 11       TSH   Date Value Ref " Range Status   02/19/2020 2.25 0.40 - 4.00 mU/L Final   10/04/2019 4.73 (H) 0.40 - 4.00 mU/L Final   ]    Lab Results   Component Value Date    A1C 6.2 02/19/2020    A1C 6.7 04/15/2019           ASSESSMENT/PLAN:     Chest pain on breathing  Chronic systolic heart failure (H)  Chronic atrial fibrillation  Essential hypertension  Hyperlipidemia LDL goal <100  History of ST elevation myocardial infarction (STEMI)  Atherosclerosis of native coronary artery of native heart without angina pectoris  Dementia without behavioral disturbance, unspecified dementia type (H)  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   Work-up in ED neg for infectious or metabolic etiology for increased psychotic episodes or reasons for CP.  CP seemingly not cardiac in origin, thought to be pleurisy and I agree as it seems to come/go with breathing, angel snot radiate.  She does not exhibit signs of URI at this time; possibly viral pleurisy.  Differential also includes GERD - can increase current Omeprazole 20 mg daily --> BID x 7 days and monitor for more CP.  She does have Tylenol scheduled and PRN. Will monitor usage. AVSS.     Orders written by provider at facility and transcribed by : Sherlyn Faulkner MA  1.  Increase Omeprazole to 20 mg po BID x 7 days, then return to 20 mg po every day.  Dx: GERD    Electronically signed by:  NATIVIDAD Chapa CNP               Sincerely,        NATIVIDAD Chapa CNP

## 2020-02-26 ENCOUNTER — AMBULATORY - HEALTHEAST (OUTPATIENT)
Dept: OTHER | Facility: CLINIC | Age: 80
End: 2020-02-26

## 2020-02-26 ENCOUNTER — DOCUMENTATION ONLY (OUTPATIENT)
Dept: OTHER | Facility: CLINIC | Age: 80
End: 2020-02-26

## 2020-02-26 ENCOUNTER — HOSPITAL LABORATORY (OUTPATIENT)
Dept: NURSING HOME | Facility: OTHER | Age: 80
End: 2020-02-26

## 2020-02-27 LAB
ALBUMIN UR-MCNC: 30 MG/DL
APPEARANCE UR: ABNORMAL
BACTERIA #/AREA URNS HPF: ABNORMAL /HPF
BILIRUB UR QL STRIP: NEGATIVE
COLOR UR AUTO: YELLOW
GLUCOSE UR STRIP-MCNC: NEGATIVE MG/DL
HGB UR QL STRIP: ABNORMAL
HYALINE CASTS #/AREA URNS LPF: 1 /LPF (ref 0–2)
KETONES UR STRIP-MCNC: NEGATIVE MG/DL
LEUKOCYTE ESTERASE UR QL STRIP: ABNORMAL
MUCOUS THREADS #/AREA URNS LPF: PRESENT /LPF
NITRATE UR QL: POSITIVE
PH UR STRIP: 6 PH (ref 5–7)
RBC #/AREA URNS AUTO: 8 /HPF (ref 0–2)
SOURCE: ABNORMAL
SP GR UR STRIP: 1.02 (ref 1–1.03)
SQUAMOUS #/AREA URNS AUTO: <1 /HPF (ref 0–1)
UROBILINOGEN UR STRIP-MCNC: 0 MG/DL (ref 0–2)
WBC #/AREA URNS AUTO: 173 /HPF (ref 0–5)

## 2020-02-29 LAB
BACTERIA SPEC CULT: ABNORMAL
Lab: ABNORMAL
SPECIMEN SOURCE: ABNORMAL

## 2020-03-01 ENCOUNTER — TELEPHONE (OUTPATIENT)
Dept: GERIATRICS | Facility: CLINIC | Age: 80
End: 2020-03-01

## 2020-03-01 NOTE — TELEPHONE ENCOUNTER
Estimated Creatinine Clearance: 52.4 mL/min (based on SCr of 1.02 mg/dL).  UC positive with three different organisms all sensitive to bactrim.     PLAN  Bactrim DS 1 tab BID x 7 days    Electronically signed by NATIVIDAD Garcia, GNP

## 2020-03-02 ENCOUNTER — CLINICAL UPDATE (OUTPATIENT)
Dept: PHARMACY | Facility: CLINIC | Age: 80
End: 2020-03-02
Payer: MEDICAID

## 2020-03-02 DIAGNOSIS — I48.19 PERSISTENT ATRIAL FIBRILLATION (H): ICD-10-CM

## 2020-03-02 DIAGNOSIS — I25.10 ATHEROSCLEROSIS OF NATIVE CORONARY ARTERY OF NATIVE HEART WITHOUT ANGINA PECTORIS: ICD-10-CM

## 2020-03-02 DIAGNOSIS — K21.9 GASTROESOPHAGEAL REFLUX DISEASE, ESOPHAGITIS PRESENCE NOT SPECIFIED: ICD-10-CM

## 2020-03-02 DIAGNOSIS — E78.5 HYPERLIPIDEMIA LDL GOAL <100: ICD-10-CM

## 2020-03-02 DIAGNOSIS — E03.9 ACQUIRED HYPOTHYROIDISM: ICD-10-CM

## 2020-03-02 DIAGNOSIS — I50.22 CHRONIC SYSTOLIC HEART FAILURE (H): ICD-10-CM

## 2020-03-02 DIAGNOSIS — I10 ESSENTIAL HYPERTENSION: ICD-10-CM

## 2020-03-02 DIAGNOSIS — Z86.73 HISTORY OF STROKE: ICD-10-CM

## 2020-03-02 DIAGNOSIS — R33.9 URINARY RETENTION: Primary | ICD-10-CM

## 2020-03-02 PROCEDURE — 99207 ZZC NO CHARGE LOS: CPT | Performed by: PHARMACIST

## 2020-03-02 NOTE — PROGRESS NOTES
This patient's medication list and chart were reviewed as part of the service provided by [facility].    Assessment/Recommendations:  1. (Urinary retention):  Appears pt had been started on Tamsulosin due to h/o urinary retention requiring intermittent straight cath.  Noted on 6/12/19, order written to d'c Tamsulosin based on neurology's recommendation, however, Epic med list indicates pt still taking Tamsulsoin, but I do not see an order to restart this.  Unclear to me if this was inadvertently restarted.  Please verify, d'c if it was indeed inadvertenly restarted with monitoring, and reconcile med list if necessary.        Tanya Villafuerte, Pharm.D.,[facility]  Board Certified Geriatric Pharmacist  Medication Therapy Management Pharmacist  [phone]

## 2020-03-31 ENCOUNTER — DOCUMENTATION ONLY (OUTPATIENT)
Dept: OTHER | Facility: CLINIC | Age: 80
End: 2020-03-31

## 2020-03-31 ENCOUNTER — AMBULATORY - HEALTHEAST (OUTPATIENT)
Dept: OTHER | Facility: CLINIC | Age: 80
End: 2020-03-31

## 2020-04-06 VITALS
HEART RATE: 50 BPM | BODY MASS INDEX: 30.76 KG/M2 | DIASTOLIC BLOOD PRESSURE: 80 MMHG | HEIGHT: 66 IN | TEMPERATURE: 96 F | WEIGHT: 191.4 LBS | OXYGEN SATURATION: 90 % | RESPIRATION RATE: 16 BRPM | SYSTOLIC BLOOD PRESSURE: 152 MMHG

## 2020-04-06 ASSESSMENT — MIFFLIN-ST. JEOR: SCORE: 1364.93

## 2020-04-06 NOTE — PROGRESS NOTES
"[place] [facility] Regulatory   Kristine Cosby is being evaluated via a billable video visit due to the restrictions of the Covid-19 pandemic.   The patient has been notified of following:  \"This video visit will be conducted via a call between you and your provider. We have found that certain health care needs can be provided without the need for an in-person physical exam.  This service lets us provide the care you need with a video conversation. If during the course of the call the provider feels a video visit is not appropriate, you will not be charged for this service.\"   The provider has received verbal consent for a Video Visit from the patient or first contact? Yes  Patient  or facility staff would like the video invitation sent by: N/A   Video Start Time: 14:48  [place] Medical Record Number:  [id number]  Place of Location at the time of visit: [facility]      Chief Complaint   Patient presents with     [facility] Regulatory     HPI:  Kristine Cosby  is a 79 year old (1940), who is being seen today for an E-REG visit.  HPI information obtained from: facility staff, patient report and [facility] chart review.     Today's concern is:  -  - Resident seen and examined.   - Reports doing fine.   - Denies CP, SOB, edema.   - Reports sleep, appetite and BM are fine.   - RN reports  Cognition is regression.   - GNP has no concern  --------------------------------  - - Past Medical, social, family histories, medications, and allergies reviewed and updated  - Medications reviewed: in the chart and EHR.   - Case Management:   I have reviewed the care plan and MDS and do agree with the plan. Patient's desire to return to the community is not present.  Information reviewed:  Medications, vital signs, orders, and nursing notes.    MEDICATIONS:    Current Outpatient Medications   Medication Sig Dispense Refill     acetaminophen (TYLENOL) 500 MG tablet Take 1,000 mg by mouth every morning " "Also BID PRN       apixaban ANTICOAGULANT (ELIQUIS) 5 MG tablet Take 1 tablet (5 mg) by mouth 2 times daily       atorvastatin (LIPITOR) 20 MG tablet Take 20 mg by mouth At Bedtime       calcium carbonate (TUMS) 500 MG chewable tablet Take 2 chew tab by mouth every 4 hours as needed for heartburn       cholecalciferol 1000 units TABS Take 2,000 Units by mouth daily       diclofenac (VOLTAREN) 1 % topical gel Apply 2 g topically daily AND BID PRN - Apply to right hand       Furosemide (LASIX PO) Take 10 mg by mouth daily       levothyroxine (SYNTHROID/LEVOTHROID) 88 MCG tablet Take 88 mcg by mouth daily       lisinopril (PRINIVIL/ZESTRIL) 10 MG tablet Take 1 tablet (10 mg) by mouth daily 30 tablet 11     metoprolol succinate ER (TOPROL-XL) 50 MG 24 hr tablet Take 50 mg by mouth daily       nitroGLYcerin (NITROSTAT) 0.4 MG sublingual tablet Place 0.4 mg under the tongue every 5 minutes as needed for chest pain For chest pain place 1 tablet under the tongue every 5 minutes for 3 doses. If symptoms persist 5 minutes after 1st dose call 911.       nystatin (MYCOSTATIN) 411232 UNIT/GM external powder Apply topically 2 times daily as needed       omeprazole (PRILOSEC) 20 MG DR capsule Take 20 mg by mouth daily       senna (SENOKOT) 8.6 MG tablet Take 2 tablets by mouth daily as needed for constipation        senna-docusate (SENOKOT-S/PERICOLACE) 8.6-50 MG tablet Take 2 tablets by mouth daily        tamsulosin (FLOMAX) 0.4 MG capsule Take 0.8 mg by mouth At Bedtime        REVIEW OF SYSTEMS: Limited secondary to cognitive impairment but today pt reports  Objective: BP (!) 152/80   Pulse 50   Temp 96  F (35.6  C)   Resp 16   Ht 1.676 m (5' 6\")   Wt 86.8 kg (191 lb 6.4 oz)   SpO2 90%   BMI 30.89 kg/m      Limited visit exam done given COVID-19 precautions.   Exam:   GENERAL APPEARANCE:  sitting in WC, no acute distress.   RESP:  unlabored breathing  M/S:   no joint deformity noted on observation.   SKIN:  Copper " discoloration legs  NEURO:  no NFD appreciated on observation.   PSYCH: AAOx person only, mood and affect appropriate.      Labs: Reviewed in the chart and EHR.      ASSESSMENT/PLAN:  -----------------------------  Dementia, likely vascular without behavioral disturbance, (H)  History of stroke, evident on brain imaging.  - Continue to anticipate needs. Chronic condition, ongoing decline expected.   -  Continue to provide redirection and reassurance as needed. Maintain safe living situation with goals focused on comfort.  - at baseline,    Chronic systolic heart failure (H) EF 40%  Chronic atrial fibrillation;  Hx of Frequent PVC  Essential hypertension  Hyperlipidemia LDL goal <100  - compensated. BP in general w/i acceptable range  - CVR. On metoprolol for rate control. On Eliquis    Diet controlled diabetes Mellitus (H):    A1C 6.2 02/19/2020    A1C 6.7 04/15/2019       CKD, GFR 30-59 ml/min (H):  Avoid nephrotoxic drugs. Renal dose the medications.     Dysphagia:  Hx of GIB (erosive gastritis, hiatal hernia):  - on omeprazole    Frailty: Significant  Deficits requiring NH placement. Requiring extensive assistance from nursing. Up for meals only o/w spends the day resting in bed    Order: See above, otherwise, continue the rest of the current POC.     Electronically signed by:  Parris Navarro MD     Video-Visit Details  Type of service:  Video Visit  Video End Time (time video stopped): 14:53  Distant Location (provider location):  [facility]

## 2020-04-07 ENCOUNTER — VIRTUAL VISIT (OUTPATIENT)
Dept: GERIATRICS | Facility: CLINIC | Age: 80
End: 2020-04-07
Payer: COMMERCIAL

## 2020-04-07 DIAGNOSIS — I48.20 CHRONIC ATRIAL FIBRILLATION (H): ICD-10-CM

## 2020-04-07 DIAGNOSIS — E11.9 DIET-CONTROLLED DIABETES MELLITUS (H): ICD-10-CM

## 2020-04-07 DIAGNOSIS — I10 ESSENTIAL HYPERTENSION: ICD-10-CM

## 2020-04-07 DIAGNOSIS — F01.518 VASCULAR DEMENTIA WITH BEHAVIOR DISTURBANCE (H): Primary | ICD-10-CM

## 2020-04-07 DIAGNOSIS — R13.10 DYSPHAGIA, UNSPECIFIED TYPE: ICD-10-CM

## 2020-04-07 DIAGNOSIS — I50.22 CHRONIC SYSTOLIC HEART FAILURE (H): ICD-10-CM

## 2020-04-07 DIAGNOSIS — N18.30 CKD (CHRONIC KIDNEY DISEASE) STAGE 3, GFR 30-59 ML/MIN (H): ICD-10-CM

## 2020-04-07 DIAGNOSIS — Z86.73 HISTORY OF STROKE: ICD-10-CM

## 2020-04-07 PROCEDURE — 99309 SBSQ NF CARE MODERATE MDM 30: CPT | Mod: GT | Performed by: FAMILY MEDICINE

## 2020-04-07 NOTE — LETTER
"    4/7/2020        RE: Kristine Cosby  [facility]  [address] [facility] [address]        [place] [facility]   Kristine Cosby is being evaluated via a billable video visit due to the restrictions of the Covid-19 pandemic.   The patient has been notified of following:  \"This video visit will be conducted via a call between you and your provider. We have found that certain health care needs can be provided without the need for an in-person physical exam.  This service lets us provide the care you need with a video conversation. If during the course of the call the provider feels a video visit is not appropriate, you will not be charged for this service.\"   The provider has received verbal consent for a Video Visit from the patient or first contact? Yes  Patient  or facility staff would like the video invitation sent by: N/A   Video Start Time: 14:48  [place] Medical Record Number:  [id number]  Place of Location at the time of visit: [facility]      Chief Complaint   Patient presents with     retirement Regulatory     HPI:  Kristine Cosby  is a 79 year old (1940), who is being seen today for an E-REG visit.  HPI information obtained from: facility staff, patient report and [facility] chart review.     Today's concern is:  -  - Resident seen and examined.   - Reports doing fine.   - Denies CP, SOB, edema.   - Reports sleep, appetite and BM are fine.   - RN reports  Cognition is regression.   - GNP has no concern  --------------------------------  - - Past Medical, social, family histories, medications, and allergies reviewed and updated  - Medications reviewed: in the chart and EHR.   - Case Management:   I have reviewed the care plan and MDS and do agree with the plan. Patient's desire to return to the community is not present.  Information reviewed:  Medications, vital signs, orders, and nursing notes.    MEDICATIONS:    Current Outpatient Medications " "  Medication Sig Dispense Refill     acetaminophen (TYLENOL) 500 MG tablet Take 1,000 mg by mouth every morning Also BID PRN       apixaban ANTICOAGULANT (ELIQUIS) 5 MG tablet Take 1 tablet (5 mg) by mouth 2 times daily       atorvastatin (LIPITOR) 20 MG tablet Take 20 mg by mouth At Bedtime       calcium carbonate (TUMS) 500 MG chewable tablet Take 2 chew tab by mouth every 4 hours as needed for heartburn       cholecalciferol 1000 units TABS Take 2,000 Units by mouth daily       diclofenac (VOLTAREN) 1 % topical gel Apply 2 g topically daily AND BID PRN - Apply to right hand       Furosemide (LASIX PO) Take 10 mg by mouth daily       levothyroxine (SYNTHROID/LEVOTHROID) 88 MCG tablet Take 88 mcg by mouth daily       lisinopril (PRINIVIL/ZESTRIL) 10 MG tablet Take 1 tablet (10 mg) by mouth daily 30 tablet 11     metoprolol succinate ER (TOPROL-XL) 50 MG 24 hr tablet Take 50 mg by mouth daily       nitroGLYcerin (NITROSTAT) 0.4 MG sublingual tablet Place 0.4 mg under the tongue every 5 minutes as needed for chest pain For chest pain place 1 tablet under the tongue every 5 minutes for 3 doses. If symptoms persist 5 minutes after 1st dose call 911.       nystatin (MYCOSTATIN) 128582 UNIT/GM external powder Apply topically 2 times daily as needed       omeprazole (PRILOSEC) 20 MG DR capsule Take 20 mg by mouth daily       senna (SENOKOT) 8.6 MG tablet Take 2 tablets by mouth daily as needed for constipation        senna-docusate (SENOKOT-S/PERICOLACE) 8.6-50 MG tablet Take 2 tablets by mouth daily        tamsulosin (FLOMAX) 0.4 MG capsule Take 0.8 mg by mouth At Bedtime        REVIEW OF SYSTEMS: Limited secondary to cognitive impairment but today pt reports  Objective: BP (!) 152/80   Pulse 50   Temp 96  F (35.6  C)   Resp 16   Ht 1.676 m (5' 6\")   Wt 86.8 kg (191 lb 6.4 oz)   SpO2 90%   BMI 30.89 kg/m      Limited visit exam done given COVID-19 precautions.   Exam:   GENERAL APPEARANCE:  sitting in WC, no acute " distress.   RESP:  unlabored breathing  M/S:   no joint deformity noted on observation.   SKIN:  Copper discoloration legs  NEURO:  no NFD appreciated on observation.   PSYCH: AAOx person only, mood and affect appropriate.      Labs: Reviewed in the chart and EHR.      ASSESSMENT/PLAN:  -----------------------------  Dementia, likely vascular without behavioral disturbance, (H)  History of stroke, evident on brain imaging.  - Continue to anticipate needs. Chronic condition, ongoing decline expected.   -  Continue to provide redirection and reassurance as needed. Maintain safe living situation with goals focused on comfort.  - at baseline,    Chronic systolic heart failure (H) EF 40%  Chronic atrial fibrillation;  Hx of Frequent PVC  Essential hypertension  Hyperlipidemia LDL goal <100  - compensated. BP in general w/i acceptable range  - CVR. On metoprolol for rate control. On Eliquis    Diet controlled diabetes Mellitus (H):    A1C 6.2 02/19/2020    A1C 6.7 04/15/2019       CKD, GFR 30-59 ml/min (H):  Avoid nephrotoxic drugs. Renal dose the medications.     Dysphagia:  Hx of GIB (erosive gastritis, hiatal hernia):  - on omeprazole    Frailty: Significant  Deficits requiring NH placement. Requiring extensive assistance from nursing. Up for meals only o/w spends the day resting in bed    Order: See above, otherwise, continue the rest of the current POC.     Electronically signed by:  Parris Navarro MD     Video-Visit Details  Type of service:  Video Visit  Video End Time (time video stopped): 14:53  Distant Location (provider location):  [place] [facility]           Sincerely,        Parris Navarro MD

## 2020-04-12 PROBLEM — N18.30 CKD (CHRONIC KIDNEY DISEASE) STAGE 3, GFR 30-59 ML/MIN (H): Status: ACTIVE | Noted: 2020-04-12

## 2020-04-27 ENCOUNTER — HOSPITAL LABORATORY (OUTPATIENT)
Dept: NURSING HOME | Facility: OTHER | Age: 80
End: 2020-04-27

## 2020-04-27 ENCOUNTER — TELEPHONE (OUTPATIENT)
Dept: GERIATRICS | Facility: CLINIC | Age: 80
End: 2020-04-27

## 2020-04-27 NOTE — TELEPHONE ENCOUNTER
Nursing called around 4pm to express concerns of Kristine     T= 99.5  Loose stools and emesis.  Emesis reported brownish coloring and of late small amount.  This has been occurring more so during the past night and a couple of times this AM.  Lungs are clear.  O2 sats 91% which is around her normal.  Pulses are varying.  's.  Does have Atrial Fib and on Eliquis.    Orders:  1.  Swab for COVID 19 with the temp     2.  Abdominal x-ray flat plate due to emesis and loose stool.  Ok to call this NP until 10pm tonight with results.    3.  Vitals every 8 hours x48 hours and Metoprolol 25mg prn if pulse >120    Electronically signed by Karyn Hogue RN, CNP

## 2020-04-28 ENCOUNTER — HOSPITAL LABORATORY (OUTPATIENT)
Dept: NURSING HOME | Facility: OTHER | Age: 80
End: 2020-04-28

## 2020-04-28 ENCOUNTER — VIRTUAL VISIT (OUTPATIENT)
Dept: GERIATRICS | Facility: CLINIC | Age: 80
End: 2020-04-28
Payer: COMMERCIAL

## 2020-04-28 VITALS
SYSTOLIC BLOOD PRESSURE: 120 MMHG | BODY MASS INDEX: 33.46 KG/M2 | WEIGHT: 208.2 LBS | HEIGHT: 66 IN | OXYGEN SATURATION: 93 % | DIASTOLIC BLOOD PRESSURE: 74 MMHG | HEART RATE: 68 BPM | RESPIRATION RATE: 18 BRPM | TEMPERATURE: 98.5 F

## 2020-04-28 DIAGNOSIS — R50.9 LOW GRADE FEVER: ICD-10-CM

## 2020-04-28 DIAGNOSIS — R82.90 ABNORMAL URINALYSIS: Primary | ICD-10-CM

## 2020-04-28 DIAGNOSIS — R53.83 LETHARGY: ICD-10-CM

## 2020-04-28 DIAGNOSIS — I48.19 PERSISTENT ATRIAL FIBRILLATION (H): ICD-10-CM

## 2020-04-28 LAB
ALBUMIN UR-MCNC: NEGATIVE MG/DL
AMORPH CRY #/AREA URNS HPF: ABNORMAL /HPF
ANION GAP SERPL CALCULATED.3IONS-SCNC: 5 MMOL/L (ref 3–14)
APPEARANCE UR: ABNORMAL
BACTERIA #/AREA URNS HPF: ABNORMAL /HPF
BASOPHILS # BLD AUTO: 0 10E9/L (ref 0–0.2)
BASOPHILS NFR BLD AUTO: 0.3 %
BILIRUB UR QL STRIP: NEGATIVE
BUN SERPL-MCNC: 23 MG/DL (ref 7–30)
CALCIUM SERPL-MCNC: 7.8 MG/DL (ref 8.5–10.1)
CHLORIDE SERPL-SCNC: 110 MMOL/L (ref 94–109)
CO2 SERPL-SCNC: 27 MMOL/L (ref 20–32)
COLOR UR AUTO: ABNORMAL
CREAT SERPL-MCNC: 0.97 MG/DL (ref 0.52–1.04)
DIFFERENTIAL METHOD BLD: ABNORMAL
EOSINOPHIL NFR BLD AUTO: 2.2 %
ERYTHROCYTE [DISTWIDTH] IN BLOOD BY AUTOMATED COUNT: 14.2 % (ref 10–15)
GFR SERPL CREATININE-BSD FRML MDRD: 55 ML/MIN/{1.73_M2}
GLUCOSE SERPL-MCNC: 108 MG/DL (ref 70–99)
GLUCOSE UR STRIP-MCNC: NEGATIVE MG/DL
HCT VFR BLD AUTO: 34.2 % (ref 35–47)
HGB BLD-MCNC: 11.1 G/DL (ref 11.7–15.7)
HGB UR QL STRIP: ABNORMAL
IMM GRANULOCYTES # BLD: 0 10E9/L (ref 0–0.4)
IMM GRANULOCYTES NFR BLD: 0.2 %
KETONES UR STRIP-MCNC: NEGATIVE MG/DL
LEUKOCYTE ESTERASE UR QL STRIP: ABNORMAL
LYMPHOCYTES # BLD AUTO: 1.8 10E9/L (ref 0.8–5.3)
LYMPHOCYTES NFR BLD AUTO: 30 %
MCH RBC QN AUTO: 31.9 PG (ref 26.5–33)
MCHC RBC AUTO-ENTMCNC: 32.5 G/DL (ref 31.5–36.5)
MCV RBC AUTO: 98 FL (ref 78–100)
MONOCYTES # BLD AUTO: 0.7 10E9/L (ref 0–1.3)
MONOCYTES NFR BLD AUTO: 12 %
MUCOUS THREADS #/AREA URNS LPF: PRESENT /LPF
NEUTROPHILS # BLD AUTO: 3.3 10E9/L (ref 1.6–8.3)
NEUTROPHILS NFR BLD AUTO: 55.3 %
NITRATE UR QL: POSITIVE
NRBC # BLD AUTO: 0 10*3/UL
NRBC BLD AUTO-RTO: 0 /100
PH UR STRIP: 5 PH (ref 5–7)
PLATELET # BLD AUTO: 153 10E9/L (ref 150–450)
POTASSIUM SERPL-SCNC: 4 MMOL/L (ref 3.4–5.3)
RBC # BLD AUTO: 3.48 10E12/L (ref 3.8–5.2)
RBC #/AREA URNS AUTO: 3 /HPF (ref 0–2)
SARS-COV-2 PCR COMMENT: NORMAL
SARS-COV-2 RNA SPEC QL NAA+PROBE: NEGATIVE
SARS-COV-2 RNA SPEC QL NAA+PROBE: NORMAL
SODIUM SERPL-SCNC: 142 MMOL/L (ref 133–144)
SOURCE: ABNORMAL
SP GR UR STRIP: 1.02 (ref 1–1.03)
SPECIMEN SOURCE: NORMAL
SPECIMEN SOURCE: NORMAL
SQUAMOUS #/AREA URNS AUTO: 5 /HPF (ref 0–1)
UROBILINOGEN UR STRIP-MCNC: 0 MG/DL (ref 0–2)
WBC # BLD AUTO: 5.9 10E9/L (ref 4–11)
WBC #/AREA URNS AUTO: 47 /HPF (ref 0–5)

## 2020-04-28 PROCEDURE — 99309 SBSQ NF CARE MODERATE MDM 30: CPT | Mod: 95 | Performed by: NURSE PRACTITIONER

## 2020-04-28 ASSESSMENT — MIFFLIN-ST. JEOR: SCORE: 1441.14

## 2020-04-28 NOTE — LETTER
"    4/28/2020        RE: Kristine Cosby  [facility]  [address] [facility] [address]        [place] [facility]   Kristine Cosby is being evaluated via a billable video visit due to the restrictions of the Covid-19 pandemic.   The patient has been notified of following:  \"This video visit will be conducted via a call between you and your provider. We have found that certain health care needs can be provided without the need for an in-person physical exam.  This service lets us provide the care you need with a video conversation. If during the course of the call the provider feels a video visit is not appropriate, you will not be charged for this service.\"   The provider has received verbal consent for a Video Visit from the patient or first contact? Yes  Patient  or facility staff would like the video invitation sent by: Text to cell phone: [phone]  Video Start Time: 1:35 pm      [place] Medical Record Number:  [id number]  Place of Location at the time of visit: [facility]   Chief Complaint   Patient presents with     Video Visit     Nursing Home Acute     HPI:  Kristine Cosby  is a 79 year old (1940), who is being seen today for a visit.  HPI information obtained from: facility chart records, facility staff, patient report and [facility] chart review. Today's concern is:  1. Abnormal urinalysis    2. Lethargy    3. Low grade fever    4. Persistent atrial fibrillation      Came to see Kristine today via video visit with the help of staff.    Over the last 24 hours have been doing different tests on Kristine due to vomiting up brown material, loose stools, and low grade fever of 99.5     COVID test done and negative.  4/27/2020 Xray of abdomen flat plate done - negative for blockage. No specific gas pattern, no ileus, no obstruction noted.  Loose stools and emesis have stopped now.  Droplet precautions in place for now.    Nurse managed noted and concerned for " Kristine's pulses varying.  's.  Did put in place for 48 hours, vitals every 8 and if pulse >120 then to give Metoprolol 25mg prn.  Has not received anything yet.    Went into her room and Chantal Eason did not arouse readily.  Nursing did deep sternal rub x2 and finally she opened eyes.  Said a few words and closed her eyes.  At the end of the visit, she herself pulled the covers back over her arms and got comfortable.    Past Medical and Surgical History reviewed in Epic today.  MEDICATIONS:    Current Outpatient Medications   Medication Sig Dispense Refill     acetaminophen (TYLENOL) 500 MG tablet Take 1,000 mg by mouth every morning Also BID PRN       apixaban ANTICOAGULANT (ELIQUIS) 5 MG tablet Take 1 tablet (5 mg) by mouth 2 times daily       atorvastatin (LIPITOR) 20 MG tablet Take 20 mg by mouth At Bedtime       calcium carbonate (TUMS) 500 MG chewable tablet Take 2 chew tab by mouth every 4 hours as needed for heartburn       cholecalciferol 1000 units TABS Take 2,000 Units by mouth daily       diclofenac (VOLTAREN) 1 % topical gel Apply 2 g topically daily AND BID PRN - Apply to right hand       Furosemide (LASIX PO) Take 10 mg by mouth daily       levothyroxine (SYNTHROID/LEVOTHROID) 88 MCG tablet Take 88 mcg by mouth daily       lisinopril (PRINIVIL/ZESTRIL) 10 MG tablet Take 1 tablet (10 mg) by mouth daily 30 tablet 11     metoprolol succinate ER (TOPROL-XL) 50 MG 24 hr tablet Take 50 mg by mouth daily       nitroGLYcerin (NITROSTAT) 0.4 MG sublingual tablet Place 0.4 mg under the tongue every 5 minutes as needed for chest pain For chest pain place 1 tablet under the tongue every 5 minutes for 3 doses. If symptoms persist 5 minutes after 1st dose call 911.       nystatin (MYCOSTATIN) 925533 UNIT/GM external powder Apply topically 2 times daily as needed       omeprazole (PRILOSEC) 20 MG DR capsule Take 20 mg by mouth daily       senna (SENOKOT) 8.6 MG tablet Take 2 tablets by mouth daily as needed  "for constipation        senna-docusate (SENOKOT-S/PERICOLACE) 8.6-50 MG tablet Take 2 tablets by mouth daily        tamsulosin (FLOMAX) 0.4 MG capsule Take 0.8 mg by mouth At Bedtime      REVIEW OF SYSTEMS: Limited secondary to cognitive impairment but today pt reports she is tired.  Objective: /74   Pulse 68   Temp 98.5  F (36.9  C)   Resp 18   Ht 1.676 m (5' 6\")   Wt 94.4 kg (208 lb 3.2 oz)   SpO2 93%   BMI 33.60 kg/m    Limited visit exam done given COVID-19 precautions.     After she aroused for the sternal rub, her eyes opened wide like \"what are you doing?\"  Conversation minimal.  Heart rate regular/irregular.  No respiratory distress.  No oxygen.  Appears to be laying flat in bed from the angle of the video.  Coloring is pink/pale, skin is dry and body is noted to be cold.    T = 97.2 at visit.    Labs:   Component      Latest Ref Rng & Units 4/28/2020   Color Urine       Red   Appearance Urine       Slightly Cloudy   Glucose Urine      NEG:Negative mg/dL Negative   Bilirubin Urine      NEG:Negative Negative   Ketones Urine      NEG:Negative mg/dL Negative   Specific Gravity Urine      1.003 - 1.035 1.019   Blood Urine      NEG:Negative Moderate (A)   pH Urine      5.0 - 7.0 pH 5.0   Protein Albumin Urine      NEG:Negative mg/dL Negative   Urobilinogen mg/dL      0.0 - 2.0 mg/dL 0.0   Nitrite Urine      NEG:Negative Positive (A)   Leukocyte Esterase Urine      NEG:Negative Moderate (A)   Source       Unspecified Urine   WBC Urine      0 - 5 /HPF 47 (H)   RBC Urine      0 - 2 /HPF 3 (H)   Bacteria Urine      NEG:Negative /HPF Few (A)   Squamous Epithelial /HPF Urine      0 - 1 /HPF 5 (H)   Mucous Urine      NEG:Negative /LPF Present (A)   Amorphous Crystals      NEG:Negative /HPF Few (A)     Component      Latest Ref Rng & Units 4/27/2020   SARS-CoV-2 Virus Specimen Source       Nasopharyngeal   SARS-CoV-2 PCR Result       NEGATIVE   SARS-CoV-2 PCR Comment       The Simplexa COVID-19 direct PCR " assay by [employer] on the [employer] instrument has been . . .     ASSESSMENT/PLAN:  Abnormal urinalysis  Lethargy  Low grade fever  - knowing now the source of her low grade fever, vomiting and loose stools, was going to start her on a ABX today.  Seeing her be so lethargic is concerning.    Nursing knows family is involved and would want her sent in to hospital.    Would like to keep her there and order a few things first to see how she responds.  If she does not then can consider sending her in if family wishes.  Nurse manager will call family to update them.    1.  CBC with diff and BMP today - lethargy and fever  2.  Rocephin 1GM IM daily for 2 days.  3.  Able to take off droplet precautions once labs back if doing ok.    4.  Once final UC returns will start another ABX.      Persistent atrial fibrillation  Pulses have varied.  's.  Remains on apixaban 5mg twice a day.  Order for the metoprolol PRN x48 hours will  soon.  Will let that order  on own.        See above for orders given to  RN    Electronically signed by:  NATIVIDAD Sanon CNP     Video-Visit Details  Type of service:  Video Visit  Video End Time (time video stopped): 1:50pm  Distant Location (provider location):  [place] [facility]             Sincerely,        NATIVIDAD Sanon CNP

## 2020-04-28 NOTE — PROGRESS NOTES
"[place] [facility]   Kristine Cosby is being evaluated via a billable video visit due to the restrictions of the Covid-19 pandemic.   The patient has been notified of following:  \"This video visit will be conducted via a call between you and your provider. We have found that certain health care needs can be provided without the need for an in-person physical exam.  This service lets us provide the care you need with a video conversation. If during the course of the call the provider feels a video visit is not appropriate, you will not be charged for this service.\"   The provider has received verbal consent for a Video Visit from the patient or first contact? Yes  Patient  or facility staff would like the video invitation sent by: Text to cell phone: [phone]  Video Start Time: 1:35 pm      [facility] Medical Record Number:  [id number]  Place of Location at the time of visit: [facility]   Chief Complaint   Patient presents with     Video Visit     Nursing Home Acute     HPI:  Kristine Cosby  is a 79 year old (1940), who is being seen today for a visit.  HPI information obtained from: facility chart records, facility staff, patient report and [facility] Epic chart review. Today's concern is:  1. Abnormal urinalysis    2. Lethargy    3. Low grade fever    4. Persistent atrial fibrillation      Came to see Kristine today via video visit with the help of staff.    Over the last 24 hours have been doing different tests on Kristine due to vomiting up brown material, loose stools, and low grade fever of 99.5     COVID test done and negative.  4/27/2020 Xray of abdomen flat plate done - negative for blockage. No specific gas pattern, no ileus, no obstruction noted.  Loose stools and emesis have stopped now.  Droplet precautions in place for now.    Nurse managed noted and concerned for Kristine's pulses varying.  's.  Did put in place for 48 hours, vitals every 8 and if pulse >120 then to " give Metoprolol 25mg prn.  Has not received anything yet.    Went into her room and Chantal Eason did not arouse readily.  Nursing did deep sternal rub x2 and finally she opened eyes.  Said a few words and closed her eyes.  At the end of the visit, she herself pulled the covers back over her arms and got comfortable.    Past Medical and Surgical History reviewed in Epic today.  MEDICATIONS:    Current Outpatient Medications   Medication Sig Dispense Refill     acetaminophen (TYLENOL) 500 MG tablet Take 1,000 mg by mouth every morning Also BID PRN       apixaban ANTICOAGULANT (ELIQUIS) 5 MG tablet Take 1 tablet (5 mg) by mouth 2 times daily       atorvastatin (LIPITOR) 20 MG tablet Take 20 mg by mouth At Bedtime       calcium carbonate (TUMS) 500 MG chewable tablet Take 2 chew tab by mouth every 4 hours as needed for heartburn       cholecalciferol 1000 units TABS Take 2,000 Units by mouth daily       diclofenac (VOLTAREN) 1 % topical gel Apply 2 g topically daily AND BID PRN - Apply to right hand       Furosemide (LASIX PO) Take 10 mg by mouth daily       levothyroxine (SYNTHROID/LEVOTHROID) 88 MCG tablet Take 88 mcg by mouth daily       lisinopril (PRINIVIL/ZESTRIL) 10 MG tablet Take 1 tablet (10 mg) by mouth daily 30 tablet 11     metoprolol succinate ER (TOPROL-XL) 50 MG 24 hr tablet Take 50 mg by mouth daily       nitroGLYcerin (NITROSTAT) 0.4 MG sublingual tablet Place 0.4 mg under the tongue every 5 minutes as needed for chest pain For chest pain place 1 tablet under the tongue every 5 minutes for 3 doses. If symptoms persist 5 minutes after 1st dose call 911.       nystatin (MYCOSTATIN) 708880 UNIT/GM external powder Apply topically 2 times daily as needed       omeprazole (PRILOSEC) 20 MG DR capsule Take 20 mg by mouth daily       senna (SENOKOT) 8.6 MG tablet Take 2 tablets by mouth daily as needed for constipation        senna-docusate (SENOKOT-S/PERICOLACE) 8.6-50 MG tablet Take 2 tablets by mouth daily     "    tamsulosin (FLOMAX) 0.4 MG capsule Take 0.8 mg by mouth At Bedtime      REVIEW OF SYSTEMS: Limited secondary to cognitive impairment but today pt reports she is tired.  Objective: /74   Pulse 68   Temp 98.5  F (36.9  C)   Resp 18   Ht 1.676 m (5' 6\")   Wt 94.4 kg (208 lb 3.2 oz)   SpO2 93%   BMI 33.60 kg/m    Limited visit exam done given COVID-19 precautions.     After she aroused for the sternal rub, her eyes opened wide like \"what are you doing?\"  Conversation minimal.  Heart rate regular/irregular.  No respiratory distress.  No oxygen.  Appears to be laying flat in bed from the angle of the video.  Coloring is pink/pale, skin is dry and body is noted to be cold.    T = 97.2 at visit.    Labs:   Component      Latest Ref Rng & Units 4/28/2020   Color Urine       Red   Appearance Urine       Slightly Cloudy   Glucose Urine      NEG:Negative mg/dL Negative   Bilirubin Urine      NEG:Negative Negative   Ketones Urine      NEG:Negative mg/dL Negative   Specific Gravity Urine      1.003 - 1.035 1.019   Blood Urine      NEG:Negative Moderate (A)   pH Urine      5.0 - 7.0 pH 5.0   Protein Albumin Urine      NEG:Negative mg/dL Negative   Urobilinogen mg/dL      0.0 - 2.0 mg/dL 0.0   Nitrite Urine      NEG:Negative Positive (A)   Leukocyte Esterase Urine      NEG:Negative Moderate (A)   Source       Unspecified Urine   WBC Urine      0 - 5 /HPF 47 (H)   RBC Urine      0 - 2 /HPF 3 (H)   Bacteria Urine      NEG:Negative /HPF Few (A)   Squamous Epithelial /HPF Urine      0 - 1 /HPF 5 (H)   Mucous Urine      NEG:Negative /LPF Present (A)   Amorphous Crystals      NEG:Negative /HPF Few (A)     Component      Latest Ref Rng & Units 4/27/2020   SARS-CoV-2 Virus Specimen Source       Nasopharyngeal   SARS-CoV-2 PCR Result       NEGATIVE   SARS-CoV-2 PCR Comment       The Simplexa COVID-19 direct PCR assay by [employer] on the [employer] instrument has been . . .     ASSESSMENT/PLAN:  Abnormal " urinalysis  Lethargy  Low grade fever  - knowing now the source of her low grade fever, vomiting and loose stools, was going to start her on a ABX today.  Seeing her be so lethargic is concerning.    Nursing knows family is involved and would want her sent in to hospital.    Would like to keep her there and order a few things first to see how she responds.  If she does not then can consider sending her in if family wishes.  Nurse manager will call family to update them.    1.  CBC with diff and BMP today - lethargy and fever  2.  Rocephin 1GM IM daily for 2 days.  3.  Able to take off droplet precautions once labs back if doing ok.    4.  Once final UC returns will start another ABX.      Persistent atrial fibrillation  Pulses have varied.  's.  Remains on apixaban 5mg twice a day.  Order for the metoprolol PRN x48 hours will  soon.  Will let that order  on own.        See above for orders given to  RN    Electronically signed by:  NATIVIDAD Sanon CNP     Video-Visit Details  Type of service:  Video Visit  Video End Time (time video stopped): 1:50pm  Distant Location (provider location):  [facility]

## 2020-04-29 LAB
BACTERIA SPEC CULT: ABNORMAL
BACTERIA SPEC CULT: ABNORMAL
Lab: ABNORMAL
SPECIMEN SOURCE: ABNORMAL

## 2020-05-27 NOTE — PROGRESS NOTES
"[facility]  Type of service: Video Visit  Kristine Cosby is being evaluated via a billable visit due to the restrictions of the Covid-19 pandemic.   The patient or first contact has been notified of following:  \"This video visit will be conducted via a call between you and your provider. We have found that certain health care needs can be provided without the need for an in-person physical exam.  This service lets us provide the care you need with a video conversation. If during the course of the call the provider feels a video visit is not appropriate, you will not be charged for this service.\"   The provider has received verbal consent for a Video or Telephone Visit from the patient and or first contact? Yes  Video or Telephone Start Time: 1230  [facility] Record Number:  [id number]  Where the Patient is living now: [facility] (formerly [facility])   Chief Complaint   Patient presents with     Video Visit     Annual Comprehensive Nursing Home     HPI:    Kristine Cosby  is a 80 year old  (1940), who is being seen today for an annual comprehensive visit. HPI information obtained from: facility chart records, facility staff, patient report and [facility] chart review.  Today's concerns are:  Vascular dementia with behavior disturbance (H)  Hx of Hypoglycemic encephalopathy  History of stroke  F/b: Dr Nunes, Neurology   12/2018 hospitalized for encephalopathy related to ow glucose levels.    Since then has had episodes of unresponsiveness (unarousable but stable VS) and baby talk which can last several hours to over 24 hours.   Last saw Neurology on 6/4/19 who stopped tamsulosin and agreed with discontinue of keppra.  MRI was ordered and completed 6/7/29:  IMPRESSION:  1. No interval change.   2. No acute intracranial abnormality.   3. Normal brain parenchymal morphology. Stable focal encephalomalacia and gliosis ovoid infarct involving the right frontal lobe and frontal operculum.   4. Stable " patchy T2/FLAIR signal hyperintensity within the white matter consistent with chronic small vessel ischemic changes. Multiple lacunar infarcts of the bilateral cerebellar hemispheres   5. No acute or chronic intracranial hemorrhage     On apixaban and statin for cva ppx.   In-house psychology is following. 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     Diet-controlled diabetes mellitus (H)  Lab Results   Component Value Date    A1C 6.2 02/19/2020    A1C 6.7 04/15/2019     On no meds/insulin  No ASA d/t GIB history; is on ACEI and statin    Patient denies any numbness/tingling in her fingers/toes     Chronic atrial fibrillation  on Eliquis 5 mg BID for anticulation  On Toprol XL 50 mg daily for rate control  HRs 66-78    History of previous Pradaxa which was stopped d/t GIB (2017) but apixaban was started as patient has been having unresponsive episodes concerning for CVA with known a fib history.  No concern for bleeding since that time.     No reports of any bleeding     Chronic systolic heart failure (H)  Essential hypertension  History of ST elevation myocardial infarction (STEMI)  Hyperlipidemia LDL goal <100  Atherosclerosis of native coronary artery of native heart without angina pectoris  On Lasix 10 mg daily (no KCL supplement, Last K 4.0), atorvastatin 20 mg q HS, lisinopril 10 mg daily, nitroglycerin PRN, Toprol XL 50 mg daily    BPs 120-140s/60-70s  HRs 66-78  Patient denies CP, HA, lightheadedness, SOB     LE edema none    Weights:  5/2/20 - 204 5/9/20 - 205  5/16/20 - 207  6/23/20 - 209.8    CKD (chronic kidney disease) stage 3, GFR 30-59 ml/min (H)  BL Creat seemingly ~0.9-1.0  Last few BMPs:   2/19/20: BUN 20, Creat 0.98, GFR 55  2/22/20: BUN 20, Creat 1.02, GFR 52  4/28/20: BUN 23, Creat 0.97, GFR  55    Is on Lasix, ACEI; no NSAIDs     Gastroesophageal reflux disease, esophagitis presence not specified  Hx of GIB (hiatal hernia, erosive gastritis)  History of GIB in 2017 while on pradaxa  Now on apixaban  On Omeprazole 20 mg daily    Patient denies any upset stomach or heartburn     Anemia, unspecified type  Hemoglobin   Date Value Ref Range Status   04/28/2020 11.1 (L) 11.7 - 15.7 g/dL Final   02/22/2020 11.8 11.7 - 15.7 g/dL Final     On Apixaban     No reports of bleeding     Primary osteoarthritis involving multiple joints  Analgesia with Tylenol 1000 mg daily and 1000 mg BID PRN, Voltaren Gel daily and BID PRN    Patient reports no pain today.     Acquired hypothyroidism  Lab Results   Component Value Date    TSH 2.25 02/19/2020     On levothyroxine 88 mcg daily  Patient denies any constipation, lethargy or depression     Slow transit constipation  On Senna 2 tab daily and 2 tab daily PRN  Patient reports no constipation     Vitamin D deficiency  On vitamin D 2000 units daily  No recent level on file     Impaired mobility and activities of daily living  Uses w/c in SNF where she resides d/t increased care needs.      ALLERGIES: Blood transfusion related (informational only)  PAST MEDICAL HISTORY:  has a past medical history of Cellulitis of left lower extremity, GERD (gastroesophageal reflux disease), HTN (hypertension), Type 2 diabetes mellitus without complication (H), and Ulcer of left lower extremity with fat layer exposed (H).  PAST SURGICAL HISTORY:  has a past surgical history that includes Hysterectomy and ESOPHAGOGASTRODUODENOSCOPY.  IMMUNIZATIONS:    There is no immunization history on file for this patient.  Above immunizations pulled from [employer]. [facility] and facility records also reconciled. Outstanding information sent to  to update [employer].   Future immunizations are not needed at this point as all recommended immunizations are up to date.     Current Outpatient  Medications   Medication Sig Dispense Refill     levothyroxine (SYNTHROID/LEVOTHROID) 75 MCG tablet Take 1 tablet (75 mcg) by mouth daily       acetaminophen (TYLENOL) 500 MG tablet Take 1,000 mg by mouth every morning Also BID PRN       apixaban ANTICOAGULANT (ELIQUIS) 5 MG tablet Take 1 tablet (5 mg) by mouth 2 times daily       atorvastatin (LIPITOR) 20 MG tablet Take 20 mg by mouth At Bedtime       calcium carbonate (TUMS) 500 MG chewable tablet Take 2 chew tab by mouth every 4 hours as needed for heartburn       cholecalciferol 1000 units TABS Take 2,000 Units by mouth daily       diclofenac (VOLTAREN) 1 % topical gel Apply 2 g topically daily AND BID PRN - Apply to right hand       Furosemide (LASIX PO) Take 10 mg by mouth daily       lisinopril (PRINIVIL/ZESTRIL) 10 MG tablet Take 1 tablet (10 mg) by mouth daily 30 tablet 11     metoprolol succinate ER (TOPROL-XL) 50 MG 24 hr tablet Take 50 mg by mouth daily       nitroGLYcerin (NITROSTAT) 0.4 MG sublingual tablet Place 0.4 mg under the tongue every 5 minutes as needed for chest pain For chest pain place 1 tablet under the tongue every 5 minutes for 3 doses. If symptoms persist 5 minutes after 1st dose call 911.       nystatin (MYCOSTATIN) 842003 UNIT/GM external powder Apply topically 2 times daily as needed       omeprazole (PRILOSEC) 20 MG DR capsule Take 20 mg by mouth daily       senna (SENOKOT) 8.6 MG tablet Take 2 tablets by mouth daily as needed for constipation        senna-docusate (SENOKOT-S/PERICOLACE) 8.6-50 MG tablet Take 2 tablets by mouth daily        Case Management:  I have reviewed the facility/SNF care plan/MDS, including the falls risk, nutrition and pain screening. I also reviewed the current immunizations, and preventive care. .Future cancer screening is not clinically indicated secondary to age/goals of care Patient's desire to return to the community is not assessible due to cognitive impairment. Current Level of Care is  "appropriate.    Advance Directive Discussion:    I reviewed the current advanced directives as reflected in EPIC, the POLST and the facility chart, and verified the congruency of orders.     Team Discussion:  I communicated with the appropriate disciplines involved with the Plan of Care:   Nursing    Patient's goal is pain control and comfort.  Information reviewed:  Medications, vital signs, orders, and nursing notes.    ROS:  Limited secondary to cognitive impairment but today pt reports 10 point ROS of systems including Constitutional, Eyes, Respiratory, Cardiovascular, Gastroenterology, Genitourinary, Integumentary, Musculoskeletal, Psychiatric were all negative except for pertinent positives noted in my HPI.    Vitals:  BP (!) 150/86   Pulse 77   Temp 96.3  F (35.7  C)   Resp 16   Ht 1.676 m (5' 6\")   Wt 95.2 kg (209 lb 12.8 oz)   SpO2 96%   BMI 33.86 kg/m   Body mass index is 33.86 kg/m .  Exam:  GENERAL APPEARANCE:  Alert, in no distress  RESP:  respiratory effort normal, no respiratory distress, on RA  CV:  No LE peripheral edema  ABDOMEN:  nondistended  M/S:   Gait and station with w/c for mobility, Digits and nails with arthritic changes, reduced muscle mass  SKIN:  Inspection and Palpation of skin and subcutaneous tissue pale, intact  PSYCH:  insight and judgement, memory with impairment, affect and mood normal, follows commands readily           Lab/Diagnostic data:   CBC RESULTS:   Recent Labs   Lab Test 04/28/20  1505 02/22/20  0030   WBC 5.9 6.5   RBC 3.48* 3.85   HGB 11.1* 11.8   HCT 34.2* 36.8   MCV 98 96   MCH 31.9 30.6   MCHC 32.5 32.1   RDW 14.2 13.3    209       Last Basic Metabolic Panel:  Recent Labs   Lab Test 04/28/20  1505 02/22/20  0030    142   POTASSIUM 4.0 3.9   CHLORIDE 110* 110*   MIKE 7.8* 9.1   CO2 27 24   BUN 23 20   CR 0.97 1.02   * 133*       Liver Function Studies -   Recent Labs   Lab Test 02/22/20  0030 05/13/19  2215   PROTTOTAL 7.2 7.0   ALBUMIN 3.4 " 3.1*   BILITOTAL 0.4 0.5   ALKPHOS 73 80   AST 14 13   ALT 12 11       TSH   Date Value Ref Range Status   02/19/2020 2.25 0.40 - 4.00 mU/L Final   10/04/2019 4.73 (H) 0.40 - 4.00 mU/L Final   ]    Lab Results   Component Value Date    A1C 6.2 02/19/2020    A1C 6.7 04/15/2019         ASSESSMENT/PLAN  Vascular dementia with behavior disturbance (H)  Hx of Hypoglycemic encephalopathy  resides in SNF for increased care needs  Stable  Possibility of multiple personalities with progressing dementia as she sometimes flips into baby talk, etc.     Diet-controlled diabetes mellitus (H)  Stable off meds  Goal: HgbA1C between 8-9%. Per AGS there is potential harm in lowering the A1C to <6.5% in older adults with diabetes.     Chronic atrial fibrillation  Continue anticoagulation and Toprol for rate control  Monitor for bleeding    Chronic systolic heart failure (H)  Essential hypertension  History of ST elevation myocardial infarction (STEMI)  Hyperlipidemia LDL goal <100  Atherosclerosis of native coronary artery of native heart without angina pectoris  Continue regimen of Lasix, statin, ACEI, nitroglycerin Prn, Toprol XL  BP goals are ~130 -165/60 -90 mmHg.This is higher than ACC and AHA recommendations due to risk for hypotension, risk of dizziness and falls and risk of tissue/cerebral hypoperfusion. Patient is stable with current plan of care and routine assessment.      CKD (chronic kidney disease) stage 3, GFR 30-59 ml/min (H)  Will avoid nephrotoxic agents (such as ACEI/ARB/NSAIDs, IV contrast) and mindful prescribing with renal dosing.     History of stroke  Resides in SNF for increased care needs     Gastroesophageal reflux disease, esophagitis presence not specified  Hx of GIB (hiatal hernia, erosive gastritis)  Continue PPI despite known risks d/t history GIB and is on anticoagulation.     Anemia, unspecified type  No need for Fe addition as Hgb >10  Monitor for bleeding     Primary osteoarthritis involving  multiple joints  Continue regimen at this time  Monitor for ability to GDR Tylenol     Acquired hypothyroidism  TSH goal 4-6%  Decrease Levothyroxine to 75 mcg daily and recheck TSH with reflex to Free T4 in 8 weeks    Slow transit constipation  Stable on regimen  continue    Vitamin D deficiency  Stable on regimen.  Recheck Vit D level with next set of labs     Impaired mobility and activities of daily living  Resides in SNF and uses w/c for mobility      Orders written by provider at facility  1. Decrease Levothyroxine to 75 mcg daily Dx: hypothyroidism  2. Recheck TSH with reflex to Free T4 in 8 weeks. Dx: hypothyroidism     Electronically signed by:  NATIVIDAD Chapa CNP   Visit Details  Video or Telephone End Time: 1250  [place] Location (provider location):  [facility]

## 2020-05-28 VITALS
BODY MASS INDEX: 33.72 KG/M2 | HEART RATE: 77 BPM | TEMPERATURE: 96.3 F | HEIGHT: 66 IN | OXYGEN SATURATION: 96 % | DIASTOLIC BLOOD PRESSURE: 86 MMHG | WEIGHT: 209.8 LBS | SYSTOLIC BLOOD PRESSURE: 150 MMHG | RESPIRATION RATE: 16 BRPM

## 2020-05-28 ASSESSMENT — MIFFLIN-ST. JEOR: SCORE: 1443.4

## 2020-05-29 ENCOUNTER — VIRTUAL VISIT (OUTPATIENT)
Dept: GERIATRICS | Facility: CLINIC | Age: 80
End: 2020-05-29
Payer: COMMERCIAL

## 2020-05-29 DIAGNOSIS — Z78.9 IMPAIRED MOBILITY AND ACTIVITIES OF DAILY LIVING: ICD-10-CM

## 2020-05-29 DIAGNOSIS — D64.9 ANEMIA, UNSPECIFIED TYPE: ICD-10-CM

## 2020-05-29 DIAGNOSIS — E78.5 HYPERLIPIDEMIA LDL GOAL <100: ICD-10-CM

## 2020-05-29 DIAGNOSIS — I50.22 CHRONIC SYSTOLIC HEART FAILURE (H): ICD-10-CM

## 2020-05-29 DIAGNOSIS — N18.30 CKD (CHRONIC KIDNEY DISEASE) STAGE 3, GFR 30-59 ML/MIN (H): ICD-10-CM

## 2020-05-29 DIAGNOSIS — K92.2 GASTROINTESTINAL HEMORRHAGE, UNSPECIFIED GASTROINTESTINAL HEMORRHAGE TYPE: ICD-10-CM

## 2020-05-29 DIAGNOSIS — I48.20 CHRONIC ATRIAL FIBRILLATION (H): ICD-10-CM

## 2020-05-29 DIAGNOSIS — E11.9 DIET-CONTROLLED DIABETES MELLITUS (H): ICD-10-CM

## 2020-05-29 DIAGNOSIS — Z74.09 IMPAIRED MOBILITY AND ACTIVITIES OF DAILY LIVING: ICD-10-CM

## 2020-05-29 DIAGNOSIS — F01.518 VASCULAR DEMENTIA WITH BEHAVIOR DISTURBANCE (H): Primary | ICD-10-CM

## 2020-05-29 DIAGNOSIS — Z86.73 HISTORY OF STROKE: ICD-10-CM

## 2020-05-29 DIAGNOSIS — K21.9 GASTROESOPHAGEAL REFLUX DISEASE, ESOPHAGITIS PRESENCE NOT SPECIFIED: ICD-10-CM

## 2020-05-29 DIAGNOSIS — K59.01 SLOW TRANSIT CONSTIPATION: ICD-10-CM

## 2020-05-29 DIAGNOSIS — E03.9 ACQUIRED HYPOTHYROIDISM: ICD-10-CM

## 2020-05-29 DIAGNOSIS — M15.0 PRIMARY OSTEOARTHRITIS INVOLVING MULTIPLE JOINTS: ICD-10-CM

## 2020-05-29 DIAGNOSIS — I10 ESSENTIAL HYPERTENSION: ICD-10-CM

## 2020-05-29 DIAGNOSIS — E55.9 VITAMIN D DEFICIENCY: ICD-10-CM

## 2020-05-29 DIAGNOSIS — I25.2 HISTORY OF ST ELEVATION MYOCARDIAL INFARCTION (STEMI): ICD-10-CM

## 2020-05-29 DIAGNOSIS — I25.10 ATHEROSCLEROSIS OF NATIVE CORONARY ARTERY OF NATIVE HEART WITHOUT ANGINA PECTORIS: ICD-10-CM

## 2020-05-29 DIAGNOSIS — E16.2 HYPOGLYCEMIC ENCEPHALOPATHY: ICD-10-CM

## 2020-05-29 PROCEDURE — 99309 SBSQ NF CARE MODERATE MDM 30: CPT | Mod: GT | Performed by: NURSE PRACTITIONER

## 2020-05-29 RX ORDER — LEVOTHYROXINE SODIUM 75 UG/1
75 TABLET ORAL DAILY
Start: 2020-05-29

## 2020-05-29 NOTE — LETTER
"    5/29/2020        RE: Kristine Cosby  [facility]  [address] [facility] [address]        [facility]  Type of service: Video Visit  Kristine Cosby is being evaluated via a billable visit due to the restrictions of the Covid-19 pandemic.   The patient or first contact has been notified of following:  \"This video visit will be conducted via a call between you and your provider. We have found that certain health care needs can be provided without the need for an in-person physical exam.  This service lets us provide the care you need with a video conversation. If during the course of the call the provider feels a video visit is not appropriate, you will not be charged for this service.\"   The provider has received verbal consent for a Video or Telephone Visit from the patient and or first contact? Yes  Video or Telephone Start Time: 1230  [facility] Medical Record Number:  [id number]  Where the Patient is living now: [facility]   Chief Complaint   Patient presents with     Video Visit     Annual Comprehensive Nursing Home     HPI:    Kristine Cosby  is a 80 year old  (1940), who is being seen today for an annual comprehensive visit. HPI information obtained from: facility chart records, facility staff, patient report and [facility] chart review.  Today's concerns are:  Vascular dementia with behavior disturbance (H)  Hx of Hypoglycemic encephalopathy  History of stroke  F/b: Dr Nunes, Neurology   12/2018 hospitalized for encephalopathy related to ow glucose levels.    Since then has had episodes of unresponsiveness (unarousable but stable VS) and baby talk which can last several hours to over 24 hours.   Last saw Neurology on 6/4/19 who stopped tamsulosin and agreed with discontinue of keppra.  MRI was ordered and completed 6/7/29:  IMPRESSION:  1. No interval change.   2. No acute intracranial abnormality.   3. Normal brain parenchymal morphology. Stable focal " encephalomalacia and gliosis ovoid infarct involving the right frontal lobe and frontal operculum.   4. Stable patchy T2/FLAIR signal hyperintensity within the white matter consistent with chronic small vessel ischemic changes. Multiple lacunar infarcts of the bilateral cerebellar hemispheres   5. No acute or chronic intracranial hemorrhage     On apixaban and statin for cva ppx.   In-house psychology is following. 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     Diet-controlled diabetes mellitus (H)  Lab Results   Component Value Date    A1C 6.2 02/19/2020    A1C 6.7 04/15/2019     On no meds/insulin  No ASA d/t GIB history; is on ACEI and statin    Patient denies any numbness/tingling in her fingers/toes     Chronic atrial fibrillation  on Eliquis 5 mg BID for anticulation  On Toprol XL 50 mg daily for rate control  HRs 66-78    History of previous Pradaxa which was stopped d/t GIB (2017) but apixaban was started as patient has been having unresponsive episodes concerning for CVA with known a fib history.  No concern for bleeding since that time.     No reports of any bleeding     Chronic systolic heart failure (H)  Essential hypertension  History of ST elevation myocardial infarction (STEMI)  Hyperlipidemia LDL goal <100  Atherosclerosis of native coronary artery of native heart without angina pectoris  On Lasix 10 mg daily (no KCL supplement, Last K 4.0), atorvastatin 20 mg q HS, lisinopril 10 mg daily, nitroglycerin PRN, Toprol XL 50 mg daily    BPs 120-140s/60-70s  HRs 66-78  Patient denies CP, HA, lightheadedness, SOB     LE edema none    Weights:  5/2/20 - 204 5/9/20 - 205  5/16/20 - 207  6/23/20 - 209.8    CKD (chronic kidney disease) stage 3, GFR 30-59 ml/min (H)  BL Creat seemingly ~0.9-1.0  Last few  BMPs:   2/19/20: BUN 20, Creat 0.98, GFR 55  2/22/20: BUN 20, Creat 1.02, GFR 52  4/28/20: BUN 23, Creat 0.97, GFR 55    Is on Lasix, ACEI; no NSAIDs     Gastroesophageal reflux disease, esophagitis presence not specified  Hx of GIB (hiatal hernia, erosive gastritis)  History of GIB in 2017 while on pradaxa  Now on apixaban  On Omeprazole 20 mg daily    Patient denies any upset stomach or heartburn     Anemia, unspecified type  Hemoglobin   Date Value Ref Range Status   04/28/2020 11.1 (L) 11.7 - 15.7 g/dL Final   02/22/2020 11.8 11.7 - 15.7 g/dL Final     On Apixaban     No reports of bleeding     Primary osteoarthritis involving multiple joints  Analgesia with Tylenol 1000 mg daily and 1000 mg BID PRN, Voltaren Gel daily and BID PRN    Patient reports no pain today.     Acquired hypothyroidism  Lab Results   Component Value Date    TSH 2.25 02/19/2020     On levothyroxine 88 mcg daily  Patient denies any constipation, lethargy or depression     Slow transit constipation  On Senna 2 tab daily and 2 tab daily PRN  Patient reports no constipation     Vitamin D deficiency  On vitamin D 2000 units daily  No recent level on file     Impaired mobility and activities of daily living  Uses w/c in SNF where she resides d/t increased care needs.      ALLERGIES: Blood transfusion related (informational only)  PAST MEDICAL HISTORY:  has a past medical history of Cellulitis of left lower extremity, GERD (gastroesophageal reflux disease), HTN (hypertension), Type 2 diabetes mellitus without complication (H), and Ulcer of left lower extremity with fat layer exposed (H).  PAST SURGICAL HISTORY:  has a past surgical history that includes Hysterectomy and ESOPHAGOGASTRODUODENOSCOPY.  IMMUNIZATIONS:    There is no immunization history on file for this patient.  Above immunizations pulled from [employer]. [facility] and facility records also reconciled. Outstanding information sent to  to update [facility] Epic.   Future  immunizations are not needed at this point as all recommended immunizations are up to date.     Current Outpatient Medications   Medication Sig Dispense Refill     levothyroxine (SYNTHROID/LEVOTHROID) 75 MCG tablet Take 1 tablet (75 mcg) by mouth daily       acetaminophen (TYLENOL) 500 MG tablet Take 1,000 mg by mouth every morning Also BID PRN       apixaban ANTICOAGULANT (ELIQUIS) 5 MG tablet Take 1 tablet (5 mg) by mouth 2 times daily       atorvastatin (LIPITOR) 20 MG tablet Take 20 mg by mouth At Bedtime       calcium carbonate (TUMS) 500 MG chewable tablet Take 2 chew tab by mouth every 4 hours as needed for heartburn       cholecalciferol 1000 units TABS Take 2,000 Units by mouth daily       diclofenac (VOLTAREN) 1 % topical gel Apply 2 g topically daily AND BID PRN - Apply to right hand       Furosemide (LASIX PO) Take 10 mg by mouth daily       lisinopril (PRINIVIL/ZESTRIL) 10 MG tablet Take 1 tablet (10 mg) by mouth daily 30 tablet 11     metoprolol succinate ER (TOPROL-XL) 50 MG 24 hr tablet Take 50 mg by mouth daily       nitroGLYcerin (NITROSTAT) 0.4 MG sublingual tablet Place 0.4 mg under the tongue every 5 minutes as needed for chest pain For chest pain place 1 tablet under the tongue every 5 minutes for 3 doses. If symptoms persist 5 minutes after 1st dose call 911.       nystatin (MYCOSTATIN) 912752 UNIT/GM external powder Apply topically 2 times daily as needed       omeprazole (PRILOSEC) 20 MG DR capsule Take 20 mg by mouth daily       senna (SENOKOT) 8.6 MG tablet Take 2 tablets by mouth daily as needed for constipation        senna-docusate (SENOKOT-S/PERICOLACE) 8.6-50 MG tablet Take 2 tablets by mouth daily        Case Management:  I have reviewed the facility/SNF care plan/MDS, including the falls risk, nutrition and pain screening. I also reviewed the current immunizations, and preventive care. .Future cancer screening is not clinically indicated secondary to age/goals of care Patient's  "desire to return to the community is not assessible due to cognitive impairment. Current Level of Care is appropriate.    Advance Directive Discussion:    I reviewed the current advanced directives as reflected in EPIC, the POLST and the facility chart, and verified the congruency of orders.     Team Discussion:  I communicated with the appropriate disciplines involved with the Plan of Care:   Nursing    Patient's goal is pain control and comfort.  Information reviewed:  Medications, vital signs, orders, and nursing notes.    ROS:  Limited secondary to cognitive impairment but today pt reports 10 point ROS of systems including Constitutional, Eyes, Respiratory, Cardiovascular, Gastroenterology, Genitourinary, Integumentary, Musculoskeletal, Psychiatric were all negative except for pertinent positives noted in my HPI.    Vitals:  BP (!) 150/86   Pulse 77   Temp 96.3  F (35.7  C)   Resp 16   Ht 1.676 m (5' 6\")   Wt 95.2 kg (209 lb 12.8 oz)   SpO2 96%   BMI 33.86 kg/m   Body mass index is 33.86 kg/m .  Exam:  GENERAL APPEARANCE:  Alert, in no distress  RESP:  respiratory effort normal, no respiratory distress, on RA  CV:  No LE peripheral edema  ABDOMEN:  nondistended  M/S:   Gait and station with w/c for mobility, Digits and nails with arthritic changes, reduced muscle mass  SKIN:  Inspection and Palpation of skin and subcutaneous tissue pale, intact  PSYCH:  insight and judgement, memory with impairment, affect and mood normal, follows commands readily           Lab/Diagnostic data:   CBC RESULTS:   Recent Labs   Lab Test 04/28/20  1505 02/22/20  0030   WBC 5.9 6.5   RBC 3.48* 3.85   HGB 11.1* 11.8   HCT 34.2* 36.8   MCV 98 96   MCH 31.9 30.6   MCHC 32.5 32.1   RDW 14.2 13.3    209       Last Basic Metabolic Panel:  Recent Labs   Lab Test 04/28/20  1505 02/22/20  0030    142   POTASSIUM 4.0 3.9   CHLORIDE 110* 110*   MIKE 7.8* 9.1   CO2 27 24   BUN 23 20   CR 0.97 1.02   * 133*       Liver " Function Studies -   Recent Labs   Lab Test 02/22/20  0030 05/13/19  2215   PROTTOTAL 7.2 7.0   ALBUMIN 3.4 3.1*   BILITOTAL 0.4 0.5   ALKPHOS 73 80   AST 14 13   ALT 12 11       TSH   Date Value Ref Range Status   02/19/2020 2.25 0.40 - 4.00 mU/L Final   10/04/2019 4.73 (H) 0.40 - 4.00 mU/L Final   ]    Lab Results   Component Value Date    A1C 6.2 02/19/2020    A1C 6.7 04/15/2019         ASSESSMENT/PLAN  Vascular dementia with behavior disturbance (H)  Hx of Hypoglycemic encephalopathy  resides in SNF for increased care needs  Stable  Possibility of multiple personalities with progressing dementia as she sometimes flips into baby talk, etc.     Diet-controlled diabetes mellitus (H)  Stable off meds  Goal: HgbA1C between 8-9%. Per AGS there is potential harm in lowering the A1C to <6.5% in older adults with diabetes.     Chronic atrial fibrillation  Continue anticoagulation and Toprol for rate control  Monitor for bleeding    Chronic systolic heart failure (H)  Essential hypertension  History of ST elevation myocardial infarction (STEMI)  Hyperlipidemia LDL goal <100  Atherosclerosis of native coronary artery of native heart without angina pectoris  Continue regimen of Lasix, statin, ACEI, nitroglycerin Prn, Toprol XL  BP goals are ~130 -165/60 -90 mmHg.This is higher than ACC and AHA recommendations due to risk for hypotension, risk of dizziness and falls and risk of tissue/cerebral hypoperfusion. Patient is stable with current plan of care and routine assessment.      CKD (chronic kidney disease) stage 3, GFR 30-59 ml/min (H)  Will avoid nephrotoxic agents (such as ACEI/ARB/NSAIDs, IV contrast) and mindful prescribing with renal dosing.     History of stroke  Resides in SNF for increased care needs     Gastroesophageal reflux disease, esophagitis presence not specified  Hx of GIB (hiatal hernia, erosive gastritis)  Continue PPI despite known risks d/t history GIB and is on anticoagulation.     Anemia, unspecified  type  No need for Fe addition as Hgb >10  Monitor for bleeding     Primary osteoarthritis involving multiple joints  Continue regimen at this time  Monitor for ability to GDR Tylenol     Acquired hypothyroidism  TSH goal 4-6%  Decrease Levothyroxine to 75 mcg daily and recheck TSH with reflex to Free T4 in 8 weeks    Slow transit constipation  Stable on regimen  continue    Vitamin D deficiency  Stable on regimen.  Recheck Vit D level with next set of labs     Impaired mobility and activities of daily living  Resides in SNF and uses w/c for mobility      Orders written by provider at facility  1. Decrease Levothyroxine to 75 mcg daily Dx: hypothyroidism  2. Recheck TSH with reflex to Free T4 in 8 weeks. Dx: hypothyroidism     Electronically signed by:  NATIVIDAD Chapa CNP   Visit Details  Video or Telephone End Time: 1250  Distant Location (provider location):  [facility]         Sincerely,        NATIVIDAD Chapa CNP

## 2020-07-24 ENCOUNTER — HOSPITAL LABORATORY (OUTPATIENT)
Dept: NURSING HOME | Facility: OTHER | Age: 80
End: 2020-07-24

## 2020-07-24 LAB — TSH SERPL DL<=0.005 MIU/L-ACNC: 3.61 MU/L (ref 0.4–4)

## 2020-08-05 NOTE — PROGRESS NOTES
"[facility] Regulatory   Kristine Cosby is being evaluated via a billable video visit due to the restrictions of the Covid-19 pandemic.   The patient has been notified of following:  \"This video visit will be conducted via a call between you and your provider. We have found that certain health care needs can be provided without the need for an in-person physical exam.  This service lets us provide the care you need with a video conversation. If during the course of the call the provider feels a video visit is not appropriate, you will not be charged for this service.\"   The provider has received verbal consent for a Video Visit from the patient or first contact? Yes  Patient or facility staff would like the video invitation sent by: N/A   Video Start Time: 1334  [facility] Medical Record Number:  [id number]  Place of Location at the time of visit: [facility]   Chief Complaint   Patient presents with     [facility] Regulatory     HPI:  Kristine Cosby  is a 80 year old (1940), who is being seen today for a Regulatory visit.  HPI information obtained from: facility chart records, facility staff, patient report and [facility] chart review. Today's concern is:     Vascular dementia with behavior disturbance (H)  Hypoglycemic encephalopathy  Diet-controlled diabetes mellitus (H)  Chronic atrial fibrillation (H)  Chronic systolic heart failure (H)  Essential hypertension  History of ST elevation myocardial infarction (STEMI)  Hyperlipidemia LDL goal <100  Atherosclerosis of native coronary artery of native heart without angina pectoris  CKD (chronic kidney disease) stage 3, GFR 30-59 ml/min (H)  Acquired hypothyroidism     Patient is a 80 year old woman who resides at [facility] since 2/5/2019 after a hospitalization for metabolic encephalopathy d/t hypoglycemia.  She was admitted to LTC from TCU d/t increased care needs.  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   She has hospitalizations for episodes of unresponsiveness and episodes of \"baby talk\"  Extensive work up to determine if CVA present which have all been unremarkable. She has been to Neurology and EEG neg for seizure activity. She is also followed by Cardiology who noted unlikely cardiac- related.     Recent Med Changes:  - 3/3/20: Omeprazole 20 mg daily started when pharmacy reported no H2 blocker supply  - 4/28/20: Rocephin 1GM IM daily for 2 days  - 5/29/20: Decrease Levothyroxine to 75 mcg daily     Patient is met today in her SNF room with nursing graciously assisting for this video visit.  Patient is without complaint today, noting \"I'm not sick. I feel well.  I'm good!\"  She is pleasantly confused and oriented to self only. It should be noted that patient may be a poor historian   Nursing has no concerns.      Case Management and Team Discussion:  I spoke with Nursing staff at the facility regarding the current Plan of Care. I have reviewed the facility/SNF care plan/MDS, including the falls risk, nutrition and pain screening. I also reviewed the current immunizations, and preventive care.Patient's desire to return to the community is present, but is not able due to care needs . Current Level of Care is appropriate at this time. The Plan of Care is appropriate at this time.     Advance Directive Discussion:    I reviewed the current advanced directives as reflected in EPIC, the POLST and the facility chart, and verified the congruency of orders.     Past Medical and Surgical History reviewed in Epic today.  MEDICATIONS:    Current Outpatient Medications   Medication Sig Dispense Refill     acetaminophen (TYLENOL) 500 MG tablet Take 1,000 mg by mouth every morning Also BID PRN       apixaban ANTICOAGULANT (ELIQUIS) 5 MG tablet Take 1 tablet (5 " "mg) by mouth 2 times daily       atorvastatin (LIPITOR) 20 MG tablet Take 20 mg by mouth At Bedtime       calcium carbonate (TUMS) 500 MG chewable tablet Take 2 chew tab by mouth every 4 hours as needed for heartburn       cholecalciferol 1000 units TABS Take 2,000 Units by mouth daily       diclofenac (VOLTAREN) 1 % topical gel Apply 2 g topically daily AND BID PRN - Apply to right hand       Furosemide (LASIX PO) Take 10 mg by mouth daily       levothyroxine (SYNTHROID/LEVOTHROID) 75 MCG tablet Take 1 tablet (75 mcg) by mouth daily       lisinopril (PRINIVIL/ZESTRIL) 10 MG tablet Take 1 tablet (10 mg) by mouth daily 30 tablet 11     metoprolol succinate ER (TOPROL-XL) 50 MG 24 hr tablet Take 50 mg by mouth daily       nitroGLYcerin (NITROSTAT) 0.4 MG sublingual tablet Place 0.4 mg under the tongue every 5 minutes as needed for chest pain For chest pain place 1 tablet under the tongue every 5 minutes for 3 doses. If symptoms persist 5 minutes after 1st dose call 911.       nystatin (MYCOSTATIN) 140484 UNIT/GM external powder Apply topically 2 times daily as needed       omeprazole (PRILOSEC) 20 MG DR capsule Take 20 mg by mouth daily       senna (SENOKOT) 8.6 MG tablet Take 2 tablets by mouth daily as needed for constipation        senna-docusate (SENOKOT-S/PERICOLACE) 8.6-50 MG tablet Take 2 tablets by mouth daily        REVIEW OF SYSTEMS: Limited secondary to cognitive impairment but today pt reports 10 point ROS of systems including Constitutional, Eyes, Respiratory, Cardiovascular, Gastroenterology, Genitourinary, Integumentary, Musculoskeletal, Psychiatric were all negative except for pertinent positives noted in my HPI.  Objective: BP (!) 141/63   Pulse 86   Temp 97.8  F (36.6  C)   Resp 16   Ht 1.676 m (5' 6\")   Wt 92.8 kg (204 lb 9.6 oz)   SpO2 95%   BMI 33.02 kg/m    Limited visit exam done given COVID-19 precautions.   GENERAL APPEARANCE:  Alert, in no distress, pleasantly confused  RESP:  " respiratory effort normal, no respiratory distress, on RA  CV:  LANA for LE peripheral edema for this video visit   ABDOMEN:  nondistended  M/S:   Gait and station with w/c for mobility, Digits and nails with arthritic changes, reduced muscle mass  SKIN:  Inspection and Palpation of skin and subcutaneous tissue pale, intact  PSYCH:  insight and judgement, memory with impairment, affect and mood normal, follows commands readily but forgets quickly     Labs:   CBC RESULTS:   Recent Labs   Lab Test 04/28/20  1505 02/22/20  0030   WBC 5.9 6.5   RBC 3.48* 3.85   HGB 11.1* 11.8   HCT 34.2* 36.8   MCV 98 96   MCH 31.9 30.6   MCHC 32.5 32.1   RDW 14.2 13.3    209       Last Basic Metabolic Panel:  Recent Labs   Lab Test 04/28/20  1505 02/22/20  0030    142   POTASSIUM 4.0 3.9   CHLORIDE 110* 110*   MIKE 7.8* 9.1   CO2 27 24   BUN 23 20   CR 0.97 1.02   * 133*       Liver Function Studies -   Recent Labs   Lab Test 02/22/20  0030 05/13/19  2215   PROTTOTAL 7.2 7.0   ALBUMIN 3.4 3.1*   BILITOTAL 0.4 0.5   ALKPHOS 73 80   AST 14 13   ALT 12 11       TSH   Date Value Ref Range Status   07/24/2020 3.61 0.40 - 4.00 mU/L Final   02/19/2020 2.25 0.40 - 4.00 mU/L Final   ]    Lab Results   Component Value Date    A1C 6.2 02/19/2020    A1C 6.7 04/15/2019         ASSESSMENT/PLAN:  Vascular dementia with behavior disturbance (H)  Hx of Hypoglycemic encephalopathy  F/b: Dr Nunes, Neurology   12/2018 hospitalized for encephalopathy related to low glucose levels.    Since then has had episodes of unresponsiveness (unarousable but stable VS) and baby talk which can last several hours to over 24 hours.   Last saw Neurology on 6/4/19 who stopped tamsulosin and agreed with discontinue of keppra.  MRI was ordered and completed 6/7/29:  IMPRESSION:  1. No interval change.   2. No acute intracranial abnormality.   3. Normal brain parenchymal morphology. Stable focal encephalomalacia and gliosis ovoid infarct involving the right  "frontal lobe and frontal operculum.   4. Stable patchy T2/FLAIR signal hyperintensity within the white matter consistent with chronic small vessel ischemic changes. Multiple lacunar infarcts of the bilateral cerebellar hemispheres   5. No acute or chronic intracranial hemorrhage     On apixaban and statin for cva ppx.   In-house psychology is following. Resides in SNF for increased care needs    A&O x 1 (self) only   BIMS 4/15  PHQ9 - 0    PLAN:  - nursing for increased care needs, appropriate for SNF LTC  - apixaban and statin for CVA ppx        Diet-controlled diabetes mellitus (H)  Lab Results   Component Value Date    A1C 6.2 02/19/2020    A1C 6.7 04/15/2019     On no meds  No ASA d/t GIB history but is on apixaban; is on ACEI and statin    Patient denies numbness/tingling    PLAN:  - monitor  - statin, ACEI, apixaban      Chronic atrial fibrillation (H)  Previously on Prradaxa which was discontinue\"d d/t GIB, then restarted on apixaban d/t concern for CVA with known a fib (apixaban 5 mg BID)  On Toprol XL 50 mg daily for rate control    HRs 70-80s    PLAN:  - Toprol for rate control  - apixaban for anticoagulation  - monitor HR     Chronic systolic heart failure (H)  Essential hypertension  History of ST elevation myocardial infarction (STEMI)  Hyperlipidemia LDL goal <100  Atherosclerosis of native coronary artery of native heart without angina pectoris  On Lasix 10 mg daily, atorvastatin 20 mg q HS, lisinopril 10 mg daily, nitroglycerin PRN, Toprol 50 mg daily,   4/23/19 ECHO: The left ventricle is normal in size. The visual ejection fraction is estimated at 40%. Mid-distal anteroseptal and apical walls are severely hypokinetic, mild hypokinesis elsewhere. The right ventricle is normal in structure, function and size. No significant valve disease. The ascending aorta is Mildly dilated (3.9cm).    BPs mostly 120-140s/70-80s  HRs 70-80s  Patient denies CP, HA, lightheadedness (may be poor historian)   LE edema " none per nursing     Weights overall down 207 --> 204 lbs    PLAN:  - continue atorvastain, nitroglycerin prn  - continue Toprol XL  - Continue Lasix   - Continue lisinopril  - BMP recheck on 8/7 for monitoring of fluid status with Lasix     CKD (chronic kidney disease) stage 3, GFR 30-59 ml/min (H)  BL Creat seemingly ~0.9-1.0  Last few BMPs:   2/19/20: BUN 20, Creat 0.98, GFR 55  2/22/20: BUN 20, Creat 1.02, GFR 52  4/28/20: BUN 23, Creat 0.97, GFR 55     Is on Lasix, ACEI; no NSAIDs     PLAN:  - Will avoid nephrotoxic agents (such as ACEI/ARB/NSAIDs, IV contrast) and mindful prescribing with renal dosing.   - recheck BMP for stability     Acquired hypothyroidism  On levothyroxine 75 mcg daily  TSH   Date Value Ref Range Status   07/24/2020 3.61 0.40 - 4.00 mU/L Final     Patient denies constipation, lethargy    PLAN:  - levothyroxine 75 mcg daily  - TSH goal 4-6%      Orders written by provider at facility  1. Hgb A1C and BMP 8/7/20 Dx: CHF, DM2    Electronically signed by:  NATIVIDAD Chapa CNP   Video-Visit Details  Type of service:  Video Visit  Video End Time (time video stopped): 0158  Distant Location (provider location):  [facility]

## 2020-08-06 ENCOUNTER — VIRTUAL VISIT (OUTPATIENT)
Dept: GERIATRICS | Facility: CLINIC | Age: 80
End: 2020-08-06
Payer: COMMERCIAL

## 2020-08-06 VITALS
OXYGEN SATURATION: 95 % | WEIGHT: 204.6 LBS | SYSTOLIC BLOOD PRESSURE: 141 MMHG | HEIGHT: 66 IN | RESPIRATION RATE: 16 BRPM | HEART RATE: 86 BPM | BODY MASS INDEX: 32.88 KG/M2 | DIASTOLIC BLOOD PRESSURE: 63 MMHG | TEMPERATURE: 97.8 F

## 2020-08-06 DIAGNOSIS — E03.9 ACQUIRED HYPOTHYROIDISM: ICD-10-CM

## 2020-08-06 DIAGNOSIS — E16.2 HYPOGLYCEMIC ENCEPHALOPATHY: ICD-10-CM

## 2020-08-06 DIAGNOSIS — I10 ESSENTIAL HYPERTENSION: ICD-10-CM

## 2020-08-06 DIAGNOSIS — I25.10 ATHEROSCLEROSIS OF NATIVE CORONARY ARTERY OF NATIVE HEART WITHOUT ANGINA PECTORIS: ICD-10-CM

## 2020-08-06 DIAGNOSIS — I48.20 CHRONIC ATRIAL FIBRILLATION (H): ICD-10-CM

## 2020-08-06 DIAGNOSIS — I25.2 HISTORY OF ST ELEVATION MYOCARDIAL INFARCTION (STEMI): ICD-10-CM

## 2020-08-06 DIAGNOSIS — F01.518 VASCULAR DEMENTIA WITH BEHAVIOR DISTURBANCE (H): Primary | ICD-10-CM

## 2020-08-06 DIAGNOSIS — E11.9 DIET-CONTROLLED DIABETES MELLITUS (H): ICD-10-CM

## 2020-08-06 DIAGNOSIS — I50.22 CHRONIC SYSTOLIC HEART FAILURE (H): ICD-10-CM

## 2020-08-06 DIAGNOSIS — N18.30 CKD (CHRONIC KIDNEY DISEASE) STAGE 3, GFR 30-59 ML/MIN (H): ICD-10-CM

## 2020-08-06 DIAGNOSIS — E78.5 HYPERLIPIDEMIA LDL GOAL <100: ICD-10-CM

## 2020-08-06 PROCEDURE — 99309 SBSQ NF CARE MODERATE MDM 30: CPT | Mod: 95 | Performed by: NURSE PRACTITIONER

## 2020-08-06 ASSESSMENT — MIFFLIN-ST. JEOR: SCORE: 1419.81

## 2020-08-06 NOTE — LETTER
"    8/6/2020        RE: Kristine Cosby  [facility]  [address] [facility] [address]        [place] [facility]   Kristine Cosby is being evaluated via a billable video visit due to the restrictions of the Covid-19 pandemic.   The patient has been notified of following:  \"This video visit will be conducted via a call between you and your provider. We have found that certain health care needs can be provided without the need for an in-person physical exam.  This service lets us provide the care you need with a video conversation. If during the course of the call the provider feels a video visit is not appropriate, you will not be charged for this service.\"   The provider has received verbal consent for a Video Visit from the patient or first contact? Yes  Patient or facility staff would like the video invitation sent by: N/A   Video Start Time: 1334  [place] Medical Record Number:  [id number]  Place of Location at the time of visit: [facility]   Chief Complaint   Patient presents with     FDC Regulatory     HPI:  Kristine Cosby  is a 80 year old (1940), who is being seen today for a Regulatory visit.  HPI information obtained from: facility chart records, facility staff, patient report and [facility] chart review. Today's concern is:     Vascular dementia with behavior disturbance (H)  Hypoglycemic encephalopathy  Diet-controlled diabetes mellitus (H)  Chronic atrial fibrillation (H)  Chronic systolic heart failure (H)  Essential hypertension  History of ST elevation myocardial infarction (STEMI)  Hyperlipidemia LDL goal <100  Atherosclerosis of native coronary artery of native heart without angina pectoris  CKD (chronic kidney disease) stage 3, GFR 30-59 ml/min (H)  Acquired hypothyroidism     Patient is a 80 year old woman who resides at [facility] since 2/5/2019 after a hospitalization for metabolic encephalopathy d/t hypoglycemia.  " "She was admitted to LTC from TCU d/t increased care needs.  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   She has hospitalizations for episodes of unresponsiveness and episodes of \"baby talk\"  Extensive work up to determine if CVA present which have all been unremarkable. She has been to Neurology and EEG neg for seizure activity. She is also followed by Cardiology who noted unlikely cardiac- related.     Recent Med Changes:  - 3/3/20: Omeprazole 20 mg daily started when pharmacy reported no H2 blocker supply  - 4/28/20: Rocephin 1GM IM daily for 2 days  - 5/29/20: Decrease Levothyroxine to 75 mcg daily     Patient is met today in her SNF room with nursing graciously assisting for this video visit.  Patient is without complaint today, noting \"I'm not sick. I feel well.  I'm good!\"  She is pleasantly confused and oriented to self only. It should be noted that patient may be a poor historian   Nursing has no concerns.      Case Management and Team Discussion:  I spoke with Nursing staff at the facility regarding the current Plan of Care. I have reviewed the facility/SNF care plan/MDS, including the falls risk, nutrition and pain screening. I also reviewed the current immunizations, and preventive care.Patient's desire to return to the community is present, but is not able due to care needs . Current Level of Care is appropriate at this time. The Plan of Care is appropriate at this time.     Advance Directive Discussion:    I reviewed the current advanced directives as reflected in EPIC, the POLST and the facility chart, and verified the congruency of orders.     Past Medical and Surgical History reviewed in Epic today.  MEDICATIONS:    Current Outpatient Medications   Medication Sig Dispense Refill     acetaminophen (TYLENOL) 500 MG tablet Take " "1,000 mg by mouth every morning Also BID PRN       apixaban ANTICOAGULANT (ELIQUIS) 5 MG tablet Take 1 tablet (5 mg) by mouth 2 times daily       atorvastatin (LIPITOR) 20 MG tablet Take 20 mg by mouth At Bedtime       calcium carbonate (TUMS) 500 MG chewable tablet Take 2 chew tab by mouth every 4 hours as needed for heartburn       cholecalciferol 1000 units TABS Take 2,000 Units by mouth daily       diclofenac (VOLTAREN) 1 % topical gel Apply 2 g topically daily AND BID PRN - Apply to right hand       Furosemide (LASIX PO) Take 10 mg by mouth daily       levothyroxine (SYNTHROID/LEVOTHROID) 75 MCG tablet Take 1 tablet (75 mcg) by mouth daily       lisinopril (PRINIVIL/ZESTRIL) 10 MG tablet Take 1 tablet (10 mg) by mouth daily 30 tablet 11     metoprolol succinate ER (TOPROL-XL) 50 MG 24 hr tablet Take 50 mg by mouth daily       nitroGLYcerin (NITROSTAT) 0.4 MG sublingual tablet Place 0.4 mg under the tongue every 5 minutes as needed for chest pain For chest pain place 1 tablet under the tongue every 5 minutes for 3 doses. If symptoms persist 5 minutes after 1st dose call 911.       nystatin (MYCOSTATIN) 245396 UNIT/GM external powder Apply topically 2 times daily as needed       omeprazole (PRILOSEC) 20 MG DR capsule Take 20 mg by mouth daily       senna (SENOKOT) 8.6 MG tablet Take 2 tablets by mouth daily as needed for constipation        senna-docusate (SENOKOT-S/PERICOLACE) 8.6-50 MG tablet Take 2 tablets by mouth daily        REVIEW OF SYSTEMS: Limited secondary to cognitive impairment but today pt reports 10 point ROS of systems including Constitutional, Eyes, Respiratory, Cardiovascular, Gastroenterology, Genitourinary, Integumentary, Musculoskeletal, Psychiatric were all negative except for pertinent positives noted in my HPI.  Objective: BP (!) 141/63   Pulse 86   Temp 97.8  F (36.6  C)   Resp 16   Ht 1.676 m (5' 6\")   Wt 92.8 kg (204 lb 9.6 oz)   SpO2 95%   BMI 33.02 kg/m    Limited visit exam " done given COVID-19 precautions.   GENERAL APPEARANCE:  Alert, in no distress, pleasantly confused  RESP:  respiratory effort normal, no respiratory distress, on RA  CV:  LANA for LE peripheral edema for this video visit   ABDOMEN:  nondistended  M/S:   Gait and station with w/c for mobility, Digits and nails with arthritic changes, reduced muscle mass  SKIN:  Inspection and Palpation of skin and subcutaneous tissue pale, intact  PSYCH:  insight and judgement, memory with impairment, affect and mood normal, follows commands readily but forgets quickly     Labs:   CBC RESULTS:   Recent Labs   Lab Test 04/28/20  1505 02/22/20  0030   WBC 5.9 6.5   RBC 3.48* 3.85   HGB 11.1* 11.8   HCT 34.2* 36.8   MCV 98 96   MCH 31.9 30.6   MCHC 32.5 32.1   RDW 14.2 13.3    209       Last Basic Metabolic Panel:  Recent Labs   Lab Test 04/28/20  1505 02/22/20  0030    142   POTASSIUM 4.0 3.9   CHLORIDE 110* 110*   MIKE 7.8* 9.1   CO2 27 24   BUN 23 20   CR 0.97 1.02   * 133*       Liver Function Studies -   Recent Labs   Lab Test 02/22/20  0030 05/13/19  2215   PROTTOTAL 7.2 7.0   ALBUMIN 3.4 3.1*   BILITOTAL 0.4 0.5   ALKPHOS 73 80   AST 14 13   ALT 12 11       TSH   Date Value Ref Range Status   07/24/2020 3.61 0.40 - 4.00 mU/L Final   02/19/2020 2.25 0.40 - 4.00 mU/L Final   ]    Lab Results   Component Value Date    A1C 6.2 02/19/2020    A1C 6.7 04/15/2019         ASSESSMENT/PLAN:  Vascular dementia with behavior disturbance (H)  Hx of Hypoglycemic encephalopathy  F/b: Dr Nunes, Neurology   12/2018 hospitalized for encephalopathy related to low glucose levels.    Since then has had episodes of unresponsiveness (unarousable but stable VS) and baby talk which can last several hours to over 24 hours.   Last saw Neurology on 6/4/19 who stopped tamsulosin and agreed with discontinue of keppra.  MRI was ordered and completed 6/7/29:  IMPRESSION:  1. No interval change.   2. No acute intracranial abnormality.   3. Normal  "brain parenchymal morphology. Stable focal encephalomalacia and gliosis ovoid infarct involving the right frontal lobe and frontal operculum.   4. Stable patchy T2/FLAIR signal hyperintensity within the white matter consistent with chronic small vessel ischemic changes. Multiple lacunar infarcts of the bilateral cerebellar hemispheres   5. No acute or chronic intracranial hemorrhage     On apixaban and statin for cva ppx.   In-house psychology is following. Resides in SNF for increased care needs    A&O x 1 (self) only   BIMS 4/15  PHQ9 - 0    PLAN:  - nursing for increased care needs, appropriate for SNF LTC  - apixaban and statin for CVA ppx        Diet-controlled diabetes mellitus (H)  Lab Results   Component Value Date    A1C 6.2 02/19/2020    A1C 6.7 04/15/2019     On no meds  No ASA d/t GIB history but is on apixaban; is on ACEI and statin    Patient denies numbness/tingling    PLAN:  - monitor  - statin, ACEI, apixaban      Chronic atrial fibrillation (H)  Previously on Prradaxa which was discontinue\"d d/t GIB, then restarted on apixaban d/t concern for CVA with known a fib (apixaban 5 mg BID)  On Toprol XL 50 mg daily for rate control    HRs 70-80s    PLAN:  - Toprol for rate control  - apixaban for anticoagulation  - monitor HR     Chronic systolic heart failure (H)  Essential hypertension  History of ST elevation myocardial infarction (STEMI)  Hyperlipidemia LDL goal <100  Atherosclerosis of native coronary artery of native heart without angina pectoris  On Lasix 10 mg daily, atorvastatin 20 mg q HS, lisinopril 10 mg daily, nitroglycerin PRN, Toprol 50 mg daily,   4/23/19 ECHO: The left ventricle is normal in size. The visual ejection fraction is estimated at 40%. Mid-distal anteroseptal and apical walls are severely hypokinetic, mild hypokinesis elsewhere. The right ventricle is normal in structure, function and size. No significant valve disease. The ascending aorta is Mildly dilated (3.9cm).    BPs " mostly 120-140s/70-80s  HRs 70-80s  Patient denies CP, HA, lightheadedness (may be poor historian)   LE edema none per nursing     Weights overall down 207 --> 204 lbs    PLAN:  - continue atorvastain, nitroglycerin prn  - continue Toprol XL  - Continue Lasix   - Continue lisinopril  - BMP recheck on 8/7 for monitoring of fluid status with Lasix     CKD (chronic kidney disease) stage 3, GFR 30-59 ml/min (H)  BL Creat seemingly ~0.9-1.0  Last few BMPs:   2/19/20: BUN 20, Creat 0.98, GFR 55  2/22/20: BUN 20, Creat 1.02, GFR 52  4/28/20: BUN 23, Creat 0.97, GFR 55     Is on Lasix, ACEI; no NSAIDs     PLAN:  - Will avoid nephrotoxic agents (such as ACEI/ARB/NSAIDs, IV contrast) and mindful prescribing with renal dosing.   - recheck BMP for stability     Acquired hypothyroidism  On levothyroxine 75 mcg daily  TSH   Date Value Ref Range Status   07/24/2020 3.61 0.40 - 4.00 mU/L Final     Patient denies constipation, lethargy    PLAN:  - levothyroxine 75 mcg daily  - TSH goal 4-6%      Orders written by provider at facility  1. Hgb A1C and BMP 8/7/20 Dx: CHF, DM2    Electronically signed by:  NATIVIDAD Chapa CNP   Video-Visit Details  Type of service:  Video Visit  Video End Time (time video stopped): 1338  Distant Location (provider location):  [facility] [facility]             Sincerely,        NATIVIDAD Chapa CNP

## 2020-08-07 ENCOUNTER — HOSPITAL LABORATORY (OUTPATIENT)
Dept: NURSING HOME | Facility: OTHER | Age: 80
End: 2020-08-07

## 2020-08-07 LAB
ANION GAP SERPL CALCULATED.3IONS-SCNC: 4 MMOL/L (ref 3–14)
BUN SERPL-MCNC: 22 MG/DL (ref 7–30)
CALCIUM SERPL-MCNC: 9 MG/DL (ref 8.5–10.1)
CHLORIDE SERPL-SCNC: 108 MMOL/L (ref 94–109)
CO2 SERPL-SCNC: 30 MMOL/L (ref 20–32)
CREAT SERPL-MCNC: 0.88 MG/DL (ref 0.52–1.04)
GFR SERPL CREATININE-BSD FRML MDRD: 63 ML/MIN/{1.73_M2}
GLUCOSE SERPL-MCNC: 107 MG/DL (ref 70–99)
HBA1C MFR BLD: 6.2 % (ref 0–5.6)
POTASSIUM SERPL-SCNC: 4.3 MMOL/L (ref 3.4–5.3)
SODIUM SERPL-SCNC: 142 MMOL/L (ref 133–144)

## 2020-09-17 NOTE — PROGRESS NOTES
"[facility]  [place] Medical Record Number:  [id number]  Place of Service where encounter took place:  No question data found.  Chief Complaint   Patient presents with     RECHECK       HPI:    Kristine Cosby  is a 80 year old (1940), who is being seen today for an episodic care visit.  HPI information obtained from: facility chart records, facility staff, patient report, [facility] chart review and family/first contact pt's daughter's report. Today's concern is:     Primary osteoarthritis involving multiple joints  Primary localized osteoarthrosis of left lower leg  Arthralgia of left lower leg     Patient is a 80 year old woman who resides at [facility] since 2/5/2019 after a hospitalization for metabolic encephalopathy d/t hypoglycemia.  She was admitted to LTC from TCU d/t increased care needs.  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   She has hospitalizations for episodes of unresponsiveness and episodes of \"baby talk\"  Extensive work up to determine if CVA present which have all been unremarkable. She has been to Neurology and EEG neg for seizure activity. She is also followed by Cardiology who noted unlikely cardiac- related.      Recent Med Changes:  - 3/3/20: Omeprazole 20 mg daily started when pharmacy reported no H2 blocker supply  - 4/28/20: Rocephin 1GM IM daily for 2 days  - 5/29/20: Decrease Levothyroxine to 75 mcg daily     Patient's family has requested a left knee steroid injection for recurring pain.     6/12/19 left knee xray showing moderate DJD.  Injection had been offered after this imaging and patient declined.    Today patient's daughter was contacted and yes, would like a steroid injection for her mom.  RvB was discussed and she gave consent.  Patient was met in her SNF room " where she noted she had steroid injections in the past and they only helped for a short period but was open to it.  Noted reduced ROM and even light touch, patient was sensitive over left knee.       Past Medical and Surgical History reviewed in Epic today.    MEDICATIONS:  Current Outpatient Medications   Medication Sig Dispense Refill     acetaminophen (TYLENOL) 500 MG tablet Take 1,000 mg by mouth every morning Also BID PRN       apixaban ANTICOAGULANT (ELIQUIS) 5 MG tablet Take 1 tablet (5 mg) by mouth 2 times daily       atorvastatin (LIPITOR) 20 MG tablet Take 20 mg by mouth At Bedtime       calcium carbonate (TUMS) 500 MG chewable tablet Take 2 chew tab by mouth every 4 hours as needed for heartburn       cholecalciferol 1000 units TABS Take 2,000 Units by mouth daily       diclofenac (VOLTAREN) 1 % topical gel Apply 2 g topically daily AND BID PRN - Apply to right hand       Furosemide (LASIX PO) Take 10 mg by mouth daily       levothyroxine (SYNTHROID/LEVOTHROID) 75 MCG tablet Take 1 tablet (75 mcg) by mouth daily       lisinopril (PRINIVIL/ZESTRIL) 10 MG tablet Take 1 tablet (10 mg) by mouth daily 30 tablet 11     metoprolol succinate ER (TOPROL-XL) 50 MG 24 hr tablet Take 50 mg by mouth daily       nitroGLYcerin (NITROSTAT) 0.4 MG sublingual tablet Place 0.4 mg under the tongue every 5 minutes as needed for chest pain For chest pain place 1 tablet under the tongue every 5 minutes for 3 doses. If symptoms persist 5 minutes after 1st dose call 911.       nystatin (MYCOSTATIN) 330889 UNIT/GM external powder Apply topically 2 times daily as needed       omeprazole (PRILOSEC) 20 MG DR capsule Take 20 mg by mouth daily       senna (SENOKOT) 8.6 MG tablet Take 2 tablets by mouth daily as needed for constipation        senna-docusate (SENOKOT-S/PERICOLACE) 8.6-50 MG tablet Take 2 tablets by mouth daily          REVIEW OF SYSTEMS:  Limited secondary to cognitive impairment but today pt reports 4 point ROS  including Respiratory, CV, GI and , other than that noted in the HPI,  is negative    Objective:  /64   Pulse 68   Temp 98.5  F (36.9  C)   Resp 18   Wt 95.7 kg (211 lb)   SpO2 93%   BMI 34.06 kg/m    Exam:  GENERAL APPEARANCE:  Alert, in no distress, confused per baseline  RESP:  respiratory effort normal, no respiratory distress, on RA  CV:  Scant LE peripheral edema  ABDOMEN:  nondistended  M/S:   Gait and station with w/c fo rmobility, Digits and nails with arthritic changes, reduced muscle mass, tenderness over left knee and reduced ROM, pain with movement.   SKIN:  Inspection and Palpation of skin and subcutaneous tissue intact  PSYCH:  insight and judgement, memory with impairment, affect and mood normal, follows commands readily but forgets quickly       Labs:   Reviewed     ASSESSMENT/PLAN:     Primary osteoarthritis involving multiple joints  Primary localized osteoarthrosis of left lower leg  Arthralgia of left lower leg     Risks vs benefits of injection discussed including but not limited to infection, contents of injection, expected results of injection.  Patient and her daughter (POA) elected to proceed.  Using sterile technique, the left knee was prepped with alcohol.  A 25 gauge needle was used to inject 40 mg DepoMedrol and 4 cc of 1% lidocaine into left knee, using lateral approach.  Slight aspiration before injection of steroids/lidocaine assured no blood vessel involvement. Patient tolerated the procedure well and without complications. No bleeding noted but band-aid applied.        Electronically signed by:  NATIVIDAD Chapa CNP

## 2020-09-18 ENCOUNTER — NURSING HOME VISIT (OUTPATIENT)
Dept: GERIATRICS | Facility: CLINIC | Age: 80
End: 2020-09-18
Payer: COMMERCIAL

## 2020-09-18 VITALS
BODY MASS INDEX: 34.06 KG/M2 | TEMPERATURE: 98.5 F | SYSTOLIC BLOOD PRESSURE: 112 MMHG | RESPIRATION RATE: 18 BRPM | OXYGEN SATURATION: 93 % | DIASTOLIC BLOOD PRESSURE: 64 MMHG | HEART RATE: 68 BPM | WEIGHT: 211 LBS

## 2020-09-18 DIAGNOSIS — M15.0 PRIMARY OSTEOARTHRITIS INVOLVING MULTIPLE JOINTS: Primary | ICD-10-CM

## 2020-09-18 DIAGNOSIS — M25.562 ARTHRALGIA OF LEFT LOWER LEG: ICD-10-CM

## 2020-09-18 DIAGNOSIS — M17.12 PRIMARY LOCALIZED OSTEOARTHROSIS OF LEFT LOWER LEG: ICD-10-CM

## 2020-09-18 PROCEDURE — 20610 DRAIN/INJ JOINT/BURSA W/O US: CPT | Mod: LT | Performed by: NURSE PRACTITIONER

## 2020-09-18 RX ORDER — LIDOCAINE HYDROCHLORIDE 10 MG/ML
4 INJECTION, SOLUTION INFILTRATION; PERINEURAL ONCE
Status: COMPLETED | OUTPATIENT
Start: 2020-09-18 | End: 2020-09-18

## 2020-09-18 RX ORDER — METHYLPREDNISOLONE ACETATE 40 MG/ML
40 INJECTION, SUSPENSION INTRA-ARTICULAR; INTRALESIONAL; INTRAMUSCULAR; SOFT TISSUE ONCE
Status: COMPLETED | OUTPATIENT
Start: 2020-09-18 | End: 2020-09-18

## 2020-09-18 RX ADMIN — LIDOCAINE HYDROCHLORIDE 4 ML: 10 INJECTION, SOLUTION INFILTRATION; PERINEURAL at 15:48

## 2020-09-18 RX ADMIN — METHYLPREDNISOLONE ACETATE 40 MG: 40 INJECTION, SUSPENSION INTRA-ARTICULAR; INTRALESIONAL; INTRAMUSCULAR; SOFT TISSUE at 15:48

## 2020-09-18 NOTE — LETTER
"    9/18/2020        RE: Kristine Cosby  [facility]  [address] [facility] [address]        [place] [facility]  [place] Medical Record Number:  [id number]  Place of Service where encounter took place:  No question data found.  Chief Complaint   Patient presents with     RECHECK       HPI:    Kristine Cosby  is a 80 year old (1940), who is being seen today for an episodic care visit.  HPI information obtained from: facility chart records, facility staff, patient report, [facility] chart review and family/first contact pt's daughter's report. Today's concern is:     Primary osteoarthritis involving multiple joints  Primary localized osteoarthrosis of left lower leg  Arthralgia of left lower leg     Patient is a 80 year old woman who resides at [facility] since 2/5/2019 after a hospitalization for metabolic encephalopathy d/t hypoglycemia.  She was admitted to LTC from TCU d/t increased care needs.  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   She has hospitalizations for episodes of unresponsiveness and episodes of \"baby talk\"  Extensive work up to determine if CVA present which have all been unremarkable. She has been to Neurology and EEG neg for seizure activity. She is also followed by Cardiology who noted unlikely cardiac- related.      Recent Med Changes:  - 3/3/20: Omeprazole 20 mg daily started when pharmacy reported no H2 blocker supply  - 4/28/20: Rocephin 1GM IM daily for 2 days  - 5/29/20: Decrease Levothyroxine to 75 mcg daily     Patient's family has requested a left knee steroid injection for recurring pain.     6/12/19 left knee xray showing moderate DJD.  Injection had been offered after this imaging and patient declined.    Today patient's daughter was contacted and yes, would " like a steroid injection for her mom.  RvB was discussed and she gave consent.  Patient was met in her SNF room where she noted she had steroid injections in the past and they only helped for a short period but was open to it.  Noted reduced ROM and even light touch, patient was sensitive over left knee.       Past Medical and Surgical History reviewed in Epic today.    MEDICATIONS:  Current Outpatient Medications   Medication Sig Dispense Refill     acetaminophen (TYLENOL) 500 MG tablet Take 1,000 mg by mouth every morning Also BID PRN       apixaban ANTICOAGULANT (ELIQUIS) 5 MG tablet Take 1 tablet (5 mg) by mouth 2 times daily       atorvastatin (LIPITOR) 20 MG tablet Take 20 mg by mouth At Bedtime       calcium carbonate (TUMS) 500 MG chewable tablet Take 2 chew tab by mouth every 4 hours as needed for heartburn       cholecalciferol 1000 units TABS Take 2,000 Units by mouth daily       diclofenac (VOLTAREN) 1 % topical gel Apply 2 g topically daily AND BID PRN - Apply to right hand       Furosemide (LASIX PO) Take 10 mg by mouth daily       levothyroxine (SYNTHROID/LEVOTHROID) 75 MCG tablet Take 1 tablet (75 mcg) by mouth daily       lisinopril (PRINIVIL/ZESTRIL) 10 MG tablet Take 1 tablet (10 mg) by mouth daily 30 tablet 11     metoprolol succinate ER (TOPROL-XL) 50 MG 24 hr tablet Take 50 mg by mouth daily       nitroGLYcerin (NITROSTAT) 0.4 MG sublingual tablet Place 0.4 mg under the tongue every 5 minutes as needed for chest pain For chest pain place 1 tablet under the tongue every 5 minutes for 3 doses. If symptoms persist 5 minutes after 1st dose call 911.       nystatin (MYCOSTATIN) 145460 UNIT/GM external powder Apply topically 2 times daily as needed       omeprazole (PRILOSEC) 20 MG DR capsule Take 20 mg by mouth daily       senna (SENOKOT) 8.6 MG tablet Take 2 tablets by mouth daily as needed for constipation        senna-docusate (SENOKOT-S/PERICOLACE) 8.6-50 MG tablet Take 2 tablets by mouth daily           REVIEW OF SYSTEMS:  Limited secondary to cognitive impairment but today pt reports 4 point ROS including Respiratory, CV, GI and , other than that noted in the HPI,  is negative    Objective:  /64   Pulse 68   Temp 98.5  F (36.9  C)   Resp 18   Wt 95.7 kg (211 lb)   SpO2 93%   BMI 34.06 kg/m    Exam:  GENERAL APPEARANCE:  Alert, in no distress, confused per baseline  RESP:  respiratory effort normal, no respiratory distress, on RA  CV:  Scant LE peripheral edema  ABDOMEN:  nondistended  M/S:   Gait and station with w/c fo rmobility, Digits and nails with arthritic changes, reduced muscle mass, tenderness over left knee and reduced ROM, pain with movement.   SKIN:  Inspection and Palpation of skin and subcutaneous tissue intact  PSYCH:  insight and judgement, memory with impairment, affect and mood normal, follows commands readily but forgets quickly       Labs:   Reviewed     ASSESSMENT/PLAN:     Primary osteoarthritis involving multiple joints  Primary localized osteoarthrosis of left lower leg  Arthralgia of left lower leg     Risks vs benefits of injection discussed including but not limited to infection, contents of injection, expected results of injection.  Patient and her daughter (POA) elected to proceed.  Using sterile technique, the left knee was prepped with alcohol.  A 25 gauge needle was used to inject 40 mg DepoMedrol and 4 cc of 1% lidocaine into left knee, using lateral approach.  Slight aspiration before injection of steroids/lidocaine assured no blood vessel involvement. Patient tolerated the procedure well and without complications. No bleeding noted but band-aid applied.        Electronically signed by:  NATIVIDAD Chapa CNP               Sincerely,        NATIVIDAD Chapa CNP

## 2020-10-29 ENCOUNTER — NURSING HOME VISIT (OUTPATIENT)
Dept: GERIATRICS | Facility: CLINIC | Age: 80
End: 2020-10-29
Payer: COMMERCIAL

## 2020-10-29 DIAGNOSIS — R51.9 ACUTE NONINTRACTABLE HEADACHE, UNSPECIFIED HEADACHE TYPE: ICD-10-CM

## 2020-10-29 DIAGNOSIS — E03.9 ACQUIRED HYPOTHYROIDISM: ICD-10-CM

## 2020-10-29 DIAGNOSIS — I10 ESSENTIAL HYPERTENSION: ICD-10-CM

## 2020-10-29 DIAGNOSIS — E11.9 TYPE 2 DIABETES MELLITUS WITHOUT COMPLICATION, WITHOUT LONG-TERM CURRENT USE OF INSULIN (H): Primary | ICD-10-CM

## 2020-10-29 DIAGNOSIS — K21.9 GASTROESOPHAGEAL REFLUX DISEASE, UNSPECIFIED WHETHER ESOPHAGITIS PRESENT: ICD-10-CM

## 2020-10-29 DIAGNOSIS — E78.5 HYPERLIPIDEMIA LDL GOAL <100: ICD-10-CM

## 2020-10-29 DIAGNOSIS — Z86.73 HISTORY OF STROKE: ICD-10-CM

## 2020-10-29 DIAGNOSIS — I50.22 CHRONIC SYSTOLIC HEART FAILURE (H): ICD-10-CM

## 2020-10-29 PROCEDURE — 99309 SBSQ NF CARE MODERATE MDM 30: CPT | Performed by: NURSE PRACTITIONER

## 2020-10-29 NOTE — LETTER
10/29/2020        RE: Kristine Cosby  [facility] [place]  [address] [address]        [facility]  Chief Complaint   Patient presents with     retirement Regulatory     [place] Medical Record Number:  [id number]  Place of Service where encounter took place:  [facility], [place] ([facility]) [[id number]]    HPI:    Kristine Cosby  is 80 year old (1940), who is being seen today for a federally mandated E/M visit.  HPI information obtained from: facility chart records, facility staff, patient report and [facility] chart review. Today's concerns are:  1. Type 2 diabetes mellitus without complication, without long-term current use of insulin (H)    2. Acute nonintractable headache, unspecified headache type    3. History of stroke    4. Chronic systolic heart failure (H)    5. Essential hypertension    6. Gastroesophageal reflux disease, unspecified whether esophagitis present    7. Hyperlipidemia LDL goal <100    8. Acquired hypothyroidism      Requested to see Kristine today and so found her laying in bed.  Did not really opened her eyes and drifted in and out of sleep.  Stated she had a headache now for a couple of days.  Talked a little baby talk but not real interactive.    Spoke with nursing about the headache.  Recently tested for COVID and no results back.  Saw in her chart to avoid narcotics.  Is on Eliquis, so no ibuprofen.        ALLERGIES:Blood transfusion related (informational only)  PAST MEDICAL HISTORY:   has a past medical history of Cellulitis of left lower extremity, GERD (gastroesophageal reflux disease), HTN (hypertension), Type 2 diabetes mellitus without complication (H), and Ulcer of left lower extremity with fat layer exposed (H).  PAST SURGICAL HISTORY:   has a past surgical history that includes Hysterectomy and ESOPHAGOGASTRODUODENOSCOPY.  FAMILY HISTORY: family history includes Lung Cancer in her father.  SOCIAL HISTORY:   reports that she quit smoking about 18 months ago. She has quit using smokeless tobacco. She reports that she does not drink alcohol.    MEDICATIONS:  Current Outpatient Medications   Medication Sig Dispense Refill     acetaminophen (TYLENOL) 500 MG tablet Take 1,000 mg by mouth every morning Also BID PRN       apixaban ANTICOAGULANT (ELIQUIS) 5 MG tablet Take 1 tablet (5 mg) by mouth 2 times daily       atorvastatin (LIPITOR) 20 MG tablet Take 20 mg by mouth At Bedtime       calcium carbonate (TUMS) 500 MG chewable tablet Take 2 chew tab by mouth every 4 hours as needed for heartburn       cholecalciferol 1000 units TABS Take 2,000 Units by mouth daily       diclofenac (VOLTAREN) 1 % topical gel Apply 2 g topically daily AND BID PRN - Apply to right hand       Furosemide (LASIX PO) Take 10 mg by mouth daily       levothyroxine (SYNTHROID/LEVOTHROID) 75 MCG tablet Take 1 tablet (75 mcg) by mouth daily       lisinopril (PRINIVIL/ZESTRIL) 10 MG tablet Take 1 tablet (10 mg) by mouth daily 30 tablet 11     metoprolol succinate ER (TOPROL-XL) 50 MG 24 hr tablet Take 50 mg by mouth daily       nitroGLYcerin (NITROSTAT) 0.4 MG sublingual tablet Place 0.4 mg under the tongue every 5 minutes as needed for chest pain For chest pain place 1 tablet under the tongue every 5 minutes for 3 doses. If symptoms persist 5 minutes after 1st dose call 911.       nystatin (MYCOSTATIN) 069786 UNIT/GM external powder Apply topically 2 times daily as needed       omeprazole (PRILOSEC) 20 MG DR capsule Take 20 mg by mouth daily       senna (SENOKOT) 8.6 MG tablet Take 2 tablets by mouth daily as needed for constipation        senna-docusate (SENOKOT-S/PERICOLACE) 8.6-50 MG tablet Take 2 tablets by mouth daily          Case Management:  I have reviewed the care plan and MDS and do agree with the plan. Patient's desire to return to the community is not present. Information reviewed:  Medications, vital signs, orders, and nursing  notes.    ROS:  Complained of headache and denied chest pain.  Otherwise not other answers.    Vitals:  /56   Pulse 70   Temp 96.3  F (35.7  C)   Resp 18   Wt 95.4 kg (210 lb 6.4 oz)   SpO2 95%   BMI 33.96 kg/m    Body mass index is 33.96 kg/m .  Exam:  GENERAL APPEARANCE:  cooperative, not much conversation, no immediate distress  EYES:  kept eyes closed, eyelids normal, no puffiness noted under eyelids  RESP:  respiratory effort and palpation of chest normal, lungs clear to auscultation , no respiratory distress  CV:  Palpation and auscultation of heart done , no edema, heart rate distant but seemed regular  ABDOMEN:  normal bowel sounds, soft, nontender, no hepatosplenomegaly or other masses, no guarding or rebound  M/S:   Gait and station abnormal wheelchair used for mobility  SKIN:  pink, dry and warm  PSYCH:  insight and judgement impaired, memory impaired , BIMS 5/15, answered to name being called    Lab/Diagnostic data:     Component      Latest Ref Rng & Units 8/7/2020   Sodium      133 - 144 mmol/L 142   Potassium      3.4 - 5.3 mmol/L 4.3   Chloride      94 - 109 mmol/L 108   Carbon Dioxide      20 - 32 mmol/L 30   Anion Gap      3 - 14 mmol/L 4   Glucose      70 - 99 mg/dL 107 (H)   Urea Nitrogen      7 - 30 mg/dL 22   Creatinine      0.52 - 1.04 mg/dL 0.88   GFR Estimate      >60 mL/min/1.73:m2 63   GFR Estimate If Black      >60 mL/min/1.73:m2 73   Calcium      8.5 - 10.1 mg/dL 9.0   Hemoglobin A1C      0 - 5.6 % 6.2 (H)     ASSESSMENT/PLAN  Type 2 diabetes mellitus without complication, without long-term current use of insulin (H)  No blood sugars done.  Lab Results   Component Value Date    A1C 6.2 08/07/2020    A1C 6.2 02/19/2020     Going by the A1c, she is doing well.      Acute nonintractable headache, unspecified headache type  Concerned for the headache as it seems to really bother her.  She is on twice a day Tylenol ES.  Discussed with  for action plan.  1.  Increase the  Tylenol ES 1000mg to QID for 4 days and then back to original order of twice a day.  2.  CBC and BMP tomorrow lab day to see if anything abnormal.  Waiting on COVID testing right now.    History of stroke  Hyperlipidemia LDL goal <100  Remains on Eliquis 5mg twice a day.  No new neurological symptoms noted by staff.  Will remain on Lipitor 20mg at HS.      Chronic systolic heart failure (H)  Able to lay flat without shortness of breath.  No edema.  Remains on Lasix 10mg daily for maintenance.    Essential hypertension  Blood pressures range from 100-130's/60's.  Weekly vitals.  Does take lisinopril 10mg daily and Toprol XL 50mg daily.  No changes.    Gastroesophageal reflux disease, unspecified whether esophagitis present  Able to lay flat and no signs of belching or coughing.  Remains on Omeprazole 20mg daily.      Acquired hypothyroidism  Lab Results   Component Value Date    TSH 3.61 07/24/2020     Receives levothyroxine 75mcg daily.  No changes.      Orders written by provider at facility  1.  Tylenol ES 1000mg po QID for 4 days then back to BID and prn dosing for acute headache  2.  CBC and BMP tomorrow for headache and HTN    Total time spent with patient visit at the [facility] nursing facility was 25 min including patient visit and review of past records. Greater than 50% of total time spent with counseling and coordinating care due to acute headache and review of chart to see what could be changed..    Electronically signed by:  NATIVIDAD Sanon CNP                Sincerely,        NATIVIDAD Sanon CNP

## 2020-10-30 ENCOUNTER — HOSPITAL LABORATORY (OUTPATIENT)
Dept: NURSING HOME | Facility: OTHER | Age: 80
End: 2020-10-30

## 2020-10-30 VITALS
TEMPERATURE: 96.3 F | RESPIRATION RATE: 18 BRPM | WEIGHT: 210.4 LBS | HEART RATE: 70 BPM | DIASTOLIC BLOOD PRESSURE: 56 MMHG | SYSTOLIC BLOOD PRESSURE: 118 MMHG | BODY MASS INDEX: 33.96 KG/M2 | OXYGEN SATURATION: 95 %

## 2020-10-30 LAB
ANION GAP SERPL CALCULATED.3IONS-SCNC: 5 MMOL/L (ref 3–14)
BUN SERPL-MCNC: 16 MG/DL (ref 7–30)
CALCIUM SERPL-MCNC: 9.2 MG/DL (ref 8.5–10.1)
CHLORIDE SERPL-SCNC: 109 MMOL/L (ref 94–109)
CO2 SERPL-SCNC: 27 MMOL/L (ref 20–32)
CREAT SERPL-MCNC: 0.93 MG/DL (ref 0.52–1.04)
ERYTHROCYTE [DISTWIDTH] IN BLOOD BY AUTOMATED COUNT: 12.9 % (ref 10–15)
GFR SERPL CREATININE-BSD FRML MDRD: 58 ML/MIN/{1.73_M2}
GLUCOSE SERPL-MCNC: 99 MG/DL (ref 70–99)
HCT VFR BLD AUTO: 35.6 % (ref 35–47)
HGB BLD-MCNC: 11.6 G/DL (ref 11.7–15.7)
MCH RBC QN AUTO: 32.8 PG (ref 26.5–33)
MCHC RBC AUTO-ENTMCNC: 32.6 G/DL (ref 31.5–36.5)
MCV RBC AUTO: 101 FL (ref 78–100)
PLATELET # BLD AUTO: 173 10E9/L (ref 150–450)
POTASSIUM SERPL-SCNC: 4.4 MMOL/L (ref 3.4–5.3)
RBC # BLD AUTO: 3.54 10E12/L (ref 3.8–5.2)
SODIUM SERPL-SCNC: 141 MMOL/L (ref 133–144)
WBC # BLD AUTO: 5.3 10E9/L (ref 4–11)

## 2020-10-30 NOTE — PROGRESS NOTES
[facility]  Chief Complaint   Patient presents with     [facility]ulatory     [place] Medical Record Number:  [id number]  Place of Service where encounter took place[facility] [[id number]]    HPI:    Kristine Cosby  is 80 year old (1940), who is being seen today for a federally mandated E/M visit.  HPI information obtained from: facility chart records, facility staff, patient report and [facility] chart review. Today's concerns are:  1. Type 2 diabetes mellitus without complication, without long-term current use of insulin (H)    2. Acute nonintractable headache, unspecified headache type    3. History of stroke    4. Chronic systolic heart failure (H)    5. Essential hypertension    6. Gastroesophageal reflux disease, unspecified whether esophagitis present    7. Hyperlipidemia LDL goal <100    8. Acquired hypothyroidism      Requested to see Kristine today and so found her laying in bed.  Did not really opened her eyes and drifted in and out of sleep.  Stated she had a headache now for a couple of days.  Talked a little baby talk but not real interactive.    Spoke with nursing about the headache.  Recently tested for COVID and no results back.  Saw in her chart to avoid narcotics.  Is on Eliquis, so no ibuprofen.        ALLERGIES:Blood transfusion related (informational only)  PAST MEDICAL HISTORY:   has a past medical history of Cellulitis of left lower extremity, GERD (gastroesophageal reflux disease), HTN (hypertension), Type 2 diabetes mellitus without complication (H), and Ulcer of left lower extremity with fat layer exposed (H).  PAST SURGICAL HISTORY:   has a past surgical history that includes Hysterectomy and ESOPHAGOGASTRODUODENOSCOPY.  FAMILY HISTORY: family history includes Lung Cancer in her father.  SOCIAL HISTORY:  reports that she quit smoking about 18 months ago. She has quit using smokeless tobacco. She reports that she does not  drink alcohol.    MEDICATIONS:  Current Outpatient Medications   Medication Sig Dispense Refill     acetaminophen (TYLENOL) 500 MG tablet Take 1,000 mg by mouth every morning Also BID PRN       apixaban ANTICOAGULANT (ELIQUIS) 5 MG tablet Take 1 tablet (5 mg) by mouth 2 times daily       atorvastatin (LIPITOR) 20 MG tablet Take 20 mg by mouth At Bedtime       calcium carbonate (TUMS) 500 MG chewable tablet Take 2 chew tab by mouth every 4 hours as needed for heartburn       cholecalciferol 1000 units TABS Take 2,000 Units by mouth daily       diclofenac (VOLTAREN) 1 % topical gel Apply 2 g topically daily AND BID PRN - Apply to right hand       Furosemide (LASIX PO) Take 10 mg by mouth daily       levothyroxine (SYNTHROID/LEVOTHROID) 75 MCG tablet Take 1 tablet (75 mcg) by mouth daily       lisinopril (PRINIVIL/ZESTRIL) 10 MG tablet Take 1 tablet (10 mg) by mouth daily 30 tablet 11     metoprolol succinate ER (TOPROL-XL) 50 MG 24 hr tablet Take 50 mg by mouth daily       nitroGLYcerin (NITROSTAT) 0.4 MG sublingual tablet Place 0.4 mg under the tongue every 5 minutes as needed for chest pain For chest pain place 1 tablet under the tongue every 5 minutes for 3 doses. If symptoms persist 5 minutes after 1st dose call 911.       nystatin (MYCOSTATIN) 990238 UNIT/GM external powder Apply topically 2 times daily as needed       omeprazole (PRILOSEC) 20 MG DR capsule Take 20 mg by mouth daily       senna (SENOKOT) 8.6 MG tablet Take 2 tablets by mouth daily as needed for constipation        senna-docusate (SENOKOT-S/PERICOLACE) 8.6-50 MG tablet Take 2 tablets by mouth daily          Case Management:  I have reviewed the care plan and MDS and do agree with the plan. Patient's desire to return to the community is not present. Information reviewed:  Medications, vital signs, orders, and nursing notes.    ROS:  Complained of headache and denied chest pain.  Otherwise not other answers.    Vitals:  /56   Pulse 70   Temp  96.3  F (35.7  C)   Resp 18   Wt 95.4 kg (210 lb 6.4 oz)   SpO2 95%   BMI 33.96 kg/m    Body mass index is 33.96 kg/m .  Exam:  GENERAL APPEARANCE:  cooperative, not much conversation, no immediate distress  EYES:  kept eyes closed, eyelids normal, no puffiness noted under eyelids  RESP:  respiratory effort and palpation of chest normal, lungs clear to auscultation , no respiratory distress  CV:  Palpation and auscultation of heart done , no edema, heart rate distant but seemed regular  ABDOMEN:  normal bowel sounds, soft, nontender, no hepatosplenomegaly or other masses, no guarding or rebound  M/S:   Gait and station abnormal wheelchair used for mobility  SKIN:  pink, dry and warm  PSYCH:  insight and judgement impaired, memory impaired , BIMS 5/15, answered to name being called    Lab/Diagnostic data:     Component      Latest Ref Rng & Units 8/7/2020   Sodium      133 - 144 mmol/L 142   Potassium      3.4 - 5.3 mmol/L 4.3   Chloride      94 - 109 mmol/L 108   Carbon Dioxide      20 - 32 mmol/L 30   Anion Gap      3 - 14 mmol/L 4   Glucose      70 - 99 mg/dL 107 (H)   Urea Nitrogen      7 - 30 mg/dL 22   Creatinine      0.52 - 1.04 mg/dL 0.88   GFR Estimate      >60 mL/min/1.73:m2 63   GFR Estimate If Black      >60 mL/min/1.73:m2 73   Calcium      8.5 - 10.1 mg/dL 9.0   Hemoglobin A1C      0 - 5.6 % 6.2 (H)     ASSESSMENT/PLAN  Type 2 diabetes mellitus without complication, without long-term current use of insulin (H)  No blood sugars done.  Lab Results   Component Value Date    A1C 6.2 08/07/2020    A1C 6.2 02/19/2020     Going by the A1c, she is doing well.      Acute nonintractable headache, unspecified headache type  Concerned for the headache as it seems to really bother her.  She is on twice a day Tylenol ES.  Discussed with  for action plan.  1.  Increase the Tylenol ES 1000mg to QID for 4 days and then back to original order of twice a day.  2.  CBC and BMP tomorrow lab day to see if  anything abnormal.  Waiting on COVID testing right now.    History of stroke  Hyperlipidemia LDL goal <100  Remains on Eliquis 5mg twice a day.  No new neurological symptoms noted by staff.  Will remain on Lipitor 20mg at HS.      Chronic systolic heart failure (H)  Able to lay flat without shortness of breath.  No edema.  Remains on Lasix 10mg daily for maintenance.    Essential hypertension  Blood pressures range from 100-130's/60's.  Weekly vitals.  Does take lisinopril 10mg daily and Toprol XL 50mg daily.  No changes.    Gastroesophageal reflux disease, unspecified whether esophagitis present  Able to lay flat and no signs of belching or coughing.  Remains on Omeprazole 20mg daily.      Acquired hypothyroidism  Lab Results   Component Value Date    TSH 3.61 07/24/2020     Receives levothyroxine 75mcg daily.  No changes.      Orders written by provider at facility  1.  Tylenol ES 1000mg po QID for 4 days then back to BID and prn dosing for acute headache  2.  CBC and BMP tomorrow for headache and HTN    Total time spent with patient visit at the [facility] was 25 min including patient visit and review of past records. Greater than 50% of total time spent with counseling and coordinating care due to acute headache and review of chart to see what could be changed..    Electronically signed by:  NATIVIDAD Sanon CNP

## 2020-11-10 ENCOUNTER — HOSPITAL LABORATORY (OUTPATIENT)
Dept: NURSING HOME | Facility: OTHER | Age: 80
End: 2020-11-10

## 2020-11-10 ENCOUNTER — VIRTUAL VISIT (OUTPATIENT)
Dept: GERIATRICS | Facility: CLINIC | Age: 80
End: 2020-11-10
Payer: COMMERCIAL

## 2020-11-10 VITALS
TEMPERATURE: 99 F | SYSTOLIC BLOOD PRESSURE: 126 MMHG | RESPIRATION RATE: 17 BRPM | OXYGEN SATURATION: 92 % | DIASTOLIC BLOOD PRESSURE: 83 MMHG | WEIGHT: 210.4 LBS | BODY MASS INDEX: 33.96 KG/M2 | HEART RATE: 81 BPM

## 2020-11-10 DIAGNOSIS — I10 ESSENTIAL HYPERTENSION: ICD-10-CM

## 2020-11-10 DIAGNOSIS — F01.518 VASCULAR DEMENTIA WITH BEHAVIOR DISTURBANCE (H): ICD-10-CM

## 2020-11-10 DIAGNOSIS — I25.10 ATHEROSCLEROSIS OF NATIVE CORONARY ARTERY OF NATIVE HEART WITHOUT ANGINA PECTORIS: ICD-10-CM

## 2020-11-10 DIAGNOSIS — E78.5 HYPERLIPIDEMIA LDL GOAL <100: ICD-10-CM

## 2020-11-10 DIAGNOSIS — N18.31 STAGE 3A CHRONIC KIDNEY DISEASE (H): ICD-10-CM

## 2020-11-10 DIAGNOSIS — R50.9 FEVER, UNSPECIFIED FEVER CAUSE: Primary | ICD-10-CM

## 2020-11-10 DIAGNOSIS — E11.9 TYPE 2 DIABETES MELLITUS WITHOUT COMPLICATION, WITHOUT LONG-TERM CURRENT USE OF INSULIN (H): ICD-10-CM

## 2020-11-10 DIAGNOSIS — Z20.822 EXPOSURE TO COVID-19 VIRUS: ICD-10-CM

## 2020-11-10 DIAGNOSIS — I50.22 CHRONIC SYSTOLIC HEART FAILURE (H): ICD-10-CM

## 2020-11-10 DIAGNOSIS — E16.2 HYPOGLYCEMIC ENCEPHALOPATHY: ICD-10-CM

## 2020-11-10 DIAGNOSIS — I25.2 HISTORY OF ST ELEVATION MYOCARDIAL INFARCTION (STEMI): ICD-10-CM

## 2020-11-10 DIAGNOSIS — R51.9 ACUTE NONINTRACTABLE HEADACHE, UNSPECIFIED HEADACHE TYPE: ICD-10-CM

## 2020-11-10 LAB
ANION GAP SERPL CALCULATED.3IONS-SCNC: 7 MMOL/L (ref 3–14)
BASOPHILS # BLD AUTO: 0 10E9/L (ref 0–0.2)
BASOPHILS NFR BLD AUTO: 0.3 %
BUN SERPL-MCNC: 19 MG/DL (ref 7–30)
CALCIUM SERPL-MCNC: 8.6 MG/DL (ref 8.5–10.1)
CHLORIDE SERPL-SCNC: 107 MMOL/L (ref 94–109)
CO2 SERPL-SCNC: 25 MMOL/L (ref 20–32)
CREAT SERPL-MCNC: 0.99 MG/DL (ref 0.52–1.04)
DIFFERENTIAL METHOD BLD: ABNORMAL
EOSINOPHIL NFR BLD AUTO: 0.7 %
ERYTHROCYTE [DISTWIDTH] IN BLOOD BY AUTOMATED COUNT: 13.1 % (ref 10–15)
GFR SERPL CREATININE-BSD FRML MDRD: 54 ML/MIN/{1.73_M2}
GLUCOSE SERPL-MCNC: 150 MG/DL (ref 70–99)
HCT VFR BLD AUTO: 36.8 % (ref 35–47)
HGB BLD-MCNC: 12.2 G/DL (ref 11.7–15.7)
IMM GRANULOCYTES # BLD: 0 10E9/L (ref 0–0.4)
IMM GRANULOCYTES NFR BLD: 0.3 %
LYMPHOCYTES # BLD AUTO: 1.4 10E9/L (ref 0.8–5.3)
LYMPHOCYTES NFR BLD AUTO: 50.2 %
MCH RBC QN AUTO: 33.2 PG (ref 26.5–33)
MCHC RBC AUTO-ENTMCNC: 33.2 G/DL (ref 31.5–36.5)
MCV RBC AUTO: 100 FL (ref 78–100)
MONOCYTES # BLD AUTO: 0.4 10E9/L (ref 0–1.3)
MONOCYTES NFR BLD AUTO: 13.9 %
NEUTROPHILS # BLD AUTO: 1 10E9/L (ref 1.6–8.3)
NEUTROPHILS NFR BLD AUTO: 34.6 %
NRBC # BLD AUTO: 0 10*3/UL
NRBC BLD AUTO-RTO: 0 /100
PLATELET # BLD AUTO: 141 10E9/L (ref 150–450)
POTASSIUM SERPL-SCNC: 4.4 MMOL/L (ref 3.4–5.3)
RBC # BLD AUTO: 3.67 10E12/L (ref 3.8–5.2)
SODIUM SERPL-SCNC: 139 MMOL/L (ref 133–144)
WBC # BLD AUTO: 2.9 10E9/L (ref 4–11)

## 2020-11-10 PROCEDURE — 99309 SBSQ NF CARE MODERATE MDM 30: CPT | Mod: 95 | Performed by: NURSE PRACTITIONER

## 2020-11-10 NOTE — LETTER
"    11/10/2020        RE: Kristine Cosby  [facility]  [address] [facility] [address]        [place] [facility]   Kristine Cosby is being evaluated via a billable video visit.   The patient has been notified of following:  \"This video visit will be conducted via a call between you and your provider. We have found that certain health care needs can be provided without the need for an in-person physical exam.  This service lets us provide the care you need with a video conversation. If during the course of the call the provider feels a video visit is not appropriate, you will not be charged for this service.\"   The provider has received verbal consent for a Video Visit from the patient or first contact? Yes  Patient  or facility staff would like the video invitation sent by: N/A   Video Start Time: 1333  [place] Medical Record Number:  [id number]  Place of Location at the time of visit: [facility]   Chief Complaint   Patient presents with     Nursing Home Acute     HPI:  Kristine Cosby  is a 80 year old (1940), who is being seen today for a visit.  HPI information obtained from: facility chart records, facility staff, patient report and [place] Epic chart review. Today's concern is:     Fever, unspecified fever cause  Acute nonintractable headache, unspecified headache type  Exposure to COVID-19 virus  Vascular dementia with behavior disturbance (H)  Type 2 diabetes mellitus without complication, without long-term current use of insulin (H)  Hypoglycemic encephalopathy  Chronic systolic heart failure (H)  Essential hypertension  Hyperlipidemia LDL goal <100  History of ST elevation myocardial infarction (STEMI)  Atherosclerosis of native coronary artery of native heart without angina pectoris  Stage 3a chronic kidney disease     Patient is a 80 year old woman who resides at [facility] since 2/5/2019 after a hospitalization for metabolic encephalopathy " "d/t hypoglycemia.  She was admitted to LTC from TCU d/t increased care needs.  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   She has hospitalizations for episodes of unresponsiveness and episodes of \"baby talk\"  Extensive work up to determine if CVA present which have all been unremarkable. She has been to Neurology and EEG neg for seizure activity. She is also followed by Cardiology who noted unlikely cardiac- related.      Recent Med Changes:  - 3/3/20: Omeprazole 20 mg daily started when pharmacy reported no H2 blocker supply  - 4/28/20: Rocephin 1GM IM daily for 2 days  - 5/29/20: Decrease Levothyroxine to 75 mcg daily  - 9/18/20: left knee infection  -  10/2920: Tylenol increased for a few days to see if HA could be reduced, then on 11/2/20 Tylenol reduced back to 1000 mg BID.   - 11/3/20: Amitriptyline 10 mg q HS started for intractable HAs.     11/3/20: first patient in SNF on this patient's unit positive for COVID.   Since that time, other residents have been positive as well.    11/9/20 - patient had low grade temp of 99.2, COVID test done and is still pending.   11/10/20: low grade temp of 99 F  Nursing noted patient has had past few days where she wants to be in bed, declines getting OOB and sleeps a lot.  This is not overtly new for patient but in light of new medication and COVID exposure, is concerning.  Nursing also noted this AM patient had blood glucose of \"low\" on glucometer, patient was given soda, etc and recheck was 120s.     Patient is met today in her SNF LTC room with nursing graciously assisting for this video visit.  Nursing has noted that patient has been more tired over the last week.  Patient reports she still has her headache and when asked where it is, she points to her frontal bilat sinuses.  She also reports some " occasional lightheadedness.  She denies shortness of breath, pain, heartburn, upset stomach, constipation.  It is noted she is likely a poor historian d/t dementia.      Past Medical and Surgical History reviewed in Epic today.  MEDICATIONS:  Current Outpatient Medications   Medication Sig Dispense Refill     acetaminophen (TYLENOL) 500 MG tablet Take 1,000 mg by mouth every morning Also BID PRN       apixaban ANTICOAGULANT (ELIQUIS) 5 MG tablet Take 1 tablet (5 mg) by mouth 2 times daily       atorvastatin (LIPITOR) 20 MG tablet Take 20 mg by mouth At Bedtime       calcium carbonate (TUMS) 500 MG chewable tablet Take 2 chew tab by mouth every 4 hours as needed for heartburn       cholecalciferol 1000 units TABS Take 2,000 Units by mouth daily       diclofenac (VOLTAREN) 1 % topical gel Apply 2 g topically daily AND BID PRN - Apply to right hand       Furosemide (LASIX PO) Take 10 mg by mouth daily       levothyroxine (SYNTHROID/LEVOTHROID) 75 MCG tablet Take 1 tablet (75 mcg) by mouth daily       lisinopril (PRINIVIL/ZESTRIL) 10 MG tablet Take 1 tablet (10 mg) by mouth daily 30 tablet 11     metoprolol succinate ER (TOPROL-XL) 50 MG 24 hr tablet Take 50 mg by mouth daily       nitroGLYcerin (NITROSTAT) 0.4 MG sublingual tablet Place 0.4 mg under the tongue every 5 minutes as needed for chest pain For chest pain place 1 tablet under the tongue every 5 minutes for 3 doses. If symptoms persist 5 minutes after 1st dose call 911.       nystatin (MYCOSTATIN) 730574 UNIT/GM external powder Apply topically 2 times daily as needed       omeprazole (PRILOSEC) 20 MG DR capsule Take 20 mg by mouth daily       senna (SENOKOT) 8.6 MG tablet Take 2 tablets by mouth daily as needed for constipation        senna-docusate (SENOKOT-S/PERICOLACE) 8.6-50 MG tablet Take 2 tablets by mouth daily        REVIEW OF SYSTEMS: Limited secondary to cognitive impairment but today pt reports 10 point ROS of systems including Constitutional, Eyes,  Respiratory, Cardiovascular, Gastroenterology, Genitourinary, Integumentary, Musculoskeletal, Psychiatric were all negative except for pertinent positives noted in my HPI.  Objective: /83   Pulse 81   Temp 99  F (37.2  C)   Resp 17   Wt 95.4 kg (210 lb 6.4 oz)   SpO2 92%   BMI 33.96 kg/m    Limited visit exam done given COVID-19 precautions.   GENERAL APPEARANCE:  Alert, in no distress, talkative, confused per baseline  RESP:  respiratory effort normal, no respiratory distress, on RA, no cough this visit  CV:  Nursing reports no LE peripheral edema  ABDOMEN:  nondistended  M/S:   Gait and station with w/c for mobility, Digits and nails with arthritic changes, reduced muscle mass  SKIN:  Inspection and Palpation of skin and subcutaneous tissue intact  PSYCH:  insight and judgement, memory with impairment, affect and mood normal, follows commands readily  But forgets quickly         Labs:   CBC RESULTS:   Recent Labs   Lab Test 11/10/20  1500 10/30/20  0845   WBC 2.9* 5.3   RBC 3.67* 3.54*   HGB 12.2 11.6*   HCT 36.8 35.6    101*   MCH 33.2* 32.8   MCHC 33.2 32.6   RDW 13.1 12.9   * 173       Last Basic Metabolic Panel:  Recent Labs   Lab Test 11/10/20  1500 10/30/20  0845    141   POTASSIUM 4.4 4.4   CHLORIDE 107 109   MIKE 8.6 9.2   CO2 25 27   BUN 19 16   CR 0.99 0.93   * 99       Liver Function Studies -   Recent Labs   Lab Test 02/22/20  0030 05/13/19  2215   PROTTOTAL 7.2 7.0   ALBUMIN 3.4 3.1*   BILITOTAL 0.4 0.5   ALKPHOS 73 80   AST 14 13   ALT 12 11       TSH   Date Value Ref Range Status   07/24/2020 3.61 0.40 - 4.00 mU/L Final   02/19/2020 2.25 0.40 - 4.00 mU/L Final   ]    Lab Results   Component Value Date    A1C 6.2 08/07/2020    A1C 6.2 02/19/2020         ASSESSMENT/PLAN:  Fever, unspecified fever cause  Acute nonintractable headache, unspecified headache type  Exposure to COVID-19 virus  Vascular dementia with behavior disturbance (H)  11/3/20: first patient in  SNF on this patient's unit positive for COVID.   Since that time, other residents have been positive as well.    11/9/20 - patient had low grade temp of 99.2, COVID test done and is still pending.   11/10/20: low grade temp of 99 F  Nursing noted patient has had past few days where she wants to be in bed, declines getting OOB and sleeps a lot.  This is not overtly new for patient but in light of new medication and COVID exposure, is concerning.  Sats 92-96% on RA; patient denies shortness of breath.       PLAN:  - COVID test pending  - Tylenol scheduled and PRN for fever  - supportive cares while awaiting results  - nursing for increased care needs, likely needs must be anticipated   - discontinue amitriptyline as seemingly not helpful for HA  - can add Sumatriptan PRN, if COVID positive, will discontinue.   - noted DNR status with palliative care goal     Type 2 diabetes mellitus without complication, without long-term current use of insulin (H)  Hx of Hypoglycemic encephalopathy  12/2018 hospitalized for encephalopathy related to low glucose levels.    Lab Results   Component Value Date    A1C 6.2 08/07/2020    A1C 6.2 02/19/2020    A1C 6.7 04/15/2019     On no meds  No ASA d/t history of GIB; now on apixaban. Is on ACEI and statin    Nursing reported hypoglycemia this AM but recheck was normal (however after some soda).    PLAN:  - q AM BG monitoring x 7 days.   - Goal: HgbA1C between 8-9%. Per AGS there is potential harm in lowering the A1C to <6.5% in older adults with diabetes.       Chronic systolic heart failure (H)  Essential hypertension  Hyperlipidemia LDL goal <100  History of ST elevation myocardial infarction (STEMI)  Atherosclerosis of native coronary artery of native heart without angina pectoris  On Lasix 10 mg daily, atorvastatin 20 mg q HS, lisinopril 10 mg daily, nitroglycerin PRN, Toprol 50 mg daily,   4/23/19 ECHO: The left ventricle is normal in size. The visual ejection fraction is estimated  at 40%. Mid-distal anteroseptal and apical walls are severely hypokinetic, mild hypokinesis elsewhere. The right ventricle is normal in structure, function and size. No significant valve disease. The ascending aorta is Mildly dilated (3.9cm).    BPs mostly 110s-120s/70-80s  HRs mostly 70-80s  Patient denies CP; endorses occasional lightheadedness and some frontal HAs.     Nursing noted LS clear     PLAN:  - continue atorvastain, nitroglycerin prn  - continue Toprol XL  - Continue Lasix   - Continue lisinopril, but add hold parameters  - BMP for monitoring of fluid status       Stage 3a chronic kidney disease  BL Creat seemingly ~0.9-1.0  Last few BMPs:   4/28/20: BUN 23, Creat 0.97, GFR 55  8/7/20: BUN 22, Creat 0.99, GFR 63  10/30/20: BUN 16, Creat 0.93, GFR 58  Labs ordered for today -   11/10/20: BUN 19, Creat 0.99, GFR 54    Patient is on Lasix, ACEI; no NSAIDs    PLAN:  - BMP ordered for today - appears euvolemic without JOSTIN  - Will avoid nephrotoxic agents (such as ACEI/ARB/NSAIDs, IV contrast) and mindful prescribing with renal dosing.       Orders written by provider at facility  1. DC amitriptyline  2. q AM BG x 7 days. Update NP with results please Dx: DM2  3. Sumatriptan 50 mg po BID PRN at least 2 hours apart. Update NP with effectiveness if given.  Dx: migraine headache.   4. BMP, CBC with Diff ASAP Dx: fever, lethargy, sinus pressure and headache, HTN  5. Hold Lisinopril for SBP <110    Electronically signed by:  NATIVIDAD Chapa CNP     Video-Visit Details  Type of service:  Video Visit  Video End Time (time video stopped): 1344  Distant Location (provider location):  [place] [facility]                   Sincerely,        NATIVIDAD Chapa CNP

## 2020-11-10 NOTE — PROGRESS NOTES
"[place] [facility]   Kristine Cosby is being evaluated via a billable video visit.   The patient has been notified of following:  \"This video visit will be conducted via a call between you and your provider. We have found that certain health care needs can be provided without the need for an in-person physical exam.  This service lets us provide the care you need with a video conversation. If during the course of the call the provider feels a video visit is not appropriate, you will not be charged for this service.\"   The provider has received verbal consent for a Video Visit from the patient or first contact? Yes  Patient  or facility staff would like the video invitation sent by: N/A   Video Start Time: 1333  [place] Medical Record Number:  [id number]  Place of Location at the time of visit: [facility]   Chief Complaint   Patient presents with     Nursing Home Acute     HPI:  Kristine Cosby  is a 80 year old (1940), who is being seen today for a visit.  HPI information obtained from: facility chart records, facility staff, patient report and [facility] chart review. Today's concern is:     Fever, unspecified fever cause  Acute nonintractable headache, unspecified headache type  Exposure to COVID-19 virus  Vascular dementia with behavior disturbance (H)  Type 2 diabetes mellitus without complication, without long-term current use of insulin (H)  Hypoglycemic encephalopathy  Chronic systolic heart failure (H)  Essential hypertension  Hyperlipidemia LDL goal <100  History of ST elevation myocardial infarction (STEMI)  Atherosclerosis of native coronary artery of native heart without angina pectoris  Stage 3a chronic kidney disease     Patient is a 80 year old woman who resides at [facility] since 2/5/2019 after a hospitalization for metabolic encephalopathy d/t hypoglycemia.  She was admitted to LTC from TCU d/t increased care needs.  Other PMH includes DM2, A fib with " "RVR, CVA with seizure, HTN, HLD, CAD, JOSTIN 2/2 Rhabdomyolysis, systolic CHF, GERD with large hiatal hernia and History of GIB while on Pradaxa (2017), History STEMI, hypothyroidism.   She has hospitalizations for episodes of unresponsiveness and episodes of \"baby talk\"  Extensive work up to determine if CVA present which have all been unremarkable. She has been to Neurology and EEG neg for seizure activity. She is also followed by Cardiology who noted unlikely cardiac- related.      Recent Med Changes:  - 3/3/20: Omeprazole 20 mg daily started when pharmacy reported no H2 blocker supply  - 4/28/20: Rocephin 1GM IM daily for 2 days  - 5/29/20: Decrease Levothyroxine to 75 mcg daily  - 9/18/20: left knee infection  -  10/2920: Tylenol increased for a few days to see if HA could be reduced, then on 11/2/20 Tylenol reduced back to 1000 mg BID.   - 11/3/20: Amitriptyline 10 mg q HS started for intractable HAs.     11/3/20: first patient in SNF on this patient's unit positive for COVID.   Since that time, other residents have been positive as well.    11/9/20 - patient had low grade temp of 99.2, COVID test done and is still pending.   11/10/20: low grade temp of 99 F  Nursing noted patient has had past few days where she wants to be in bed, declines getting OOB and sleeps a lot.  This is not overtly new for patient but in light of new medication and COVID exposure, is concerning.  Nursing also noted this AM patient had blood glucose of \"low\" on glucometer, patient was given soda, etc and recheck was 120s.     Patient is met today in her SNF LTC room with nursing graciously assisting for this video visit.  Nursing has noted that patient has been more tired over the last week.  Patient reports she still has her headache and when asked where it is, she points to her frontal bilat sinuses.  She also reports some occasional lightheadedness.  She denies shortness of breath, pain, heartburn, upset stomach, constipation.  It is " noted she is likely a poor historian d/t dementia.      Past Medical and Surgical History reviewed in Epic today.  MEDICATIONS:  Current Outpatient Medications   Medication Sig Dispense Refill     acetaminophen (TYLENOL) 500 MG tablet Take 1,000 mg by mouth every morning Also BID PRN       apixaban ANTICOAGULANT (ELIQUIS) 5 MG tablet Take 1 tablet (5 mg) by mouth 2 times daily       atorvastatin (LIPITOR) 20 MG tablet Take 20 mg by mouth At Bedtime       calcium carbonate (TUMS) 500 MG chewable tablet Take 2 chew tab by mouth every 4 hours as needed for heartburn       cholecalciferol 1000 units TABS Take 2,000 Units by mouth daily       diclofenac (VOLTAREN) 1 % topical gel Apply 2 g topically daily AND BID PRN - Apply to right hand       Furosemide (LASIX PO) Take 10 mg by mouth daily       levothyroxine (SYNTHROID/LEVOTHROID) 75 MCG tablet Take 1 tablet (75 mcg) by mouth daily       lisinopril (PRINIVIL/ZESTRIL) 10 MG tablet Take 1 tablet (10 mg) by mouth daily 30 tablet 11     metoprolol succinate ER (TOPROL-XL) 50 MG 24 hr tablet Take 50 mg by mouth daily       nitroGLYcerin (NITROSTAT) 0.4 MG sublingual tablet Place 0.4 mg under the tongue every 5 minutes as needed for chest pain For chest pain place 1 tablet under the tongue every 5 minutes for 3 doses. If symptoms persist 5 minutes after 1st dose call 911.       nystatin (MYCOSTATIN) 321526 UNIT/GM external powder Apply topically 2 times daily as needed       omeprazole (PRILOSEC) 20 MG DR capsule Take 20 mg by mouth daily       senna (SENOKOT) 8.6 MG tablet Take 2 tablets by mouth daily as needed for constipation        senna-docusate (SENOKOT-S/PERICOLACE) 8.6-50 MG tablet Take 2 tablets by mouth daily        REVIEW OF SYSTEMS: Limited secondary to cognitive impairment but today pt reports 10 point ROS of systems including Constitutional, Eyes, Respiratory, Cardiovascular, Gastroenterology, Genitourinary, Integumentary, Musculoskeletal, Psychiatric were  all negative except for pertinent positives noted in my HPI.  Objective: /83   Pulse 81   Temp 99  F (37.2  C)   Resp 17   Wt 95.4 kg (210 lb 6.4 oz)   SpO2 92%   BMI 33.96 kg/m    Limited visit exam done given COVID-19 precautions.   GENERAL APPEARANCE:  Alert, in no distress, talkative, confused per baseline  RESP:  respiratory effort normal, no respiratory distress, on RA, no cough this visit  CV:  Nursing reports no LE peripheral edema  ABDOMEN:  nondistended  M/S:   Gait and station with w/c for mobility, Digits and nails with arthritic changes, reduced muscle mass  SKIN:  Inspection and Palpation of skin and subcutaneous tissue intact  PSYCH:  insight and judgement, memory with impairment, affect and mood normal, follows commands readily  But forgets quickly         Labs:   CBC RESULTS:   Recent Labs   Lab Test 11/10/20  1500 10/30/20  0845   WBC 2.9* 5.3   RBC 3.67* 3.54*   HGB 12.2 11.6*   HCT 36.8 35.6    101*   MCH 33.2* 32.8   MCHC 33.2 32.6   RDW 13.1 12.9   * 173       Last Basic Metabolic Panel:  Recent Labs   Lab Test 11/10/20  1500 10/30/20  0845    141   POTASSIUM 4.4 4.4   CHLORIDE 107 109   MIKE 8.6 9.2   CO2 25 27   BUN 19 16   CR 0.99 0.93   * 99       Liver Function Studies -   Recent Labs   Lab Test 02/22/20  0030 05/13/19  2215   PROTTOTAL 7.2 7.0   ALBUMIN 3.4 3.1*   BILITOTAL 0.4 0.5   ALKPHOS 73 80   AST 14 13   ALT 12 11       TSH   Date Value Ref Range Status   07/24/2020 3.61 0.40 - 4.00 mU/L Final   02/19/2020 2.25 0.40 - 4.00 mU/L Final   ]    Lab Results   Component Value Date    A1C 6.2 08/07/2020    A1C 6.2 02/19/2020         ASSESSMENT/PLAN:  Fever, unspecified fever cause  Acute nonintractable headache, unspecified headache type  Exposure to COVID-19 virus  Vascular dementia with behavior disturbance (H)  11/3/20: first patient in SNF on this patient's unit positive for COVID.   Since that time, other residents have been positive as  well.    11/9/20 - patient had low grade temp of 99.2, COVID test done and is still pending.   11/10/20: low grade temp of 99 F  Nursing noted patient has had past few days where she wants to be in bed, declines getting OOB and sleeps a lot.  This is not overtly new for patient but in light of new medication and COVID exposure, is concerning.  Sats 92-96% on RA; patient denies shortness of breath.       PLAN:  - COVID test pending  - Tylenol scheduled and PRN for fever  - supportive cares while awaiting results  - nursing for increased care needs, likely needs must be anticipated   - discontinue amitriptyline as seemingly not helpful for HA  - can add Sumatriptan PRN, if COVID positive, will discontinue.   - noted DNR status with palliative care goal     Type 2 diabetes mellitus without complication, without long-term current use of insulin (H)  Hx of Hypoglycemic encephalopathy  12/2018 hospitalized for encephalopathy related to low glucose levels.    Lab Results   Component Value Date    A1C 6.2 08/07/2020    A1C 6.2 02/19/2020    A1C 6.7 04/15/2019     On no meds  No ASA d/t history of GIB; now on apixaban. Is on ACEI and statin    Nursing reported hypoglycemia this AM but recheck was normal (however after some soda).    PLAN:  - q AM BG monitoring x 7 days.   - Goal: HgbA1C between 8-9%. Per AGS there is potential harm in lowering the A1C to <6.5% in older adults with diabetes.       Chronic systolic heart failure (H)  Essential hypertension  Hyperlipidemia LDL goal <100  History of ST elevation myocardial infarction (STEMI)  Atherosclerosis of native coronary artery of native heart without angina pectoris  On Lasix 10 mg daily, atorvastatin 20 mg q HS, lisinopril 10 mg daily, nitroglycerin PRN, Toprol 50 mg daily,   4/23/19 ECHO: The left ventricle is normal in size. The visual ejection fraction is estimated at 40%. Mid-distal anteroseptal and apical walls are severely hypokinetic, mild hypokinesis elsewhere.  The right ventricle is normal in structure, function and size. No significant valve disease. The ascending aorta is Mildly dilated (3.9cm).    BPs mostly 110s-120s/70-80s  HRs mostly 70-80s  Patient denies CP; endorses occasional lightheadedness and some frontal HAs.     Nursing noted LS clear     PLAN:  - continue atorvastain, nitroglycerin prn  - continue Toprol XL  - Continue Lasix   - Continue lisinopril, but add hold parameters  - BMP for monitoring of fluid status       Stage 3a chronic kidney disease  BL Creat seemingly ~0.9-1.0  Last few BMPs:   4/28/20: BUN 23, Creat 0.97, GFR 55  8/7/20: BUN 22, Creat 0.99, GFR 63  10/30/20: BUN 16, Creat 0.93, GFR 58  Labs ordered for today -   11/10/20: BUN 19, Creat 0.99, GFR 54    Patient is on Lasix, ACEI; no NSAIDs    PLAN:  - BMP ordered for today - appears euvolemic without JOSTIN  - Will avoid nephrotoxic agents (such as ACEI/ARB/NSAIDs, IV contrast) and mindful prescribing with renal dosing.       Orders written by provider at facility  1. DC amitriptyline  2. q AM BG x 7 days. Update NP with results please Dx: DM2  3. Sumatriptan 50 mg po BID PRN at least 2 hours apart. Update NP with effectiveness if given.  Dx: migraine headache.   4. BMP, CBC with Diff ASAP Dx: fever, lethargy, sinus pressure and headache, HTN  5. Hold Lisinopril for SBP <110    Electronically signed by:  NATIVIDAD Chapa CNP     Video-Visit Details  Type of service:  Video Visit  Video End Time (time video stopped): 8574  Distant Location (provider location):  [facility]

## 2020-12-01 ENCOUNTER — NURSING HOME VISIT (OUTPATIENT)
Dept: GERIATRICS | Facility: CLINIC | Age: 80
End: 2020-12-01
Payer: COMMERCIAL

## 2020-12-01 VITALS
OXYGEN SATURATION: 97 % | WEIGHT: 205.6 LBS | TEMPERATURE: 98 F | SYSTOLIC BLOOD PRESSURE: 115 MMHG | HEIGHT: 66 IN | DIASTOLIC BLOOD PRESSURE: 70 MMHG | HEART RATE: 90 BPM | BODY MASS INDEX: 33.04 KG/M2 | RESPIRATION RATE: 16 BRPM

## 2020-12-01 DIAGNOSIS — F01.518 VASCULAR DEMENTIA WITH BEHAVIOR DISTURBANCE (H): ICD-10-CM

## 2020-12-01 DIAGNOSIS — N18.31 STAGE 3A CHRONIC KIDNEY DISEASE (H): ICD-10-CM

## 2020-12-01 DIAGNOSIS — E11.9 DIET-CONTROLLED DIABETES MELLITUS (H): ICD-10-CM

## 2020-12-01 DIAGNOSIS — Z86.16 HISTORY OF 2019 NOVEL CORONAVIRUS DISEASE (COVID-19): ICD-10-CM

## 2020-12-01 DIAGNOSIS — Z86.73 HISTORY OF STROKE: ICD-10-CM

## 2020-12-01 DIAGNOSIS — I25.10 ATHEROSCLEROSIS OF NATIVE CORONARY ARTERY OF NATIVE HEART WITHOUT ANGINA PECTORIS: ICD-10-CM

## 2020-12-01 DIAGNOSIS — I48.20 CHRONIC ATRIAL FIBRILLATION (H): ICD-10-CM

## 2020-12-01 DIAGNOSIS — I10 ESSENTIAL HYPERTENSION: ICD-10-CM

## 2020-12-01 DIAGNOSIS — I25.2 HISTORY OF ST ELEVATION MYOCARDIAL INFARCTION (STEMI): ICD-10-CM

## 2020-12-01 DIAGNOSIS — E78.5 HYPERLIPIDEMIA LDL GOAL <100: ICD-10-CM

## 2020-12-01 DIAGNOSIS — G72.9 MYOPATHY: Primary | ICD-10-CM

## 2020-12-01 DIAGNOSIS — E16.2 HYPOGLYCEMIC ENCEPHALOPATHY: ICD-10-CM

## 2020-12-01 DIAGNOSIS — I50.22 CHRONIC SYSTOLIC HEART FAILURE (H): ICD-10-CM

## 2020-12-01 PROCEDURE — 99309 SBSQ NF CARE MODERATE MDM 30: CPT | Performed by: NURSE PRACTITIONER

## 2020-12-01 RX ORDER — LANOLIN ALCOHOL/MO/W.PET/CERES
1 CREAM (GRAM) TOPICAL
COMMUNITY

## 2020-12-01 RX ORDER — ACETAMINOPHEN 500 MG
1000 TABLET ORAL 3 TIMES DAILY
Start: 2020-12-01

## 2020-12-01 ASSESSMENT — MIFFLIN-ST. JEOR: SCORE: 1424.35

## 2020-12-01 NOTE — LETTER
"    12/1/2020        RE: Kristine Cosby  [facility]  [address] [facility] [address]        [place] [facility]  [place] [facility] Record Number:  [id number]  Place of Service where encounter took place:  [facility] ([facility]) [[id number]]  Chief Complaint   Patient presents with     Nursing Home Acute       HPI:    Kristine Cosby  is a 80 year old (1940), who is being seen today for an episodic care visit.  HPI information obtained from: facility chart records, facility staff, patient report and [facility] chart review. Today's concern is:     Myopathy  History of 2019 novel coronavirus disease (COVID-19)  Chronic atrial fibrillation (H)  Chronic systolic heart failure (H)  Essential hypertension  Hyperlipidemia LDL goal <100  History of ST elevation myocardial infarction (STEMI)  Atherosclerosis of native coronary artery of native heart without angina pectoris  Stage 3a chronic kidney disease  Vascular dementia with behavior disturbance (H)  History of stroke  Diet-controlled diabetes mellitus (H)  Hypoglycemic encephalopathy     Patient is a 80 year old woman who resides at [facility] since 2/5/2019 after a hospitalization for metabolic encephalopathy d/t hypoglycemia.  She was admitted to LTC from TCU d/t increased care needs.  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   She has hospitalizations for episodes of unresponsiveness and episodes of \"baby talk\"  Extensive work up to determine if CVA present which have all been unremarkable. She has been to Neurology and EEG neg for seizure activity. She is also followed by Cardiology who noted unlikely cardiac- related.      Recent Med Changes:  - 3/3/20: Omeprazole 20 mg daily started when pharmacy reported no H2 blocker " "supply  - 4/28/20: Rocephin 1GM IM daily for 2 days  - 5/29/20: Decrease Levothyroxine to 75 mcg daily  - 9/18/20: left knee infection  -  10/2920: Tylenol increased for a few days to see if HA could be reduced, then on 11/2/20 Tylenol reduced back to 1000 mg BID.   - 11/3/20: Amitriptyline 10 mg q HS started for intractable HAs.     Unfortunately patient tested positive for COVID around 11/11/20 and was transferred to [facility].    She has now returned and patient is being seen for monitoring post COVID.  Nursing also reporting that several medications were DC'd and asking about whether they needed to be restarted or not.     Patient is met in her SNF unit.  She appears at baseline and when asked about symptoms, she denies any.  It is noted she is likely a poor historian.    She reports she does not have any HAs and when asked about her sunglasses she reports, \"the doctor told me I always have to wear them now so (wu)...\"    Past Medical and Surgical History reviewed in Epic today.    MEDICATIONS:  Current Outpatient Medications   Medication Sig Dispense Refill     acetaminophen (TYLENOL) 500 MG tablet Take 2 tablets (1,000 mg) by mouth 3 times daily Also BID PRN       melatonin 3 MG tablet Take 1 mg by mouth nightly as needed for sleep       apixaban ANTICOAGULANT (ELIQUIS) 5 MG tablet Take 1 tablet (5 mg) by mouth 2 times daily       atorvastatin (LIPITOR) 20 MG tablet Take 20 mg by mouth At Bedtime       cholecalciferol 1000 units TABS Take 2,000 Units by mouth daily       diclofenac (VOLTAREN) 1 % topical gel Apply 2 g topically daily AND BID PRN - Apply to right hand       levothyroxine (SYNTHROID/LEVOTHROID) 75 MCG tablet Take 1 tablet (75 mcg) by mouth daily       metoprolol succinate ER (TOPROL-XL) 50 MG 24 hr tablet Take 50 mg by mouth daily       nitroGLYcerin (NITROSTAT) 0.4 MG sublingual tablet Place 0.4 mg under the tongue every 5 minutes as needed for chest pain For chest pain place 1 tablet " "under the tongue every 5 minutes for 3 doses. If symptoms persist 5 minutes after 1st dose call 911.       omeprazole (PRILOSEC) 20 MG DR capsule Take 20 mg by mouth daily       senna-docusate (SENOKOT-S/PERICOLACE) 8.6-50 MG tablet Take 2 tablets by mouth daily        REVIEW OF SYSTEMS:  Limited secondary to cognitive impairment but today pt reports 10 point ROS of systems including Constitutional, Eyes, Respiratory, Cardiovascular, Gastroenterology, Genitourinary, Integumentary, Musculoskeletal, Psychiatric were all negative except for pertinent positives noted in my HPI.    Objective:  /70   Pulse 90   Temp 98  F (36.7  C)   Resp 16   Ht 1.676 m (5' 6\")   Wt 93.3 kg (205 lb 9.6 oz)   SpO2 97%   BMI 33.18 kg/m    Exam:  GENERAL APPEARANCE:  Alert, in no distress, pleasantly confused elderly woman  RESP:  respiratory effort and palpation of chest normal, auscultation of lungs clear, no respiratory distress  CV:  Palpation and auscultation of heart done , rate and rhythm irregular, no murmur, scant LE peripheral edema  ABDOMEN:  normal bowel sounds, soft, nontender, no hepatosplenomegaly or other masses  M/S:   Gait and station with w/c for mobility, Digits and nails with arthritic changes, reduced muscle mass  SKIN:  Inspection and Palpation of skin and subcutaneous tissue pale, thin, fragile, intact  PSYCH:  insight and judgement, memory with impairment, affect and mood normal, follows commands readily         Labs:   CBC RESULTS:   Recent Labs   Lab Test 11/10/20  1500 10/30/20  0845   WBC 2.9* 5.3   RBC 3.67* 3.54*   HGB 12.2 11.6*   HCT 36.8 35.6    101*   MCH 33.2* 32.8   MCHC 33.2 32.6   RDW 13.1 12.9   * 173       Last Basic Metabolic Panel:  Recent Labs   Lab Test 11/10/20  1500 10/30/20  0845    141   POTASSIUM 4.4 4.4   CHLORIDE 107 109   MIKE 8.6 9.2   CO2 25 27   BUN 19 16   CR 0.99 0.93   * 99       Liver Function Studies -   Recent Labs   Lab Test 02/22/20  0030 " 05/13/19  2215   PROTTOTAL 7.2 7.0   ALBUMIN 3.4 3.1*   BILITOTAL 0.4 0.5   ALKPHOS 73 80   AST 14 13   ALT 12 11       TSH   Date Value Ref Range Status   07/24/2020 3.61 0.40 - 4.00 mU/L Final   02/19/2020 2.25 0.40 - 4.00 mU/L Final   ]    Lab Results   Component Value Date    A1C 6.2 08/07/2020    A1C 6.2 02/19/2020         ASSESSMENT/PLAN:  Myopathy  Post COVID  Is working with therapy now for strengthening.   Patient denies pain today.     History of 2019 novel coronavirus disease (COVID-19)  Patient appearing to be at baseline without respiratory or GI symptoms.    Remains on PTA Eliquis for a fib  No need for (new) PRN Morphine as patient denies pain (& nursing confirms she has not c/o pain) and is breathing comfortably   Monitor closely     Chronic atrial fibrillation (H)  Chronic systolic heart failure (H)  Essential hypertension  Hyperlipidemia LDL goal <100  History of ST elevation myocardial infarction (STEMI)  Atherosclerosis of native coronary artery of native heart without angina pectoris  4/23/19 ECHO: The left ventricle is normal in size. The visual ejection fraction is estimated at 40%. Mid-distal anteroseptal and apical walls are severely hypokinetic, mild hypokinesis elsewhere. The right ventricle is normal in structure, function and size. No significant valve disease. The ascending aorta is Mildly dilated (3.9cm).    Lisinopril 10 mg daily and Lasix 10 mg daily have been DC'd by previous SNF.  Patient remains on atorvastatin 20 mg q HS, nitroglycerin prn, Toprol 50 mg daily (also for rate control with A fib).      BPs 120s/80-90s  HR listed in SNF EHR - 52, recheck at today's visit: 84, irregular   Patient denies CP, HA, lightheadedness (likely a poor historian)    LS clear  Scant LE edema    Weights:   Wt Readings from Last 2 Encounters:   12/01/20 93.3 kg (205 lb 9.6 oz)   11/10/20 95.4 kg (210 lb 6.4 oz)     PLAN:  - OK to not restart Lisinopril at this time  - OK to not restart Lasix at  "this time  - daily weights  - continue Apixaban for anticoagulation  - continue Toprol XL for rate control  - HRs taken manually for VS please   - continue statin and nitroglycerin PRN  - BMP for monitoring of fluid status.   - BP goals are ~130 -165/60 -90 mmHg.This is higher than ACC and AHA recommendations due to risk for hypotension, risk of dizziness and falls, risk of tissue/cerebral hypoperfusion and frailty.     Stage 3a chronic kidney disease  BL Creat seemingly ~0.9-1.0  Last few BMPs:   4/28/20: BUN 23, Creat 0.97, GFR 55  8/7/20: BUN 22, Creat 0.99, GFR 63  10/30/20: BUN 16, Creat 0.93, GFR 58  11/10/20: BUN 19, Creat 0.99, GFR 54    Unknown if labs drawn at other SNF.     Previously on Lasix and ACEI - not currently    PLAN:  - recheck BMP for stability without Lasix and ACEI  - Will avoid nephrotoxic agents (such as ACEI/ARB/NSAIDs, IV contrast) and mindful prescribing with renal dosing.     Vascular dementia with behavior disturbance (H)  History of stroke  Diet-controlled diabetes mellitus (H)  Hx of Hypoglycemic encephalopathy  12/2018 hospitalized for encephalopathy related to low glucose levels.          Lab Results   Component Value Date     A1C 6.2 08/07/2020     A1C 6.2 02/19/2020     On no meds  No ASA d/t history of GIB; now on apixaban. Is on statin  (no longer on ACEI)    Is on apixaban and statin for CVA ppx.  Resides in SNF for increased care needs.   No behaviors per nursing.      Melatonin was discontinue\"d at previous SNF - can restart but as PRN and monitor for usage.    Do not restart amitriptyline as this was being used for migraines/headaches prior and likely not effective (later diagnosed with COVID-19).      PLAN:  - restart Melatonin 3 mg q HS but make PRN, monitor for need  - OK not to restart amitriptyline   - nursing for supportive cares and increased care needs     Orders written by provider at facility and transcribed by : Sherlyn Faulkner MA  1.  " Discontinue Morphine.  2.  HR taken manually only please.  3.  Friday, 12/4/20.  Dx: BMP.  Dx: CHF  4.  Melatonin 3 mg po at bedtime PRN.  Dx: insomnia   5. Daily weights Dx: CHF       Electronically signed by:  NATIVIDAD Chapa CNP                 Sincerely,        NATIVIDAD Chapa CNP

## 2020-12-01 NOTE — PROGRESS NOTES
"[place] [facility]  [place] Medical Record Number:  [id number]  Place of Service where encounter took place:  [facility] ([facility]) [[id number]]  Chief Complaint   Patient presents with     Nursing Home Acute       HPI:    Kristine Cosby  is a 80 year old (1940), who is being seen today for an episodic care visit.  HPI information obtained from: facility chart records, facility staff, patient report and [facility] chart review. Today's concern is:     Myopathy  History of 2019 novel coronavirus disease (COVID-19)  Chronic atrial fibrillation (H)  Chronic systolic heart failure (H)  Essential hypertension  Hyperlipidemia LDL goal <100  History of ST elevation myocardial infarction (STEMI)  Atherosclerosis of native coronary artery of native heart without angina pectoris  Stage 3a chronic kidney disease  Vascular dementia with behavior disturbance (H)  History of stroke  Diet-controlled diabetes mellitus (H)  Hypoglycemic encephalopathy     Patient is a 80 year old woman who resides at [facility] since 2/5/2019 after a hospitalization for metabolic encephalopathy d/t hypoglycemia.  She was admitted to LTC from TCU d/t increased care needs.  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   She has hospitalizations for episodes of unresponsiveness and episodes of \"baby talk\"  Extensive work up to determine if CVA present which have all been unremarkable. She has been to Neurology and EEG neg for seizure activity. She is also followed by Cardiology who noted unlikely cardiac- related.      Recent Med Changes:  - 3/3/20: Omeprazole 20 mg daily started when pharmacy reported no H2 blocker supply  - 4/28/20: Rocephin 1GM IM daily for 2 days  - 5/29/20: Decrease Levothyroxine to 75 mcg daily  - 9/18/20: " "left knee infection  -  10/2920: Tylenol increased for a few days to see if HA could be reduced, then on 11/2/20 Tylenol reduced back to 1000 mg BID.   - 11/3/20: Amitriptyline 10 mg q HS started for intractable HAs.     Unfortunately patient tested positive for COVID around 11/11/20 and was transferred to [facility].    She has now returned and patient is being seen for monitoring post COVID.  Nursing also reporting that several medications were DC'd and asking about whether they needed to be restarted or not.     Patient is met in her SNF unit.  She appears at baseline and when asked about symptoms, she denies any.  It is noted she is likely a poor historian.    She reports she does not have any HAs and when asked about her sunglasses she reports, \"the doctor told me I always have to wear them now so (tiurg)...\"    Past Medical and Surgical History reviewed in Epic today.    MEDICATIONS:  Current Outpatient Medications   Medication Sig Dispense Refill     acetaminophen (TYLENOL) 500 MG tablet Take 2 tablets (1,000 mg) by mouth 3 times daily Also BID PRN       melatonin 3 MG tablet Take 1 mg by mouth nightly as needed for sleep       apixaban ANTICOAGULANT (ELIQUIS) 5 MG tablet Take 1 tablet (5 mg) by mouth 2 times daily       atorvastatin (LIPITOR) 20 MG tablet Take 20 mg by mouth At Bedtime       cholecalciferol 1000 units TABS Take 2,000 Units by mouth daily       diclofenac (VOLTAREN) 1 % topical gel Apply 2 g topically daily AND BID PRN - Apply to right hand       levothyroxine (SYNTHROID/LEVOTHROID) 75 MCG tablet Take 1 tablet (75 mcg) by mouth daily       metoprolol succinate ER (TOPROL-XL) 50 MG 24 hr tablet Take 50 mg by mouth daily       nitroGLYcerin (NITROSTAT) 0.4 MG sublingual tablet Place 0.4 mg under the tongue every 5 minutes as needed for chest pain For chest pain place 1 tablet under the tongue every 5 minutes for 3 doses. If symptoms persist 5 minutes after 1st dose call 911.       " "omeprazole (PRILOSEC) 20 MG DR capsule Take 20 mg by mouth daily       senna-docusate (SENOKOT-S/PERICOLACE) 8.6-50 MG tablet Take 2 tablets by mouth daily        REVIEW OF SYSTEMS:  Limited secondary to cognitive impairment but today pt reports 10 point ROS of systems including Constitutional, Eyes, Respiratory, Cardiovascular, Gastroenterology, Genitourinary, Integumentary, Musculoskeletal, Psychiatric were all negative except for pertinent positives noted in my HPI.    Objective:  /70   Pulse 90   Temp 98  F (36.7  C)   Resp 16   Ht 1.676 m (5' 6\")   Wt 93.3 kg (205 lb 9.6 oz)   SpO2 97%   BMI 33.18 kg/m    Exam:  GENERAL APPEARANCE:  Alert, in no distress, pleasantly confused elderly woman  RESP:  respiratory effort and palpation of chest normal, auscultation of lungs clear, no respiratory distress  CV:  Palpation and auscultation of heart done , rate and rhythm irregular, no murmur, scant LE peripheral edema  ABDOMEN:  normal bowel sounds, soft, nontender, no hepatosplenomegaly or other masses  M/S:   Gait and station with w/c for mobility, Digits and nails with arthritic changes, reduced muscle mass  SKIN:  Inspection and Palpation of skin and subcutaneous tissue pale, thin, fragile, intact  PSYCH:  insight and judgement, memory with impairment, affect and mood normal, follows commands readily         Labs:   CBC RESULTS:   Recent Labs   Lab Test 11/10/20  1500 10/30/20  0845   WBC 2.9* 5.3   RBC 3.67* 3.54*   HGB 12.2 11.6*   HCT 36.8 35.6    101*   MCH 33.2* 32.8   MCHC 33.2 32.6   RDW 13.1 12.9   * 173       Last Basic Metabolic Panel:  Recent Labs   Lab Test 11/10/20  1500 10/30/20  0845    141   POTASSIUM 4.4 4.4   CHLORIDE 107 109   MIKE 8.6 9.2   CO2 25 27   BUN 19 16   CR 0.99 0.93   * 99       Liver Function Studies -   Recent Labs   Lab Test 02/22/20  0030 05/13/19  2215   PROTTOTAL 7.2 7.0   ALBUMIN 3.4 3.1*   BILITOTAL 0.4 0.5   ALKPHOS 73 80   AST 14 13   ALT " 12 11       TSH   Date Value Ref Range Status   07/24/2020 3.61 0.40 - 4.00 mU/L Final   02/19/2020 2.25 0.40 - 4.00 mU/L Final   ]    Lab Results   Component Value Date    A1C 6.2 08/07/2020    A1C 6.2 02/19/2020         ASSESSMENT/PLAN:  Myopathy  Post COVID  Is working with therapy now for strengthening.   Patient denies pain today.     History of 2019 novel coronavirus disease (COVID-19)  Patient appearing to be at baseline without respiratory or GI symptoms.    Remains on PTA Eliquis for a fib  No need for (new) PRN Morphine as patient denies pain (& nursing confirms she has not c/o pain) and is breathing comfortably   Monitor closely     Chronic atrial fibrillation (H)  Chronic systolic heart failure (H)  Essential hypertension  Hyperlipidemia LDL goal <100  History of ST elevation myocardial infarction (STEMI)  Atherosclerosis of native coronary artery of native heart without angina pectoris  4/23/19 ECHO: The left ventricle is normal in size. The visual ejection fraction is estimated at 40%. Mid-distal anteroseptal and apical walls are severely hypokinetic, mild hypokinesis elsewhere. The right ventricle is normal in structure, function and size. No significant valve disease. The ascending aorta is Mildly dilated (3.9cm).    Lisinopril 10 mg daily and Lasix 10 mg daily have been DC'd by previous SNF.  Patient remains on atorvastatin 20 mg q HS, nitroglycerin prn, Toprol 50 mg daily (also for rate control with A fib).      BPs 120s/80-90s  HR listed in SNF EHR - 52, recheck at today's visit: 84, irregular   Patient denies CP, HA, lightheadedness (likely a poor historian)    LS clear  Scant LE edema    Weights:   Wt Readings from Last 2 Encounters:   12/01/20 93.3 kg (205 lb 9.6 oz)   11/10/20 95.4 kg (210 lb 6.4 oz)     PLAN:  - OK to not restart Lisinopril at this time  - OK to not restart Lasix at this time  - daily weights  - continue Apixaban for anticoagulation  - continue Toprol XL for rate control  -  "HRs taken manually for VS please   - continue statin and nitroglycerin PRN  - BMP for monitoring of fluid status.   - BP goals are ~130 -165/60 -90 mmHg.This is higher than ACC and AHA recommendations due to risk for hypotension, risk of dizziness and falls, risk of tissue/cerebral hypoperfusion and frailty.     Stage 3a chronic kidney disease  BL Creat seemingly ~0.9-1.0  Last few BMPs:   4/28/20: BUN 23, Creat 0.97, GFR 55  8/7/20: BUN 22, Creat 0.99, GFR 63  10/30/20: BUN 16, Creat 0.93, GFR 58  11/10/20: BUN 19, Creat 0.99, GFR 54    Unknown if labs drawn at other SNF.     Previously on Lasix and ACEI - not currently    PLAN:  - recheck BMP for stability without Lasix and ACEI  - Will avoid nephrotoxic agents (such as ACEI/ARB/NSAIDs, IV contrast) and mindful prescribing with renal dosing.     Vascular dementia with behavior disturbance (H)  History of stroke  Diet-controlled diabetes mellitus (H)  Hx of Hypoglycemic encephalopathy  12/2018 hospitalized for encephalopathy related to low glucose levels.          Lab Results   Component Value Date     A1C 6.2 08/07/2020     A1C 6.2 02/19/2020     On no meds  No ASA d/t history of GIB; now on apixaban. Is on statin  (no longer on ACEI)    Is on apixaban and statin for CVA ppx.  Resides in SNF for increased care needs.   No behaviors per nursing.      Melatonin was discontinue\"d at previous SNF - can restart but as PRN and monitor for usage.    Do not restart amitriptyline as this was being used for migraines/headaches prior and likely not effective (later diagnosed with COVID-19).      PLAN:  - restart Melatonin 3 mg q HS but make PRN, monitor for need  - OK not to restart amitriptyline   - nursing for supportive cares and increased care needs     Orders written by provider at facility and transcribed by : Sherlyn Faulkner MA  1.  Discontinue Morphine.  2.  HR taken manually only please.  3.  Friday, 12/4/20.  Dx: BMP.  Dx: CHF  4.  Melatonin 3 mg " po at bedtime PRN.  Dx: insomnia   5. Daily weights Dx: CHF       Electronically signed by:  ANTIVIDAD Chapa CNP

## 2020-12-04 ENCOUNTER — HOSPITAL LABORATORY (OUTPATIENT)
Dept: NURSING HOME | Facility: OTHER | Age: 80
End: 2020-12-04

## 2020-12-04 LAB
ANION GAP SERPL CALCULATED.3IONS-SCNC: 4 MMOL/L (ref 3–14)
BUN SERPL-MCNC: 17 MG/DL (ref 7–30)
CALCIUM SERPL-MCNC: 8.8 MG/DL (ref 8.5–10.1)
CHLORIDE SERPL-SCNC: 110 MMOL/L (ref 94–109)
CO2 SERPL-SCNC: 26 MMOL/L (ref 20–32)
CREAT SERPL-MCNC: 0.79 MG/DL (ref 0.52–1.04)
GFR SERPL CREATININE-BSD FRML MDRD: 71 ML/MIN/{1.73_M2}
GLUCOSE SERPL-MCNC: 109 MG/DL (ref 70–99)
POTASSIUM SERPL-SCNC: 4.4 MMOL/L (ref 3.4–5.3)
SODIUM SERPL-SCNC: 140 MMOL/L (ref 133–144)

## 2020-12-17 ENCOUNTER — VIRTUAL VISIT (OUTPATIENT)
Dept: GERIATRICS | Facility: CLINIC | Age: 80
End: 2020-12-17
Payer: COMMERCIAL

## 2020-12-17 VITALS
WEIGHT: 202.6 LBS | RESPIRATION RATE: 18 BRPM | HEART RATE: 80 BPM | BODY MASS INDEX: 32.56 KG/M2 | HEIGHT: 66 IN | DIASTOLIC BLOOD PRESSURE: 74 MMHG | SYSTOLIC BLOOD PRESSURE: 132 MMHG | TEMPERATURE: 98.5 F | OXYGEN SATURATION: 97 %

## 2020-12-17 DIAGNOSIS — I10 ESSENTIAL HYPERTENSION: ICD-10-CM

## 2020-12-17 DIAGNOSIS — G72.9 MYOPATHY: Primary | ICD-10-CM

## 2020-12-17 DIAGNOSIS — I50.22 CHRONIC SYSTOLIC HEART FAILURE (H): ICD-10-CM

## 2020-12-17 DIAGNOSIS — E78.5 HYPERLIPIDEMIA LDL GOAL <100: ICD-10-CM

## 2020-12-17 DIAGNOSIS — E11.9 DIET-CONTROLLED DIABETES MELLITUS (H): ICD-10-CM

## 2020-12-17 DIAGNOSIS — Z86.16 HISTORY OF 2019 NOVEL CORONAVIRUS DISEASE (COVID-19): ICD-10-CM

## 2020-12-17 DIAGNOSIS — F01.518 VASCULAR DEMENTIA WITH BEHAVIOR DISTURBANCE (H): ICD-10-CM

## 2020-12-17 DIAGNOSIS — I48.20 CHRONIC ATRIAL FIBRILLATION (H): ICD-10-CM

## 2020-12-17 PROCEDURE — 99305 1ST NF CARE MODERATE MDM 35: CPT | Mod: 95 | Performed by: FAMILY MEDICINE

## 2020-12-17 ASSESSMENT — MIFFLIN-ST. JEOR: SCORE: 1410.74

## 2020-12-17 NOTE — PROGRESS NOTES
" [facility]  Kristine Cosby is being evaluated via a billable video.   The patient has been notified of following:  \"This video visit will be conducted via a call between you and your provider. We have found that certain health care needs can be provided without the need for an in-person physical exam.  This service lets us provide the care you need with a video conversation. If during the course of the call the provider feels a video visit is not appropriate, you will not be charged for this service.\"   The provider has received verbal consent for a Video Visit from the patient and or first contact? Yes  Patient/facility staff would like the video invitation sent by: N/A   Video Start Time: . 12:45  Which Facility the Patient is at during the time of visit: [facility] (formerly [facility])   PRIMARY CARE PROVIDER AND CLINIC:  NATIVIDAD Chapa [facility], [address] [address]  Chief Complaint   Patient presents with     Video Visit     [facility] Medical Record Number:  [id number]  Kristine Cosby  is a 80 year old  (1940), re-admitted to the above facility from [facility] on 11/30/20.  Admitted to this facility for  rehab, medical management and nursing care.  HPI:    HPI information obtained from: facility staff, patient report and [facility] chart review.   Brief Summary:  - Pt who has been an LTC Resident of this facility since 2019, tested positive for covid19, transferred to outside facility. Stabilized, transferred back.       Today:  - Resp: pt reports \" lung is doing very well\". denies wheezing or cough  - Muscle Pain: pt denies pain in the muscles.   - Rehab: \" somewhat getting some therapy, and getting stronger\"  - CHF:  Reports heart is \" so far so good\". GNP reports no peripheral edema.   - Psych: feeling happy.     CODE STATUS/ADVANCE DIRECTIVES DISCUSSION:   DNR only  Patient's living condition: lives in a skilled nursing facility  ALLERGIES: " Blood transfusion related (informational only), Blood-group specific substance, Ferrous gluconate, Ferrous sulfate, and Metformin  PAST MEDICAL HISTORY:  has a past medical history of Cellulitis of left lower extremity, GERD (gastroesophageal reflux disease), HTN (hypertension), Type 2 diabetes mellitus without complication (H), and Ulcer of left lower extremity with fat layer exposed (H).  PAST SURGICAL HISTORY:   has a past surgical history that includes Hysterectomy and ESOPHAGOGASTRODUODENOSCOPY.  FAMILY HISTORY: family history includes Lung Cancer in her father.  SOCIAL HISTORY:   reports that she quit smoking about 20 months ago. She has quit using smokeless tobacco. She reports that she does not drink alcohol.  Current Outpatient Medications   Medication Sig Dispense Refill     acetaminophen (TYLENOL) 500 MG tablet Take 2 tablets (1,000 mg) by mouth 3 times daily Also BID PRN       apixaban ANTICOAGULANT (ELIQUIS) 5 MG tablet Take 1 tablet (5 mg) by mouth 2 times daily       atorvastatin (LIPITOR) 20 MG tablet Take 20 mg by mouth At Bedtime       cholecalciferol 1000 units TABS Take 2,000 Units by mouth daily       diclofenac (VOLTAREN) 1 % topical gel Apply 2 g topically daily AND BID PRN - Apply to right hand       levothyroxine (SYNTHROID/LEVOTHROID) 75 MCG tablet Take 1 tablet (75 mcg) by mouth daily       melatonin 3 MG tablet Take 1 mg by mouth nightly as needed for sleep       metoprolol succinate ER (TOPROL-XL) 50 MG 24 hr tablet Take 50 mg by mouth daily       nitroGLYcerin (NITROSTAT) 0.4 MG sublingual tablet Place 0.4 mg under the tongue every 5 minutes as needed for chest pain For chest pain place 1 tablet under the tongue every 5 minutes for 3 doses. If symptoms persist 5 minutes after 1st dose call 911.       omeprazole (PRILOSEC) 20 MG DR capsule Take 20 mg by mouth daily       senna-docusate (SENOKOT-S/PERICOLACE) 8.6-50 MG tablet Take 2 tablets by mouth daily         discharge medications  "reconciled and changed, per note/orders     ROS: limited secondary to cognitive impairment but today reports.   Vitals:/74   Pulse 80   Temp 98.5  F (36.9  C)   Resp 18   Ht 1.676 m (5' 6\")   Wt 91.9 kg (202 lb 9.6 oz)   SpO2 97%   BMI 32.70 kg/m     Limited Visit Exam done given COVID-19 precautions:  GENERAL APPEARANCE:  in no distress  RESP:  unlabored breathing  M/S:   no joint deformity noted  SKIN:  no rash noted  NEURO:   no purposeful movement in upper and lower extremities  PSYCH:  affect and mood normal    Lab/Diagnostic data: Reviewed in the chart and EHR.      ASSESSMENT/PLAN:  ----------------------------  Post COVID19 Myopathy  Generalized weakness:  - started rehab program, making a progress, continue until desired goal is achieved.       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 BP in general w/i acceptable range   - CVR. On metoprolol for rate control. On Eliquis      Dementia, likely vascular without behavioral disturbance, (H)  History of stroke, evident on brain imaging.   Continue to anticipate needs. Chronic condition, ongoing decline expected.   -  Continue to provide redirection and reassurance as needed. Maintain safe living situation with goals focused on comfort.  - at baseline,      Diet controlled diabetes Mellitus (H): no concern.     Dysphagia, and  Hx of GIB (erosive gastritis, hiatal hernia): on omeprazole      Frailty: Significant  Deficits requiring NH placement. Requiring extensive assistance from nursing. Up for meals only o/w spends the day resting in bed    Order: See above, otherwise, continue the rest of the current POC.         Electronically signed by:  Parris Navarro MD     Video-Visit Details  Type of service:  Video Visit  Video End Time (time video stopped): 12:49  Distant Location (provider location):  " [facility]

## 2020-12-17 NOTE — LETTER
"    12/17/2020        RE: Kristine Cosby  [facility]  [address] [place] [id number]         [facility]  Kristine Cosby is being evaluated via a billable video.   The patient has been notified of following:  \"This video visit will be conducted via a call between you and your provider. We have found that certain health care needs can be provided without the need for an in-person physical exam.  This service lets us provide the care you need with a video conversation. If during the course of the call the provider feels a video visit is not appropriate, you will not be charged for this service.\"   The provider has received verbal consent for a Video Visit from the patient and or first contact? Yes  Patient/facility staff would like the video invitation sent by: N/A   Video Start Time: . 12:45  Which Facility the Patient is at during the time of visit: [facility]   PRIMARY CARE PROVIDER AND CLINIC:  NATIVIDAD Chapa [facility], [address] [address]  Chief Complaint   Patient presents with     Video Visit     [facility] Medical Record Number:  [id number]  Kristine Cosby  is a 80 year old  (1940), re-admitted to the above facility from [facility] on 11/30/20.  Admitted to this facility for  rehab, medical management and nursing care.  HPI:    HPI information obtained from: facility staff, patient report and [facility] chart review.   Brief Summary:  - Pt who has been an LTC Resident of this facility since 2019, tested positive for covid19, transferred to outside facility. Stabilized, transferred back.       Today:  - Resp: pt reports \" lung is doing very well\". denies wheezing or cough  - Muscle Pain: pt denies pain in the muscles.   - Rehab: \" somewhat getting some therapy, and getting stronger\"  - CHF:  Reports heart is \" so far so good\". GNP reports no peripheral edema.   - Psych: feeling happy.     CODE STATUS/ADVANCE " DIRECTIVES DISCUSSION:   DNR only  Patient's living condition: lives in a skilled nursing facility  ALLERGIES: Blood transfusion related (informational only), Blood-group specific substance, Ferrous gluconate, Ferrous sulfate, and Metformin  PAST MEDICAL HISTORY:  has a past medical history of Cellulitis of left lower extremity, GERD (gastroesophageal reflux disease), HTN (hypertension), Type 2 diabetes mellitus without complication (H), and Ulcer of left lower extremity with fat layer exposed (H).  PAST SURGICAL HISTORY:   has a past surgical history that includes Hysterectomy and ESOPHAGOGASTRODUODENOSCOPY.  FAMILY HISTORY: family history includes Lung Cancer in her father.  SOCIAL HISTORY:   reports that she quit smoking about 20 months ago. She has quit using smokeless tobacco. She reports that she does not drink alcohol.  Current Outpatient Medications   Medication Sig Dispense Refill     acetaminophen (TYLENOL) 500 MG tablet Take 2 tablets (1,000 mg) by mouth 3 times daily Also BID PRN       apixaban ANTICOAGULANT (ELIQUIS) 5 MG tablet Take 1 tablet (5 mg) by mouth 2 times daily       atorvastatin (LIPITOR) 20 MG tablet Take 20 mg by mouth At Bedtime       cholecalciferol 1000 units TABS Take 2,000 Units by mouth daily       diclofenac (VOLTAREN) 1 % topical gel Apply 2 g topically daily AND BID PRN - Apply to right hand       levothyroxine (SYNTHROID/LEVOTHROID) 75 MCG tablet Take 1 tablet (75 mcg) by mouth daily       melatonin 3 MG tablet Take 1 mg by mouth nightly as needed for sleep       metoprolol succinate ER (TOPROL-XL) 50 MG 24 hr tablet Take 50 mg by mouth daily       nitroGLYcerin (NITROSTAT) 0.4 MG sublingual tablet Place 0.4 mg under the tongue every 5 minutes as needed for chest pain For chest pain place 1 tablet under the tongue every 5 minutes for 3 doses. If symptoms persist 5 minutes after 1st dose call 911.       omeprazole (PRILOSEC) 20 MG DR capsule Take 20 mg by mouth daily        "senna-docusate (SENOKOT-S/PERICOLACE) 8.6-50 MG tablet Take 2 tablets by mouth daily         discharge medications reconciled and changed, per note/orders     ROS: limited secondary to cognitive impairment but today reports.   Vitals:/74   Pulse 80   Temp 98.5  F (36.9  C)   Resp 18   Ht 1.676 m (5' 6\")   Wt 91.9 kg (202 lb 9.6 oz)   SpO2 97%   BMI 32.70 kg/m     Limited Visit Exam done given COVID-19 precautions:  GENERAL APPEARANCE:  in no distress  RESP:  unlabored breathing  M/S:   no joint deformity noted  SKIN:  no rash noted  NEURO:   no purposeful movement in upper and lower extremities  PSYCH:  affect and mood normal    Lab/Diagnostic data: Reviewed in the chart and EHR.      ASSESSMENT/PLAN:  ----------------------------  Post COVID19 Myopathy  Generalized weakness:  - started rehab program, making a progress, continue until desired goal is achieved.       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 BP in general w/i acceptable range   - CVR. On metoprolol for rate control. On Eliquis      Dementia, likely vascular without behavioral disturbance, (H)  History of stroke, evident on brain imaging.   Continue to anticipate needs. Chronic condition, ongoing decline expected.   -  Continue to provide redirection and reassurance as needed. Maintain safe living situation with goals focused on comfort.  - at baseline,      Diet controlled diabetes Mellitus (H): no concern.     Dysphagia, and  Hx of GIB (erosive gastritis, hiatal hernia): on omeprazole      Frailty: Significant  Deficits requiring NH placement. Requiring extensive assistance from nursing. Up for meals only o/w spends the day resting in bed    Order: See above, otherwise, continue the rest of the current POC.         Electronically signed by:  Parris Navarro MD     Video-Visit " Details  Type of service:  Video Visit  Video End Time (time video stopped): 12:49  Distant Location (provider location):  [facility]                   Sincerely,        Parris Navarro MD

## 2021-01-06 NOTE — PROGRESS NOTES
"[facility]  Chief Complaint   Patient presents with     assisted Regulatory     [facility] Record Number:  [id number]  Place of Service where encounter took place:  [facility] ([facility] [[id number]]    HPI:    Kristine Cosby  is 80 year old (1940), who is being seen today for a federally mandated E/M visit.  HPI information obtained from: facility chart records, facility staff, patient report and [facility] chart review. Today's concerns are:     Vascular dementia with behavior disturbance (H)  History of stroke  Type 2 diabetes mellitus with stage 3a chronic kidney disease, without long-term current use of insulin (H)  Hypoglycemic encephalopathy  Chronic atrial fibrillation (H)  Chronic systolic heart failure (H)  Hyperlipidemia LDL goal <100  Essential hypertension  History of ST elevation myocardial infarction (STEMI)  Atherosclerosis of native coronary artery of native heart without angina pectoris  Stage 3a chronic kidney disease  History of 2019 novel coronavirus disease (COVID-19)  Gastroesophageal reflux disease, unspecified whether esophagitis present  Acquired hypothyroidism  Primary osteoarthritis involving multiple joints  Gastrointestinal hemorrhage, unspecified gastrointestinal hemorrhage type  Anemia, unspecified type  Slow transit constipation  Impaired mobility and activities of daily living     Patient is a 80 year old woman who resides at [facility] since 2/5/2019 after a hospitalization for metabolic encephalopathy d/t hypoglycemia.  She was admitted to LTC from TCU d/t increased care needs.  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   She has hospitalizations for episodes of unresponsiveness and episodes of \"baby talk\"  Extensive work up to determine if " "CVA present which have all been unremarkable. She has been to Neurology and EEG neg for seizure activity. She is also followed by Cardiology who noted unlikely cardiac- related.      Recent Med Changes:  - 3/3/20: Omeprazole 20 mg daily started when pharmacy reported no H2 blocker supply  - 4/28/20: Rocephin 1GM IM daily for 2 days  - 5/29/20: Decrease Levothyroxine to 75 mcg daily  - 9/18/20: left knee infection  -  10/2920: Tylenol increased for a few days to see if HA could be reduced, then on 11/2/20 Tylenol reduced back to 1000 mg BID.   - 11/3/20: Amitriptyline 10 mg q HS started for intractable HAs.      Unfortunately patient tested positive for COVID around 11/11/20 and was transferred to [facility]. Once recovered, she returned to her SNF.    Today patient is met for her regulatory visit in the SNF dining room where she is alone, looking out the window at the frost on the trees.  We talk about how beautiful it is.  She then tells me that a  came into her room this AM and told her that although he knows she is retired, she is needed again and will need to be a  as she is \"the only one who can help.\"  She is without complaint and denies any pain, shortness of breath, CP, HA, lightheadedness, upset stomach, heartburn, constipation nor diarrhea.    Nursing reports no recent behaviors except her delusions.  Nursing does report patient has significant knee pain when ambulating with nursing.     ALLERGIES:Blood transfusion related (informational only), Blood-group specific substance, Ferrous gluconate, Ferrous sulfate, and Metformin  PAST MEDICAL HISTORY:   has a past medical history of Cellulitis of left lower extremity, GERD (gastroesophageal reflux disease), HTN (hypertension), Type 2 diabetes mellitus without complication (H), and Ulcer of left lower extremity with fat layer exposed (H).  PAST SURGICAL HISTORY:   has a past surgical history that includes Hysterectomy and " ESOPHAGOGASTRODUODENOSCOPY.  FAMILY HISTORY: family history includes Lung Cancer in her father.  SOCIAL HISTORY:  reports that she quit smoking about 20 months ago. She has quit using smokeless tobacco. She reports that she does not drink alcohol.    MEDICATIONS:  Current Outpatient Medications   Medication Sig Dispense Refill     acetaminophen (TYLENOL) 500 MG tablet Take 2 tablets (1,000 mg) by mouth 3 times daily Also BID PRN       apixaban ANTICOAGULANT (ELIQUIS) 5 MG tablet Take 1 tablet (5 mg) by mouth 2 times daily       atorvastatin (LIPITOR) 20 MG tablet Take 20 mg by mouth At Bedtime       cholecalciferol 1000 units TABS Take 2,000 Units by mouth daily       diclofenac (VOLTAREN) 1 % topical gel Apply 2 g topically daily AND BID PRN - Apply to right hand       levothyroxine (SYNTHROID/LEVOTHROID) 75 MCG tablet Take 1 tablet (75 mcg) by mouth daily       melatonin 3 MG tablet Take 1 mg by mouth nightly as needed for sleep       metoprolol succinate ER (TOPROL-XL) 50 MG 24 hr tablet Take 50 mg by mouth daily       nitroGLYcerin (NITROSTAT) 0.4 MG sublingual tablet Place 0.4 mg under the tongue every 5 minutes as needed for chest pain For chest pain place 1 tablet under the tongue every 5 minutes for 3 doses. If symptoms persist 5 minutes after 1st dose call 911.       omeprazole (PRILOSEC) 20 MG DR capsule Take 20 mg by mouth daily       senna-docusate (SENOKOT-S/PERICOLACE) 8.6-50 MG tablet Take 2 tablets by mouth daily        Case Management:  I have reviewed the care plan and MDS and do agree with the plan. Patient's desire to return to the community is not present. Information reviewed:  Medications, vital signs, orders, and nursing notes.    ROS:  Limited secondary to cognitive impairment but today pt reports 10 point ROS of systems including Constitutional, Eyes, Respiratory, Cardiovascular, Gastroenterology, Genitourinary, Integumentary, Musculoskeletal, Psychiatric were all negative except for  "pertinent positives noted in my HPI.    Vitals:  /74   Pulse 81   Temp 97.6  F (36.4  C)   Resp 20   Ht 1.575 m (5' 2\")   Wt 83.4 kg (183 lb 12.8 oz)   SpO2 99%   BMI 33.62 kg/m    Body mass index is 33.62 kg/m .  Exam:  GENERAL APPEARANCE:  Alert, in no distress, pleasant, laughing  ENT:  Mouth and posterior oropharynx normal, moist mucous membranes, hearing acuity WNL  EYES:  EOM, conjunctivae, lids, pupils and irises normal  NECK:  No adenopathy,masses or thyromegaly  RESP:  respiratory effort and palpation of chest normal, no respiratory distress, Lung sounds clear  CV:  Palpation and auscultation of heart done , rate and rhythm irregular, no  murmur, no rub or gallop, Edema none, + PVD changes to LEs   ABDOMEN:  normal bowel sounds, soft, nontender, no hepatosplenomegaly or other masses  M/S:   Gait and station with w/c for mobility, Digits and nails with arthritic changes  SKIN:  Inspection/Palpation of skin and subcutaneous tissue pale, intact  NEURO: 2-12 in normal limits and at patient's baseline  PSYCH:  insight and judgement, memory with significant impairment, affect and mood normal      Lab/Diagnostic data:   CBC RESULTS:   Recent Labs   Lab Test 11/10/20  1500 10/30/20  0845   WBC 2.9* 5.3   RBC 3.67* 3.54*   HGB 12.2 11.6*   HCT 36.8 35.6    101*   MCH 33.2* 32.8   MCHC 33.2 32.6   RDW 13.1 12.9   * 173       Last Basic Metabolic Panel:  Recent Labs   Lab Test 12/04/20  0719 11/10/20  1500    139   POTASSIUM 4.4 4.4   CHLORIDE 110* 107   MIKE 8.8 8.6   CO2 26 25   BUN 17 19   CR 0.79 0.99   * 150*       Liver Function Studies -   Recent Labs   Lab Test 02/22/20  0030 05/13/19  2215   PROTTOTAL 7.2 7.0   ALBUMIN 3.4 3.1*   BILITOTAL 0.4 0.5   ALKPHOS 73 80   AST 14 13   ALT 12 11       TSH   Date Value Ref Range Status   07/24/2020 3.61 0.40 - 4.00 mU/L Final   02/19/2020 2.25 0.40 - 4.00 mU/L Final   ]    Lab Results   Component Value Date    A1C 6.2 08/07/2020 "    A1C 6.2 02/19/2020         ASSESSMENT/PLAN  Vascular dementia with behavior disturbance (H)  History of stroke  F/b: Dr Nunes, Neurology   12/2018 hospitalized for encephalopathy related to ow glucose levels.    Since then has had episodes of unresponsiveness (unarousable but stable VS) and baby talk which can last several hours to over 24 hours.   Last saw Neurology on 6/4/19 who stopped tamsulosin and agreed with discontinue of keppra.  MRI was ordered and completed 6/7/29:  IMPRESSION:  1. No interval change.   2. No acute intracranial abnormality.   3. Normal brain parenchymal morphology. Stable focal encephalomalacia and gliosis ovoid infarct involving the right frontal lobe and frontal operculum.   4. Stable patchy T2/FLAIR signal hyperintensity within the white matter consistent with chronic small vessel ischemic changes. Multiple lacunar infarcts of the bilateral cerebellar hemispheres   5. No acute or chronic intracranial hemorrhage     On apixaban and statin for cva ppx.   In-house psychology is following. Resides in SNF for increased care needs    A&O x 1  BIMS 5  PHQ9 - 2    On no meds for behaviors. Is very pleasant and laughing/joyful with her delusions.     PLAN:  -nursing for increased care needs  - delusions are joyful to her and do not bother her    Type 2 diabetes mellitus with stage 3a chronic kidney disease, without long-term current use of insulin (H)  Hx of Hypoglycemic encephalopathy  Lab Results   Component Value Date    A1C 6.2 08/07/2020    A1C 6.2 02/19/2020    A1C 6.7 04/15/2019     On no meds  History of hypoglycemic encephalopathy as listed above   No ASA d/t Eliquis  No ACEI d/t hypotension  Is on statin    PLAN:  - continue statin  - no ASA nor ACEi d/t contraindication  - daily BG --> can reduce to 2x/week (at family's request to keep checks)  - Goal: HgbA1C between 8-9%. Per AGS there is potential harm in lowering the A1C to <6.5% in older adults with diabetes.     Chronic atrial  fibrillation (H)  on Eliquis 5 mg BID for anticulation  On Toprol XL 50 mg daily for rate control    HRs 60-78, irregular today     PLAN:  - eliquis as ordered   - Toprol for rate control  - monitor for bleeding      Chronic systolic heart failure (H)  Hyperlipidemia LDL goal <100  Essential hypertension  History of ST elevation myocardial infarction (STEMI)  Atherosclerosis of native coronary artery of native heart without angina pectoris  4/23/19 ECHO: EF 40%, Mid-distal anteroseptal and apical walls are severely hypokinetic, mild hypokinesis elsewhere.  On Toprol XL 50 mg daily, atorvastatin 20 mg daily,   No longer on diuretics    Weights: stable around 209-210  LS clear  No LE edema     BPs 110s-120s/60-70s  HRs 60-78, irregular today  Patient denies CP, HA, lightheadedness (may be a poor historian)    PLAN:  - continue Toprol XL  - can reduce weights to routine as have been stable for quite some time  - monitor LE edema, LS  - continue statin  - BP goals are ~130 -165/60 -90 mmHg.This is higher than ACC and AHA recommendations due to risk for hypotension, risk of dizziness and falls, risk of tissue/cerebral hypoperfusion and frailty.     Stage 3a chronic kidney disease  BL Creat seemingly ~0.9  Last few BMPs:   10/20/20: BUN 16, Creat 0.93, GFR 58  11/10/20: BUN 19, Creat 0.99, GFR 54  12/4/20: BUN 17, Creat 0.79, GFR 71    Patient is not on diuretics, ACEI nor NSAIDs    PLAN:  - Will avoid nephrotoxic agents (such as ACEI/ARB/NSAIDs, IV contrast) and mindful prescribing with renal dosing.       History of 2019 novel coronavirus disease (COVID-19)  Unfortunately patient tested positive for COVID around 11/11/20 and was transferred to [facility]. Once recovered, she returned to her SNF.  Doing well now.  Previously had significant HAs which in retrospect likely were related to COVID dx.   Has PRN Imitrex - can discontinue    PLAN:  - discontinue Imitrex PRN    Gastroesophageal reflux disease, unspecified  whether esophagitis present  Hx of GIB (hiatal hernia, erosive gastritis)  History of GIB in 2017 while on pradaxa  Now on apixaban  On Omeprazole 20 mg daily     Patient denies any upset stomach or heartburn     Hemoglobin   Date Value Ref Range Status   11/10/2020 12.2 11.7 - 15.7 g/dL Final   10/30/2020 11.6 (L) 11.7 - 15.7 g/dL Final     PLAN:  - Apixaban for a fib  - PPI for history of GIB    Orders written by provider at facility  1. discontinue q day weights  2. Change BG checks to 2x/week  3. discontinue Imitrex         Electronically signed by:  NATIVIDAD Chapa CNP

## 2021-01-08 ENCOUNTER — NURSING HOME VISIT (OUTPATIENT)
Dept: GERIATRICS | Facility: CLINIC | Age: 81
End: 2021-01-08
Payer: COMMERCIAL

## 2021-01-08 VITALS
DIASTOLIC BLOOD PRESSURE: 74 MMHG | OXYGEN SATURATION: 99 % | TEMPERATURE: 97.6 F | WEIGHT: 183.8 LBS | HEIGHT: 62 IN | SYSTOLIC BLOOD PRESSURE: 132 MMHG | RESPIRATION RATE: 20 BRPM | BODY MASS INDEX: 33.82 KG/M2 | HEART RATE: 81 BPM

## 2021-01-08 DIAGNOSIS — K21.9 GASTROESOPHAGEAL REFLUX DISEASE, UNSPECIFIED WHETHER ESOPHAGITIS PRESENT: ICD-10-CM

## 2021-01-08 DIAGNOSIS — E03.9 ACQUIRED HYPOTHYROIDISM: ICD-10-CM

## 2021-01-08 DIAGNOSIS — N18.31 TYPE 2 DIABETES MELLITUS WITH STAGE 3A CHRONIC KIDNEY DISEASE, WITHOUT LONG-TERM CURRENT USE OF INSULIN (H): ICD-10-CM

## 2021-01-08 DIAGNOSIS — E11.22 TYPE 2 DIABETES MELLITUS WITH STAGE 3A CHRONIC KIDNEY DISEASE, WITHOUT LONG-TERM CURRENT USE OF INSULIN (H): ICD-10-CM

## 2021-01-08 DIAGNOSIS — I10 ESSENTIAL HYPERTENSION: ICD-10-CM

## 2021-01-08 DIAGNOSIS — Z78.9 IMPAIRED MOBILITY AND ACTIVITIES OF DAILY LIVING: ICD-10-CM

## 2021-01-08 DIAGNOSIS — N18.31 STAGE 3A CHRONIC KIDNEY DISEASE (H): ICD-10-CM

## 2021-01-08 DIAGNOSIS — I25.10 ATHEROSCLEROSIS OF NATIVE CORONARY ARTERY OF NATIVE HEART WITHOUT ANGINA PECTORIS: ICD-10-CM

## 2021-01-08 DIAGNOSIS — I48.20 CHRONIC ATRIAL FIBRILLATION (H): ICD-10-CM

## 2021-01-08 DIAGNOSIS — E16.2 HYPOGLYCEMIC ENCEPHALOPATHY: ICD-10-CM

## 2021-01-08 DIAGNOSIS — Z86.16 HISTORY OF 2019 NOVEL CORONAVIRUS DISEASE (COVID-19): ICD-10-CM

## 2021-01-08 DIAGNOSIS — K92.2 GASTROINTESTINAL HEMORRHAGE, UNSPECIFIED GASTROINTESTINAL HEMORRHAGE TYPE: ICD-10-CM

## 2021-01-08 DIAGNOSIS — Z86.73 HISTORY OF STROKE: ICD-10-CM

## 2021-01-08 DIAGNOSIS — D64.9 ANEMIA, UNSPECIFIED TYPE: ICD-10-CM

## 2021-01-08 DIAGNOSIS — M15.0 PRIMARY OSTEOARTHRITIS INVOLVING MULTIPLE JOINTS: ICD-10-CM

## 2021-01-08 DIAGNOSIS — E78.5 HYPERLIPIDEMIA LDL GOAL <100: ICD-10-CM

## 2021-01-08 DIAGNOSIS — I50.22 CHRONIC SYSTOLIC HEART FAILURE (H): ICD-10-CM

## 2021-01-08 DIAGNOSIS — F01.518 VASCULAR DEMENTIA WITH BEHAVIOR DISTURBANCE (H): Primary | ICD-10-CM

## 2021-01-08 DIAGNOSIS — Z74.09 IMPAIRED MOBILITY AND ACTIVITIES OF DAILY LIVING: ICD-10-CM

## 2021-01-08 DIAGNOSIS — K59.01 SLOW TRANSIT CONSTIPATION: ICD-10-CM

## 2021-01-08 DIAGNOSIS — I25.2 HISTORY OF ST ELEVATION MYOCARDIAL INFARCTION (STEMI): ICD-10-CM

## 2021-01-08 PROCEDURE — 99309 SBSQ NF CARE MODERATE MDM 30: CPT | Performed by: NURSE PRACTITIONER

## 2021-01-08 ASSESSMENT — MIFFLIN-ST. JEOR: SCORE: 1261.96

## 2021-01-08 NOTE — LETTER
1/8/2021        RE: Kristine Cosby  [facility]  [address] [facility] [address]        [place] [facility]  Chief Complaint   Patient presents with     USP Regulatory     [place] Medical Record Number:  [id number]  Place of Service where encounter took place:  [facility] [[id number]]    HPI:    Kristine Cosby  is 80 year old (1940), who is being seen today for a federally mandated E/M visit.  HPI information obtained from: facility chart records, facility staff, patient report and [facility] chart review. Today's concerns are:     Vascular dementia with behavior disturbance (H)  History of stroke  Type 2 diabetes mellitus with stage 3a chronic kidney disease, without long-term current use of insulin (H)  Hypoglycemic encephalopathy  Chronic atrial fibrillation (H)  Chronic systolic heart failure (H)  Hyperlipidemia LDL goal <100  Essential hypertension  History of ST elevation myocardial infarction (STEMI)  Atherosclerosis of native coronary artery of native heart without angina pectoris  Stage 3a chronic kidney disease  History of 2019 novel coronavirus disease (COVID-19)  Gastroesophageal reflux disease, unspecified whether esophagitis present  Acquired hypothyroidism  Primary osteoarthritis involving multiple joints  Gastrointestinal hemorrhage, unspecified gastrointestinal hemorrhage type  Anemia, unspecified type  Slow transit constipation  Impaired mobility and activities of daily living     Patient is a 80 year old woman who resides at [facility] since 2/5/2019 after a hospitalization for metabolic encephalopathy d/t hypoglycemia.  She was admitted to LTC from TCU d/t increased care needs.  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   She has  "hospitalizations for episodes of unresponsiveness and episodes of \"baby talk\"  Extensive work up to determine if CVA present which have all been unremarkable. She has been to Neurology and EEG neg for seizure activity. She is also followed by Cardiology who noted unlikely cardiac- related.      Recent Med Changes:  - 3/3/20: Omeprazole 20 mg daily started when pharmacy reported no H2 blocker supply  - 4/28/20: Rocephin 1GM IM daily for 2 days  - 5/29/20: Decrease Levothyroxine to 75 mcg daily  - 9/18/20: left knee infection  -  10/2920: Tylenol increased for a few days to see if HA could be reduced, then on 11/2/20 Tylenol reduced back to 1000 mg BID.   - 11/3/20: Amitriptyline 10 mg q HS started for intractable HAs.      Unfortunately patient tested positive for COVID around 11/11/20 and was transferred to [facility]. Once recovered, she returned to her SNF.    Today patient is met for her regulatory visit in the SNF dining room where she is alone, looking out the window at the frost on the trees.  We talk about how beautiful it is.  She then tells me that a  came into her room this AM and told her that although he knows she is retired, she is needed again and will need to be a  as she is \"the only one who can help.\"  She is without complaint and denies any pain, shortness of breath, CP, HA, lightheadedness, upset stomach, heartburn, constipation nor diarrhea.    Nursing reports no recent behaviors except her delusions.  Nursing does report patient has significant knee pain when ambulating with nursing.     ALLERGIES:Blood transfusion related (informational only), Blood-group specific substance, Ferrous gluconate, Ferrous sulfate, and Metformin  PAST MEDICAL HISTORY:   has a past medical history of Cellulitis of left lower extremity, GERD (gastroesophageal reflux disease), HTN (hypertension), Type 2 diabetes mellitus without complication (H), and Ulcer of left lower extremity with " fat layer exposed (H).  PAST SURGICAL HISTORY:   has a past surgical history that includes Hysterectomy and ESOPHAGOGASTRODUODENOSCOPY.  FAMILY HISTORY: family history includes Lung Cancer in her father.  SOCIAL HISTORY:  reports that she quit smoking about 20 months ago. She has quit using smokeless tobacco. She reports that she does not drink alcohol.    MEDICATIONS:  Current Outpatient Medications   Medication Sig Dispense Refill     acetaminophen (TYLENOL) 500 MG tablet Take 2 tablets (1,000 mg) by mouth 3 times daily Also BID PRN       apixaban ANTICOAGULANT (ELIQUIS) 5 MG tablet Take 1 tablet (5 mg) by mouth 2 times daily       atorvastatin (LIPITOR) 20 MG tablet Take 20 mg by mouth At Bedtime       cholecalciferol 1000 units TABS Take 2,000 Units by mouth daily       diclofenac (VOLTAREN) 1 % topical gel Apply 2 g topically daily AND BID PRN - Apply to right hand       levothyroxine (SYNTHROID/LEVOTHROID) 75 MCG tablet Take 1 tablet (75 mcg) by mouth daily       melatonin 3 MG tablet Take 1 mg by mouth nightly as needed for sleep       metoprolol succinate ER (TOPROL-XL) 50 MG 24 hr tablet Take 50 mg by mouth daily       nitroGLYcerin (NITROSTAT) 0.4 MG sublingual tablet Place 0.4 mg under the tongue every 5 minutes as needed for chest pain For chest pain place 1 tablet under the tongue every 5 minutes for 3 doses. If symptoms persist 5 minutes after 1st dose call 911.       omeprazole (PRILOSEC) 20 MG DR capsule Take 20 mg by mouth daily       senna-docusate (SENOKOT-S/PERICOLACE) 8.6-50 MG tablet Take 2 tablets by mouth daily        Case Management:  I have reviewed the care plan and MDS and do agree with the plan. Patient's desire to return to the community is not present. Information reviewed:  Medications, vital signs, orders, and nursing notes.    ROS:  Limited secondary to cognitive impairment but today pt reports 10 point ROS of systems including Constitutional, Eyes, Respiratory, Cardiovascular,  "Gastroenterology, Genitourinary, Integumentary, Musculoskeletal, Psychiatric were all negative except for pertinent positives noted in my HPI.    Vitals:  /74   Pulse 81   Temp 97.6  F (36.4  C)   Resp 20   Ht 1.575 m (5' 2\")   Wt 83.4 kg (183 lb 12.8 oz)   SpO2 99%   BMI 33.62 kg/m    Body mass index is 33.62 kg/m .  Exam:  GENERAL APPEARANCE:  Alert, in no distress, pleasant, laughing  ENT:  Mouth and posterior oropharynx normal, moist mucous membranes, hearing acuity WNL  EYES:  EOM, conjunctivae, lids, pupils and irises normal  NECK:  No adenopathy,masses or thyromegaly  RESP:  respiratory effort and palpation of chest normal, no respiratory distress, Lung sounds clear  CV:  Palpation and auscultation of heart done , rate and rhythm irregular, no  murmur, no rub or gallop, Edema none, + PVD changes to LEs   ABDOMEN:  normal bowel sounds, soft, nontender, no hepatosplenomegaly or other masses  M/S:   Gait and station with w/c for mobility, Digits and nails with arthritic changes  SKIN:  Inspection/Palpation of skin and subcutaneous tissue pale, intact  NEURO: 2-12 in normal limits and at patient's baseline  PSYCH:  insight and judgement, memory with significant impairment, affect and mood normal      Lab/Diagnostic data:   CBC RESULTS:   Recent Labs   Lab Test 11/10/20  1500 10/30/20  0845   WBC 2.9* 5.3   RBC 3.67* 3.54*   HGB 12.2 11.6*   HCT 36.8 35.6    101*   MCH 33.2* 32.8   MCHC 33.2 32.6   RDW 13.1 12.9   * 173       Last Basic Metabolic Panel:  Recent Labs   Lab Test 12/04/20  0719 11/10/20  1500    139   POTASSIUM 4.4 4.4   CHLORIDE 110* 107   MIKE 8.8 8.6   CO2 26 25   BUN 17 19   CR 0.79 0.99   * 150*       Liver Function Studies -   Recent Labs   Lab Test 02/22/20  0030 05/13/19  2215   PROTTOTAL 7.2 7.0   ALBUMIN 3.4 3.1*   BILITOTAL 0.4 0.5   ALKPHOS 73 80   AST 14 13   ALT 12 11       TSH   Date Value Ref Range Status   07/24/2020 3.61 0.40 - 4.00 mU/L Final "   02/19/2020 2.25 0.40 - 4.00 mU/L Final   ]    Lab Results   Component Value Date    A1C 6.2 08/07/2020    A1C 6.2 02/19/2020         ASSESSMENT/PLAN  Vascular dementia with behavior disturbance (H)  History of stroke  F/b: Dr Nunes, Neurology   12/2018 hospitalized for encephalopathy related to ow glucose levels.    Since then has had episodes of unresponsiveness (unarousable but stable VS) and baby talk which can last several hours to over 24 hours.   Last saw Neurology on 6/4/19 who stopped tamsulosin and agreed with discontinue of keppra.  MRI was ordered and completed 6/7/29:  IMPRESSION:  1. No interval change.   2. No acute intracranial abnormality.   3. Normal brain parenchymal morphology. Stable focal encephalomalacia and gliosis ovoid infarct involving the right frontal lobe and frontal operculum.   4. Stable patchy T2/FLAIR signal hyperintensity within the white matter consistent with chronic small vessel ischemic changes. Multiple lacunar infarcts of the bilateral cerebellar hemispheres   5. No acute or chronic intracranial hemorrhage     On apixaban and statin for cva ppx.   In-house psychology is following. Resides in SNF for increased care needs    A&O x 1  BIMS 5  PHQ9 - 2    On no meds for behaviors. Is very pleasant and laughing/joyful with her delusions.     PLAN:  -nursing for increased care needs  - delusions are joyful to her and do not bother her    Type 2 diabetes mellitus with stage 3a chronic kidney disease, without long-term current use of insulin (H)  Hx of Hypoglycemic encephalopathy  Lab Results   Component Value Date    A1C 6.2 08/07/2020    A1C 6.2 02/19/2020    A1C 6.7 04/15/2019     On no meds  History of hypoglycemic encephalopathy as listed above   No ASA d/t Eliquis  No ACEI d/t hypotension  Is on statin    PLAN:  - continue statin  - no ASA nor ACEi d/t contraindication  - daily BG --> can reduce to 2x/week (at family's request to keep checks)  - Goal: HgbA1C between 8-9%. Per  AGS there is potential harm in lowering the A1C to <6.5% in older adults with diabetes.     Chronic atrial fibrillation (H)  on Eliquis 5 mg BID for anticulation  On Toprol XL 50 mg daily for rate control    HRs 60-78, irregular today     PLAN:  - eliquis as ordered   - Toprol for rate control  - monitor for bleeding      Chronic systolic heart failure (H)  Hyperlipidemia LDL goal <100  Essential hypertension  History of ST elevation myocardial infarction (STEMI)  Atherosclerosis of native coronary artery of native heart without angina pectoris  4/23/19 ECHO: EF 40%, Mid-distal anteroseptal and apical walls are severely hypokinetic, mild hypokinesis elsewhere.  On Toprol XL 50 mg daily, atorvastatin 20 mg daily,   No longer on diuretics    Weights: stable around 209-210  LS clear  No LE edema     BPs 110s-120s/60-70s  HRs 60-78, irregular today  Patient denies CP, HA, lightheadedness (may be a poor historian)    PLAN:  - continue Toprol XL  - can reduce weights to routine as have been stable for quite some time  - monitor LE edema, LS  - continue statin  - BP goals are ~130 -165/60 -90 mmHg.This is higher than ACC and AHA recommendations due to risk for hypotension, risk of dizziness and falls, risk of tissue/cerebral hypoperfusion and frailty.     Stage 3a chronic kidney disease  BL Creat seemingly ~0.9  Last few BMPs:   10/20/20: BUN 16, Creat 0.93, GFR 58  11/10/20: BUN 19, Creat 0.99, GFR 54  12/4/20: BUN 17, Creat 0.79, GFR 71    Patient is not on diuretics, ACEI nor NSAIDs    PLAN:  - Will avoid nephrotoxic agents (such as ACEI/ARB/NSAIDs, IV contrast) and mindful prescribing with renal dosing.       History of 2019 novel coronavirus disease (COVID-19)  Unfortunately patient tested positive for COVID around 11/11/20 and was transferred to [facility]. Once recovered, she returned to her SNF.  Doing well now.  Previously had significant HAs which in retrospect likely were related to COVID dx.   Has PRN  Imitrex - can discontinue    PLAN:  - discontinue Imitrex PRN    Gastroesophageal reflux disease, unspecified whether esophagitis present  Hx of GIB (hiatal hernia, erosive gastritis)  History of GIB in 2017 while on pradaxa  Now on apixaban  On Omeprazole 20 mg daily     Patient denies any upset stomach or heartburn     Hemoglobin   Date Value Ref Range Status   11/10/2020 12.2 11.7 - 15.7 g/dL Final   10/30/2020 11.6 (L) 11.7 - 15.7 g/dL Final     PLAN:  - Apixaban for a fib  - PPI for history of GIB    Orders written by provider at facility  1. discontinue q day weights  2. Change BG checks to 2x/week  3. discontinue Imitrex         Electronically signed by:  NATIVIDAD Chapa CNP                Sincerely,        NATIVIDAD Chapa CNP

## 2021-02-11 ENCOUNTER — VIRTUAL VISIT (OUTPATIENT)
Dept: GERIATRICS | Facility: CLINIC | Age: 81
End: 2021-02-11
Payer: COMMERCIAL

## 2021-02-11 VITALS
HEIGHT: 66 IN | TEMPERATURE: 97.1 F | BODY MASS INDEX: 33.56 KG/M2 | RESPIRATION RATE: 16 BRPM | OXYGEN SATURATION: 94 % | HEART RATE: 64 BPM | WEIGHT: 208.8 LBS | SYSTOLIC BLOOD PRESSURE: 117 MMHG | DIASTOLIC BLOOD PRESSURE: 83 MMHG

## 2021-02-11 DIAGNOSIS — I25.10 ATHEROSCLEROSIS OF NATIVE CORONARY ARTERY OF NATIVE HEART WITHOUT ANGINA PECTORIS: ICD-10-CM

## 2021-02-11 DIAGNOSIS — E78.5 HYPERLIPIDEMIA LDL GOAL <100: ICD-10-CM

## 2021-02-11 DIAGNOSIS — I10 ESSENTIAL HYPERTENSION: ICD-10-CM

## 2021-02-11 DIAGNOSIS — I48.20 CHRONIC ATRIAL FIBRILLATION (H): ICD-10-CM

## 2021-02-11 DIAGNOSIS — I50.20 HFREF (HEART FAILURE WITH REDUCED EJECTION FRACTION) (H): Primary | ICD-10-CM

## 2021-02-11 DIAGNOSIS — E11.9 DIET-CONTROLLED DIABETES MELLITUS (H): ICD-10-CM

## 2021-02-11 DIAGNOSIS — F01.518 VASCULAR DEMENTIA WITH BEHAVIOR DISTURBANCE (H): ICD-10-CM

## 2021-02-11 PROCEDURE — 99309 SBSQ NF CARE MODERATE MDM 30: CPT | Mod: 95 | Performed by: FAMILY MEDICINE

## 2021-02-11 ASSESSMENT — MIFFLIN-ST. JEOR: SCORE: 1438.86

## 2021-02-11 NOTE — PROGRESS NOTES
"[facility] Regulatory   Kristine Cosby is being evaluated via a billable video visit.   The patient has been notified of following:  \"This video visit will be conducted via a call between you and your provider. We have found that certain health care needs can be provided without the need for an in-person physical exam.  This service lets us provide the care you need with a video conversation. If during the course of the call the provider feels a video visit is not appropriate, you will not be charged for this service.\"   The provider has received verbal consent for a Video Visit from the patient or first contact? Yes  Patient or facility staff would like the video invitation sent by: N/A   Video Start Time: 12:00  [place] Medical Record Number:  [id number]  Place of Location at the time of visit: [facility]   Chief Complaint   Patient presents with     long term Regulatory     Video Visit     HPI:  Kristine Cosby  is a 80 year old (1940), who is being seen today for a Regulatory visit.  HPI information obtained from: facility staff, patient report and [facility] chart review.     Today's concern is:  pt reports breathing is fine, denies pain.   - sleeps appetite and sleep are fine  - denies CP. Denies legs edema    ==================================  Past Medical and Surgical History reviewed in Epic today.  MEDICATIONS:  Current Outpatient Medications   Medication Sig Dispense Refill     acetaminophen (TYLENOL) 500 MG tablet Take 2 tablets (1,000 mg) by mouth 3 times daily Also BID PRN       apixaban ANTICOAGULANT (ELIQUIS) 5 MG tablet Take 1 tablet (5 mg) by mouth 2 times daily       atorvastatin (LIPITOR) 20 MG tablet Take 20 mg by mouth At Bedtime       cholecalciferol 1000 units TABS Take 2,000 Units by mouth daily       diclofenac (VOLTAREN) 1 % topical gel Apply 2 g topically daily AND BID PRN - Apply to right hand       levothyroxine (SYNTHROID/LEVOTHROID) 75 " "MCG tablet Take 1 tablet (75 mcg) by mouth daily       melatonin 3 MG tablet Take 1 mg by mouth nightly as needed for sleep       metoprolol succinate ER (TOPROL-XL) 50 MG 24 hr tablet Take 50 mg by mouth daily       nitroGLYcerin (NITROSTAT) 0.4 MG sublingual tablet Place 0.4 mg under the tongue every 5 minutes as needed for chest pain For chest pain place 1 tablet under the tongue every 5 minutes for 3 doses. If symptoms persist 5 minutes after 1st dose call 911.       omeprazole (PRILOSEC) 20 MG DR capsule Take 20 mg by mouth daily       senna-docusate (SENOKOT-S/PERICOLACE) 8.6-50 MG tablet Take 2 tablets by mouth daily        REVIEW OF SYSTEMS: limited secondary to cognitive impairment but today reports.     Objective: /83   Pulse 64   Temp 97.1  F (36.2  C)   Resp 16   Ht 1.676 m (5' 6\")   Wt 94.7 kg (208 lb 12.8 oz)   SpO2 94%   BMI 33.70 kg/m    Limited visit exam done given COVID-19 precautions.   GENERAL APPEARANCE:  in no distress  RESP:  unlabored breathing  M/S:   no joint deformity noted  SKIN:  no rash noted  NEURO:   no purposeful movement in upper and lower extremities  PSYCH:  affect and mood normal    Labs: Reviewed in the chart and EHR.        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 BP in general w/i acceptable range   - CVR. On metoprolol for rate control. On Eliquis      Dementia, likely vascular without behavioral disturbance, (H)  History of stroke, evident on brain imaging.   - Continue to anticipate needs. Chronic condition, ongoing decline expected.   -  Continue to provide redirection and reassurance as needed. Maintain safe living situation with goals focused on comfort.  - at baseline,        Diet controlled diabetes Mellitus (H): no concern.       Dysphagia, and  Hx of " GIB (erosive gastritis, hiatal hernia): on omeprazole       Hx of GIB (hiatal hernia, and erosive gastritis):  While on Pradexa, on omeprazole.       Frailty: Significant  Deficits requiring NH placement. Requiring extensive assistance from nursing. Up for meals only o/w spends the day resting in bed       Hypothyroidism:  TSH   Date Value Ref Range Status   07/24/2020 3.61 0.40 - 4.00 mU/L Final   -  On Levothyroxine. In frail Elderly adult, recommended TSH level is around 6 providing patient is asymptomatic and FT4 is wnl- preferably on the lower normal level.     Order: See above, otherwise, continue the rest of the current POC      Electronically signed by:  Parris Navarro MD     Video-Visit Details  Type of service:  Video Visit  Video End Time (time video stopped): 12:01  Distant Location (provider location):  [place] [facility]

## 2021-02-11 NOTE — LETTER
"    2/11/2021        RE: Kristine Cosby  [facility]  [address] [facility] [address]        [place] [facility] Regulatory   Kristine Cosby is being evaluated via a billable video visit.   The patient has been notified of following:  \"This video visit will be conducted via a call between you and your provider. We have found that certain health care needs can be provided without the need for an in-person physical exam.  This service lets us provide the care you need with a video conversation. If during the course of the call the provider feels a video visit is not appropriate, you will not be charged for this service.\"   The provider has received verbal consent for a Video Visit from the patient or first contact? Yes  Patient or facility staff would like the video invitation sent by: N/A   Video Start Time: 12:00  [place] Medical Record Number:  [id number]  Place of Location at the time of visit: [facility]   Chief Complaint   Patient presents with     intermediate Regulatory     Video Visit     HPI:  Kristine Cosby  is a 80 year old (1940), who is being seen today for a Regulatory visit.  HPI information obtained from: facility staff, patient report and [facility] chart review.     Today's concern is:  pt reports breathing is fine, denies pain.   - sleeps appetite and sleep are fine  - denies CP. Denies legs edema    ==================================  Past Medical and Surgical History reviewed in Epic today.  MEDICATIONS:  Current Outpatient Medications   Medication Sig Dispense Refill     acetaminophen (TYLENOL) 500 MG tablet Take 2 tablets (1,000 mg) by mouth 3 times daily Also BID PRN       apixaban ANTICOAGULANT (ELIQUIS) 5 MG tablet Take 1 tablet (5 mg) by mouth 2 times daily       atorvastatin (LIPITOR) 20 MG tablet Take 20 mg by mouth At Bedtime       cholecalciferol 1000 units TABS Take 2,000 Units by mouth daily       diclofenac (VOLTAREN) 1 % topical " "gel Apply 2 g topically daily AND BID PRN - Apply to right hand       levothyroxine (SYNTHROID/LEVOTHROID) 75 MCG tablet Take 1 tablet (75 mcg) by mouth daily       melatonin 3 MG tablet Take 1 mg by mouth nightly as needed for sleep       metoprolol succinate ER (TOPROL-XL) 50 MG 24 hr tablet Take 50 mg by mouth daily       nitroGLYcerin (NITROSTAT) 0.4 MG sublingual tablet Place 0.4 mg under the tongue every 5 minutes as needed for chest pain For chest pain place 1 tablet under the tongue every 5 minutes for 3 doses. If symptoms persist 5 minutes after 1st dose call 911.       omeprazole (PRILOSEC) 20 MG DR capsule Take 20 mg by mouth daily       senna-docusate (SENOKOT-S/PERICOLACE) 8.6-50 MG tablet Take 2 tablets by mouth daily        REVIEW OF SYSTEMS: limited secondary to cognitive impairment but today reports.     Objective: /83   Pulse 64   Temp 97.1  F (36.2  C)   Resp 16   Ht 1.676 m (5' 6\")   Wt 94.7 kg (208 lb 12.8 oz)   SpO2 94%   BMI 33.70 kg/m    Limited visit exam done given COVID-19 precautions.   GENERAL APPEARANCE:  in no distress  RESP:  unlabored breathing  M/S:   no joint deformity noted  SKIN:  no rash noted  NEURO:   no purposeful movement in upper and lower extremities  PSYCH:  affect and mood normal    Labs: Reviewed in the chart and EHR.        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 BP in general w/i acceptable range   - CVR. On metoprolol for rate control. On Eliquis      Dementia, likely vascular without behavioral disturbance, (H)  History of stroke, evident on brain imaging.   - Continue to anticipate needs. Chronic condition, ongoing decline expected.   -  Continue to provide redirection and reassurance as needed. Maintain safe living situation with goals focused on " comfort.  - at baseline,        Diet controlled diabetes Mellitus (H): no concern.       Dysphagia, and  Hx of GIB (erosive gastritis, hiatal hernia): on omeprazole       Hx of GIB (hiatal hernia, and erosive gastritis):  While on Pradexa, on omeprazole.       Frailty: Significant  Deficits requiring NH placement. Requiring extensive assistance from nursing. Up for meals only o/w spends the day resting in bed       Hypothyroidism:  TSH   Date Value Ref Range Status   07/24/2020 3.61 0.40 - 4.00 mU/L Final   -  On Levothyroxine. In frail Elderly adult, recommended TSH level is around 6 providing patient is asymptomatic and FT4 is wnl- preferably on the lower normal level.     Order: See above, otherwise, continue the rest of the current POC      Electronically signed by:  Parris Navarro MD     Video-Visit Details  Type of service:  Video Visit  Video End Time (time video stopped): 12:01  Distant Location (provider location):  [place] [facility]               Sincerely,        Parris Navarro MD

## 2021-02-18 ENCOUNTER — CLINICAL UPDATE (OUTPATIENT)
Dept: PHARMACY | Facility: CLINIC | Age: 81
End: 2021-02-18
Payer: COMMERCIAL

## 2021-02-18 DIAGNOSIS — E03.9 ACQUIRED HYPOTHYROIDISM: ICD-10-CM

## 2021-02-18 DIAGNOSIS — I50.22 CHRONIC SYSTOLIC HEART FAILURE (H): Primary | ICD-10-CM

## 2021-02-18 DIAGNOSIS — I10 ESSENTIAL HYPERTENSION: ICD-10-CM

## 2021-02-18 DIAGNOSIS — E78.5 HYPERLIPIDEMIA LDL GOAL <100: ICD-10-CM

## 2021-02-18 DIAGNOSIS — I48.19 PERSISTENT ATRIAL FIBRILLATION (H): ICD-10-CM

## 2021-02-18 PROCEDURE — 99207 PR NO CHARGE LOS: CPT | Performed by: PHARMACIST

## 2021-02-18 NOTE — PROGRESS NOTES
This patient's medication list and chart were reviewed as part of the service provided by [facility].    Assessment/Recommendations:    No recommendations for changes at this time.    Tanya Villafuerte, Pharm.D.,[facility]  Board Certified Geriatric Pharmacist  Medication Therapy Management Pharmacist  [phone]

## 2021-02-22 ENCOUNTER — DOCUMENTATION ONLY (OUTPATIENT)
Dept: OTHER | Facility: CLINIC | Age: 81
End: 2021-02-22

## 2021-02-22 ENCOUNTER — AMBULATORY - HEALTHEAST (OUTPATIENT)
Dept: OTHER | Facility: CLINIC | Age: 81
End: 2021-02-22

## 2021-03-30 ENCOUNTER — PATIENT OUTREACH (OUTPATIENT)
Dept: GERIATRIC MEDICINE | Facility: CLINIC | Age: 81
End: 2021-03-30

## 2021-04-01 ASSESSMENT — ACTIVITIES OF DAILY LIVING (ADL)
DEPENDENT_IADLS:: CLEANING;COOKING;LAUNDRY;SHOPPING;MEAL PREPARATION;MEDICATION MANAGEMENT;MONEY MANAGEMENT;TRANSPORTATION;INCONTINENCE

## 2021-04-01 NOTE — PROGRESS NOTES
[facility] Care Coordination Contact    [facility] Home Visit Assessment     Home visit for Health Risk Assessment with Kristine Cosby completed on March 30th, 2021. CC notified of the referral on 3/30/2021. The member is set to discharge on 4/1/2021 to [facility]. CC spoke with the member's guardian, Kaya. CC sent the [id number] to the Guardian to complete. CC also called and spoke with Rhonda with [facility] and she just requested a [id number]. She also stated the member will not have any issues opening to the waiver. The member is able to discharge on 4/1/2021. CC also spoke with Leyda from [facility] and they will accept the member on 4/1/2021. The member's family will assist with the move. CC also spoke with JUSTICE Larkin with [facility] and she will fax orders to [facility].     Type of residence:: Nursing home  Current living arrangement:: I live in a nursing home     Assessment completed with:: Patient, Care Team Member, Children    Current Care Plan  Member currently receiving the following home care services:   None  Member currently receiving the following community resources: None      Medication Review  Medication reconciliation completed in Epic: Yes  Medication set-up & administration: RN set up daily.  Nursing Home staff administers medications.  Medication Risk Assessment Medication (1 or more, place referral to MTM): Recent transition (IP/TCU within last 30 days)  MTM Referral Placed: No: Member is already followed by MTM. Will follow up with current MTM.    Mental/Behavioral Health   Depression Screening: n/a D/t member having delusions           Mental health DX:: No        Falls Assessment:   Fallen 2 or more times in the past year?: No   Any fall with injury in the past year?: No    ADL/IADL Dependencies:   Dependent ADLs:: Bathing, Dressing, Eating, Grooming, Incontinence, Positioning, Transfers, Wheelchair-with assist, Toileting  Dependent IADLs:: Cleaning,  Cooking, Laundry, Shopping, Meal Preparation, Medication Management, Money Management, Transportation, Incontinence    [facility] benefits: Shared information re: Silver Sneakers/gym memberships, ASA, Calcium +D.    PCA Assessment completed at visit: Not Applicable     Elderly Waiver Eligibility: Yes-will open to EW    Care Plan & Recommendations: Member will move to [facility]     See LTCC for detailed assessment information.    Follow-Up Plan: Member informed of future contact, plan to f/u with member with a 6 month telephone assessment.  Contact information shared with member and family, encouraged member to call with any questions or concerns at any time.    [facility] care continuum providers: Please refer to Health Care Home on the Epic Problem List to view this patient's [facility] Care Plan Summary.    Eunice Dobbs RN,BSN  [facility] Case Coordinator   [phone]

## 2021-05-05 ENCOUNTER — PATIENT OUTREACH (OUTPATIENT)
Dept: GERIATRIC MEDICINE | Facility: CLINIC | Age: 81
End: 2021-05-05

## 2021-05-05 NOTE — PROGRESS NOTES
[facility] Care Coordination Contact    No longer active with [facility] case management effective 5/1/2021.  Reason for community disenrollment: Change Care System/PCC to [facility] Eunice Dobbs RN,BSN  [facility] Case Coordinator   [phone]

## 2021-05-26 ENCOUNTER — RECORDS - HEALTHEAST (OUTPATIENT)
Dept: ADMINISTRATIVE | Facility: CLINIC | Age: 81
End: 2021-05-26

## 2021-05-28 ENCOUNTER — RECORDS - HEALTHEAST (OUTPATIENT)
Dept: ADMINISTRATIVE | Facility: CLINIC | Age: 81
End: 2021-05-28

## 2021-05-29 ENCOUNTER — RECORDS - HEALTHEAST (OUTPATIENT)
Dept: ADMINISTRATIVE | Facility: CLINIC | Age: 81
End: 2021-05-29

## 2021-05-30 ENCOUNTER — RECORDS - HEALTHEAST (OUTPATIENT)
Dept: ADMINISTRATIVE | Facility: CLINIC | Age: 81
End: 2021-05-30

## 2021-05-31 ENCOUNTER — RECORDS - HEALTHEAST (OUTPATIENT)
Dept: ADMINISTRATIVE | Facility: CLINIC | Age: 81
End: 2021-05-31

## 2021-06-02 ENCOUNTER — RECORDS - HEALTHEAST (OUTPATIENT)
Dept: ADMINISTRATIVE | Facility: CLINIC | Age: 81
End: 2021-06-02

## 2021-07-13 ENCOUNTER — RECORDS - HEALTHEAST (OUTPATIENT)
Dept: ADMINISTRATIVE | Facility: CLINIC | Age: 81
End: 2021-07-13

## 2021-09-20 ENCOUNTER — LAB REQUISITION (OUTPATIENT)
Dept: LAB | Facility: CLINIC | Age: 81
End: 2021-09-20
Payer: COMMERCIAL

## 2021-09-20 DIAGNOSIS — R30.0 DYSURIA: ICD-10-CM

## 2021-09-20 DIAGNOSIS — R45.1 RESTLESSNESS AND AGITATION: ICD-10-CM

## 2021-09-20 LAB
ALBUMIN UR-MCNC: 100 MG/DL
APPEARANCE UR: ABNORMAL
BACTERIA #/AREA URNS HPF: ABNORMAL /HPF
BILIRUB UR QL STRIP: NEGATIVE
COLOR UR AUTO: YELLOW
GLUCOSE UR STRIP-MCNC: NEGATIVE MG/DL
HGB UR QL STRIP: ABNORMAL
KETONES UR STRIP-MCNC: NEGATIVE MG/DL
LEUKOCYTE ESTERASE UR QL STRIP: ABNORMAL
MUCOUS THREADS #/AREA URNS LPF: PRESENT /LPF
NITRATE UR QL: POSITIVE
PH UR STRIP: 6 [PH] (ref 5–7)
RBC URINE: 15 /HPF
SP GR UR STRIP: 1.02 (ref 1–1.03)
SQUAMOUS EPITHELIAL: 2 /HPF
UROBILINOGEN UR STRIP-MCNC: NORMAL MG/DL
WBC CLUMPS #/AREA URNS HPF: PRESENT /HPF
WBC URINE: >182 /HPF

## 2021-09-20 PROCEDURE — 87086 URINE CULTURE/COLONY COUNT: CPT | Mod: ORL

## 2021-09-20 PROCEDURE — 81001 URINALYSIS AUTO W/SCOPE: CPT | Mod: ORL

## 2021-09-23 LAB
BACTERIA UR CULT: ABNORMAL
BACTERIA UR CULT: ABNORMAL

## 2021-10-15 ENCOUNTER — LAB REQUISITION (OUTPATIENT)
Dept: LAB | Facility: CLINIC | Age: 81
End: 2021-10-15
Payer: COMMERCIAL

## 2021-10-15 DIAGNOSIS — E03.9 HYPOTHYROIDISM, UNSPECIFIED: ICD-10-CM

## 2021-10-15 DIAGNOSIS — E11.9 TYPE 2 DIABETES MELLITUS WITHOUT COMPLICATIONS (H): ICD-10-CM

## 2021-10-15 DIAGNOSIS — I50.42 CHRONIC COMBINED SYSTOLIC (CONGESTIVE) AND DIASTOLIC (CONGESTIVE) HEART FAILURE (H): ICD-10-CM

## 2021-10-15 LAB
ALBUMIN SERPL-MCNC: 2.6 G/DL (ref 3.4–5)
ALP SERPL-CCNC: 64 U/L (ref 40–150)
ALT SERPL W P-5'-P-CCNC: 15 U/L (ref 0–50)
ANION GAP SERPL CALCULATED.3IONS-SCNC: 3 MMOL/L (ref 3–14)
AST SERPL W P-5'-P-CCNC: 18 U/L (ref 0–45)
BILIRUB SERPL-MCNC: 0.3 MG/DL (ref 0.2–1.3)
BUN SERPL-MCNC: 18 MG/DL (ref 7–30)
CALCIUM SERPL-MCNC: 7.9 MG/DL (ref 8.5–10.1)
CHLORIDE BLD-SCNC: 110 MMOL/L (ref 94–109)
CO2 SERPL-SCNC: 26 MMOL/L (ref 20–32)
CREAT SERPL-MCNC: 0.89 MG/DL (ref 0.52–1.04)
ERYTHROCYTE [DISTWIDTH] IN BLOOD BY AUTOMATED COUNT: 14.8 % (ref 10–15)
GFR SERPL CREATININE-BSD FRML MDRD: 61 ML/MIN/1.73M2
GLUCOSE BLD-MCNC: 98 MG/DL (ref 70–99)
HBA1C MFR BLD: 6 % (ref 0–5.6)
HCT VFR BLD AUTO: 29.5 % (ref 35–47)
HGB BLD-MCNC: 9.4 G/DL (ref 11.7–15.7)
MCH RBC QN AUTO: 31.8 PG (ref 26.5–33)
MCHC RBC AUTO-ENTMCNC: 31.9 G/DL (ref 31.5–36.5)
MCV RBC AUTO: 100 FL (ref 78–100)
PLATELET # BLD AUTO: 172 10E3/UL (ref 150–450)
POTASSIUM BLD-SCNC: 4.2 MMOL/L (ref 3.4–5.3)
PROT SERPL-MCNC: 5.8 G/DL (ref 6.8–8.8)
RBC # BLD AUTO: 2.96 10E6/UL (ref 3.8–5.2)
SODIUM SERPL-SCNC: 139 MMOL/L (ref 133–144)
TSH SERPL DL<=0.005 MIU/L-ACNC: 7.09 MU/L (ref 0.4–4)
WBC # BLD AUTO: 5.1 10E3/UL (ref 4–11)

## 2021-10-15 PROCEDURE — 83036 HEMOGLOBIN GLYCOSYLATED A1C: CPT | Mod: ORL

## 2021-10-15 PROCEDURE — 36415 COLL VENOUS BLD VENIPUNCTURE: CPT | Mod: ORL

## 2021-10-15 PROCEDURE — 85027 COMPLETE CBC AUTOMATED: CPT | Mod: ORL

## 2021-10-15 PROCEDURE — 80053 COMPREHEN METABOLIC PANEL: CPT | Mod: ORL

## 2021-10-15 PROCEDURE — 84443 ASSAY THYROID STIM HORMONE: CPT | Mod: ORL

## 2021-10-15 PROCEDURE — P9603 ONE-WAY ALLOW PRORATED MILES: HCPCS | Mod: ORL

## 2021-11-12 ENCOUNTER — LAB REQUISITION (OUTPATIENT)
Dept: LAB | Facility: CLINIC | Age: 81
End: 2021-11-12
Payer: COMMERCIAL

## 2021-11-12 DIAGNOSIS — E83.51 HYPOCALCEMIA: ICD-10-CM

## 2021-11-12 LAB
ALBUMIN SERPL-MCNC: 2.8 G/DL (ref 3.4–5)
ALP SERPL-CCNC: 68 U/L (ref 40–150)
ALT SERPL W P-5'-P-CCNC: 12 U/L (ref 0–50)
ANION GAP SERPL CALCULATED.3IONS-SCNC: 5 MMOL/L (ref 3–14)
AST SERPL W P-5'-P-CCNC: 14 U/L (ref 0–45)
BILIRUB SERPL-MCNC: 0.5 MG/DL (ref 0.2–1.3)
BUN SERPL-MCNC: 11 MG/DL (ref 7–30)
CALCIUM SERPL-MCNC: 8.2 MG/DL (ref 8.5–10.1)
CHLORIDE BLD-SCNC: 108 MMOL/L (ref 94–109)
CO2 SERPL-SCNC: 25 MMOL/L (ref 20–32)
CREAT SERPL-MCNC: 0.71 MG/DL (ref 0.52–1.04)
GFR SERPL CREATININE-BSD FRML MDRD: 81 ML/MIN/1.73M2
GLUCOSE BLD-MCNC: 77 MG/DL (ref 70–99)
POTASSIUM BLD-SCNC: 4 MMOL/L (ref 3.4–5.3)
PROT SERPL-MCNC: 6.2 G/DL (ref 6.8–8.8)
SODIUM SERPL-SCNC: 138 MMOL/L (ref 133–144)

## 2021-11-12 PROCEDURE — P9603 ONE-WAY ALLOW PRORATED MILES: HCPCS | Mod: ORL

## 2021-11-12 PROCEDURE — 80053 COMPREHEN METABOLIC PANEL: CPT | Mod: ORL

## 2021-11-12 PROCEDURE — 36415 COLL VENOUS BLD VENIPUNCTURE: CPT | Mod: ORL

## 2021-12-13 ENCOUNTER — LAB REQUISITION (OUTPATIENT)
Dept: LAB | Facility: CLINIC | Age: 81
End: 2021-12-13
Payer: COMMERCIAL

## 2021-12-13 DIAGNOSIS — D64.9 ANEMIA, UNSPECIFIED: ICD-10-CM

## 2021-12-13 DIAGNOSIS — E03.9 HYPOTHYROIDISM, UNSPECIFIED: ICD-10-CM

## 2021-12-13 LAB
ERYTHROCYTE [DISTWIDTH] IN BLOOD BY AUTOMATED COUNT: 13.2 % (ref 10–15)
HCT VFR BLD AUTO: 30.9 % (ref 35–47)
HGB BLD-MCNC: 10 G/DL (ref 11.7–15.7)
MCH RBC QN AUTO: 31.9 PG (ref 26.5–33)
MCHC RBC AUTO-ENTMCNC: 32.4 G/DL (ref 31.5–36.5)
MCV RBC AUTO: 99 FL (ref 78–100)
PLATELET # BLD AUTO: 187 10E3/UL (ref 150–450)
RBC # BLD AUTO: 3.13 10E6/UL (ref 3.8–5.2)
TSH SERPL DL<=0.005 MIU/L-ACNC: 4.1 MU/L (ref 0.4–4)
WBC # BLD AUTO: 4.6 10E3/UL (ref 4–11)

## 2021-12-13 PROCEDURE — 84443 ASSAY THYROID STIM HORMONE: CPT | Mod: ORL

## 2021-12-13 PROCEDURE — P9603 ONE-WAY ALLOW PRORATED MILES: HCPCS | Mod: ORL

## 2021-12-13 PROCEDURE — 36415 COLL VENOUS BLD VENIPUNCTURE: CPT | Mod: ORL

## 2021-12-13 PROCEDURE — 85027 COMPLETE CBC AUTOMATED: CPT | Mod: ORL

## 2022-01-03 ENCOUNTER — LAB REQUISITION (OUTPATIENT)
Dept: LAB | Facility: CLINIC | Age: 82
End: 2022-01-03
Payer: COMMERCIAL

## 2022-01-03 DIAGNOSIS — E03.9 HYPOTHYROIDISM, UNSPECIFIED: ICD-10-CM

## 2022-01-03 LAB — TSH SERPL DL<=0.005 MIU/L-ACNC: 5.06 MU/L (ref 0.4–4)

## 2022-01-03 PROCEDURE — 84443 ASSAY THYROID STIM HORMONE: CPT | Mod: ORL

## 2022-01-03 PROCEDURE — P9603 ONE-WAY ALLOW PRORATED MILES: HCPCS | Mod: ORL

## 2022-01-03 PROCEDURE — 36415 COLL VENOUS BLD VENIPUNCTURE: CPT | Mod: ORL

## 2022-02-22 ENCOUNTER — LAB REQUISITION (OUTPATIENT)
Dept: LAB | Facility: CLINIC | Age: 82
End: 2022-02-22
Payer: COMMERCIAL

## 2022-02-22 DIAGNOSIS — E03.9 HYPOTHYROIDISM, UNSPECIFIED: ICD-10-CM

## 2022-02-23 LAB — TSH SERPL DL<=0.005 MIU/L-ACNC: 3.76 MU/L (ref 0.4–4)

## 2022-02-23 PROCEDURE — 84443 ASSAY THYROID STIM HORMONE: CPT | Mod: ORL | Performed by: PHYSICIAN ASSISTANT

## 2022-02-23 PROCEDURE — 36415 COLL VENOUS BLD VENIPUNCTURE: CPT | Mod: ORL | Performed by: PHYSICIAN ASSISTANT

## 2022-02-23 PROCEDURE — P9603 ONE-WAY ALLOW PRORATED MILES: HCPCS | Mod: ORL | Performed by: PHYSICIAN ASSISTANT

## 2022-05-13 ENCOUNTER — LAB REQUISITION (OUTPATIENT)
Dept: LAB | Facility: CLINIC | Age: 82
End: 2022-05-13
Payer: COMMERCIAL

## 2022-05-13 DIAGNOSIS — E78.5 HYPERLIPIDEMIA, UNSPECIFIED: ICD-10-CM

## 2022-05-13 DIAGNOSIS — D64.9 ANEMIA, UNSPECIFIED: ICD-10-CM

## 2022-05-13 DIAGNOSIS — E11.9 TYPE 2 DIABETES MELLITUS WITHOUT COMPLICATIONS (H): ICD-10-CM

## 2022-05-13 DIAGNOSIS — E03.9 HYPOTHYROIDISM, UNSPECIFIED: ICD-10-CM

## 2022-05-13 DIAGNOSIS — E55.9 VITAMIN D DEFICIENCY, UNSPECIFIED: ICD-10-CM

## 2022-05-13 DIAGNOSIS — E44.1 MILD PROTEIN-CALORIE MALNUTRITION (H): ICD-10-CM

## 2022-05-13 LAB
ALBUMIN SERPL-MCNC: 2.7 G/DL (ref 3.4–5)
ALP SERPL-CCNC: 50 U/L (ref 40–150)
ALT SERPL W P-5'-P-CCNC: 10 U/L (ref 0–50)
ANION GAP SERPL CALCULATED.3IONS-SCNC: 4 MMOL/L (ref 3–14)
AST SERPL W P-5'-P-CCNC: 13 U/L (ref 0–45)
BILIRUB SERPL-MCNC: 0.3 MG/DL (ref 0.2–1.3)
BUN SERPL-MCNC: 20 MG/DL (ref 7–30)
CALCIUM SERPL-MCNC: 7.9 MG/DL (ref 8.5–10.1)
CHLORIDE BLD-SCNC: 113 MMOL/L (ref 94–109)
CHOLEST SERPL-MCNC: 113 MG/DL
CO2 SERPL-SCNC: 26 MMOL/L (ref 20–32)
CREAT SERPL-MCNC: 0.9 MG/DL (ref 0.52–1.04)
DEPRECATED CALCIDIOL+CALCIFEROL SERPL-MC: 36 UG/L (ref 20–75)
ERYTHROCYTE [DISTWIDTH] IN BLOOD BY AUTOMATED COUNT: 15.1 % (ref 10–15)
FASTING STATUS PATIENT QL REPORTED: ABNORMAL
GFR SERPL CREATININE-BSD FRML MDRD: 64 ML/MIN/1.73M2
GLUCOSE BLD-MCNC: 85 MG/DL (ref 70–99)
HBA1C MFR BLD: 6 % (ref 0–5.6)
HCT VFR BLD AUTO: 27.7 % (ref 35–47)
HDLC SERPL-MCNC: 44 MG/DL
HGB BLD-MCNC: 8.8 G/DL (ref 11.7–15.7)
LDLC SERPL CALC-MCNC: 49 MG/DL
MCH RBC QN AUTO: 30.9 PG (ref 26.5–33)
MCHC RBC AUTO-ENTMCNC: 31.8 G/DL (ref 31.5–36.5)
MCV RBC AUTO: 97 FL (ref 78–100)
NONHDLC SERPL-MCNC: 69 MG/DL
PLATELET # BLD AUTO: 147 10E3/UL (ref 150–450)
POTASSIUM BLD-SCNC: 4.3 MMOL/L (ref 3.4–5.3)
PROT SERPL-MCNC: 5.8 G/DL (ref 6.8–8.8)
RBC # BLD AUTO: 2.85 10E6/UL (ref 3.8–5.2)
SODIUM SERPL-SCNC: 143 MMOL/L (ref 133–144)
T4 FREE SERPL-MCNC: 1.23 NG/DL (ref 0.76–1.46)
TRIGL SERPL-MCNC: 102 MG/DL
TSH SERPL DL<=0.005 MIU/L-ACNC: 1.46 MU/L (ref 0.4–4)
WBC # BLD AUTO: 5.3 10E3/UL (ref 4–11)

## 2022-05-13 PROCEDURE — 36415 COLL VENOUS BLD VENIPUNCTURE: CPT | Mod: ORL | Performed by: PHYSICIAN ASSISTANT

## 2022-05-13 PROCEDURE — P9603 ONE-WAY ALLOW PRORATED MILES: HCPCS | Mod: ORL | Performed by: PHYSICIAN ASSISTANT

## 2022-05-13 PROCEDURE — 80053 COMPREHEN METABOLIC PANEL: CPT | Mod: ORL | Performed by: PHYSICIAN ASSISTANT

## 2022-05-13 PROCEDURE — 82306 VITAMIN D 25 HYDROXY: CPT | Mod: ORL | Performed by: PHYSICIAN ASSISTANT

## 2022-05-13 PROCEDURE — 84443 ASSAY THYROID STIM HORMONE: CPT | Mod: ORL | Performed by: PHYSICIAN ASSISTANT

## 2022-05-13 PROCEDURE — 83036 HEMOGLOBIN GLYCOSYLATED A1C: CPT | Mod: ORL | Performed by: PHYSICIAN ASSISTANT

## 2022-05-13 PROCEDURE — 84439 ASSAY OF FREE THYROXINE: CPT | Mod: ORL | Performed by: PHYSICIAN ASSISTANT

## 2022-05-13 PROCEDURE — 80061 LIPID PANEL: CPT | Mod: ORL | Performed by: PHYSICIAN ASSISTANT

## 2022-05-13 PROCEDURE — 85027 COMPLETE CBC AUTOMATED: CPT | Mod: ORL | Performed by: PHYSICIAN ASSISTANT

## 2022-05-27 ENCOUNTER — LAB REQUISITION (OUTPATIENT)
Dept: LAB | Facility: CLINIC | Age: 82
End: 2022-05-27
Payer: COMMERCIAL

## 2022-05-27 DIAGNOSIS — D64.9 ANEMIA, UNSPECIFIED: ICD-10-CM

## 2022-05-27 LAB
ERYTHROCYTE [DISTWIDTH] IN BLOOD BY AUTOMATED COUNT: 15 % (ref 10–15)
FOLATE SERPL-MCNC: 4.8 NG/ML
HCT VFR BLD AUTO: 33.2 % (ref 35–47)
HGB BLD-MCNC: 10.5 G/DL (ref 11.7–15.7)
IRON SERPL-MCNC: 62 UG/DL (ref 35–180)
MCH RBC QN AUTO: 31.3 PG (ref 26.5–33)
MCHC RBC AUTO-ENTMCNC: 31.6 G/DL (ref 31.5–36.5)
MCV RBC AUTO: 99 FL (ref 78–100)
PLATELET # BLD AUTO: 199 10E3/UL (ref 150–450)
RBC # BLD AUTO: 3.36 10E6/UL (ref 3.8–5.2)
VIT B12 SERPL-MCNC: 296 PG/ML (ref 193–986)
WBC # BLD AUTO: 5.6 10E3/UL (ref 4–11)

## 2022-05-27 PROCEDURE — 85027 COMPLETE CBC AUTOMATED: CPT | Mod: ORL | Performed by: PHYSICIAN ASSISTANT

## 2022-05-27 PROCEDURE — 82607 VITAMIN B-12: CPT | Mod: ORL | Performed by: PHYSICIAN ASSISTANT

## 2022-05-27 PROCEDURE — P9603 ONE-WAY ALLOW PRORATED MILES: HCPCS | Mod: ORL | Performed by: PHYSICIAN ASSISTANT

## 2022-05-27 PROCEDURE — 36415 COLL VENOUS BLD VENIPUNCTURE: CPT | Mod: ORL | Performed by: PHYSICIAN ASSISTANT

## 2022-05-27 PROCEDURE — 83540 ASSAY OF IRON: CPT | Mod: ORL | Performed by: PHYSICIAN ASSISTANT

## 2022-05-27 PROCEDURE — 82746 ASSAY OF FOLIC ACID SERUM: CPT | Mod: ORL | Performed by: PHYSICIAN ASSISTANT

## 2022-06-03 ENCOUNTER — LAB REQUISITION (OUTPATIENT)
Dept: LAB | Facility: CLINIC | Age: 82
End: 2022-06-03
Payer: COMMERCIAL

## 2022-06-03 DIAGNOSIS — D64.9 ANEMIA, UNSPECIFIED: ICD-10-CM

## 2022-06-03 LAB
ERYTHROCYTE [DISTWIDTH] IN BLOOD BY AUTOMATED COUNT: 14.7 % (ref 10–15)
FOLATE SERPL-MCNC: 4.3 NG/ML
HCT VFR BLD AUTO: 27.5 % (ref 35–47)
HGB BLD-MCNC: 9 G/DL (ref 11.7–15.7)
IRON SERPL-MCNC: 38 UG/DL (ref 35–180)
MCH RBC QN AUTO: 31.1 PG (ref 26.5–33)
MCHC RBC AUTO-ENTMCNC: 32.7 G/DL (ref 31.5–36.5)
MCV RBC AUTO: 95 FL (ref 78–100)
PLATELET # BLD AUTO: 151 10E3/UL (ref 150–450)
RBC # BLD AUTO: 2.89 10E6/UL (ref 3.8–5.2)
VIT B12 SERPL-MCNC: 307 PG/ML (ref 193–986)
WBC # BLD AUTO: 5.5 10E3/UL (ref 4–11)

## 2022-06-03 PROCEDURE — 82746 ASSAY OF FOLIC ACID SERUM: CPT | Mod: ORL | Performed by: PHYSICIAN ASSISTANT

## 2022-06-03 PROCEDURE — 85027 COMPLETE CBC AUTOMATED: CPT | Mod: ORL | Performed by: PHYSICIAN ASSISTANT

## 2022-06-03 PROCEDURE — 82607 VITAMIN B-12: CPT | Mod: ORL | Performed by: PHYSICIAN ASSISTANT

## 2022-06-03 PROCEDURE — P9603 ONE-WAY ALLOW PRORATED MILES: HCPCS | Mod: ORL | Performed by: PHYSICIAN ASSISTANT

## 2022-06-03 PROCEDURE — 36415 COLL VENOUS BLD VENIPUNCTURE: CPT | Mod: ORL | Performed by: PHYSICIAN ASSISTANT

## 2022-06-03 PROCEDURE — 83540 ASSAY OF IRON: CPT | Mod: ORL | Performed by: PHYSICIAN ASSISTANT

## 2022-07-15 ENCOUNTER — LAB REQUISITION (OUTPATIENT)
Dept: LAB | Facility: CLINIC | Age: 82
End: 2022-07-15
Payer: COMMERCIAL

## 2022-07-15 DIAGNOSIS — D64.9 ANEMIA, UNSPECIFIED: ICD-10-CM

## 2022-07-15 LAB — HGB BLD-MCNC: 10.1 G/DL (ref 11.7–15.7)

## 2022-07-15 PROCEDURE — 85018 HEMOGLOBIN: CPT | Mod: ORL | Performed by: PHYSICIAN ASSISTANT

## 2022-07-15 PROCEDURE — 36415 COLL VENOUS BLD VENIPUNCTURE: CPT | Mod: ORL | Performed by: PHYSICIAN ASSISTANT

## 2022-07-15 PROCEDURE — P9603 ONE-WAY ALLOW PRORATED MILES: HCPCS | Mod: ORL | Performed by: PHYSICIAN ASSISTANT

## 2022-08-17 PROCEDURE — 81001 URINALYSIS AUTO W/SCOPE: CPT | Mod: ORL | Performed by: PHYSICIAN ASSISTANT

## 2022-08-17 PROCEDURE — 87086 URINE CULTURE/COLONY COUNT: CPT | Mod: ORL | Performed by: PHYSICIAN ASSISTANT

## 2022-08-18 ENCOUNTER — LAB REQUISITION (OUTPATIENT)
Dept: LAB | Facility: CLINIC | Age: 82
End: 2022-08-18
Payer: COMMERCIAL

## 2022-08-18 DIAGNOSIS — R30.0 DYSURIA: ICD-10-CM

## 2022-08-18 LAB
ALBUMIN UR-MCNC: 30 MG/DL
APPEARANCE UR: ABNORMAL
BACTERIA #/AREA URNS HPF: ABNORMAL /HPF
BILIRUB UR QL STRIP: NEGATIVE
COLOR UR AUTO: YELLOW
GLUCOSE UR STRIP-MCNC: NEGATIVE MG/DL
HGB UR QL STRIP: ABNORMAL
KETONES UR STRIP-MCNC: ABNORMAL MG/DL
LEUKOCYTE ESTERASE UR QL STRIP: ABNORMAL
MUCOUS THREADS #/AREA URNS LPF: PRESENT /LPF
NITRATE UR QL: POSITIVE
PH UR STRIP: 6 [PH] (ref 5–7)
RBC URINE: 44 /HPF
SP GR UR STRIP: 1.02 (ref 1–1.03)
SQUAMOUS EPITHELIAL: 7 /HPF
UROBILINOGEN UR STRIP-MCNC: NORMAL MG/DL
WBC CLUMPS #/AREA URNS HPF: PRESENT /HPF
WBC URINE: >182 /HPF

## 2022-08-21 LAB — BACTERIA UR CULT: ABNORMAL

## 2023-01-17 ENCOUNTER — LAB REQUISITION (OUTPATIENT)
Dept: LAB | Facility: CLINIC | Age: 83
End: 2023-01-17
Payer: COMMERCIAL

## 2023-01-17 DIAGNOSIS — E55.9 VITAMIN D DEFICIENCY, UNSPECIFIED: ICD-10-CM

## 2023-01-17 DIAGNOSIS — E11.9 TYPE 2 DIABETES MELLITUS WITHOUT COMPLICATIONS (H): ICD-10-CM

## 2023-01-17 DIAGNOSIS — E03.9 HYPOTHYROIDISM, UNSPECIFIED: ICD-10-CM

## 2023-01-17 DIAGNOSIS — I25.10 ATHEROSCLEROTIC HEART DISEASE OF NATIVE CORONARY ARTERY WITHOUT ANGINA PECTORIS: ICD-10-CM

## 2023-01-17 DIAGNOSIS — D64.9 ANEMIA, UNSPECIFIED: ICD-10-CM

## 2023-01-17 DIAGNOSIS — I10 ESSENTIAL (PRIMARY) HYPERTENSION: ICD-10-CM

## 2023-01-18 LAB
ALBUMIN SERPL BCG-MCNC: 3.1 G/DL (ref 3.5–5.2)
ALP SERPL-CCNC: 52 U/L (ref 35–104)
ALT SERPL W P-5'-P-CCNC: 7 U/L (ref 10–35)
ANION GAP SERPL CALCULATED.3IONS-SCNC: 8 MMOL/L (ref 7–15)
AST SERPL W P-5'-P-CCNC: 16 U/L (ref 10–35)
BASOPHILS # BLD AUTO: 0 10E3/UL (ref 0–0.2)
BASOPHILS NFR BLD AUTO: 1 %
BILIRUB SERPL-MCNC: 0.2 MG/DL
BUN SERPL-MCNC: 15.7 MG/DL (ref 8–23)
CALCIUM SERPL-MCNC: 8.7 MG/DL (ref 8.8–10.2)
CHLORIDE SERPL-SCNC: 105 MMOL/L (ref 98–107)
CHOLEST SERPL-MCNC: 118 MG/DL
CREAT SERPL-MCNC: 0.88 MG/DL (ref 0.51–0.95)
DEPRECATED HCO3 PLAS-SCNC: 27 MMOL/L (ref 22–29)
EOSINOPHIL # BLD AUTO: 0.3 10E3/UL (ref 0–0.7)
EOSINOPHIL NFR BLD AUTO: 6 %
ERYTHROCYTE [DISTWIDTH] IN BLOOD BY AUTOMATED COUNT: 13.7 % (ref 10–15)
GFR SERPL CREATININE-BSD FRML MDRD: 66 ML/MIN/1.73M2
GLUCOSE SERPL-MCNC: 88 MG/DL (ref 70–99)
HBA1C MFR BLD: 5.8 %
HCT VFR BLD AUTO: 28.3 % (ref 35–47)
HDLC SERPL-MCNC: 42 MG/DL
HGB BLD-MCNC: 8.9 G/DL (ref 11.7–15.7)
IMM GRANULOCYTES # BLD: 0 10E3/UL
IMM GRANULOCYTES NFR BLD: 0 %
LDLC SERPL CALC-MCNC: 52 MG/DL
LYMPHOCYTES # BLD AUTO: 2.1 10E3/UL (ref 0.8–5.3)
LYMPHOCYTES NFR BLD AUTO: 47 %
MCH RBC QN AUTO: 31 PG (ref 26.5–33)
MCHC RBC AUTO-ENTMCNC: 31.4 G/DL (ref 31.5–36.5)
MCV RBC AUTO: 99 FL (ref 78–100)
MONOCYTES # BLD AUTO: 0.7 10E3/UL (ref 0–1.3)
MONOCYTES NFR BLD AUTO: 14 %
NEUTROPHILS # BLD AUTO: 1.5 10E3/UL (ref 1.6–8.3)
NEUTROPHILS NFR BLD AUTO: 32 %
NONHDLC SERPL-MCNC: 76 MG/DL
NRBC # BLD AUTO: 0 10E3/UL
NRBC BLD AUTO-RTO: 0 /100
PLATELET # BLD AUTO: 158 10E3/UL (ref 150–450)
POTASSIUM SERPL-SCNC: 4.6 MMOL/L (ref 3.4–5.3)
PROT SERPL-MCNC: 5.7 G/DL (ref 6.4–8.3)
RBC # BLD AUTO: 2.87 10E6/UL (ref 3.8–5.2)
SODIUM SERPL-SCNC: 140 MMOL/L (ref 136–145)
T4 FREE SERPL-MCNC: 1.01 NG/DL (ref 0.9–1.7)
TRIGL SERPL-MCNC: 119 MG/DL
TSH SERPL DL<=0.005 MIU/L-ACNC: 5.51 UIU/ML (ref 0.3–4.2)
WBC # BLD AUTO: 4.5 10E3/UL (ref 4–11)

## 2023-01-18 PROCEDURE — P9603 ONE-WAY ALLOW PRORATED MILES: HCPCS | Mod: ORL

## 2023-01-18 PROCEDURE — 84443 ASSAY THYROID STIM HORMONE: CPT | Mod: ORL

## 2023-01-18 PROCEDURE — 83036 HEMOGLOBIN GLYCOSYLATED A1C: CPT | Mod: ORL

## 2023-01-18 PROCEDURE — 80053 COMPREHEN METABOLIC PANEL: CPT | Mod: ORL

## 2023-01-18 PROCEDURE — 80061 LIPID PANEL: CPT | Mod: ORL

## 2023-01-18 PROCEDURE — 84439 ASSAY OF FREE THYROXINE: CPT | Mod: ORL

## 2023-01-18 PROCEDURE — 85025 COMPLETE CBC W/AUTO DIFF WBC: CPT | Mod: ORL

## 2023-02-09 ENCOUNTER — LAB REQUISITION (OUTPATIENT)
Dept: LAB | Facility: CLINIC | Age: 83
End: 2023-02-09
Payer: COMMERCIAL

## 2023-02-09 DIAGNOSIS — D64.9 ANEMIA, UNSPECIFIED: ICD-10-CM

## 2023-02-10 LAB
FERRITIN SERPL-MCNC: 30 NG/ML (ref 11–328)
IRON BINDING CAPACITY (ROCHE): 250 UG/DL (ref 240–430)
IRON SATN MFR SERPL: 10 % (ref 15–46)
IRON SERPL-MCNC: 25 UG/DL (ref 37–145)
VIT B12 SERPL-MCNC: 278 PG/ML (ref 232–1245)

## 2023-02-10 PROCEDURE — 36415 COLL VENOUS BLD VENIPUNCTURE: CPT | Mod: ORL

## 2023-02-10 PROCEDURE — P9603 ONE-WAY ALLOW PRORATED MILES: HCPCS | Mod: ORL

## 2023-02-10 PROCEDURE — 82728 ASSAY OF FERRITIN: CPT | Mod: ORL

## 2023-02-10 PROCEDURE — 83550 IRON BINDING TEST: CPT | Mod: ORL

## 2023-02-10 PROCEDURE — 82746 ASSAY OF FOLIC ACID SERUM: CPT | Mod: ORL

## 2023-02-10 PROCEDURE — 82607 VITAMIN B-12: CPT | Mod: ORL

## 2023-02-11 LAB — FOLATE SERPL-MCNC: 19.8 NG/ML (ref 4.6–34.8)

## 2023-04-09 ENCOUNTER — LAB REQUISITION (OUTPATIENT)
Dept: LAB | Facility: CLINIC | Age: 83
End: 2023-04-09
Payer: COMMERCIAL

## 2023-04-09 DIAGNOSIS — E03.9 HYPOTHYROIDISM, UNSPECIFIED: ICD-10-CM

## 2023-04-10 LAB
T4 FREE SERPL-MCNC: 0.92 NG/DL (ref 0.9–1.7)
TSH SERPL DL<=0.005 MIU/L-ACNC: 11.7 UIU/ML (ref 0.3–4.2)

## 2023-04-10 PROCEDURE — 84443 ASSAY THYROID STIM HORMONE: CPT | Mod: ORL

## 2023-04-10 PROCEDURE — 84439 ASSAY OF FREE THYROXINE: CPT | Mod: ORL

## 2023-04-10 PROCEDURE — 36415 COLL VENOUS BLD VENIPUNCTURE: CPT | Mod: ORL

## 2023-04-10 PROCEDURE — P9603 ONE-WAY ALLOW PRORATED MILES: HCPCS | Mod: ORL

## 2023-05-11 ENCOUNTER — LAB REQUISITION (OUTPATIENT)
Dept: LAB | Facility: CLINIC | Age: 83
End: 2023-05-11
Payer: COMMERCIAL

## 2023-05-11 DIAGNOSIS — E03.9 HYPOTHYROIDISM, UNSPECIFIED: ICD-10-CM

## 2023-05-12 LAB
T4 FREE SERPL-MCNC: 1.17 NG/DL (ref 0.9–1.7)
TSH SERPL DL<=0.005 MIU/L-ACNC: 5.31 UIU/ML (ref 0.3–4.2)

## 2023-05-12 PROCEDURE — 84439 ASSAY OF FREE THYROXINE: CPT | Mod: ORL | Performed by: PHYSICIAN ASSISTANT

## 2023-05-12 PROCEDURE — P9603 ONE-WAY ALLOW PRORATED MILES: HCPCS | Mod: ORL | Performed by: PHYSICIAN ASSISTANT

## 2023-05-12 PROCEDURE — 84443 ASSAY THYROID STIM HORMONE: CPT | Mod: ORL | Performed by: PHYSICIAN ASSISTANT

## 2023-05-12 PROCEDURE — 36415 COLL VENOUS BLD VENIPUNCTURE: CPT | Mod: ORL | Performed by: PHYSICIAN ASSISTANT

## 2023-09-05 ENCOUNTER — LAB REQUISITION (OUTPATIENT)
Dept: LAB | Facility: CLINIC | Age: 83
End: 2023-09-05
Payer: COMMERCIAL

## 2023-09-05 DIAGNOSIS — F01.50 VASCULAR DEMENTIA, UNSPECIFIED SEVERITY, WITHOUT BEHAVIORAL DISTURBANCE, PSYCHOTIC DISTURBANCE, MOOD DISTURBANCE, AND ANXIETY (H): ICD-10-CM

## 2023-09-05 DIAGNOSIS — E03.9 HYPOTHYROIDISM, UNSPECIFIED: ICD-10-CM

## 2023-09-05 DIAGNOSIS — E11.9 TYPE 2 DIABETES MELLITUS WITHOUT COMPLICATIONS (H): ICD-10-CM

## 2023-09-05 DIAGNOSIS — D64.9 ANEMIA, UNSPECIFIED: ICD-10-CM

## 2023-09-06 LAB
ANION GAP SERPL CALCULATED.3IONS-SCNC: 12 MMOL/L (ref 7–15)
BASOPHILS # BLD AUTO: 0 10E3/UL (ref 0–0.2)
BASOPHILS NFR BLD AUTO: 0 %
BUN SERPL-MCNC: 17 MG/DL (ref 8–23)
CALCIUM SERPL-MCNC: 8.6 MG/DL (ref 8.8–10.2)
CHLORIDE SERPL-SCNC: 104 MMOL/L (ref 98–107)
CREAT SERPL-MCNC: 0.9 MG/DL (ref 0.51–0.95)
DEPRECATED HCO3 PLAS-SCNC: 25 MMOL/L (ref 22–29)
EOSINOPHIL # BLD AUTO: 0.1 10E3/UL (ref 0–0.7)
EOSINOPHIL NFR BLD AUTO: 3 %
ERYTHROCYTE [DISTWIDTH] IN BLOOD BY AUTOMATED COUNT: 13.6 % (ref 10–15)
GFR SERPL CREATININE-BSD FRML MDRD: 64 ML/MIN/1.73M2
GLUCOSE SERPL-MCNC: 91 MG/DL (ref 70–99)
HBA1C MFR BLD: 6.4 %
HCT VFR BLD AUTO: 37.1 % (ref 35–47)
HGB BLD-MCNC: 12.3 G/DL (ref 11.7–15.7)
IMM GRANULOCYTES # BLD: 0 10E3/UL
IMM GRANULOCYTES NFR BLD: 0 %
LYMPHOCYTES # BLD AUTO: 0.8 10E3/UL (ref 0.8–5.3)
LYMPHOCYTES NFR BLD AUTO: 17 %
MCH RBC QN AUTO: 33.5 PG (ref 26.5–33)
MCHC RBC AUTO-ENTMCNC: 33.2 G/DL (ref 31.5–36.5)
MCV RBC AUTO: 101 FL (ref 78–100)
MONOCYTES # BLD AUTO: 0.5 10E3/UL (ref 0–1.3)
MONOCYTES NFR BLD AUTO: 11 %
NEUTROPHILS # BLD AUTO: 3 10E3/UL (ref 1.6–8.3)
NEUTROPHILS NFR BLD AUTO: 69 %
NRBC # BLD AUTO: 0 10E3/UL
NRBC BLD AUTO-RTO: 0 /100
PLATELET # BLD AUTO: 144 10E3/UL (ref 150–450)
POTASSIUM SERPL-SCNC: 4.4 MMOL/L (ref 3.4–5.3)
RBC # BLD AUTO: 3.67 10E6/UL (ref 3.8–5.2)
SODIUM SERPL-SCNC: 141 MMOL/L (ref 136–145)
T4 FREE SERPL-MCNC: 1.12 NG/DL (ref 0.9–1.7)
TSH SERPL DL<=0.005 MIU/L-ACNC: 10.04 UIU/ML (ref 0.3–4.2)
WBC # BLD AUTO: 4.5 10E3/UL (ref 4–11)

## 2023-09-06 PROCEDURE — 84439 ASSAY OF FREE THYROXINE: CPT | Mod: ORL

## 2023-09-06 PROCEDURE — 84443 ASSAY THYROID STIM HORMONE: CPT | Mod: ORL

## 2023-09-06 PROCEDURE — 83036 HEMOGLOBIN GLYCOSYLATED A1C: CPT | Mod: ORL

## 2023-09-06 PROCEDURE — 36415 COLL VENOUS BLD VENIPUNCTURE: CPT | Mod: ORL

## 2023-09-06 PROCEDURE — 80048 BASIC METABOLIC PNL TOTAL CA: CPT | Mod: ORL

## 2023-09-06 PROCEDURE — P9603 ONE-WAY ALLOW PRORATED MILES: HCPCS | Mod: ORL

## 2023-09-06 PROCEDURE — 85025 COMPLETE CBC W/AUTO DIFF WBC: CPT | Mod: ORL

## 2024-01-25 ENCOUNTER — LAB REQUISITION (OUTPATIENT)
Dept: LAB | Facility: CLINIC | Age: 84
End: 2024-01-25
Payer: COMMERCIAL

## 2024-01-25 DIAGNOSIS — Z79.899 OTHER LONG TERM (CURRENT) DRUG THERAPY: ICD-10-CM

## 2024-01-25 DIAGNOSIS — I10 ESSENTIAL (PRIMARY) HYPERTENSION: ICD-10-CM

## 2024-01-26 LAB
CREAT SERPL-MCNC: 1.33 MG/DL (ref 0.51–0.95)
EGFRCR SERPLBLD CKD-EPI 2021: 40 ML/MIN/1.73M2
POTASSIUM SERPL-SCNC: 3.7 MMOL/L (ref 3.4–5.3)

## 2024-01-26 PROCEDURE — 82565 ASSAY OF CREATININE: CPT | Mod: ORL

## 2024-01-26 PROCEDURE — 84132 ASSAY OF SERUM POTASSIUM: CPT | Mod: ORL

## 2024-01-26 PROCEDURE — P9603 ONE-WAY ALLOW PRORATED MILES: HCPCS | Mod: ORL

## 2024-01-26 PROCEDURE — 36415 COLL VENOUS BLD VENIPUNCTURE: CPT | Mod: ORL

## 2024-03-17 ENCOUNTER — LAB REQUISITION (OUTPATIENT)
Dept: LAB | Facility: CLINIC | Age: 84
End: 2024-03-17
Payer: COMMERCIAL

## 2024-03-17 DIAGNOSIS — E03.9 HYPOTHYROIDISM, UNSPECIFIED: ICD-10-CM

## 2024-03-17 DIAGNOSIS — E11.9 TYPE 2 DIABETES MELLITUS WITHOUT COMPLICATIONS (H): ICD-10-CM

## 2024-03-17 DIAGNOSIS — I50.42 CHRONIC COMBINED SYSTOLIC (CONGESTIVE) AND DIASTOLIC (CONGESTIVE) HEART FAILURE (H): ICD-10-CM

## 2024-03-18 LAB
ALBUMIN SERPL BCG-MCNC: 3 G/DL (ref 3.5–5.2)
ALP SERPL-CCNC: 76 U/L (ref 40–150)
ALT SERPL W P-5'-P-CCNC: 9 U/L (ref 0–50)
ANION GAP SERPL CALCULATED.3IONS-SCNC: 8 MMOL/L (ref 7–15)
AST SERPL W P-5'-P-CCNC: 15 U/L (ref 0–45)
BASOPHILS # BLD AUTO: 0 10E3/UL (ref 0–0.2)
BASOPHILS NFR BLD AUTO: 1 %
BILIRUB SERPL-MCNC: 0.3 MG/DL
BUN SERPL-MCNC: 14.6 MG/DL (ref 8–23)
CALCIUM SERPL-MCNC: 8.6 MG/DL (ref 8.8–10.2)
CHLORIDE SERPL-SCNC: 106 MMOL/L (ref 98–107)
CREAT SERPL-MCNC: 0.8 MG/DL (ref 0.51–0.95)
DEPRECATED HCO3 PLAS-SCNC: 27 MMOL/L (ref 22–29)
EGFRCR SERPLBLD CKD-EPI 2021: 73 ML/MIN/1.73M2
EOSINOPHIL # BLD AUTO: 0.2 10E3/UL (ref 0–0.7)
EOSINOPHIL NFR BLD AUTO: 5 %
ERYTHROCYTE [DISTWIDTH] IN BLOOD BY AUTOMATED COUNT: 13.2 % (ref 10–15)
GLUCOSE SERPL-MCNC: 93 MG/DL (ref 70–99)
HBA1C MFR BLD: 6.1 %
HCT VFR BLD AUTO: 33.4 % (ref 35–47)
HGB BLD-MCNC: 10.8 G/DL (ref 11.7–15.7)
IMM GRANULOCYTES # BLD: 0 10E3/UL
IMM GRANULOCYTES NFR BLD: 0 %
LYMPHOCYTES # BLD AUTO: 1.6 10E3/UL (ref 0.8–5.3)
LYMPHOCYTES NFR BLD AUTO: 37 %
MCH RBC QN AUTO: 34 PG (ref 26.5–33)
MCHC RBC AUTO-ENTMCNC: 32.3 G/DL (ref 31.5–36.5)
MCV RBC AUTO: 105 FL (ref 78–100)
MONOCYTES # BLD AUTO: 0.7 10E3/UL (ref 0–1.3)
MONOCYTES NFR BLD AUTO: 17 %
NEUTROPHILS # BLD AUTO: 1.7 10E3/UL (ref 1.6–8.3)
NEUTROPHILS NFR BLD AUTO: 40 %
NRBC # BLD AUTO: 0 10E3/UL
NRBC BLD AUTO-RTO: 0 /100
PLATELET # BLD AUTO: 179 10E3/UL (ref 150–450)
POTASSIUM SERPL-SCNC: 4.6 MMOL/L (ref 3.4–5.3)
PROT SERPL-MCNC: 6.1 G/DL (ref 6.4–8.3)
RBC # BLD AUTO: 3.18 10E6/UL (ref 3.8–5.2)
SODIUM SERPL-SCNC: 141 MMOL/L (ref 135–145)
TSH SERPL DL<=0.005 MIU/L-ACNC: 3.9 UIU/ML (ref 0.3–4.2)
WBC # BLD AUTO: 4.2 10E3/UL (ref 4–11)

## 2024-03-18 PROCEDURE — 85025 COMPLETE CBC W/AUTO DIFF WBC: CPT | Mod: ORL | Performed by: NURSE PRACTITIONER

## 2024-03-18 PROCEDURE — 83036 HEMOGLOBIN GLYCOSYLATED A1C: CPT | Mod: ORL | Performed by: NURSE PRACTITIONER

## 2024-03-18 PROCEDURE — P9603 ONE-WAY ALLOW PRORATED MILES: HCPCS | Mod: ORL | Performed by: NURSE PRACTITIONER

## 2024-03-18 PROCEDURE — 36415 COLL VENOUS BLD VENIPUNCTURE: CPT | Mod: ORL | Performed by: NURSE PRACTITIONER

## 2024-03-18 PROCEDURE — 80053 COMPREHEN METABOLIC PANEL: CPT | Mod: ORL | Performed by: NURSE PRACTITIONER

## 2024-03-18 PROCEDURE — 84443 ASSAY THYROID STIM HORMONE: CPT | Mod: ORL | Performed by: NURSE PRACTITIONER
